# Patient Record
Sex: MALE | Race: BLACK OR AFRICAN AMERICAN | NOT HISPANIC OR LATINO | Employment: FULL TIME | URBAN - METROPOLITAN AREA
[De-identification: names, ages, dates, MRNs, and addresses within clinical notes are randomized per-mention and may not be internally consistent; named-entity substitution may affect disease eponyms.]

---

## 2017-01-26 ENCOUNTER — TELEPHONE (OUTPATIENT)
Dept: ONCOLOGY | Facility: HOSPITAL | Age: 62
End: 2017-01-26

## 2017-01-26 ENCOUNTER — TELEPHONE (OUTPATIENT)
Dept: GENERAL RADIOLOGY | Facility: HOSPITAL | Age: 62
End: 2017-01-26

## 2017-01-26 DIAGNOSIS — I26.99 OTHER PULMONARY EMBOLISM WITHOUT ACUTE COR PULMONALE, UNSPECIFIED CHRONICITY (HCC): Primary | ICD-10-CM

## 2017-01-26 NOTE — TELEPHONE ENCOUNTER
----- Message from Sandra Pelaez RN sent at 1/26/2017  3:49 PM EST -----  Contact: [phone]  Please call pt and set him up for INR and coag RN visit. Thanks

## 2017-01-26 NOTE — TELEPHONE ENCOUNTER
----- Message from Alba Dick sent at 1/26/2017  3:42 PM EST -----  Contact: self   Pt is calling to set up appt his labs      [phone]    Pt calling to set up an appt to come in for an INR check. Message sent to scheduling to make appt. Order for INR placed

## 2017-02-01 ENCOUNTER — APPOINTMENT (OUTPATIENT)
Dept: ONCOLOGY | Facility: HOSPITAL | Age: 62
End: 2017-02-01

## 2017-02-01 ENCOUNTER — APPOINTMENT (OUTPATIENT)
Dept: LAB | Facility: HOSPITAL | Age: 62
End: 2017-02-01

## 2017-02-03 ENCOUNTER — CLINICAL SUPPORT (OUTPATIENT)
Dept: ONCOLOGY | Facility: HOSPITAL | Age: 62
End: 2017-02-03

## 2017-02-03 ENCOUNTER — LAB (OUTPATIENT)
Dept: LAB | Facility: HOSPITAL | Age: 62
End: 2017-02-03

## 2017-02-03 DIAGNOSIS — I26.09 OTHER PULMONARY EMBOLISM WITH ACUTE COR PULMONALE, UNSPECIFIED CHRONICITY (HCC): Primary | ICD-10-CM

## 2017-02-03 LAB
INR PPP: 1.9 (ref 0.9–1.1)
PROTHROMBIN TIME: 23.3 SECONDS (ref 11–13.5)

## 2017-02-03 PROCEDURE — 85610 PROTHROMBIN TIME: CPT | Performed by: INTERNAL MEDICINE

## 2017-02-03 RX ORDER — WARFARIN SODIUM 5 MG/1
TABLET ORAL
Qty: 60 TABLET | Refills: 2 | Status: SHIPPED | OUTPATIENT
Start: 2017-02-03 | End: 2017-05-09 | Stop reason: SDUPTHER

## 2017-02-03 NOTE — PROGRESS NOTES
Patient has been non compliant with taking his coumadin.  He has also missed several months of having this monitored.  Patient was given new coumadin dosing and states that he will be more compliant with taking his meds.

## 2017-03-23 ENCOUNTER — TELEPHONE (OUTPATIENT)
Dept: FAMILY MEDICINE CLINIC | Facility: CLINIC | Age: 62
End: 2017-03-23

## 2017-03-23 RX ORDER — LOSARTAN POTASSIUM 50 MG/1
TABLET ORAL
Qty: 30 TABLET | Refills: 1 | Status: SHIPPED | OUTPATIENT
Start: 2017-03-23 | End: 2017-06-16 | Stop reason: ALTCHOICE

## 2017-03-23 RX ORDER — SILDENAFIL CITRATE 100 MG
TABLET ORAL
Qty: 7 TABLET | Refills: 2 | Status: SHIPPED | OUTPATIENT
Start: 2017-03-23 | End: 2017-06-16 | Stop reason: ALTCHOICE

## 2017-03-23 NOTE — TELEPHONE ENCOUNTER
Patient was called and informed he could not take Viagra with nitroglycerin and could lead to death

## 2017-05-09 RX ORDER — WARFARIN SODIUM 5 MG/1
TABLET ORAL
Qty: 12 TABLET | Refills: 0 | Status: SHIPPED | OUTPATIENT
Start: 2017-05-09 | End: 2017-05-12 | Stop reason: ALTCHOICE

## 2017-05-10 ENCOUNTER — TELEPHONE (OUTPATIENT)
Dept: FAMILY MEDICINE CLINIC | Facility: CLINIC | Age: 62
End: 2017-05-10

## 2017-05-10 ENCOUNTER — OFFICE VISIT (OUTPATIENT)
Dept: FAMILY MEDICINE CLINIC | Facility: CLINIC | Age: 62
End: 2017-05-10

## 2017-05-10 VITALS
OXYGEN SATURATION: 97 % | SYSTOLIC BLOOD PRESSURE: 128 MMHG | TEMPERATURE: 98 F | HEIGHT: 71 IN | BODY MASS INDEX: 33.32 KG/M2 | DIASTOLIC BLOOD PRESSURE: 80 MMHG | WEIGHT: 238 LBS | HEART RATE: 77 BPM

## 2017-05-10 DIAGNOSIS — R06.09 DYSPNEA ON EXERTION: ICD-10-CM

## 2017-05-10 DIAGNOSIS — R53.82 CHRONIC FATIGUE: ICD-10-CM

## 2017-05-10 DIAGNOSIS — Z11.59 NEED FOR HEPATITIS C SCREENING TEST: ICD-10-CM

## 2017-05-10 DIAGNOSIS — I10 ESSENTIAL HYPERTENSION: ICD-10-CM

## 2017-05-10 DIAGNOSIS — D36.7: Primary | ICD-10-CM

## 2017-05-10 LAB
25(OH)D3 SERPL-MCNC: 8.7 NG/ML (ref 30–100)
ALBUMIN SERPL-MCNC: 4 G/DL (ref 3.5–5.2)
ALBUMIN/GLOB SERPL: 1 G/DL
ALP SERPL-CCNC: 61 U/L (ref 39–117)
ALT SERPL W P-5'-P-CCNC: 20 U/L (ref 1–41)
ANION GAP SERPL CALCULATED.3IONS-SCNC: 10.5 MMOL/L
AST SERPL-CCNC: 22 U/L (ref 1–40)
BACTERIA UR QL AUTO: NORMAL /HPF
BILIRUB SERPL-MCNC: 0.7 MG/DL (ref 0.1–1.2)
BILIRUB UR QL STRIP: NEGATIVE
BUN BLD-MCNC: 12 MG/DL (ref 8–23)
BUN/CREAT SERPL: 9.5 (ref 7–25)
CALCIUM SPEC-SCNC: 9.5 MG/DL (ref 8.6–10.5)
CHLORIDE SERPL-SCNC: 101 MMOL/L (ref 98–107)
CHOLEST SERPL-MCNC: 232 MG/DL (ref 0–200)
CLARITY UR: CLEAR
CO2 SERPL-SCNC: 26.5 MMOL/L (ref 22–29)
COLOR UR: YELLOW
CREAT BLD-MCNC: 1.26 MG/DL (ref 0.76–1.27)
ERYTHROCYTE [DISTWIDTH] IN BLOOD BY AUTOMATED COUNT: 14 % (ref 4.5–15)
GFR SERPL CREATININE-BSD FRML MDRD: 70 ML/MIN/1.73
GLOBULIN UR ELPH-MCNC: 3.9 GM/DL
GLUCOSE BLD-MCNC: 111 MG/DL (ref 65–99)
GLUCOSE UR STRIP-MCNC: NEGATIVE MG/DL
HCT VFR BLD AUTO: 38.9 % (ref 35–60)
HCV AB SER DONR QL: NORMAL
HDLC SERPL-MCNC: 61 MG/DL (ref 40–60)
HGB BLD-MCNC: 13 G/DL (ref 13.5–18)
HGB UR QL STRIP.AUTO: NEGATIVE
KETONES UR QL STRIP: NEGATIVE
LDLC SERPL CALC-MCNC: 140 MG/DL (ref 0–100)
LDLC/HDLC SERPL: 2.3 {RATIO}
LEUKOCYTE ESTERASE UR QL STRIP.AUTO: NEGATIVE
LYMPHOCYTES # BLD AUTO: 2.6 10*3/MM3 (ref 1.2–3.4)
LYMPHOCYTES NFR BLD AUTO: 44.4 % (ref 21–51)
MCH RBC QN AUTO: 30.3 PG (ref 26.1–33.1)
MCHC RBC AUTO-ENTMCNC: 33.3 G/DL (ref 33–37)
MCV RBC AUTO: 90.9 FL (ref 80–99)
MONOCYTES # BLD AUTO: 0.5 10*3/MM3 (ref 0.1–0.6)
MONOCYTES NFR BLD AUTO: 9.3 % (ref 2–9)
MUCOUS THREADS URNS QL MICRO: NORMAL /HPF
NEUTROPHILS # BLD AUTO: 2.7 10*3/MM3 (ref 1.4–6.5)
NEUTROPHILS NFR BLD AUTO: 46.3 % (ref 42–75)
NITRITE UR QL STRIP: NEGATIVE
PH UR STRIP.AUTO: 5.5 [PH] (ref 4.6–8)
PLATELET # BLD AUTO: 254 10*3/MM3 (ref 150–450)
PMV BLD AUTO: 7.5 FL (ref 7.1–10.5)
POTASSIUM BLD-SCNC: 4.3 MMOL/L (ref 3.5–5.2)
PROT SERPL-MCNC: 7.9 G/DL (ref 6–8.5)
PROT UR QL STRIP: ABNORMAL
RBC # BLD AUTO: 4.28 10*6/MM3 (ref 4–6)
RBC # UR: NORMAL /HPF
REF LAB TEST METHOD: NORMAL
SODIUM BLD-SCNC: 138 MMOL/L (ref 136–145)
SP GR UR STRIP: 1.02 (ref 1–1.03)
SQUAMOUS #/AREA URNS HPF: NORMAL /HPF
TRIGL SERPL-MCNC: 153 MG/DL (ref 0–150)
TSH SERPL DL<=0.05 MIU/L-ACNC: 1.34 MIU/ML (ref 0.27–4.2)
UROBILINOGEN UR QL STRIP: ABNORMAL
VLDLC SERPL-MCNC: 30.6 MG/DL (ref 5–40)
WBC NRBC COR # BLD: 5.9 10*3/MM3 (ref 4.5–10)
WBC UR QL AUTO: NORMAL /HPF

## 2017-05-10 PROCEDURE — 81001 URINALYSIS AUTO W/SCOPE: CPT | Performed by: NURSE PRACTITIONER

## 2017-05-10 PROCEDURE — 85025 COMPLETE CBC W/AUTO DIFF WBC: CPT | Performed by: NURSE PRACTITIONER

## 2017-05-10 PROCEDURE — 80053 COMPREHEN METABOLIC PANEL: CPT | Performed by: NURSE PRACTITIONER

## 2017-05-10 PROCEDURE — 84443 ASSAY THYROID STIM HORMONE: CPT | Performed by: NURSE PRACTITIONER

## 2017-05-10 PROCEDURE — 82306 VITAMIN D 25 HYDROXY: CPT | Performed by: NURSE PRACTITIONER

## 2017-05-10 PROCEDURE — 80061 LIPID PANEL: CPT | Performed by: NURSE PRACTITIONER

## 2017-05-10 PROCEDURE — 86803 HEPATITIS C AB TEST: CPT | Performed by: NURSE PRACTITIONER

## 2017-05-10 PROCEDURE — 99214 OFFICE O/P EST MOD 30 MIN: CPT | Performed by: NURSE PRACTITIONER

## 2017-05-10 RX ORDER — ERGOCALCIFEROL 1.25 MG/1
50000 CAPSULE ORAL WEEKLY
Qty: 12 CAPSULE | Refills: 0 | Status: SHIPPED | OUTPATIENT
Start: 2017-05-10 | End: 2017-06-16 | Stop reason: ALTCHOICE

## 2017-05-11 ENCOUNTER — APPOINTMENT (OUTPATIENT)
Dept: LAB | Facility: HOSPITAL | Age: 62
End: 2017-05-11

## 2017-05-11 ENCOUNTER — APPOINTMENT (OUTPATIENT)
Dept: ONCOLOGY | Facility: HOSPITAL | Age: 62
End: 2017-05-11

## 2017-05-12 ENCOUNTER — HOSPITAL ENCOUNTER (OUTPATIENT)
Dept: ULTRASOUND IMAGING | Facility: HOSPITAL | Age: 62
Discharge: HOME | End: 2017-05-12
Admitting: NURSE PRACTITIONER

## 2017-05-12 ENCOUNTER — CLINICAL SUPPORT (OUTPATIENT)
Dept: ONCOLOGY | Facility: HOSPITAL | Age: 62
End: 2017-05-12

## 2017-05-12 ENCOUNTER — ANTICOAGULATION VISIT (OUTPATIENT)
Dept: LAB | Facility: HOSPITAL | Age: 62
End: 2017-05-12

## 2017-05-12 DIAGNOSIS — I26.99 OTHER PULMONARY EMBOLISM WITHOUT ACUTE COR PULMONALE, UNSPECIFIED CHRONICITY (HCC): ICD-10-CM

## 2017-05-12 DIAGNOSIS — D36.7: ICD-10-CM

## 2017-05-12 LAB
INR PPP: 1.1 (ref 0.9–1.1)
PROTHROMBIN TIME: 13.5 SECONDS (ref 11–13.5)

## 2017-05-12 PROCEDURE — 85610 PROTHROMBIN TIME: CPT | Performed by: INTERNAL MEDICINE

## 2017-05-12 PROCEDURE — 76882 US LMTD JT/FCL EVL NVASC XTR: CPT

## 2017-05-16 ENCOUNTER — TELEPHONE (OUTPATIENT)
Dept: FAMILY MEDICINE CLINIC | Facility: CLINIC | Age: 62
End: 2017-05-16

## 2017-05-19 ENCOUNTER — OFFICE VISIT (OUTPATIENT)
Dept: FAMILY MEDICINE CLINIC | Facility: CLINIC | Age: 62
End: 2017-05-19

## 2017-05-19 VITALS
HEIGHT: 72 IN | TEMPERATURE: 98 F | SYSTOLIC BLOOD PRESSURE: 122 MMHG | DIASTOLIC BLOOD PRESSURE: 78 MMHG | BODY MASS INDEX: 32.1 KG/M2 | WEIGHT: 237 LBS | OXYGEN SATURATION: 94 % | HEART RATE: 73 BPM

## 2017-05-19 DIAGNOSIS — Z79.01 CHRONIC ANTICOAGULATION: ICD-10-CM

## 2017-05-19 DIAGNOSIS — M79.604 RIGHT LEG PAIN: Primary | ICD-10-CM

## 2017-05-19 DIAGNOSIS — Z86.711 HISTORY OF PULMONARY EMBOLISM: ICD-10-CM

## 2017-05-19 DIAGNOSIS — D68.62 LUPUS ANTICOAGULANT DISORDER (HCC): ICD-10-CM

## 2017-05-19 DIAGNOSIS — E66.09 NON MORBID OBESITY DUE TO EXCESS CALORIES: ICD-10-CM

## 2017-05-19 PROCEDURE — 99213 OFFICE O/P EST LOW 20 MIN: CPT | Performed by: NURSE PRACTITIONER

## 2017-06-16 ENCOUNTER — OFFICE VISIT (OUTPATIENT)
Dept: FAMILY MEDICINE CLINIC | Facility: CLINIC | Age: 62
End: 2017-06-16

## 2017-06-16 VITALS
HEIGHT: 72 IN | BODY MASS INDEX: 32.37 KG/M2 | DIASTOLIC BLOOD PRESSURE: 82 MMHG | HEART RATE: 80 BPM | TEMPERATURE: 97.8 F | WEIGHT: 239 LBS | SYSTOLIC BLOOD PRESSURE: 132 MMHG | OXYGEN SATURATION: 97 %

## 2017-06-16 DIAGNOSIS — L08.9 BLISTER OF TOE OF RIGHT FOOT WITH INFECTION, INITIAL ENCOUNTER: Primary | ICD-10-CM

## 2017-06-16 DIAGNOSIS — S90.426A: ICD-10-CM

## 2017-06-16 DIAGNOSIS — S90.424A BLISTER OF TOE OF RIGHT FOOT WITH INFECTION, INITIAL ENCOUNTER: Primary | ICD-10-CM

## 2017-06-16 DIAGNOSIS — L08.9: ICD-10-CM

## 2017-06-16 DIAGNOSIS — S90.829A: ICD-10-CM

## 2017-06-16 LAB
ANION GAP SERPL CALCULATED.3IONS-SCNC: 12.4 MMOL/L
BUN BLD-MCNC: 15 MG/DL (ref 8–23)
BUN/CREAT SERPL: 12.3 (ref 7–25)
CALCIUM SPEC-SCNC: 9.5 MG/DL (ref 8.6–10.5)
CHLORIDE SERPL-SCNC: 101 MMOL/L (ref 98–107)
CO2 SERPL-SCNC: 25.6 MMOL/L (ref 22–29)
CREAT BLD-MCNC: 1.22 MG/DL (ref 0.76–1.27)
ERYTHROCYTE [DISTWIDTH] IN BLOOD BY AUTOMATED COUNT: 14.4 % (ref 4.5–15)
GFR SERPL CREATININE-BSD FRML MDRD: 73 ML/MIN/1.73
GLUCOSE BLD-MCNC: 93 MG/DL (ref 65–99)
HCT VFR BLD AUTO: 38.2 % (ref 35–60)
HGB BLD-MCNC: 12.5 G/DL (ref 13.5–18)
LYMPHOCYTES # BLD AUTO: 2.1 10*3/MM3 (ref 1.2–3.4)
LYMPHOCYTES NFR BLD AUTO: 48.7 % (ref 21–51)
MCH RBC QN AUTO: 29.7 PG (ref 26.1–33.1)
MCHC RBC AUTO-ENTMCNC: 32.8 G/DL (ref 33–37)
MCV RBC AUTO: 90.4 FL (ref 80–99)
MONOCYTES # BLD AUTO: 0.5 10*3/MM3 (ref 0.1–0.6)
MONOCYTES NFR BLD AUTO: 10.6 % (ref 2–9)
NEUTROPHILS # BLD AUTO: 1.8 10*3/MM3 (ref 1.4–6.5)
NEUTROPHILS NFR BLD AUTO: 40.7 % (ref 42–75)
PLATELET # BLD AUTO: 247 10*3/MM3 (ref 150–450)
PMV BLD AUTO: 7.5 FL (ref 7.1–10.5)
POTASSIUM BLD-SCNC: 4.7 MMOL/L (ref 3.5–5.2)
RBC # BLD AUTO: 4.23 10*6/MM3 (ref 4–6)
SODIUM BLD-SCNC: 139 MMOL/L (ref 136–145)
URATE SERPL-MCNC: 5.9 MG/DL (ref 3.4–7)
WBC NRBC COR # BLD: 4.4 10*3/MM3 (ref 4.5–10)

## 2017-06-16 PROCEDURE — 99213 OFFICE O/P EST LOW 20 MIN: CPT | Performed by: NURSE PRACTITIONER

## 2017-06-16 PROCEDURE — 84550 ASSAY OF BLOOD/URIC ACID: CPT | Performed by: NURSE PRACTITIONER

## 2017-06-16 PROCEDURE — 85025 COMPLETE CBC W/AUTO DIFF WBC: CPT | Performed by: NURSE PRACTITIONER

## 2017-06-16 PROCEDURE — 80048 BASIC METABOLIC PNL TOTAL CA: CPT | Performed by: NURSE PRACTITIONER

## 2017-06-16 RX ORDER — MUPIROCIN CALCIUM 20 MG/G
CREAM TOPICAL 2 TIMES DAILY
Qty: 30 G | Refills: 0 | Status: SHIPPED | OUTPATIENT
Start: 2017-06-16 | End: 2017-10-02

## 2017-06-16 RX ORDER — SULFAMETHOXAZOLE AND TRIMETHOPRIM 800; 160 MG/1; MG/1
1 TABLET ORAL 2 TIMES DAILY
Qty: 14 TABLET | Refills: 0 | Status: SHIPPED | OUTPATIENT
Start: 2017-06-16 | End: 2017-06-16 | Stop reason: SDUPTHER

## 2017-06-16 RX ORDER — SULFAMETHOXAZOLE AND TRIMETHOPRIM 800; 160 MG/1; MG/1
1 TABLET ORAL 2 TIMES DAILY
Qty: 20 TABLET | Refills: 0 | Status: SHIPPED | OUTPATIENT
Start: 2017-06-16 | End: 2017-06-26

## 2017-06-16 RX ORDER — ATORVASTATIN CALCIUM 40 MG/1
40 TABLET, FILM COATED ORAL DAILY
COMMUNITY
End: 2017-10-02 | Stop reason: SDUPTHER

## 2017-06-16 RX ORDER — LOSARTAN POTASSIUM 50 MG/1
50 TABLET ORAL DAILY
COMMUNITY
End: 2017-10-02 | Stop reason: SDUPTHER

## 2017-06-16 NOTE — PROGRESS NOTES
Subjective   Jared Rose is a 61 y.o. male.     History of Present Illness   C/o blister and swelling on right foot, he states this is a new problem today. He states he noticed the blisters for a while, he saw a podiatrist for this before, he denies any DM hx. He states the blisters have been on his toes for about 1-2 months, he states it is hard to put his shoe on, he states he walks on the sides of his foot, he states the blisters are draining. He states he is taking xarelto, for blood clotting disorder, sees Dr. Monroy for this. He stands on his feet a lot, he is security at [employer], he states his shoes are not new.     The following portions of the patient's history were reviewed and updated as appropriate: allergies, current medications, past family history, past medical history, past social history, past surgical history and problem list.    Review of Systems   Constitutional: Negative for chills, diaphoresis and fever.   Respiratory: Negative for shortness of breath.    Cardiovascular: Negative for chest pain and leg swelling.   Musculoskeletal: Positive for gait problem and myalgias. Negative for arthralgias.   Skin: Positive for color change and wound. Negative for pallor.   Neurological: Negative for dizziness, light-headedness and headaches.   All other systems reviewed and are negative.      Objective   Physical Exam   Constitutional: He is oriented to person, place, and time. He appears well-developed and well-nourished.   HENT:   Head: Normocephalic.   Eyes: Pupils are equal, round, and reactive to light.   Neck: Neck supple.   Cardiovascular: Normal rate, regular rhythm, normal heart sounds and intact distal pulses.    Pulses:       Dorsalis pedis pulses are 2+ on the right side, and 2+ on the left side.        Posterior tibial pulses are 2+ on the right side, and 2+ on the left side.   Pulmonary/Chest: Effort normal and breath sounds normal.   Musculoskeletal: Normal range of motion.         Left foot: There is tenderness and swelling. There is normal range of motion, no bony tenderness, normal capillary refill, no crepitus and no deformity.        Neurological: He is alert and oriented to person, place, and time.   Skin: Skin is warm and dry.   Psychiatric: He has a normal mood and affect. His behavior is normal. Judgment and thought content normal.   Nursing note and vitals reviewed.      Assessment/Plan   Jared was seen today for blister and edema.    Diagnoses and all orders for this visit:    Blister of toe of right foot with infection, initial encounter  -     Ambulatory Referral to Wound Clinic  -     Basic metabolic panel  -     Uric Acid  -     Miscellaneous DME  -     Ambulatory Referral to Podiatry  -     CBC & Differential  -     CBC Auto Differential    Foot and toe(s), blister, infected  -     Ambulatory Referral to Wound Clinic  -     Basic metabolic panel  -     Uric Acid  -     Miscellaneous DME  -     Ambulatory Referral to Podiatry  -     CBC & Differential  -     CBC Auto Differential    Other orders  -     Discontinue: sulfamethoxazole-trimethoprim (BACTRIM DS) 800-160 MG per tablet; Take 1 tablet by mouth 2 (Two) Times a Day for 7 days.  -     mupirocin (BACTROBAN) 2 % cream; Apply  topically 2 (Two) Times a Day.  -     sulfamethoxazole-trimethoprim (BACTRIM DS) 800-160 MG per tablet; Take 1 tablet by mouth 2 (Two) Times a Day for 10 days.        Apply bactroban cream to open blisters, in between toes, and to bottom of foot, keep wounds clean and dry, change dressing 2x day.  DME for post op shoe, wear until seen by podiatrist.  Encourage good shoes for work.  CBC,BMP and uric acid today, will call with results.  Start Bactrim DS 2x day for 10 days, call with problems.  F/u with wound clinic as indicated appt on July 3rd.   F/u in 1 week.   Increase fluid intake, get plenty of rest.   Patient agrees with plan of care and understands instructions. Call if worsening symptoms or  any problems or concerns.

## 2017-06-16 NOTE — PATIENT INSTRUCTIONS
Apply bactroban cream to open blisters, in between toes, and to bottom of foot, keep wounds clean and dry, change dressing 2x day.  DME for post op shoe, wear until seen by podiatrist.  Encourage good shoes for work.  CBC,BMP and uric acid today, will call with results.  Start Bactrim DS 2x day for 10 days, call with problems.  F/u with wound clinic as indicated appt on July 3rd.   F/u in 1 week.   May try tylenol as needed for pain.   Elevate leg above heart level, encourage rest.  Work note given.   Increase fluid intake, get plenty of rest.   Patient agrees with plan of care and understands instructions. Call if worsening symptoms or any problems or concerns.

## 2017-06-17 DIAGNOSIS — R79.89 ABNORMAL CBC: ICD-10-CM

## 2017-06-17 DIAGNOSIS — D64.9 LOW HEMOGLOBIN: Primary | ICD-10-CM

## 2017-06-19 ENCOUNTER — TELEPHONE (OUTPATIENT)
Dept: FAMILY MEDICINE CLINIC | Facility: CLINIC | Age: 62
End: 2017-06-19

## 2017-06-19 NOTE — TELEPHONE ENCOUNTER
[facility]    ----- Message from MALIA Thorpe sent at 6/17/2017  9:12 AM EDT -----  Please call patient with results.  BS and kidney func normal. Uric acid normal. Blood counts and hemoglobin low, recommend recheck in about 1 week and occult card. He can do this at f/u next Friday.

## 2017-06-20 ENCOUNTER — TELEPHONE (OUTPATIENT)
Dept: FAMILY MEDICINE CLINIC | Facility: CLINIC | Age: 62
End: 2017-06-20

## 2017-06-20 NOTE — TELEPHONE ENCOUNTER
Pt informed    ----- Message from MALIA Thorpe sent at 6/17/2017  9:12 AM EDT -----  Please call patient with results.  BS and kidney func normal. Uric acid normal. Blood counts and hemoglobin low, recommend recheck in about 1 week and occult card. He can do this at f/u next Friday.

## 2017-06-23 ENCOUNTER — OFFICE VISIT (OUTPATIENT)
Dept: ONCOLOGY | Facility: CLINIC | Age: 62
End: 2017-06-23

## 2017-06-23 ENCOUNTER — LAB (OUTPATIENT)
Dept: LAB | Facility: HOSPITAL | Age: 62
End: 2017-06-23

## 2017-06-23 VITALS
SYSTOLIC BLOOD PRESSURE: 132 MMHG | BODY MASS INDEX: 34.19 KG/M2 | HEART RATE: 95 BPM | RESPIRATION RATE: 16 BRPM | TEMPERATURE: 98.1 F | DIASTOLIC BLOOD PRESSURE: 80 MMHG | HEIGHT: 70 IN | WEIGHT: 238.8 LBS | OXYGEN SATURATION: 96 %

## 2017-06-23 DIAGNOSIS — D68.59 THROMBOPHILIA (HCC): Primary | ICD-10-CM

## 2017-06-23 DIAGNOSIS — Z79.01 CHRONIC ANTICOAGULATION: ICD-10-CM

## 2017-06-23 DIAGNOSIS — I26.99 OTHER PULMONARY EMBOLISM WITHOUT ACUTE COR PULMONALE, UNSPECIFIED CHRONICITY (HCC): ICD-10-CM

## 2017-06-23 DIAGNOSIS — D68.62 LUPUS ANTICOAGULANT DISORDER (HCC): ICD-10-CM

## 2017-06-23 LAB
BASOPHILS # BLD AUTO: 0.04 10*3/MM3 (ref 0–0.1)
BASOPHILS NFR BLD AUTO: 0.8 % (ref 0–1.1)
DEPRECATED RDW RBC AUTO: 45.5 FL (ref 37–49)
EOSINOPHIL # BLD AUTO: 0.18 10*3/MM3 (ref 0–0.36)
EOSINOPHIL NFR BLD AUTO: 3.7 % (ref 1–5)
ERYTHROCYTE [DISTWIDTH] IN BLOOD BY AUTOMATED COUNT: 14 % (ref 11.7–14.5)
HCT VFR BLD AUTO: 37.6 % (ref 40–49)
HGB BLD-MCNC: 13.1 G/DL (ref 13.5–16.5)
IMM GRANULOCYTES # BLD: 0.01 10*3/MM3 (ref 0–0.03)
IMM GRANULOCYTES NFR BLD: 0.2 % (ref 0–0.5)
INR PPP: 1.1 (ref 0.9–1.1)
LYMPHOCYTES # BLD AUTO: 2.09 10*3/MM3 (ref 1–3.5)
LYMPHOCYTES NFR BLD AUTO: 43.1 % (ref 20–49)
MCH RBC QN AUTO: 31.3 PG (ref 27–33)
MCHC RBC AUTO-ENTMCNC: 34.8 G/DL (ref 32–35)
MCV RBC AUTO: 89.7 FL (ref 83–97)
MONOCYTES # BLD AUTO: 0.62 10*3/MM3 (ref 0.25–0.8)
MONOCYTES NFR BLD AUTO: 12.8 % (ref 4–12)
NEUTROPHILS # BLD AUTO: 1.91 10*3/MM3 (ref 1.5–7)
NEUTROPHILS NFR BLD AUTO: 39.4 % (ref 39–75)
NRBC BLD MANUAL-RTO: 0 /100 WBC (ref 0–0)
PLATELET # BLD AUTO: 254 10*3/MM3 (ref 150–375)
PMV BLD AUTO: 9.8 FL (ref 8.9–12.1)
PROTHROMBIN TIME: 12.8 SECONDS (ref 11–13.5)
RBC # BLD AUTO: 4.19 10*6/MM3 (ref 4.3–5.5)
WBC NRBC COR # BLD: 4.85 10*3/MM3 (ref 4–10)

## 2017-06-23 PROCEDURE — 85025 COMPLETE CBC W/AUTO DIFF WBC: CPT

## 2017-06-23 PROCEDURE — 36416 COLLJ CAPILLARY BLOOD SPEC: CPT

## 2017-06-23 PROCEDURE — 85610 PROTHROMBIN TIME: CPT

## 2017-06-23 PROCEDURE — 99213 OFFICE O/P EST LOW 20 MIN: CPT | Performed by: INTERNAL MEDICINE

## 2017-06-23 NOTE — PROGRESS NOTES
Subjective     HISTORY OF PRESENT ILLNESS:     History of Present Illness  The patient is a 60-year-old gentleman on chronic anticoagulation therapy due to previous history of unprovoked pulmonary embolus and positive lupus anticoagulant. He had been doing well on Coumadin for several years but is currently taking Xarelto 20 mg daily.    He seems to be tolerating this Xarelto well.  He is not any need any signs or symptoms of thrombosis and no bleeding issues.    He developed a blister on his foot which became infected and he is currently on an antibiotic and seems to be doing better.    Past Medical History, Past Surgical History, Social History, Family History have been reviewed and are without significant changes except as mentioned.    Review of Systems   Constitutional: Negative for activity change, appetite change, fatigue, fever and unexpected weight change.   HENT: Negative for hearing loss, nosebleeds, trouble swallowing and voice change.    Eyes: Negative for visual disturbance.   Respiratory: Negative for cough, shortness of breath and wheezing.    Cardiovascular: Negative for chest pain and palpitations.   Gastrointestinal: Negative for abdominal pain, diarrhea, nausea and vomiting.   Genitourinary: Negative for difficulty urinating, frequency, hematuria and urgency.   Musculoskeletal: Negative for back pain and neck pain.   Skin: Negative for rash.   Neurological: Negative for dizziness, seizures, syncope and headaches.   Hematological: Negative for adenopathy. Does not bruise/bleed easily.   Psychiatric/Behavioral: Negative for behavioral problems. The patient is not nervous/anxious.       A comprehensive 14 point review of systems was performed and was negative except as mentioned.    Medications:  The current medication list was reviewed in the EMR    ALLERGIES:  No Known Allergies    Objective      Vitals:    06/23/17 1508   BP: 132/80   Pulse: 95   Resp: 16   Temp: 98.1 °F (36.7 °C)   TempSrc:  "Oral   SpO2: 96%   Weight: 238 lb 12.8 oz (108 kg)   Height: 69.61\" (176.8 cm)  Comment: new ht   PainSc: 0-No pain     Current Status 6/23/2017   ECOG score 0       Physical Exam   Constitutional: He is oriented to person, place, and time. He appears well-developed and well-nourished. No distress.   HENT:   Head: Normocephalic.   Eyes: Conjunctivae and EOM are normal. Pupils are equal, round, and reactive to light. No scleral icterus.   Neck: Normal range of motion. Neck supple. No JVD present. No thyromegaly present.   Cardiovascular: Normal rate and regular rhythm.  Exam reveals no gallop and no friction rub.    No murmur heard.  Pulmonary/Chest: Effort normal and breath sounds normal. He has no wheezes. He has no rales.   Abdominal: Soft. He exhibits no distension and no mass. There is no tenderness.   Musculoskeletal: Normal range of motion. He exhibits no edema or deformity.   Lymphadenopathy:     He has no cervical adenopathy.   Neurological: He is alert and oriented to person, place, and time. He has normal reflexes. No cranial nerve deficit.   Skin: Skin is warm and dry. No rash noted. No erythema.   Psychiatric: He has a normal mood and affect. His behavior is normal. Judgment normal.         RECENT LABS:  Hematology    Lab Results   Component Value Date    WBC 4.85 06/23/2017    HGB 13.1 (L) 06/23/2017    HCT 37.6 (L) 06/23/2017    MCV 89.7 06/23/2017     06/23/2017                   Assessment/Plan   1. Long-term anticoagulation following pulmonary embolus with positive lupus anticoagulant detected on his initial thrombophilia evaluation in the hospital.  2. He now is doing well on Xarelto and will not require routine monitoring.   3. Mild anemia and leukopenia. Stable.    Plan    1.   Continue Xarelto 20 mg daily.  2.  Return for follow-up in 12 months.                           6/23/2017      CC:          "

## 2017-07-03 ENCOUNTER — APPOINTMENT (OUTPATIENT)
Dept: WOUND CARE | Facility: HOSPITAL | Age: 62
End: 2017-07-03
Attending: SURGERY

## 2017-08-03 ENCOUNTER — TELEPHONE (OUTPATIENT)
Dept: ONCOLOGY | Facility: HOSPITAL | Age: 62
End: 2017-08-03

## 2017-08-03 NOTE — TELEPHONE ENCOUNTER
Received a voice message from patient regarding changing from Xarelto back to Coumadin d/t cost.  Reviewed with Dr. Monroy and OK for patient to change back to Coumadin.  He should start with 7.5mg daily, recheck 1 week from restarting, then q2 weeks for 3-4 visits and then monthly.  Attempted to call patient back, but there was no answer.  Left voicemail for patient to return call.

## 2017-08-07 ENCOUNTER — TELEPHONE (OUTPATIENT)
Dept: ONCOLOGY | Facility: HOSPITAL | Age: 62
End: 2017-08-07

## 2017-08-07 ENCOUNTER — DOCUMENTATION (OUTPATIENT)
Dept: ONCOLOGY | Facility: CLINIC | Age: 62
End: 2017-08-07

## 2017-08-07 NOTE — TELEPHONE ENCOUNTER
Pt calling because he can't afford his xarelto and might need to switch back to his coumadin. Reviewed with Ashley. She will call patient back and review the option of a copay card.

## 2017-08-07 NOTE — PROGRESS NOTES
Rec call from Fior VELEZ Triage RN-She states pt left a VM saying he could not afford the Xarelto copay. She was asking if there is any assistance. Pt is a [employer] and has commercial insurance. I can get him setup with a copay card and his copay will be $0. I attempted to contact pt at the home number (he provided this in the VM) but I rec no answer. I left a message asking for a return call back.

## 2017-08-14 ENCOUNTER — DOCUMENTATION (OUTPATIENT)
Dept: ONCOLOGY | Facility: CLINIC | Age: 62
End: 2017-08-14

## 2017-08-14 NOTE — PROGRESS NOTES
Rec call from Pt-He was returning a call that was left on his VM. I let him know that I got the message about his Xarelto copay issue and I can set him up with a copay card. He came to the office and picked up the copay card along with the free month voucher. My contact information was given to him as well.

## 2017-10-02 ENCOUNTER — OFFICE VISIT (OUTPATIENT)
Dept: CARDIOLOGY | Facility: CLINIC | Age: 62
End: 2017-10-02

## 2017-10-02 VITALS
SYSTOLIC BLOOD PRESSURE: 140 MMHG | BODY MASS INDEX: 34.02 KG/M2 | HEART RATE: 64 BPM | HEIGHT: 71 IN | DIASTOLIC BLOOD PRESSURE: 84 MMHG | WEIGHT: 243 LBS

## 2017-10-02 DIAGNOSIS — I25.10 ATHEROSCLEROSIS OF NATIVE CORONARY ARTERY OF NATIVE HEART WITHOUT ANGINA PECTORIS: Primary | ICD-10-CM

## 2017-10-02 DIAGNOSIS — I10 ESSENTIAL HYPERTENSION: ICD-10-CM

## 2017-10-02 PROCEDURE — 93000 ELECTROCARDIOGRAM COMPLETE: CPT | Performed by: INTERNAL MEDICINE

## 2017-10-02 PROCEDURE — 99213 OFFICE O/P EST LOW 20 MIN: CPT | Performed by: INTERNAL MEDICINE

## 2017-10-02 RX ORDER — ATORVASTATIN CALCIUM 40 MG/1
40 TABLET, FILM COATED ORAL DAILY
Qty: 90 TABLET | Refills: 3 | Status: SHIPPED | OUTPATIENT
Start: 2017-10-02 | End: 2018-12-26

## 2017-10-02 RX ORDER — LOSARTAN POTASSIUM 50 MG/1
50 TABLET ORAL DAILY
Qty: 90 TABLET | Refills: 3 | Status: SHIPPED | OUTPATIENT
Start: 2017-10-02 | End: 2018-12-26

## 2017-10-02 NOTE — PROGRESS NOTES
Date of Office Visit: 10/2/2017  Encounter Provider: Eligio Panchal MD  Place of Service: [facility] CARDIOLOGY  Patient Name: Jared Rose  :1955    Chief Complaint   Patient presents with   • Coronary Artery Disease   :     HPI: Jared Rose is a 62 y.o. male who presents today to follow-up.  He was seen in our chest pain unit in 2015 with significant dyspnea. Due to his symptoms, cardiac catheterization was recommended. This revealed small-vessel disease of the diagonal and circumflex, and medical therapy only was recommended. He was started on aspirin and atorvastatin, as well as losartan for hypertension. He has a history of VTE due to lupus anticoagulant/antiphospholipid antibody syndrome and was on warfarin for years; he was recently changed to rivaroxaban.       He hasn't taken any medications other than the NOAC for several months.  He states he forgot and never got them filled.  He has very mild exertional dyspnea which is stable, but denies significant dyspnea, chest pain, edema, orthopnea, palpitations, claudication, or syncope.      Past Medical History:   Diagnosis Date   • Bell's palsy    • CAD (coronary artery disease)     Minimal; diagnosed on previous cardiac cath in    • Coronary atherosclerosis     cath 2015: normal LM, diffuse LCx disease, LI LAD, 60-70% ostial diag (<1 mm vessel), LI RCA   • H/O Anemia    • H/O Leukopenia    • H/O umbilical hernia repair 2014    Dr. Delgadillo   • Hyperlipidemia    • Hypertension    • Lupus anticoagulant positive    • Obesity    • Pulmonary embolism     H/O Right lower lobe pulmonary embolus   • Stress fracture of right foot        Past Surgical History:   Procedure Laterality Date   • CARDIAC CATHETERIZATION     • UMBILICAL HERNIA REPAIR  2014    Dr. Delgadillo       Social History     Social History   • Marital status:      Spouse name: N/A   • Number of children: N/A   • Years of  "education: N/A     Occupational History   • [employer]     Social History Main Topics   • Smoking status: Never Smoker   • Smokeless tobacco: Never Used   • Alcohol use Defer   • Drug use: No   • Sexual activity: Yes     Other Topics Concern   • Not on file     Social History Narrative       Family History   Problem Relation Age of Onset   • Coronary artery disease Father    • Heart disease Mother 83   • Hypertension Mother    • Hypertension Sister        Review of Systems   All other systems reviewed and are negative.      Allergies   Allergen Reactions   • Adhesive Tape          Current Outpatient Prescriptions:   •  rivaroxaban (XARELTO) 20 MG tablet, Take 1 tablet by mouth Daily., Disp: 90 tablet, Rfl: 3  •  atorvastatin (LIPITOR) 40 MG tablet, Take 1 tablet by mouth Daily., Disp: 90 tablet, Rfl: 3  •  losartan (COZAAR) 50 MG tablet, Take 1 tablet by mouth Daily., Disp: 90 tablet, Rfl: 3     Objective:     Vitals:    10/02/17 0946   BP: 140/84   BP Location: Left arm   Pulse: 64   Weight: 243 lb (110 kg)   Height: 71\" (180.3 cm)     Body mass index is 33.89 kg/(m^2).    Physical Exam   Constitutional: He is oriented to person, place, and time. He appears well-developed and well-nourished.   HENT:   Head: Normocephalic.   Nose: Nose normal.   Mouth/Throat: Oropharynx is clear and moist.   Eyes: Conjunctivae and EOM are normal. Pupils are equal, round, and reactive to light.   Neck: Normal range of motion. No JVD present.   Cardiovascular: Normal rate, regular rhythm, normal heart sounds and intact distal pulses.    No murmur heard.  Pulmonary/Chest: Effort normal and breath sounds normal.   Abdominal: Soft. He exhibits no mass. There is no tenderness.   Musculoskeletal: Normal range of motion. He exhibits no edema.   Lymphadenopathy:     He has no cervical adenopathy.   Neurological: He is alert and oriented to person, place, and time. No cranial nerve deficit.   Skin: Skin is warm " and dry. No rash noted.   Psychiatric: He has a normal mood and affect. His behavior is normal. Judgment and thought content normal.   Vitals reviewed.        ECG 12 Lead  Date/Time: 10/2/2017 10:31 AM  Performed by: ELIGIO PANCHAL  Authorized by: ELIGIO PANCHAL   Comparison: compared with previous ECG   Similar to previous ECG  Rhythm: sinus rhythm  Conduction: conduction normal  ST Segments: ST segments normal  T flattening: all  QRS axis: normal  Other: no other findings  Clinical impression: non-specific ECG              Assessment:       Diagnosis Plan   1. Atherosclerosis of native coronary artery of native heart without angina pectoris     2. Essential hypertension            Plan:       1.  He has moderate nonobstructive disease involving the circumflex and diagonal, and is asymptomatic.  I encouraged him to resume low-dose aspirin and to be compliant with atorvastatin.    2.  His blood pressure is elevated today but he is not taking his losartan; I refilled this.      Sincerely,       Eligio Panchal MD

## 2018-04-09 RX ORDER — SILDENAFIL 100 MG/1
TABLET, FILM COATED ORAL
Qty: 5 TABLET | Refills: 1 | OUTPATIENT
Start: 2018-04-09

## 2018-04-17 RX ORDER — SILDENAFIL 100 MG/1
TABLET, FILM COATED ORAL
Qty: 10 TABLET | Refills: 1 | Status: SHIPPED | OUTPATIENT
Start: 2018-04-17 | End: 2018-12-26

## 2018-05-18 ENCOUNTER — OFFICE VISIT (OUTPATIENT)
Dept: FAMILY MEDICINE CLINIC | Facility: CLINIC | Age: 63
End: 2018-05-18

## 2018-05-18 ENCOUNTER — TELEPHONE (OUTPATIENT)
Dept: FAMILY MEDICINE CLINIC | Facility: CLINIC | Age: 63
End: 2018-05-18

## 2018-05-18 VITALS
OXYGEN SATURATION: 98 % | DIASTOLIC BLOOD PRESSURE: 80 MMHG | BODY MASS INDEX: 34.75 KG/M2 | HEART RATE: 70 BPM | HEIGHT: 71 IN | RESPIRATION RATE: 16 BRPM | SYSTOLIC BLOOD PRESSURE: 130 MMHG | TEMPERATURE: 98 F | WEIGHT: 248.2 LBS

## 2018-05-18 DIAGNOSIS — D64.9 LOW HEMOGLOBIN: Primary | ICD-10-CM

## 2018-05-18 DIAGNOSIS — S90.821A BLISTER OF RIGHT FOOT, INITIAL ENCOUNTER: Primary | ICD-10-CM

## 2018-05-18 DIAGNOSIS — E78.5 HYPERLIPIDEMIA, UNSPECIFIED HYPERLIPIDEMIA TYPE: ICD-10-CM

## 2018-05-18 DIAGNOSIS — Z00.00 HEALTHCARE MAINTENANCE: ICD-10-CM

## 2018-05-18 LAB
ALBUMIN SERPL-MCNC: 4.2 G/DL (ref 3.5–5.2)
ALBUMIN/GLOB SERPL: 1.2 G/DL
ALP SERPL-CCNC: 78 U/L (ref 39–117)
ALT SERPL W P-5'-P-CCNC: 18 U/L (ref 1–41)
ANION GAP SERPL CALCULATED.3IONS-SCNC: 9.2 MMOL/L
AST SERPL-CCNC: 23 U/L (ref 1–40)
BILIRUB SERPL-MCNC: 0.8 MG/DL (ref 0.1–1.2)
BUN BLD-MCNC: 12 MG/DL (ref 8–23)
BUN/CREAT SERPL: 9.2 (ref 7–25)
CALCIUM SPEC-SCNC: 9.5 MG/DL (ref 8.6–10.5)
CHLORIDE SERPL-SCNC: 100 MMOL/L (ref 98–107)
CO2 SERPL-SCNC: 28.8 MMOL/L (ref 22–29)
CREAT BLD-MCNC: 1.3 MG/DL (ref 0.76–1.27)
ERYTHROCYTE [DISTWIDTH] IN BLOOD BY AUTOMATED COUNT: 14.7 % (ref 4.5–15)
GFR SERPL CREATININE-BSD FRML MDRD: 68 ML/MIN/1.73
GLOBULIN UR ELPH-MCNC: 3.6 GM/DL
GLUCOSE BLD-MCNC: 106 MG/DL (ref 65–99)
HBA1C MFR BLD: 5.93 % (ref 4.8–5.6)
HCT VFR BLD AUTO: 37.6 % (ref 35–60)
HGB BLD-MCNC: 12.3 G/DL (ref 13.5–18)
LYMPHOCYTES # BLD AUTO: 2.6 10*3/MM3 (ref 1.2–3.4)
LYMPHOCYTES NFR BLD AUTO: 49.7 % (ref 21–51)
MCH RBC QN AUTO: 29.7 PG (ref 26.1–33.1)
MCHC RBC AUTO-ENTMCNC: 32.8 G/DL (ref 33–37)
MCV RBC AUTO: 90.4 FL (ref 80–99)
MONOCYTES # BLD AUTO: 0.4 10*3/MM3 (ref 0.1–0.6)
MONOCYTES NFR BLD AUTO: 8.6 % (ref 2–9)
NEUTROPHILS # BLD AUTO: 2.2 10*3/MM3 (ref 1.4–6.5)
NEUTROPHILS NFR BLD AUTO: 41.7 % (ref 42–75)
PLATELET # BLD AUTO: 275 10*3/MM3 (ref 150–450)
PMV BLD AUTO: 7.7 FL (ref 7.1–10.5)
POTASSIUM BLD-SCNC: 4.4 MMOL/L (ref 3.5–5.2)
PROT SERPL-MCNC: 7.8 G/DL (ref 6–8.5)
RBC # BLD AUTO: 4.15 10*6/MM3 (ref 4–6)
SODIUM BLD-SCNC: 138 MMOL/L (ref 136–145)
WBC NRBC COR # BLD: 5.2 10*3/MM3 (ref 4.5–10)

## 2018-05-18 PROCEDURE — 80053 COMPREHEN METABOLIC PANEL: CPT | Performed by: NURSE PRACTITIONER

## 2018-05-18 PROCEDURE — 99213 OFFICE O/P EST LOW 20 MIN: CPT | Performed by: NURSE PRACTITIONER

## 2018-05-18 PROCEDURE — 83036 HEMOGLOBIN GLYCOSYLATED A1C: CPT | Performed by: NURSE PRACTITIONER

## 2018-05-18 PROCEDURE — 36415 COLL VENOUS BLD VENIPUNCTURE: CPT | Performed by: NURSE PRACTITIONER

## 2018-05-18 PROCEDURE — 85025 COMPLETE CBC W/AUTO DIFF WBC: CPT | Performed by: NURSE PRACTITIONER

## 2018-05-18 RX ORDER — SULFAMETHOXAZOLE AND TRIMETHOPRIM 800; 160 MG/1; MG/1
1 TABLET ORAL 2 TIMES DAILY
Qty: 14 TABLET | Refills: 0 | Status: SHIPPED | OUTPATIENT
Start: 2018-05-18 | End: 2018-05-25

## 2018-05-18 NOTE — TELEPHONE ENCOUNTER
----- Message from MALIA Velazco sent at 5/18/2018 11:53 AM EDT -----  Please call patient with results. Needs to work on low sugar and chol diet, regular exercise, good foot wear and frequent foot checks, needs f/u in office in 3 months, has he had colonoscopy?  Will check a1c in 3 months needs to come fasting for chol panel.    [facility]

## 2018-05-18 NOTE — PROGRESS NOTES
Subjective   Jared Rose is a 62 y.o. male.     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 Denies fever, has pain with standing. He states he tried soaking in water, tried cream he was given last year when he had blister, he did see podiatry Dr. Linares, he also went to wound care in 6/17. Sees Dr. Monroy, was placed on xarelto for hx of pulm embolus, last saw in 6/117 due for f/u in 6/18. He wears sketchers shoes to work.     The following portions of the patient's history were reviewed and updated as appropriate: allergies, current medications, past family history, past medical history, past social history, past surgical history and problem list.    Review of Systems   Constitutional: Negative for chills, diaphoresis and fever.   Respiratory: Negative for cough and shortness of breath.    Cardiovascular: Negative for chest pain.   Musculoskeletal: Negative for arthralgias and myalgias.   Skin: Positive for skin lesions.   Neurological: Negative for dizziness, light-headedness and headache.   All other systems reviewed and are negative.      Objective   Physical Exam   Constitutional: He is oriented to person, place, and time. He appears well-developed and well-nourished.   HENT:   Head: Normocephalic.   Eyes: Pupils are equal, round, and reactive to light.   Cardiovascular: Normal rate, regular rhythm, normal heart sounds and intact distal pulses.    Pulmonary/Chest: Effort normal and breath sounds normal.   Musculoskeletal: Normal range of motion.     Vascular Status -  His right foot exhibits normal foot vasculature  and no edema.  Skin Integrity  -  His right foot skin is intact..     Neurological: He is alert and oriented to person, place, and time.   Skin: Skin is warm and dry.   Psychiatric: He has a normal mood and affect.  His behavior is normal.   Nursing note and vitals reviewed.        Assessment/Plan   Jared was seen today for blister.    Diagnoses and all orders for this visit:    Blister of right foot, initial encounter  -     CBC & Differential  -     Comprehensive Metabolic Panel  -     Hemoglobin A1c  -     CBC Auto Differential  -     Ambulatory Referral to Podiatry    Healthcare maintenance  -     CBC & Differential  -     Comprehensive Metabolic Panel  -     Hemoglobin A1c  -     CBC Auto Differential    Hyperlipidemia, unspecified hyperlipidemia type  -     Lipid Panel; Future  -     Hemoglobin A1c    Other orders  -     sulfamethoxazole-trimethoprim (BACTRIM DS) 800-160 MG per tablet; Take 1 tablet by mouth 2 (Two) Times a Day for 7 days.  -     mupirocin (BACTROBAN) 2 % ointment; Apply  topically 3 (Three) Times a Day.    Refer to podiatry, will call with appt.   Cbc,cmp today, he will return for lipid panel.  Apply bactroban oint to affected areas.   Bactrim -160mg 2x day for 7days.   encouraged good foot wear, good fitting shoes, cotton socks.   If symptoms persist call office.   F/u in 1 week for recheck.   Increase fluid intake, get plenty of rest.   Patient agrees with plan of care and understands instructions. Call if worsening symptoms or any problems or concerns.

## 2018-05-18 NOTE — PATIENT INSTRUCTIONS
Refer to podiatry, will call with appt.   Cbc,cmp today, he will return for lipid panel.  Apply bactroban oint to affected areas.   Bactrim -160mg 2x day for 7days.   encouraged good foot wear, good fitting shoes, cotton socks.   If symptoms persist call office.   F/u in 1 week for recheck.   Increase fluid intake, get plenty of rest.   Patient agrees with plan of care and understands instructions. Call if worsening symptoms or any problems or concerns.

## 2018-05-21 ENCOUNTER — TELEPHONE (OUTPATIENT)
Dept: FAMILY MEDICINE CLINIC | Facility: CLINIC | Age: 63
End: 2018-05-21

## 2018-05-21 NOTE — TELEPHONE ENCOUNTER
Patient informed can take OTC tylenol for the foot pain pt states does have appt today with podiatrist.

## 2018-06-29 DIAGNOSIS — I26.99 OTHER PULMONARY EMBOLISM WITHOUT ACUTE COR PULMONALE, UNSPECIFIED CHRONICITY (HCC): ICD-10-CM

## 2018-06-29 DIAGNOSIS — D68.59 THROMBOPHILIA (HCC): ICD-10-CM

## 2018-06-29 DIAGNOSIS — Z79.01 CHRONIC ANTICOAGULATION: ICD-10-CM

## 2018-06-29 DIAGNOSIS — D68.62 LUPUS ANTICOAGULANT DISORDER (HCC): ICD-10-CM

## 2018-06-29 RX ORDER — RIVAROXABAN 20 MG/1
TABLET, FILM COATED ORAL
Qty: 30 TABLET | Refills: 0 | Status: SHIPPED | OUTPATIENT
Start: 2018-06-29 | End: 2018-11-28 | Stop reason: SDUPTHER

## 2018-08-03 DIAGNOSIS — Z79.01 CHRONIC ANTICOAGULATION: ICD-10-CM

## 2018-08-03 DIAGNOSIS — I26.99 OTHER PULMONARY EMBOLISM WITHOUT ACUTE COR PULMONALE, UNSPECIFIED CHRONICITY (HCC): ICD-10-CM

## 2018-08-03 DIAGNOSIS — D68.59 THROMBOPHILIA (HCC): ICD-10-CM

## 2018-08-03 DIAGNOSIS — D68.62 LUPUS ANTICOAGULANT DISORDER (HCC): ICD-10-CM

## 2018-08-03 RX ORDER — RIVAROXABAN 20 MG/1
TABLET, FILM COATED ORAL
Qty: 30 TABLET | Refills: 0 | Status: SHIPPED | OUTPATIENT
Start: 2018-08-03 | End: 2018-11-28 | Stop reason: SDUPTHER

## 2018-08-08 DIAGNOSIS — D68.62 LUPUS ANTICOAGULANT DISORDER (HCC): ICD-10-CM

## 2018-08-08 DIAGNOSIS — I26.99 OTHER PULMONARY EMBOLISM WITHOUT ACUTE COR PULMONALE, UNSPECIFIED CHRONICITY (HCC): ICD-10-CM

## 2018-08-08 DIAGNOSIS — Z79.01 CHRONIC ANTICOAGULATION: ICD-10-CM

## 2018-08-08 DIAGNOSIS — D68.59 THROMBOPHILIA (HCC): ICD-10-CM

## 2018-08-08 RX ORDER — RIVAROXABAN 20 MG/1
TABLET, FILM COATED ORAL
Qty: 30 TABLET | Refills: 2 | Status: SHIPPED | OUTPATIENT
Start: 2018-08-08 | End: 2018-11-28 | Stop reason: SDUPTHER

## 2018-09-07 ENCOUNTER — RESULTS ENCOUNTER (OUTPATIENT)
Dept: FAMILY MEDICINE CLINIC | Facility: CLINIC | Age: 63
End: 2018-09-07

## 2018-09-07 ENCOUNTER — OFFICE VISIT (OUTPATIENT)
Dept: FAMILY MEDICINE CLINIC | Facility: CLINIC | Age: 63
End: 2018-09-07

## 2018-09-07 VITALS
TEMPERATURE: 98.3 F | SYSTOLIC BLOOD PRESSURE: 140 MMHG | DIASTOLIC BLOOD PRESSURE: 86 MMHG | WEIGHT: 250 LBS | HEIGHT: 71 IN | BODY MASS INDEX: 35 KG/M2 | OXYGEN SATURATION: 97 % | HEART RATE: 72 BPM

## 2018-09-07 DIAGNOSIS — R79.89 ELEVATED SERUM CREATININE: ICD-10-CM

## 2018-09-07 DIAGNOSIS — Z12.11 COLON CANCER SCREENING: ICD-10-CM

## 2018-09-07 DIAGNOSIS — R79.89 ELEVATED SERUM CREATININE: Primary | ICD-10-CM

## 2018-09-07 DIAGNOSIS — E78.5 HYPERLIPIDEMIA, UNSPECIFIED HYPERLIPIDEMIA TYPE: ICD-10-CM

## 2018-09-07 DIAGNOSIS — M54.50 LEFT-SIDED LOW BACK PAIN WITHOUT SCIATICA, UNSPECIFIED CHRONICITY: Primary | ICD-10-CM

## 2018-09-07 LAB
ANION GAP SERPL CALCULATED.3IONS-SCNC: 8.3 MMOL/L
BUN BLD-MCNC: 13 MG/DL (ref 8–23)
BUN/CREAT SERPL: 10.1 (ref 7–25)
CALCIUM SPEC-SCNC: 9.7 MG/DL (ref 8.6–10.5)
CHLORIDE SERPL-SCNC: 103 MMOL/L (ref 98–107)
CHOLEST SERPL-MCNC: 166 MG/DL (ref 0–200)
CO2 SERPL-SCNC: 27.7 MMOL/L (ref 22–29)
CREAT BLD-MCNC: 1.29 MG/DL (ref 0.76–1.27)
GFR SERPL CREATININE-BSD FRML MDRD: 68 ML/MIN/1.73
GLUCOSE BLD-MCNC: 101 MG/DL (ref 65–99)
HDLC SERPL-MCNC: 56 MG/DL (ref 40–60)
LDLC SERPL CALC-MCNC: 97 MG/DL (ref 0–100)
LDLC/HDLC SERPL: 1.73 {RATIO}
POTASSIUM BLD-SCNC: 4.8 MMOL/L (ref 3.5–5.2)
SODIUM BLD-SCNC: 139 MMOL/L (ref 136–145)
TRIGL SERPL-MCNC: 65 MG/DL (ref 0–150)
VLDLC SERPL-MCNC: 13 MG/DL (ref 5–40)

## 2018-09-07 PROCEDURE — 80048 BASIC METABOLIC PNL TOTAL CA: CPT | Performed by: NURSE PRACTITIONER

## 2018-09-07 PROCEDURE — 80061 LIPID PANEL: CPT | Performed by: NURSE PRACTITIONER

## 2018-09-07 PROCEDURE — 99213 OFFICE O/P EST LOW 20 MIN: CPT | Performed by: NURSE PRACTITIONER

## 2018-09-07 PROCEDURE — 36415 COLL VENOUS BLD VENIPUNCTURE: CPT | Performed by: NURSE PRACTITIONER

## 2018-09-07 PROCEDURE — 72100 X-RAY EXAM L-S SPINE 2/3 VWS: CPT | Performed by: NURSE PRACTITIONER

## 2018-09-07 RX ORDER — CYCLOBENZAPRINE HCL 10 MG
10 TABLET ORAL NIGHTLY PRN
Qty: 30 TABLET | Refills: 0 | Status: SHIPPED | OUTPATIENT
Start: 2018-09-07 | End: 2018-12-26

## 2018-09-07 NOTE — PROGRESS NOTES
Subjective   Jared Rose is a 63 y.o. male.     History of Present Illness   C/o back pain, started about 1 month ago, back on left side, he denies any injury, he does stand a lot at home, he denies trouble with urination, he denies numbness or tingling, he states pain is severe at times, he denies blood in urine, he states pain worsens with movements, he denies fever. States he tried tylenol which helped some but then comes back. He denies hx of kidney stones. He does take xarelto for hx of DVT, he denies cough. He has not yet taken BP meds this morning. He has never had colonoscopy.       The following portions of the patient's history were reviewed and updated as appropriate: allergies, current medications, past family history, past medical history, past social history, past surgical history and problem list.    Review of Systems   Constitutional: Negative for chills, diaphoresis and fever.   Respiratory: Negative for cough and shortness of breath.    Cardiovascular: Negative for chest pain.   Genitourinary: Negative for frequency and hematuria.   Musculoskeletal: Positive for back pain. Negative for arthralgias, gait problem and myalgias.   Skin: Negative for pallor.   Neurological: Negative for dizziness, light-headedness and headache.   All other systems reviewed and are negative.      Objective   Physical Exam   Constitutional: He is oriented to person, place, and time. He appears well-developed and well-nourished.   HENT:   Head: Normocephalic.   Eyes: Pupils are equal, round, and reactive to light.   Neck: Normal range of motion.   Cardiovascular: Normal rate, regular rhythm, normal heart sounds and intact distal pulses.    Pulmonary/Chest: Effort normal and breath sounds normal.   Musculoskeletal:        Lumbar back: He exhibits decreased range of motion, tenderness and pain. He exhibits no bony tenderness, no swelling, no spasm and normal pulse.        Back:    Positive left straight leg test.     Lymphadenopathy:     He has no cervical adenopathy.   Neurological: He is alert and oriented to person, place, and time.   Skin: Skin is warm and dry.   Psychiatric: He has a normal mood and affect. His behavior is normal.   Nursing note and vitals reviewed.    Lumbar xray today for pain, no comparison, shows NAD, awaiting radiology over read.     Assessment/Plan   Jared was seen today for back pain.    Diagnoses and all orders for this visit:    Left-sided low back pain without sciatica, unspecified chronicity  -     XR Spine Lumbar 2 or 3 View (In Office)  -     Ambulatory Referral to Physical Therapy Evaluate and treat    Colon cancer screening  -     Cologuard - Stool, Per Rectum; Future    Elevated serum creatinine  -     Basic metabolic panel    Other orders  -     Cancel: Urinalysis With Microscopic - Urine, Clean Catch  -     cyclobenzaprine (FLEXERIL) 10 MG tablet; Take 1 tablet by mouth At Night As Needed for Muscle Spasms.    advised to take BP meds today, monitor and call with problems.   Recheck bmp today, occult card given, lipids today,   cologuard ordered, pamphlet given, educated about colon cancer screening.   Flexeril 10mg nightly as needed for pain or spasm.   Avoid NSAIDS OTC, advil, ibuprofen. May try tyelnol OTC as needed for pain.  Refer to PT will call with appt.   Lumbar xray today will call with results.   If symptoms persist call office will consider medrol/ mri if needed.   Increase fluid intake, get plenty of rest.   Patient agrees with plan of care and understands instructions. Call if worsening symptoms or any problems or concerns.

## 2018-09-07 NOTE — PATIENT INSTRUCTIONS
Recheck bmp today, occult card given, lipids today,   cologuard ordered, pamphlet given, educated about colon cancer screening.   Flexeril 10mg nightly as needed for pain or spasm.   Avoid NSAIDS OTC, advil, ibuprofen. May try tyelnol OTC as needed for pain.  Refer to PT will call with appt.   Lumbar xray today will call with results.   If symptoms persist call office will consider medrol/ mri if needed.   Increase fluid intake, get plenty of rest.   Patient agrees with plan of care and understands instructions. Call if worsening symptoms or any problems or concerns.

## 2018-09-10 ENCOUNTER — TELEPHONE (OUTPATIENT)
Dept: FAMILY MEDICINE CLINIC | Facility: CLINIC | Age: 63
End: 2018-09-10

## 2018-09-10 NOTE — TELEPHONE ENCOUNTER
[facility]  ----- Message from MALIA Velazco sent at 9/10/2018  3:10 PM EDT -----  Please call patient with results. Back xray shows degenerative changes consistent with arthritis, cont with muscle relaxer as needed and PT, call office if symptoms persist 1-2 weeks.

## 2018-09-10 NOTE — TELEPHONE ENCOUNTER
----- Message from MALIA Velazco sent at 9/7/2018 12:32 PM EDT -----  Please call patient with results. Kidney func is still elevated, recommend nephrology appt, will call with appt. CHOL is improved ,cont low chol diet, regular exercise.    Pt informed of lab results

## 2018-09-11 ENCOUNTER — TELEPHONE (OUTPATIENT)
Dept: FAMILY MEDICINE CLINIC | Facility: CLINIC | Age: 63
End: 2018-09-11

## 2018-09-11 NOTE — TELEPHONE ENCOUNTER
----- Message from MALIA Velazco sent at 9/10/2018  3:10 PM EDT -----  Please call patient with results. Back xray shows degenerative changes consistent with arthritis, cont with muscle relaxer as needed and PT, call office if symptoms persist 1-2 weeks.    Pt informed of Xray results

## 2018-09-17 ENCOUNTER — HOSPITAL ENCOUNTER (OUTPATIENT)
Dept: PHYSICAL THERAPY | Facility: HOSPITAL | Age: 63
Setting detail: THERAPIES SERIES
Discharge: HOME | End: 2018-09-17

## 2018-09-17 DIAGNOSIS — M62.830 MUSCLE SPASM OF BACK: ICD-10-CM

## 2018-09-17 DIAGNOSIS — M54.50 LEFT-SIDED LOW BACK PAIN WITHOUT SCIATICA, UNSPECIFIED CHRONICITY: Primary | ICD-10-CM

## 2018-09-17 DIAGNOSIS — M25.69 BACK STIFFNESS: ICD-10-CM

## 2018-09-17 PROCEDURE — 97110 THERAPEUTIC EXERCISES: CPT

## 2018-09-17 PROCEDURE — 97035 APP MDLTY 1+ULTRASOUND EA 15: CPT

## 2018-09-17 PROCEDURE — 97161 PT EVAL LOW COMPLEX 20 MIN: CPT

## 2018-09-17 PROCEDURE — G8978 MOBILITY CURRENT STATUS: HCPCS

## 2018-09-17 PROCEDURE — G8979 MOBILITY GOAL STATUS: HCPCS

## 2018-09-17 NOTE — THERAPY EVALUATION
Outpatient Physical Therapy Ortho Initial Evaluation  Psychiatric     Patient Name: Jared Rose  : 1955  MRN: [id number]  Today's Date: 2018      Visit Date: 2018    Patient Active Problem List   Diagnosis   • Thrombophilia (CMS/HCC)   • Pulmonary embolus (CMS/HCC)   • Lupus anticoagulant disorder (CMS/HCC)   • Chronic anticoagulation   • Coronary atherosclerosis   • Hypertension   • Exomphalos   • Hyperlipidemia        Past Medical History:   Diagnosis Date   • Bell's palsy    • CAD (coronary artery disease)     Minimal; diagnosed on previous cardiac cath in    • Coronary atherosclerosis     cath 2015: normal LM, diffuse LCx disease, LI LAD, 60-70% ostial diag (<1 mm vessel), LI RCA   • H/O Anemia    • H/O Leukopenia    • H/O umbilical hernia repair 2014    Dr. Delgadillo   • Hyperlipidemia    • Hypertension    • Lupus anticoagulant positive    • Obesity    • Pulmonary embolism (CMS/HCC)     H/O Right lower lobe pulmonary embolus   • Stress fracture of right foot         Past Surgical History:   Procedure Laterality Date   • CARDIAC CATHETERIZATION     • LUNG SURGERY      Blood clot removed   • TONSILLECTOMY AND ADENOIDECTOMY      as a child   • UMBILICAL HERNIA REPAIR  2014    Dr. Delgadillo       Visit Dx:     ICD-10-CM ICD-9-CM   1. Left-sided low back pain without sciatica, unspecified chronicity M54.5 724.2   2. Back stiffness M25.60 724.8   3. Muscle spasm of back M62.830 724.8             Patient History     Row Name 18 1000             History    Chief Complaint Pain  -LP      Type of Pain Back pain  -LP      Date Current Problem(s) Began 18  -LP      Brief Description of Current Complaint patient with c/o intermittent lumbar parspinal muscle pain for the last 6-9 months.  He denies any specific injury.  He also states there is no shooting pain into either leg.  Pain worsens when he is on his feet a lot or if he has to bend or twist, and if he  has to pick something up.  It does not wake him up from sleep.  Pt is a  here at [facility].  -LP         Pain     Pain Location Back  -LP      Pain at Present 6  -LP      Pain at Best 1  -LP      Pain at Worst 9  -LP      Pain Frequency Intermittent  -LP      Pain Description Sharp;Spasm;Tightness;Burning  -LP         Fall Risk Assessment    Any falls in the past year: No  -LP        User Key  (r) = Recorded By, (t) = Taken By, (c) = Cosigned By    Initials Name Provider Type    LP Kim Begum PT Physical Therapist                PT Ortho     Row Name 09/17/18 1100       Subjective Pain    Able to rate subjective pain? yes  -LP    Pre-Treatment Pain Level 6  -LP    Post-Treatment Pain Level 6  -LP       Posture/Observations    Posture/Observations Comments rounded shouilders and forwrd head posture.  slightly increased lordosis.  -LP       DTR- Lower Quarter Clearing    Patellar tendon (L2-4) Bilateral:;2- Normal response  -LP    Achilles tendon (S1-2) Bilateral:;2- Normal response  -LP       Myotomal Screen- Lower Quarter Clearing    Hip flexion (L2) Bilateral:;5 (Normal)  -LP    Knee extension (L3) Bilateral:;5 (Normal)  -LP    Ankle DF (L4) Bilateral:;5 (Normal)  -LP    Great toe extension (L5) Bilateral:;5 (Normal)  -LP    Ankle PF (S1) Bilateral:;5 (Normal)  -LP    Knee flexion (S2) Bilateral:;5 (Normal)  -LP       Lumbar ROM Screen- Lower Quarter Clearing    Lumbar Flexion Impaired   limited 50% of range  -LP    Lumbar Extension Impaired   limited 25% of range  -LP    Lumbar Lateral Flexion Impaired   limited 25%  -LP    Lumbar Rotation Impaired   limited 25%  -LP       SI/Hip Screen- Lower Quarter Clearing    Mike's/Sd's test Bilateral:;Negative  -LP       Sensation    Sensation WNL? WNL  -LP       Lower Extremity Flexibility    Hamstrings Bilateral:;Mildly limited  -LP    Quadriceps Bilateral:;Mildly limited  -LP    Hip External Rotators WNL;Bilateral:  -LP       Balance Skills  Training    SLS 10 sec B  -LP      User Key  (r) = Recorded By, (t) = Taken By, (c) = Cosigned By    Initials Name Provider Type    LP Kim Begum, PT Physical Therapist                      Therapy Education  Education Details: trying to give pt options for stretches he could do during work day.  Given: HEP, Posture/body mechanics, Symptoms/condition management, Pain management  Program: New  How Provided: Verbal, Demonstration, Written  Provided to: Patient  Level of Understanding: Teach back education performed           PT OP Goals     Row Name 09/17/18 1200          PT Short Term Goals    STG Date to Achieve 10/08/18  -LP     STG 1 Independent with HEP  -LP     STG 1 Progress New  -LP     STG 2 Decreased subj pain rating to 4/10 regularly.  -LP     STG 2 Progress New  -LP     STG 3 Increase forward flexion AROM by 25%.  -LP     STG 3 Progress New  -LP        Long Term Goals    LTG 1 Improved oswestry score to 28%  -LP     LTG 1 Progress New  -LP     LTG 2 Pt with subj rating of pain at < 3/10 during work day.  -LP     LTG 2 Progress New  -LP     LTG 3 Pt able to sit through a movie without increasing back pain.  -LP     LTG 3 Progress New  -LP       User Key  (r) = Recorded By, (t) = Taken By, (c) = Cosigned By    Initials Name Provider Type    Kim Adair, PT Physical Therapist                PT Assessment/Plan     Row Name 09/17/18 1230          PT Assessment    Functional Limitations Impaired locomotion;Impaired gait;Limitations in community activities;Limitations in functional capacity and performance;Performance in leisure activities;Performance in work activities;Limitation in home management;Performance in sport activities  -LP     Impairments Gait;Posture;Pain;Muscle strength;Range of motion;Locomotion;Endurance  -LP     Assessment Comments Mr. Rose presents today with c/o pain in L paraspinal musculature for the last 6-9 months.  Pt. demonstrates impaired trunk ROM, in all planes.  He  has minimally tightned hamstrings bilaterally. Normal Refexes bilaterally.  LE strength is normal, but core strength is decreased.  Pt also has poor standing and sitting posture,which probably worsens later in the day or when pain increases.  Pt will benefit from skilled physical therapy at this time to help decrease pain, increase trunk mobility and flexibility and help pt return to previous level of function  -LP     Please refer to paper survey for additional self-reported information Yes  -LP     Rehab Potential Excellent  -LP     Patient/caregiver participated in establishment of treatment plan and goals Yes  -LP     Patient would benefit from skilled therapy intervention Yes  -LP        PT Plan    PT Frequency 2x/week  -LP     Predicted Duration of Therapy Intervention (Therapy Eval) 3-4 weeks  -LP     Planned CPT's? PT EVAL LOW COMPLEXITY: 63006;PT MANUAL THERAPY EA 15 MIN: 52701;PT THER PROC EA 15 MIN: 75686;PT ULTRASOUND EA 15 MIN: 57198;PT ELECTRICAL STIM UNATTEND: ;PT TRACTION LUMBAR: 06860;PT HOT/COLD PACK WC NONMCARE: 56814  -LP     Physical Therapy Interventions (Optional Details) dry needling;home exercise program;manual therapy techniques;lumbar stabilization;joint mobilization;postural re-education;ROM (Range of Motion);strengthening;stretching  -LP     PT Plan Comments Develop home exercise program for flexibility and core strengthening.  Modalities as needed to decrease pain/muscle spasm  -LP       User Key  (r) = Recorded By, (t) = Taken By, (c) = Cosigned By    Initials Name Provider Type    LP Kim Begum, PT Physical Therapist                Modalities     Row Name 09/17/18 1100             Ultrasound 23208    Location L lower back paraspinals   prone  -LP      Frequency 1.0 MHz  -LP      Intensity - Wts/cm 1.5  -LP      78155 - PT Ultrasound Minutes 8  -LP        User Key  (r) = Recorded By, (t) = Taken By, (c) = Cosigned By    Initials Name Provider Type    LP Kim Begum,  PT Physical Therapist              Exercises     Row Name 09/17/18 1100             Subjective Pain    Able to rate subjective pain? yes  -LP      Pre-Treatment Pain Level 6  -LP      Post-Treatment Pain Level 6  -LP         Exercise 1    Exercise Name 1 SKTC  -LP         Exercise 2    Exercise Name 2 prone press up  -LP         Exercise 3    Exercise Name 3 seated flex  -LP         Exercise 4    Exercise Name 4 seated flex with rotation  -LP         Exercise 5    Exercise Name 5 corner stretch  -LP        User Key  (r) = Recorded By, (t) = Taken By, (c) = Cosigned By    Initials Name Provider Type    LP Kim Begum PT Physical Therapist           Manual Rx (last 36 hours)      Manual Treatments     Row Name 09/17/18 1100             Manual Rx 1    Manual Rx 1 Location L lower back  -LP      Manual Rx 1 Type PA mobs to L3-L5  -LP      Manual Rx 1 Grade gentle  -LP      Manual Rx 1 Duration 2 min   assessed mild stiffness in lumbar segment motion.  -LP         Manual Rx 2    Manual Rx 2 Location lower back  -LP      Manual Rx 2 Type soft tissue palpation  -LP      Manual Rx 2 Duration 2 min   slight increased tone to L paraspinals-poss spasm.  -LP        User Key  (r) = Recorded By, (t) = Taken By, (c) = Cosigned By    Initials Name Provider Type    LP Kim Begum, PT Physical Therapist                      Outcome Measure Options: Modifed Lishay  Modified Oswestry  Modified Oswestry Score/Comments: 34%      Time Calculation:     Therapy Suggested Charges     Code   Minutes Charges    11545 (CPT®)  Pt Neuromusc Re Education Ea 15 Min      40264 (CPT®) Hc Pt Ther Proc Ea 15 Min      56360 (CPT®) Hc Gait Training Ea 15 Min      93685 (CPT®) Hc Pt Therapeutic Act Ea 15 Min      48457 (CPT®) Hc Pt Manual Therapy Ea 15 Min      65997 (CPT®) Hc Pt Ther Massage- Per 15 Min      02298 (CPT®) Hc Pt Iontophoresis Ea 15 Min      23974 (CPT®) Hc Pt Elec Stim Ea-Per 15 Min      48309 (CPT®)  Pt Ultrasound Ea 15  Min 8 1    97447 (CPT®) Hc Pt Self Care/Mgmt/Train Ea 15 Min      17543 (CPT®) Hc Pt Prosthetic (S) Train Initial Encounter, Each 15 Min      04575 (CPT®) Hc Orthotic(S) Mgmt/Train Initial Encounter, Each 15min      36908 (CPT®) Hc Pt Aquatic Therapy Ea 15 Min      42095 (CPT®) Hc Pt Orthotic(S)/Prosthetic(S) Encounter, Each 15 Min      Total  8 1          Start Time: 0915  Stop Time: 1000  Time Calculation (min): 45 min     Therapy Charges for Today     Code Description Service Date Service Provider Modifiers Qty    78629973175 HC PT MOBILITY CURRENT 9/17/2018 Kim Begum, PT GP, CJ 1    93267677068 HC PT MOBILITY PROJECTED 9/17/2018 Kim Begum, PT GP, CI 1    97392181571 HC PT EVAL LOW COMPLEXITY 2 9/17/2018 Kim Begum, PT GP 1    99352359051 HC PT ULTRASOUND EA 15 MIN 9/17/2018 Kim Begum, PT GP 1    31342278429 HC PT THER PROC EA 15 MIN 9/17/2018 Kim Begum, PT GP 1          PT G-Codes  PT Professional Judgement Used?: Yes  Outcome Measure Options: Deanna Su  Modified Oswestry Score/Comments: 34%  Functional Limitation: Mobility: Walking and moving around  Mobility: Walking and Moving Around Current Status (): At least 20 percent but less than 40 percent impaired, limited or restricted  Mobility: Walking and Moving Around Goal Status (): At least 1 percent but less than 20 percent impaired, limited or restricted         Kim Begum PT  9/17/2018

## 2018-09-21 ENCOUNTER — HOSPITAL ENCOUNTER (OUTPATIENT)
Dept: PHYSICAL THERAPY | Facility: HOSPITAL | Age: 63
Setting detail: THERAPIES SERIES
Discharge: HOME | End: 2018-09-21

## 2018-09-21 DIAGNOSIS — M54.50 LEFT-SIDED LOW BACK PAIN WITHOUT SCIATICA, UNSPECIFIED CHRONICITY: Primary | ICD-10-CM

## 2018-09-21 DIAGNOSIS — M25.69 BACK STIFFNESS: ICD-10-CM

## 2018-09-21 DIAGNOSIS — M62.830 MUSCLE SPASM OF BACK: ICD-10-CM

## 2018-09-21 PROCEDURE — 97110 THERAPEUTIC EXERCISES: CPT | Performed by: PHYSICAL THERAPIST

## 2018-09-21 NOTE — THERAPY TREATMENT NOTE
Outpatient Physical Therapy Ortho Treatment Note  [facility]     Patient Name: Jared Rose  : 1955  MRN: [id number]  Today's Date: 2018      Visit Date: 2018    Visit Dx:    ICD-10-CM ICD-9-CM   1. Left-sided low back pain without sciatica, unspecified chronicity M54.5 724.2   2. Back stiffness M25.60 724.8   3. Muscle spasm of back M62.830 724.8       Patient Active Problem List   Diagnosis   • Thrombophilia (CMS/HCC)   • Pulmonary embolus (CMS/HCC)   • Lupus anticoagulant disorder (CMS/HCC)   • Chronic anticoagulation   • Coronary atherosclerosis   • Hypertension   • Exomphalos   • Hyperlipidemia        Past Medical History:   Diagnosis Date   • Bell's palsy    • CAD (coronary artery disease)     Minimal; diagnosed on previous cardiac cath in    • Coronary atherosclerosis     cath 2015: normal LM, diffuse LCx disease, LI LAD, 60-70% ostial diag (<1 mm vessel), LI RCA   • H/O Anemia    • H/O Leukopenia    • H/O umbilical hernia repair 2014    Dr. Delgadillo   • Hyperlipidemia    • Hypertension    • Lupus anticoagulant positive    • Obesity    • Pulmonary embolism (CMS/HCC)     H/O Right lower lobe pulmonary embolus   • Stress fracture of right foot         Past Surgical History:   Procedure Laterality Date   • CARDIAC CATHETERIZATION     • LUNG SURGERY      Blood clot removed   • TONSILLECTOMY AND ADENOIDECTOMY      as a child   • UMBILICAL HERNIA REPAIR  2014    Dr. Delgadillo                             PT Assessment/Plan     Row Name 18 1117          PT Assessment    Assessment Comments Patient returns for 1st follow up.  He denies pain on this date and returns exercise with tactile, verbal and demo cues for accuracy.  Note severe hip flexor restrictions affecting gait pattern, improved with stretching.    -GR        PT Plan    PT Plan Comments Assess response to new exercises and update HEP when appropriate.  -GR       User Key  (r) = Recorded By, (t) =  Taken By, (c) = Cosigned By    Initials Name Provider Type    GR Mario Mejia, PT Physical Therapist                    Exercises     Row Name 09/21/18 1000             Subjective Comments    Subjective Comments Pain is well controlled. Tried the sheet from home. Walking around the hospital more.  -GR         Subjective Pain    Able to rate subjective pain? yes  -GR      Pre-Treatment Pain Level 0  -GR      Post-Treatment Pain Level 0  -GR         Total Minutes    55069 - PT Therapeutic Exercise Minutes 45  -GR         Exercise 1    Exercise Name 1 SKTC  -GR      Cueing 1 Demo  -GR      Sets 1 1  -GR      Reps 1 20  -GR      Time 1 3-5 sec  -GR      Additional Comments alternating  -GR         Exercise 2    Exercise Name 2 prone press up  -GR      Cueing 2 Demo  -GR      Sets 2 1  -GR      Reps 2 10  -GR         Exercise 5    Exercise Name 5 doorway stretch  -GR      Cueing 5 Demo  -GR      Sets 5 1  -GR      Reps 5 3  -GR      Time 5 20 sec  -GR         Exercise 6    Exercise Name 6 Nustep  -GR      Time 6 5 minutes  -GR      Additional Comments L5 UE/LE  -GR         Exercise 7    Exercise Name 7 PPT  -GR      Cueing 7 Demo  -GR      Sets 7 1  -GR      Reps 7 10  -GR      Time 7 5 seconds  -GR         Exercise 8    Exercise Name 8 LTR  -GR      Cueing 8 Demo  -GR      Sets 8 1  -GR      Reps 8 10  -GR         Exercise 9    Exercise Name 9 Piriformis stretch  -GR      Cueing 9 Demo  -GR      Sets 9 1  -GR      Reps 9 3  -GR      Time 9 20 seconds  -GR         Exercise 10    Exercise Name 10 Bridge  -GR      Cueing 10 Demo  -GR      Sets 10 1  -GR      Reps 10 10  -GR      Time 10 5 sec  -GR         Exercise 11    Exercise Name 11 Prone hip flexor stretch with belt  -GR      Cueing 11 Demo  -GR      Sets 11 1  -GR      Reps 11 3  -GR      Time 11 20 sec  -GR         Exercise 12    Exercise Name 12 Doorway lat/QL stretch  -GR      Cueing 12 Demo  -GR      Reps 12 3  -GR      Time 12 20 seconds  -GR       Additional Comments each direction  -GR        User Key  (r) = Recorded By, (t) = Taken By, (c) = Cosigned By    Initials Name Provider Type    Mario Rand PT Physical Therapist                               PT OP Goals     Row Name 09/21/18 1100          PT Short Term Goals    STG Date to Achieve 10/08/18  -GR     STG 1 Independent with HEP  -GR     STG 1 Progress Ongoing  -GR     STG 2 Decreased subj pain rating to 4/10 regularly.  -GR     STG 2 Progress Ongoing  -GR     STG 2 Progress Comments no pain on 9/21  -GR     STG 3 Increase forward flexion AROM by 25%.  -GR     STG 3 Progress Ongoing  -GR        Long Term Goals    LTG 1 Improved oswestry score to 28%  -GR     LTG 1 Progress Ongoing  -GR     LTG 2 Pt with subj rating of pain at < 3/10 during work day.  -GR     LTG 2 Progress Ongoing  -GR     LTG 3 Pt able to sit through a movie without increasing back pain.  -GR     LTG 3 Progress Ongoing  -GR       User Key  (r) = Recorded By, (t) = Taken By, (c) = Cosigned By    Initials Name Provider Type    Mario Rand PT Physical Therapist          Therapy Education  Education Details: review of initial HEP  Given: HEP  Program: Reinforced  How Provided: Verbal  Provided to: Patient  Level of Understanding: Teach back education performed, Verbalized, Demonstrated              Time Calculation:   Start Time: 1015  Stop Time: 1100  Time Calculation (min): 45 min  Total Timed Code Minutes- PT: 45 minute(s)  Therapy Suggested Charges     Code   Minutes Charges    29181 (CPT®) Hc Pt Neuromusc Re Education Ea 15 Min      05060 (CPT®) Hc Pt Ther Proc Ea 15 Min 45 3    00127 (CPT®) Hc Gait Training Ea 15 Min      09859 (CPT®) Hc Pt Therapeutic Act Ea 15 Min      96063 (CPT®) Hc Pt Manual Therapy Ea 15 Min      58082 (CPT®) Hc Pt Ther Massage- Per 15 Min      40440 (CPT®) Hc Pt Iontophoresis Ea 15 Min      19628 (CPT®) Hc Pt Elec Stim Ea-Per 15 Min      09685 (CPT®) Hc Pt Ultrasound Ea 15 Min      35970  (CPT®) Hc Pt Self Care/Mgmt/Train Ea 15 Min      88649 (CPT®) Hc Pt Prosthetic (S) Train Initial Encounter, Each 15 Min      34106 (CPT®) Hc Orthotic(S) Mgmt/Train Initial Encounter, Each 15min      76542 (CPT®) Hc Pt Aquatic Therapy Ea 15 Min      87287 (CPT®) Hc Pt Orthotic(S)/Prosthetic(S) Encounter, Each 15 Min      Total  45 3        Therapy Charges for Today     Code Description Service Date Service Provider Modifiers Qty    76929769492 HC PT THER PROC EA 15 MIN 9/21/2018 Mario Mejia, PT GP 3                    Mario Mejia, PT  9/21/2018

## 2018-09-25 ENCOUNTER — HOSPITAL ENCOUNTER (OUTPATIENT)
Dept: PHYSICAL THERAPY | Facility: HOSPITAL | Age: 63
Setting detail: THERAPIES SERIES
Discharge: HOME | End: 2018-09-25

## 2018-09-25 DIAGNOSIS — M54.50 LEFT-SIDED LOW BACK PAIN WITHOUT SCIATICA, UNSPECIFIED CHRONICITY: Primary | ICD-10-CM

## 2018-09-25 DIAGNOSIS — M25.69 BACK STIFFNESS: ICD-10-CM

## 2018-09-25 DIAGNOSIS — M62.830 MUSCLE SPASM OF BACK: ICD-10-CM

## 2018-09-25 PROCEDURE — 97110 THERAPEUTIC EXERCISES: CPT | Performed by: PHYSICAL THERAPIST

## 2018-09-25 NOTE — THERAPY TREATMENT NOTE
Outpatient Physical Therapy Ortho Treatment Note  [facility]     Patient Name: Jared Rose  : 1955  MRN: [id number]  Today's Date: 2018      Visit Date: 2018    Visit Dx:    ICD-10-CM ICD-9-CM   1. Left-sided low back pain without sciatica, unspecified chronicity M54.5 724.2   2. Back stiffness M25.60 724.8   3. Muscle spasm of back M62.830 724.8       Patient Active Problem List   Diagnosis   • Thrombophilia (CMS/HCC)   • Pulmonary embolus (CMS/HCC)   • Lupus anticoagulant disorder (CMS/HCC)   • Chronic anticoagulation   • Coronary atherosclerosis   • Hypertension   • Exomphalos   • Hyperlipidemia        Past Medical History:   Diagnosis Date   • Bell's palsy    • CAD (coronary artery disease)     Minimal; diagnosed on previous cardiac cath in    • Coronary atherosclerosis     cath 2015: normal LM, diffuse LCx disease, LI LAD, 60-70% ostial diag (<1 mm vessel), LI RCA   • H/O Anemia    • H/O Leukopenia    • H/O umbilical hernia repair 2014    Dr. Delgadillo   • Hyperlipidemia    • Hypertension    • Lupus anticoagulant positive    • Obesity    • Pulmonary embolism (CMS/HCC)     H/O Right lower lobe pulmonary embolus   • Stress fracture of right foot         Past Surgical History:   Procedure Laterality Date   • CARDIAC CATHETERIZATION     • LUNG SURGERY      Blood clot removed   • TONSILLECTOMY AND ADENOIDECTOMY      as a child   • UMBILICAL HERNIA REPAIR  2014    Dr. Delgadillo                             PT Assessment/Plan     Row Name 18 1142          PT Assessment    Assessment Comments Patient continues to present with well controlled pain. Advanced repetitions with stability training met without fatigue. May be appropriate to add resistance band in hooklying next visit.   -GR        PT Plan    PT Plan Comments Reassess forward flexion and compare to IE.  -GR       User Key  (r) = Recorded By, (t) = Taken By, (c) = Cosigned By    Initials Name Provider  Type    GR Mario Mejia, PT Physical Therapist                    Exercises     Row Name 09/25/18 0900             Subjective Comments    Subjective Comments Walking more with less pain and felt no worse after last visit.  -GR         Subjective Pain    Able to rate subjective pain? yes  -GR      Pre-Treatment Pain Level 0  -GR      Post-Treatment Pain Level 0  -GR         Total Minutes    83642 - PT Therapeutic Exercise Minutes 42  -GR         Exercise 1    Exercise Name 1 SKTC  -GR      Cueing 1 Demo  -GR      Sets 1 1  -GR      Reps 1 3  -GR      Time 1 20 sec   -GR      Additional Comments alternating  -GR         Exercise 2    Exercise Name 2 prone press up  -GR      Cueing 2 Demo  -GR      Sets 2 1  -GR      Reps 2 10  -GR         Exercise 5    Exercise Name 5 doorway stretch  -GR      Cueing 5 Demo  -GR      Sets 5 1  -GR      Reps 5 3  -GR      Time 5 20 sec  -GR         Exercise 6    Exercise Name 6 Nustep  -GR      Time 6 5 minutes  -GR      Additional Comments L5 UE/LE  -GR         Exercise 7    Exercise Name 7 PPT  -GR      Cueing 7 Demo  -GR      Sets 7 1  -GR      Reps 7 15  -GR      Time 7 5 seconds  -GR         Exercise 8    Exercise Name 8 LTR  -GR      Cueing 8 Demo  -GR      Sets 8 1  -GR      Reps 8 15  -GR         Exercise 9    Exercise Name 9 Piriformis stretch  -GR      Cueing 9 Demo  -GR      Sets 9 1  -GR      Reps 9 3  -GR      Time 9 20 seconds  -GR         Exercise 10    Exercise Name 10 Bridge  -GR      Cueing 10 Demo  -GR      Sets 10 1  -GR      Reps 10 15  -GR      Time 10 5 sec  -GR         Exercise 11    Exercise Name 11 Prone hip flexor stretch with belt  -GR      Cueing 11 Demo  -GR      Sets 11 1  -GR      Reps 11 3  -GR      Time 11 20 sec  -GR         Exercise 12    Exercise Name 12 Doorway lat/QL stretch  -GR      Cueing 12 Demo  -GR      Reps 12 3  -GR      Time 12 20 seconds  -GR      Additional Comments each direction  -GR        User Key  (r) = Recorded By, (t) =  Taken By, (c) = Cosigned By    Initials Name Provider Type    Mario Rand, PT Physical Therapist                               PT OP Goals     Row Name 09/25/18 1100          PT Short Term Goals    STG Date to Achieve 10/08/18  -GR     STG 1 Independent with HEP  -GR     STG 1 Progress Met  -GR     STG 1 Progress Comments initial HEP met  -GR     STG 2 Decreased subj pain rating to 4/10 regularly.  -GR     STG 2 Progress Ongoing  -GR     STG 2 Progress Comments no pain last 2 visits  -GR     STG 3 Increase forward flexion AROM by 25%.  -GR     STG 3 Progress Ongoing  -GR        Long Term Goals    LTG 1 Improved oswestry score to 28%  -GR     LTG 1 Progress Ongoing  -GR     LTG 2 Pt with subj rating of pain at < 3/10 during work day.  -GR     LTG 2 Progress Ongoing  -GR     LTG 3 Pt able to sit through a movie without increasing back pain.  -GR     LTG 3 Progress Ongoing  -GR       User Key  (r) = Recorded By, (t) = Taken By, (c) = Cosigned By    Initials Name Provider Type    Mario Rand, PT Physical Therapist          Therapy Education  Education Details: continue with current HEP may add TB next visit  Given: HEP  Program: Reinforced  How Provided: Verbal  Provided to: Patient  Level of Understanding: Teach back education performed, Verbalized              Time Calculation:   Start Time: 0918  Stop Time: 1000  Time Calculation (min): 42 min  Total Timed Code Minutes- PT: 42 minute(s)  Therapy Suggested Charges     Code   Minutes Charges    16994 (CPT®) Hc Pt Neuromusc Re Education Ea 15 Min      27607 (CPT®) Hc Pt Ther Proc Ea 15 Min 42 3    91726 (CPT®) Hc Gait Training Ea 15 Min      02458 (CPT®) Hc Pt Therapeutic Act Ea 15 Min      30522 (CPT®) Hc Pt Manual Therapy Ea 15 Min      03332 (CPT®) Hc Pt Ther Massage- Per 15 Min      15563 (CPT®) Hc Pt Iontophoresis Ea 15 Min      96061 (CPT®) Hc Pt Elec Stim Ea-Per 15 Min      48778 (CPT®) Hc Pt Ultrasound Ea 15 Min      66408 (CPT®) Hc Pt Self  Care/Mgmt/Train Ea 15 Min      33733 (CPT®) Hc Pt Prosthetic (S) Train Initial Encounter, Each 15 Min      86327 (CPT®) Hc Orthotic(S) Mgmt/Train Initial Encounter, Each 15min      91957 (CPT®) Hc Pt Aquatic Therapy Ea 15 Min      06241 (CPT®) Hc Pt Orthotic(S)/Prosthetic(S) Encounter, Each 15 Min      Total  42 3        Therapy Charges for Today     Code Description Service Date Service Provider Modifiers Qty    17114211112 HC PT THER PROC EA 15 MIN 9/25/2018 Mario Mejia, PT GP 3                    Mario Mejia, PT  9/25/2018

## 2018-09-26 ENCOUNTER — OFFICE VISIT (OUTPATIENT)
Dept: ONCOLOGY | Facility: CLINIC | Age: 63
End: 2018-09-26

## 2018-09-26 ENCOUNTER — LAB (OUTPATIENT)
Dept: LAB | Facility: HOSPITAL | Age: 63
End: 2018-09-26

## 2018-09-26 VITALS
WEIGHT: 249.8 LBS | OXYGEN SATURATION: 97 % | HEART RATE: 73 BPM | RESPIRATION RATE: 16 BRPM | BODY MASS INDEX: 35.76 KG/M2 | DIASTOLIC BLOOD PRESSURE: 70 MMHG | TEMPERATURE: 98 F | HEIGHT: 70 IN | SYSTOLIC BLOOD PRESSURE: 130 MMHG

## 2018-09-26 DIAGNOSIS — Z79.01 CHRONIC ANTICOAGULATION: ICD-10-CM

## 2018-09-26 DIAGNOSIS — D68.62 LUPUS ANTICOAGULANT DISORDER (HCC): Primary | ICD-10-CM

## 2018-09-26 DIAGNOSIS — E78.5 HYPERLIPIDEMIA, UNSPECIFIED HYPERLIPIDEMIA TYPE: ICD-10-CM

## 2018-09-26 DIAGNOSIS — D68.59 THROMBOPHILIA (HCC): ICD-10-CM

## 2018-09-26 DIAGNOSIS — I26.99 OTHER PULMONARY EMBOLISM WITHOUT ACUTE COR PULMONALE, UNSPECIFIED CHRONICITY (HCC): ICD-10-CM

## 2018-09-26 DIAGNOSIS — Z79.01 CHRONIC ANTICOAGULATION: Primary | ICD-10-CM

## 2018-09-26 LAB
BASOPHILS # BLD AUTO: 0.02 10*3/MM3 (ref 0–0.1)
BASOPHILS NFR BLD AUTO: 0.5 % (ref 0–1.1)
DEPRECATED RDW RBC AUTO: 45.1 FL (ref 37–49)
EOSINOPHIL # BLD AUTO: 0.14 10*3/MM3 (ref 0–0.36)
EOSINOPHIL NFR BLD AUTO: 3.3 % (ref 1–5)
ERYTHROCYTE [DISTWIDTH] IN BLOOD BY AUTOMATED COUNT: 14.1 % (ref 11.7–14.5)
FERRITIN SERPL-MCNC: 15.5 NG/ML (ref 30–400)
HCT VFR BLD AUTO: 34.1 % (ref 40–49)
HGB BLD-MCNC: 11.4 G/DL (ref 13.5–16.5)
HGB RETIC QN: 32.9 PG (ref 29.8–36.1)
IMM GRANULOCYTES # BLD: 0.01 10*3/MM3 (ref 0–0.03)
IMM GRANULOCYTES NFR BLD: 0.2 % (ref 0–0.5)
IMM RETICS NFR: 10.1 % (ref 3–15.8)
INR PPP: 2.1 (ref 0.9–1.1)
IRON 24H UR-MRATE: 54 MCG/DL (ref 59–158)
IRON SATN MFR SERPL: 13 % (ref 14–48)
LYMPHOCYTES # BLD AUTO: 2.01 10*3/MM3 (ref 1–3.5)
LYMPHOCYTES NFR BLD AUTO: 46.7 % (ref 20–49)
MCH RBC QN AUTO: 29.2 PG (ref 27–33)
MCHC RBC AUTO-ENTMCNC: 33.4 G/DL (ref 32–35)
MCV RBC AUTO: 87.4 FL (ref 83–97)
MONOCYTES # BLD AUTO: 0.41 10*3/MM3 (ref 0.25–0.8)
MONOCYTES NFR BLD AUTO: 9.5 % (ref 4–12)
NEUTROPHILS # BLD AUTO: 1.71 10*3/MM3 (ref 1.5–7)
NEUTROPHILS NFR BLD AUTO: 39.8 % (ref 39–75)
NRBC BLD MANUAL-RTO: 0 /100 WBC (ref 0–0)
PLATELET # BLD AUTO: 277 10*3/MM3 (ref 150–375)
PMV BLD AUTO: 9.8 FL (ref 8.9–12.1)
PROTHROMBIN TIME: 24.7 SECONDS (ref 11–13.5)
RBC # BLD AUTO: 3.9 10*6/MM3 (ref 4.3–5.5)
RETICS/RBC NFR AUTO: 1.48 % (ref 0.6–2)
TIBC SERPL-MCNC: 427 MCG/DL (ref 249–505)
TRANSFERRIN SERPL-MCNC: 305 MG/DL (ref 200–360)
VIT B12 BLD-MCNC: 607 PG/ML (ref 211–946)
WBC NRBC COR # BLD: 4.3 10*3/MM3 (ref 4–10)

## 2018-09-26 PROCEDURE — 99213 OFFICE O/P EST LOW 20 MIN: CPT | Performed by: INTERNAL MEDICINE

## 2018-09-26 PROCEDURE — 82607 VITAMIN B-12: CPT | Performed by: INTERNAL MEDICINE

## 2018-09-26 PROCEDURE — 85610 PROTHROMBIN TIME: CPT | Performed by: INTERNAL MEDICINE

## 2018-09-26 PROCEDURE — 83540 ASSAY OF IRON: CPT | Performed by: INTERNAL MEDICINE

## 2018-09-26 PROCEDURE — 36415 COLL VENOUS BLD VENIPUNCTURE: CPT | Performed by: INTERNAL MEDICINE

## 2018-09-26 PROCEDURE — 82728 ASSAY OF FERRITIN: CPT | Performed by: INTERNAL MEDICINE

## 2018-09-26 PROCEDURE — 85025 COMPLETE CBC W/AUTO DIFF WBC: CPT | Performed by: INTERNAL MEDICINE

## 2018-09-26 PROCEDURE — 85046 RETICYTE/HGB CONCENTRATE: CPT | Performed by: INTERNAL MEDICINE

## 2018-09-26 PROCEDURE — 84466 ASSAY OF TRANSFERRIN: CPT | Performed by: INTERNAL MEDICINE

## 2018-09-26 RX ORDER — FERROUS SULFATE 325(65) MG
325 TABLET ORAL
Qty: 60 TABLET | Refills: 5 | Status: SHIPPED | OUTPATIENT
Start: 2018-09-26 | End: 2019-06-21

## 2018-09-26 NOTE — PROGRESS NOTES
Subjective     HISTORY OF PRESENT ILLNESS:     History of Present Illness  The patient is a 63 y.o. gentleman on chronic anticoagulation therapy due to previous history of unprovoked pulmonary embolus and positive lupus anticoagulant. He had been doing well on Coumadin for several years but is currently taking Xarelto 20 mg daily and just checking in with us once a year.    He seems to be tolerating this Xarelto well.  He is not any need any signs or symptoms of thrombosis and no bleeding issues.      Past Medical History, Past Surgical History, Social History, Family History have been reviewed and are without significant changes except as mentioned.    Review of Systems   Constitutional: Negative for activity change, appetite change, fatigue, fever and unexpected weight change.   HENT: Negative for hearing loss, nosebleeds, trouble swallowing and voice change.    Eyes: Negative for visual disturbance.   Respiratory: Negative for cough, shortness of breath and wheezing.    Cardiovascular: Negative for chest pain and palpitations.   Gastrointestinal: Negative for abdominal pain, diarrhea, nausea and vomiting.   Genitourinary: Negative for difficulty urinating, frequency, hematuria and urgency.   Musculoskeletal: Negative for back pain and neck pain.   Skin: Negative for rash.   Neurological: Negative for dizziness, seizures, syncope and headaches.   Hematological: Negative for adenopathy. Does not bruise/bleed easily.   Psychiatric/Behavioral: Negative for behavioral problems. The patient is not nervous/anxious.       A comprehensive 14 point review of systems was performed and was negative except as mentioned.    Medications:  The current medication list was reviewed in the EMR    ALLERGIES:    Allergies   Allergen Reactions   • Adhesive Tape        Objective      Vitals:    09/26/18 1046   BP: 130/70   Pulse: 73   Resp: 16   Temp: 98 °F (36.7 °C)   SpO2: 97%   Weight: 113 kg (249 lb 12.8 oz)   Height: 178.5 cm  "(70.28\")  Comment: new ht w/shoes   PainSc: 0-No pain     Current Status 9/26/2018   ECOG score 0       Physical Exam   Constitutional: He is oriented to person, place, and time. He appears well-developed and well-nourished. No distress.   HENT:   Head: Normocephalic.   Eyes: Pupils are equal, round, and reactive to light. Conjunctivae and EOM are normal. No scleral icterus.   Neck: Normal range of motion. Neck supple. No JVD present. No thyromegaly present.   Cardiovascular: Normal rate and regular rhythm.  Exam reveals no gallop and no friction rub.    No murmur heard.  Pulmonary/Chest: Effort normal and breath sounds normal. He has no wheezes. He has no rales.   Abdominal: Soft. He exhibits no distension and no mass. There is no tenderness.   Musculoskeletal: Normal range of motion. He exhibits no edema or deformity.   Lymphadenopathy:     He has no cervical adenopathy.   Neurological: He is alert and oriented to person, place, and time. He has normal reflexes. No cranial nerve deficit.   Skin: Skin is warm and dry. No rash noted. No erythema.   Psychiatric: He has a normal mood and affect. His behavior is normal. Judgment normal.         RECENT LABS:  Hematology    Lab Results   Component Value Date    WBC 4.30 09/26/2018    HGB 11.4 (L) 09/26/2018    HCT 34.1 (L) 09/26/2018    MCV 87.4 09/26/2018     09/26/2018                   Assessment/Plan   1. Long-term anticoagulation following pulmonary embolus with positive lupus anticoagulant detected on his initial thrombophilia evaluation in the hospital.  2. He now is doing well on Xarelto and will not require routine monitoring.   3. Mild anemia and leukopenia.  He was more anemic today and reports that he is occasionally seen dark stools.  We will send him back to the lab to have some anemia studies performed.    Plan    1.   Continue Xarelto 20 mg daily.  2.  Return for follow-up in 12 months.  3.  He will return to the lab on his way out to have anemia " labs drawn including an iron panel and ferritin, reticulocyte count and B12.  4.  I sent in a prescription for ferrous sulfate to his pharmacy with instructions to take 325 mg tablet twice daily with food.  Once we review the results of the anemia labs if it appears that he does not need any additional iron we can contact him by phone with new instructions.                           9/26/2018      CC:

## 2018-09-28 ENCOUNTER — HOSPITAL ENCOUNTER (OUTPATIENT)
Dept: PHYSICAL THERAPY | Facility: HOSPITAL | Age: 63
Setting detail: THERAPIES SERIES
Discharge: HOME | End: 2018-09-28

## 2018-09-28 DIAGNOSIS — M54.50 LEFT-SIDED LOW BACK PAIN WITHOUT SCIATICA, UNSPECIFIED CHRONICITY: Primary | ICD-10-CM

## 2018-09-28 DIAGNOSIS — M62.830 MUSCLE SPASM OF BACK: ICD-10-CM

## 2018-09-28 DIAGNOSIS — M25.69 BACK STIFFNESS: ICD-10-CM

## 2018-09-28 PROCEDURE — 97110 THERAPEUTIC EXERCISES: CPT | Performed by: PHYSICAL THERAPIST

## 2018-09-28 NOTE — THERAPY TREATMENT NOTE
Outpatient Physical Therapy Ortho Treatment Note  [facility]     Patient Name: Jared Rose  : 1955  MRN: [id number]  Today's Date: 2018      Visit Date: 2018    Visit Dx:    ICD-10-CM ICD-9-CM   1. Left-sided low back pain without sciatica, unspecified chronicity M54.5 724.2   2. Back stiffness M25.60 724.8   3. Muscle spasm of back M62.830 724.8       Patient Active Problem List   Diagnosis   • Thrombophilia (CMS/HCC)   • Pulmonary embolus (CMS/HCC)   • Lupus anticoagulant disorder (CMS/HCC)   • Chronic anticoagulation   • Coronary atherosclerosis   • Hypertension   • Exomphalos   • Hyperlipidemia        Past Medical History:   Diagnosis Date   • Bell's palsy    • CAD (coronary artery disease)     Minimal; diagnosed on previous cardiac cath in    • Coronary atherosclerosis     cath 2015: normal LM, diffuse LCx disease, LI LAD, 60-70% ostial diag (<1 mm vessel), LI RCA   • H/O Anemia    • H/O Leukopenia    • H/O umbilical hernia repair 2014    Dr. Delgadillo   • Hyperlipidemia    • Hypertension    • Lupus anticoagulant positive    • Obesity    • Pulmonary embolism (CMS/HCC)     H/O Right lower lobe pulmonary embolus   • Stress fracture of right foot         Past Surgical History:   Procedure Laterality Date   • CARDIAC CATHETERIZATION     • LUNG SURGERY      Blood clot removed   • TONSILLECTOMY AND ADENOIDECTOMY      as a child   • UMBILICAL HERNIA REPAIR  2014    Dr. Delgadillo                             PT Assessment/Plan     Row Name 18 1042          PT Assessment    Assessment Comments Lumbar flexin improved from 50% impairment on IE to <25% impairment today (distal tibia).  Progressed with swiss ball for core stability training met without adverse resposne. Pain is well controlled and patient is motivated.  -GR       User Key  (r) = Recorded By, (t) = Taken By, (c) = Cosigned By    Initials Name Provider Type    Mario Rand, PT Physical  Therapist                    Exercises     Row Name 09/28/18 1101 09/28/18 1000          Subjective Comments    Subjective Comments  -- Doing well with home program and pain is mild at worst now.  -GR        Subjective Pain    Able to rate subjective pain?  -- yes  -GR     Pre-Treatment Pain Level  -- 0  -GR     Post-Treatment Pain Level  -- 0  -GR        Total Minutes    23685 - PT Therapeutic Exercise Minutes --  -GR 45  -GR        Exercise 1    Exercise Name 1  -- DKTC  -GR     Cueing 1  -- Demo  -GR     Sets 1  -- 1  -GR     Reps 1  -- 3  -GR     Time 1  -- 20 sec  -GR     Additional Comments  -- with swiss ball  -GR        Exercise 2    Exercise Name 2  -- prone press up  -GR     Cueing 2  -- Demo  -GR     Sets 2  -- 1  -GR     Reps 2  -- 10  -GR        Exercise 3    Exercise Name 3  -- Rows standing + TA draw-in  -GR     Cueing 3  -- Demo  -GR     Sets 3  -- 2  -GR     Reps 3  -- 10  -GR     Additional Comments  -- RTB  -GR        Exercise 5    Exercise Name 5  -- doorway stretch  -GR     Cueing 5  -- Demo  -GR     Sets 5  -- 1  -GR     Reps 5  -- 3  -GR     Time 5  -- 20 sec  -GR        Exercise 6    Exercise Name 6  -- Nustep  -GR     Time 6  -- 5 minutes  -GR     Additional Comments  -- L5 UE/LE  -GR        Exercise 7    Exercise Name 7  -- PPT  -GR     Cueing 7  -- Demo  -GR     Sets 7  -- 1  -GR     Reps 7  -- 15  -GR     Time 7  -- 5 seconds  -GR        Exercise 8    Exercise Name 8  -- LTR  -GR     Cueing 8  -- Demo  -GR     Sets 8  -- 1  -GR     Reps 8  -- 15  -GR     Additional Comments  -- swiss ball  -GR        Exercise 9    Exercise Name 9  -- Piriformis stretch  -GR     Cueing 9  -- Demo  -GR     Sets 9  -- 1  -GR     Reps 9  -- 3  -GR     Time 9  -- 20 seconds  -GR        Exercise 10    Exercise Name 10  -- Bridge  -GR     Cueing 10  -- Demo  -GR     Sets 10  -- 1  -GR     Reps 10  -- 15  -GR     Time 10  -- 5 sec  -GR     Additional Comments  -- swiss ball  -GR        Exercise 11    Exercise  Name 11  -- Prone hip flexor stretch with belt  -GR     Cueing 11  -- Demo  -GR     Sets 11  -- 1  -GR     Reps 11  -- 3  -GR     Time 11  -- 20 sec  -GR        Exercise 12    Exercise Name 12  -- Doorway lat/QL stretch  -GR     Cueing 12  -- Demo  -GR     Reps 12  -- 3  -GR     Time 12  -- 20 seconds  -GR     Additional Comments  -- each direction  -GR        Exercise 13    Exercise Name 13  -- H/L clam + TA  -GR     Cueing 13  -- Demo  -GR     Sets 13  -- 1  -GR     Reps 13  -- 15  -GR     Time 13  -- 5 sec  -GR        Exercise 14    Exercise Name 14  -- H/L adductor squeeze  -GR     Cueing 14  -- Demo  -GR     Sets 14  -- 1  -GR     Reps 14  -- 15  -GR     Time 14  -- 5 sec  -GR       User Key  (r) = Recorded By, (t) = Taken By, (c) = Cosigned By    Initials Name Provider Type    Mario Rand, PT Physical Therapist                               PT OP Goals     Row Name 09/28/18 1000          PT Short Term Goals    STG Date to Achieve 10/08/18  -GR     STG 1 Independent with HEP  -GR     STG 1 Progress Met  -GR     STG 2 Decreased subj pain rating to 4/10 regularly.  -GR     STG 2 Progress Met  -GR     STG 3 Increase forward flexion AROM by 25%.  -GR     STG 3 Progress Met  -GR     STG 3 Progress Comments to distal tib  -GR        Long Term Goals    LTG 1 Improved oswestry score to 28%  -GR     LTG 1 Progress Ongoing  -GR     LTG 2 Pt with subj rating of pain at < 3/10 during work day.  -GR     LTG 2 Progress Ongoing  -GR     LTG 3 Pt able to sit through a movie without increasing back pain.  -GR     LTG 3 Progress Ongoing  -GR       User Key  (r) = Recorded By, (t) = Taken By, (c) = Cosigned By    Initials Name Provider Type    Mario Rand, PT Physical Therapist          Therapy Education  Education Details: upper back activation/importance, addition of swiss ball for stability training  Given: HEP, Posture/body mechanics  Program: Progressed  How Provided: Verbal  Provided to: Patient  Level of  Understanding: Teach back education performed, Verbalized              Time Calculation:   Start Time: 1015  Stop Time: 1100  Time Calculation (min): 45 min  Total Timed Code Minutes- PT: 45 minute(s)  Therapy Suggested Charges     Code   Minutes Charges    19378 (CPT®) Hc Pt Neuromusc Re Education Ea 15 Min      76912 (CPT®) Hc Pt Ther Proc Ea 15 Min 45 3    57943 (CPT®) Hc Gait Training Ea 15 Min      15339 (CPT®) Hc Pt Therapeutic Act Ea 15 Min      00698 (CPT®) Hc Pt Manual Therapy Ea 15 Min      82793 (CPT®) Hc Pt Ther Massage- Per 15 Min      64303 (CPT®) Hc Pt Iontophoresis Ea 15 Min      17068 (CPT®) Hc Pt Elec Stim Ea-Per 15 Min      98877 (CPT®) Hc Pt Ultrasound Ea 15 Min      76684 (CPT®) Hc Pt Self Care/Mgmt/Train Ea 15 Min      31952 (CPT®) Hc Pt Prosthetic (S) Train Initial Encounter, Each 15 Min      77482 (CPT®) Hc Orthotic(S) Mgmt/Train Initial Encounter, Each 15min      14574 (CPT®) Hc Pt Aquatic Therapy Ea 15 Min      10460 (CPT®) Hc Pt Orthotic(S)/Prosthetic(S) Encounter, Each 15 Min      Total  45 3        Therapy Charges for Today     Code Description Service Date Service Provider Modifiers Qty    58894811997 HC PT THER PROC EA 15 MIN 9/28/2018 Mario Mejia, PT GP 3                    Mario Mejia, PT  9/28/2018

## 2018-10-02 ENCOUNTER — HOSPITAL ENCOUNTER (OUTPATIENT)
Dept: PHYSICAL THERAPY | Facility: HOSPITAL | Age: 63
Setting detail: THERAPIES SERIES
Discharge: HOME | End: 2018-10-02

## 2018-10-02 DIAGNOSIS — M62.830 MUSCLE SPASM OF BACK: ICD-10-CM

## 2018-10-02 DIAGNOSIS — M54.50 LEFT-SIDED LOW BACK PAIN WITHOUT SCIATICA, UNSPECIFIED CHRONICITY: Primary | ICD-10-CM

## 2018-10-02 DIAGNOSIS — M25.69 BACK STIFFNESS: ICD-10-CM

## 2018-10-02 PROCEDURE — 97110 THERAPEUTIC EXERCISES: CPT | Performed by: PHYSICAL THERAPIST

## 2018-10-02 NOTE — THERAPY TREATMENT NOTE
Outpatient Physical Therapy Ortho Treatment Note  [facility]     Patient Name: Jared Rose  : 1955  MRN: [id number]  Today's Date: 10/2/2018      Visit Date: 10/02/2018    Visit Dx:    ICD-10-CM ICD-9-CM   1. Left-sided low back pain without sciatica, unspecified chronicity M54.5 724.2   2. Back stiffness M25.60 724.8   3. Muscle spasm of back M62.830 724.8       Patient Active Problem List   Diagnosis   • Thrombophilia (CMS/HCC)   • Pulmonary embolus (CMS/HCC)   • Lupus anticoagulant disorder (CMS/HCC)   • Chronic anticoagulation   • Coronary atherosclerosis   • Hypertension   • Exomphalos   • Hyperlipidemia        Past Medical History:   Diagnosis Date   • Bell's palsy    • CAD (coronary artery disease)     Minimal; diagnosed on previous cardiac cath in    • Coronary atherosclerosis     cath 2015: normal LM, diffuse LCx disease, LI LAD, 60-70% ostial diag (<1 mm vessel), LI RCA   • H/O Anemia    • H/O Leukopenia    • H/O umbilical hernia repair 2014    Dr. Delgadillo   • Hyperlipidemia    • Hypertension    • Lupus anticoagulant positive    • Obesity    • Pulmonary embolism (CMS/HCC)     H/O Right lower lobe pulmonary embolus   • Stress fracture of right foot         Past Surgical History:   Procedure Laterality Date   • CARDIAC CATHETERIZATION     • LUNG SURGERY      Blood clot removed   • TONSILLECTOMY AND ADENOIDECTOMY      as a child   • UMBILICAL HERNIA REPAIR  2014    Dr. Delgadillo                             PT Assessment/Plan     Row Name 10/02/18 0959          PT Assessment    Assessment Comments Patient able to walk >1000 ft without pain and then uses a stretching strategy to manage symptoms. Advanced with resistance in clinic met without adverse response.  -GR        PT Plan    PT Plan Comments Continue POC.  -GR       User Key  (r) = Recorded By, (t) = Taken By, (c) = Cosigned By    Initials Name Provider Type    Mario Rand, PT Physical Therapist     "                Exercises     Row Name 10/02/18 0900             Subjective Comments    Subjective Comments \"It's getting better by the minute.\"  -GR         Subjective Pain    Able to rate subjective pain? yes  -GR      Pre-Treatment Pain Level 0  -GR      Post-Treatment Pain Level 0  -GR         Total Minutes    80600 - PT Therapeutic Exercise Minutes 44  -GR         Exercise 3    Exercise Name 3 Rows standing + TA draw-in  -GR      Cueing 3 Demo  -GR      Sets 3 2  -GR      Reps 3 10  -GR      Additional Comments GTB  -GR         Exercise 4    Exercise Name 4 Alternating shoulder extension + TA  -GR      Cueing 4 Demo  -GR      Sets 4 2  -GR      Reps 4 10  -GR      Additional Comments GTB  -GR         Exercise 6    Exercise Name 6 Nustep  -GR      Time 6 5 minutes  -GR      Additional Comments L5 UE/LE  -GR         Exercise 8    Exercise Name 8 LTR  -GR      Cueing 8 Verbal  -GR      Sets 8 1  -GR      Reps 8 20  -GR      Additional Comments swiss ball  -GR         Exercise 9    Exercise Name 9 Piriformis stretch  -GR      Cueing 9 Verbal  -GR      Sets 9 1  -GR      Reps 9 3  -GR      Time 9 20 seconds  -GR         Exercise 10    Exercise Name 10 Bridge  -GR      Cueing 10 Verbal  -GR      Sets 10 1  -GR      Reps 10 20  -GR      Time 10 5 sec  -GR      Additional Comments GTB at knees  -GR         Exercise 11    Exercise Name 11 Hip flexor stretch on step  -GR      Cueing 11 Demo  -GR      Sets 11 1  -GR      Reps 11 3  -GR      Time 11  20 sec  -GR         Exercise 12    Exercise Name 12 Doorway lat/QL stretch  -GR      Cueing 12 Demo  -GR      Reps 12 3  -GR      Time 12 20 seconds  -GR      Additional Comments each direction  -GR         Exercise 13    Exercise Name 13 H/L clam + TA  -GR      Cueing 13 Demo  -GR      Sets 13 1  -GR      Reps 13 20  -GR      Time 13 5 sec  -GR      Additional Comments GTB  -GR         Exercise 14    Exercise Name 14 H/L adductor squeeze  -GR      Cueing 14 Demo  -GR      " Sets 14 1  -GR      Reps 14 20  -GR      Time 14 5 sec  -GR        User Key  (r) = Recorded By, (t) = Taken By, (c) = Cosigned By    Initials Name Provider Type    Mario Rand, PT Physical Therapist                                            Time Calculation:   Start Time: 0915  Stop Time: 0959  Time Calculation (min): 44 min  Total Timed Code Minutes- PT: 44 minute(s)  Therapy Suggested Charges     Code   Minutes Charges    90714 (CPT®) Hc Pt Neuromusc Re Education Ea 15 Min      18414 (CPT®) Hc Pt Ther Proc Ea 15 Min 44 3    00895 (CPT®) Hc Gait Training Ea 15 Min      59607 (CPT®) Hc Pt Therapeutic Act Ea 15 Min      43026 (CPT®) Hc Pt Manual Therapy Ea 15 Min      79682 (CPT®) Hc Pt Ther Massage- Per 15 Min      45415 (CPT®) Hc Pt Iontophoresis Ea 15 Min      74700 (CPT®) Hc Pt Elec Stim Ea-Per 15 Min      44285 (CPT®) Hc Pt Ultrasound Ea 15 Min      33406 (CPT®) Hc Pt Self Care/Mgmt/Train Ea 15 Min      96375 (CPT®) Hc Pt Prosthetic (S) Train Initial Encounter, Each 15 Min      00021 (CPT®) Hc Orthotic(S) Mgmt/Train Initial Encounter, Each 15min      75109 (CPT®) Hc Pt Aquatic Therapy Ea 15 Min      91921 (CPT®) Hc Pt Orthotic(S)/Prosthetic(S) Encounter, Each 15 Min       (CPT®) Hc Pt Electrical Stim Unattended      Total  44 3        Therapy Charges for Today     Code Description Service Date Service Provider Modifiers Qty    40407977952 HC PT THER PROC EA 15 MIN 10/2/2018 Mario Mejia, PT GP 3                    Mario Mejia, PT  10/2/2018

## 2018-10-05 ENCOUNTER — HOSPITAL ENCOUNTER (OUTPATIENT)
Dept: PHYSICAL THERAPY | Facility: HOSPITAL | Age: 63
Setting detail: THERAPIES SERIES
Discharge: HOME | End: 2018-10-05

## 2018-10-05 DIAGNOSIS — M54.50 LEFT-SIDED LOW BACK PAIN WITHOUT SCIATICA, UNSPECIFIED CHRONICITY: Primary | ICD-10-CM

## 2018-10-05 DIAGNOSIS — M25.69 BACK STIFFNESS: ICD-10-CM

## 2018-10-05 DIAGNOSIS — M62.830 MUSCLE SPASM OF BACK: ICD-10-CM

## 2018-10-05 PROCEDURE — 97110 THERAPEUTIC EXERCISES: CPT | Performed by: PHYSICAL THERAPIST

## 2018-10-05 NOTE — THERAPY TREATMENT NOTE
Outpatient Physical Therapy Ortho Treatment Note  [facility]     Patient Name: Jared Rose  : 1955  MRN: [id number]  Today's Date: 10/5/2018      Visit Date: 10/05/2018    Visit Dx:    ICD-10-CM ICD-9-CM   1. Left-sided low back pain without sciatica, unspecified chronicity M54.5 724.2   2. Back stiffness M25.60 724.8   3. Muscle spasm of back M62.830 724.8       Patient Active Problem List   Diagnosis   • Thrombophilia (CMS/HCC)   • Pulmonary embolus (CMS/HCC)   • Lupus anticoagulant disorder (CMS/HCC)   • Chronic anticoagulation   • Coronary atherosclerosis   • Hypertension   • Exomphalos   • Hyperlipidemia        Past Medical History:   Diagnosis Date   • Bell's palsy    • CAD (coronary artery disease)     Minimal; diagnosed on previous cardiac cath in    • Coronary atherosclerosis     cath 2015: normal LM, diffuse LCx disease, LI LAD, 60-70% ostial diag (<1 mm vessel), LI RCA   • H/O Anemia    • H/O Leukopenia    • H/O umbilical hernia repair 2014    Dr. Delgadillo   • Hyperlipidemia    • Hypertension    • Lupus anticoagulant positive    • Obesity    • Pulmonary embolism (CMS/HCC)     H/O Right lower lobe pulmonary embolus   • Stress fracture of right foot         Past Surgical History:   Procedure Laterality Date   • CARDIAC CATHETERIZATION     • LUNG SURGERY      Blood clot removed   • TONSILLECTOMY AND ADENOIDECTOMY      as a child   • UMBILICAL HERNIA REPAIR  2014    Dr. Delgadillo                             PT Assessment/Plan     Row Name 10/05/18 1059          PT Assessment    Assessment Comments Added unilateral stability training. No pain throughout. Progressing towards LTGs.  -GR        PT Plan    PT Plan Comments Continue POC.  -GR       User Key  (r) = Recorded By, (t) = Taken By, (c) = Cosigned By    Initials Name Provider Type    Mario Rand, PT Physical Therapist                    Exercises     Row Name 10/05/18 1000             Subjective  "Comments    Subjective Comments \"I can't complain, just keep working at it.\"  -GR         Subjective Pain    Able to rate subjective pain? yes  -GR      Pre-Treatment Pain Level 0  -GR      Post-Treatment Pain Level 0  -GR         Total Minutes    10181 - PT Therapeutic Exercise Minutes 45  -GR         Exercise 3    Exercise Name 3 Rows standing + TA draw-in  -GR      Cueing 3 Demo  -GR      Sets 3 2  -GR      Reps 3 10  -GR      Additional Comments tuff stuff L2  -GR         Exercise 4    Exercise Name 4 Alternating shoulder extension + TA  -GR      Cueing 4 Demo  -GR      Sets 4 2  -GR      Reps 4 10  -GR      Additional Comments tuff stuff L2  -GR         Exercise 5    Exercise Name 5 Tuff stuff walk out L2  -GR      Cueing 5 Demo;Verbal  -GR      Sets 5 2  -GR      Reps 5 5  -GR      Additional Comments 2 steps fwd/retro  -GR         Exercise 6    Exercise Name 6 Nustep  -GR      Time 6 5 minutes  -GR      Additional Comments L6 UE/LE  -GR         Exercise 8    Exercise Name 8 LTR  -GR      Cueing 8 Verbal  -GR      Sets 8 1  -GR      Reps 8 20  -GR      Additional Comments swiss ball  -GR         Exercise 9    Exercise Name 9 Piriformis stretch  -GR      Cueing 9 Verbal  -GR      Sets 9 1  -GR      Reps 9 3  -GR      Time 9 20 seconds  -GR         Exercise 10    Exercise Name 10 Bridge  -GR      Cueing 10 Verbal  -GR      Sets 10 1  -GR      Reps 10 20  -GR      Time 10 5 sec  -GR      Additional Comments GTB at knees  -GR         Exercise 11    Exercise Name 11 Prone hip flexor stretch  -GR      Cueing 11 Demo  -GR      Sets 11 1  -GR      Reps 11 3  -GR      Time 11  20 sec  -GR      Additional Comments with belt  -GR         Exercise 12    Exercise Name 12 Doorway lat/QL stretch  -GR      Cueing 12 Demo  -GR      Reps 12 3  -GR      Time 12 20 seconds  -GR         Exercise 13    Exercise Name 13 H/L clam + TA  -GR      Cueing 13 Demo  -GR      Sets 13 1  -GR      Reps 13 20  -GR      Time 13 5 sec  -GR      " Additional Comments GTB B and uni  -GR         Exercise 14    Exercise Name 14 H/L adductor squeeze  -GR      Cueing 14 Demo  -GR      Sets 14 1  -GR      Reps 14 20  -GR      Time 14 5 sec  -GR        User Key  (r) = Recorded By, (t) = Taken By, (c) = Cosigned By    Initials Name Provider Type    Mario Rand, PT Physical Therapist                               PT OP Goals     Row Name 10/05/18 1000          PT Short Term Goals    STG Date to Achieve 10/08/18  -GR     STG 1 Independent with HEP  -GR     STG 1 Progress Met  -GR     STG 2 Decreased subj pain rating to 4/10 regularly.  -GR     STG 2 Progress Met  -GR     STG 3 Increase forward flexion AROM by 25%.  -GR     STG 3 Progress Met  -GR        Long Term Goals    LTG 1 Improved oswestry score to 28%  -GR     LTG 1 Progress Ongoing  -GR     LTG 2 Pt with subj rating of pain at < 3/10 during work day.  -GR     LTG 2 Progress Ongoing  -GR     LTG 3 Pt able to sit through a movie without increasing back pain.  -GR     LTG 3 Progress Ongoing  -GR       User Key  (r) = Recorded By, (t) = Taken By, (c) = Cosigned By    Initials Name Provider Type    Mario Rand, PT Physical Therapist                         Time Calculation:   Start Time: 1015  Stop Time: 1100  Time Calculation (min): 45 min  Therapy Suggested Charges     Code   Minutes Charges    88722 (CPT®) Hc Pt Neuromusc Re Education Ea 15 Min      64245 (CPT®) Hc Pt Ther Proc Ea 15 Min 45 3    76197 (CPT®) Hc Gait Training Ea 15 Min      54667 (CPT®) Hc Pt Therapeutic Act Ea 15 Min      16509 (CPT®) Hc Pt Manual Therapy Ea 15 Min      41196 (CPT®) Hc Pt Ther Massage- Per 15 Min      04130 (CPT®) Hc Pt Iontophoresis Ea 15 Min      53573 (CPT®) Hc Pt Elec Stim Ea-Per 15 Min      21864 (CPT®) Hc Pt Ultrasound Ea 15 Min      62697 (CPT®) Hc Pt Self Care/Mgmt/Train Ea 15 Min      36577 (CPT®) Hc Pt Prosthetic (S) Train Initial Encounter, Each 15 Min      67074 (CPT®) Hc Orthotic(S) Mgmt/Train  Initial Encounter, Each 15min      12623 (CPT®) Hc Pt Aquatic Therapy Ea 15 Min      44529 (CPT®) Hc Pt Orthotic(S)/Prosthetic(S) Encounter, Each 15 Min       (CPT®) Hc Pt Electrical Stim Unattended      Total  45 3        Therapy Charges for Today     Code Description Service Date Service Provider Modifiers Qty    34624382336 HC PT THER PROC EA 15 MIN 10/5/2018 Mario Mejia, PT GP 3                    Mario Mejia, PT  10/5/2018

## 2018-10-09 ENCOUNTER — HOSPITAL ENCOUNTER (OUTPATIENT)
Dept: PHYSICAL THERAPY | Facility: HOSPITAL | Age: 63
Setting detail: THERAPIES SERIES
Discharge: HOME | End: 2018-10-09

## 2018-10-09 DIAGNOSIS — M25.69 BACK STIFFNESS: ICD-10-CM

## 2018-10-09 DIAGNOSIS — M54.50 LEFT-SIDED LOW BACK PAIN WITHOUT SCIATICA, UNSPECIFIED CHRONICITY: Primary | ICD-10-CM

## 2018-10-09 DIAGNOSIS — M62.830 MUSCLE SPASM OF BACK: ICD-10-CM

## 2018-10-09 PROCEDURE — 97110 THERAPEUTIC EXERCISES: CPT

## 2018-10-09 NOTE — THERAPY TREATMENT NOTE
Outpatient Physical Therapy Ortho Treatment Note  [facility]     Patient Name: Jared Rose  : 1955  MRN: [id number]  Today's Date: 10/9/2018      Visit Date: 10/09/2018    Visit Dx:    ICD-10-CM ICD-9-CM   1. Left-sided low back pain without sciatica, unspecified chronicity M54.5 724.2   2. Back stiffness M25.60 724.8   3. Muscle spasm of back M62.830 724.8       Patient Active Problem List   Diagnosis   • Thrombophilia (CMS/HCC)   • Pulmonary embolus (CMS/HCC)   • Lupus anticoagulant disorder (CMS/HCC)   • Chronic anticoagulation   • Coronary atherosclerosis   • Hypertension   • Exomphalos   • Hyperlipidemia        Past Medical History:   Diagnosis Date   • Bell's palsy    • CAD (coronary artery disease)     Minimal; diagnosed on previous cardiac cath in    • Coronary atherosclerosis     cath 2015: normal LM, diffuse LCx disease, LI LAD, 60-70% ostial diag (<1 mm vessel), LI RCA   • H/O Anemia    • H/O Leukopenia    • H/O umbilical hernia repair 2014    Dr. Delgadillo   • Hyperlipidemia    • Hypertension    • Lupus anticoagulant positive    • Obesity    • Pulmonary embolism (CMS/HCC)     H/O Right lower lobe pulmonary embolus   • Stress fracture of right foot         Past Surgical History:   Procedure Laterality Date   • CARDIAC CATHETERIZATION     • LUNG SURGERY      Blood clot removed   • TONSILLECTOMY AND ADENOIDECTOMY      as a child   • UMBILICAL HERNIA REPAIR  2014    Dr. Delgadillo                             PT Assessment/Plan     Row Name 10/09/18 1030          PT Assessment    Assessment Comments Pt reports no pain today and overall  improvement in symptoms. Pt continues with intermittnet flare ups at work which requires several stretches to calm down. Pt required minimal cues for current program and discussed TA contraction at length during standing exercises.   -CN        PT Plan    PT Plan Comments Continue to progress strengthening with transition to  functional positions.   -CN       User Key  (r) = Recorded By, (t) = Taken By, (c) = Cosigned By    Initials Name Provider Type    CN Cindy Butcher, PT Physical Therapist                    Exercises     Row Name 10/09/18 0900             Subjective Comments    Subjective Comments I have been feeling pretty good. It still fluctuates some.   -CN         Subjective Pain    Able to rate subjective pain? yes  -CN      Pre-Treatment Pain Level 0  -CN         Total Minutes    59582 - PT Therapeutic Exercise Minutes 45  -CN         Exercise 1    Exercise Name 1 DKTC  -CN      Cueing 1 Demo  -CN      Sets 1 1  -CN      Reps 1 3  -CN      Time 1 20 sec  -CN      Additional Comments with swiss ball  -CN         Exercise 3    Exercise Name 3 Rows standing + TA draw-in  -CN      Cueing 3 Demo  -CN      Sets 3 2  -CN      Reps 3 10  -CN      Additional Comments tuff stuff L3  -CN         Exercise 4    Exercise Name 4 Alternating shoulder extension + TA  -CN      Cueing 4 Demo  -CN      Sets 4 2  -CN      Reps 4 10  -CN      Additional Comments Tuff stuff L2  -CN         Exercise 5    Exercise Name 5 Tuff stuff walk out L2  -CN      Cueing 5 Demo;Verbal  -CN      Sets 5 2  -CN      Reps 5 5  -CN      Additional Comments 2  steps forward/retro  -CN         Exercise 6    Exercise Name 6 Nustep  -CN      Time 6 5 minutes  -CN      Additional Comments L6 UE/LE  -CN         Exercise 8    Exercise Name 8 LTR  -CN      Cueing 8 Verbal  -CN      Sets 8 1  -CN      Reps 8 20  -CN      Additional Comments swiss ball  -CN         Exercise 9    Exercise Name 9 Piriformis stretch  -CN      Cueing 9 Verbal  -CN      Sets 9 1  -CN      Reps 9 3  -CN      Time 9 20 seconds  -CN         Exercise 10    Exercise Name 10 Bridge  -CN      Cueing 10 Verbal  -CN      Sets 10 1  -CN      Reps 10 20  -CN      Time 10 5 sec  -CN      Additional Comments GTB at knees  -CN         Exercise 11    Exercise Name 11 Prone hip flexor stretch  -CN       Cueing 11 Demo  -CN      Sets 11 1  -CN      Reps 11 3  -CN      Time 11  20 sec  -CN      Additional Comments with belt  -CN         Exercise 12    Exercise Name 12 Doorway lat/QL stretch  -CN      Cueing 12 Demo  -CN      Reps 12 3  -CN      Time 12 20 seconds  -CN         Exercise 13    Exercise Name 13 H/L clam + TA  -CN      Cueing 13 Demo  -CN      Sets 13 1  -CN      Reps 13 20  -CN      Time 13 5 sec  -CN      Additional Comments GTB, B and uni  -CN         Exercise 14    Exercise Name 14 H/L adductor squeeze  -CN      Cueing 14 Demo  -CN      Sets 14 1  -CN      Reps 14 20  -CN      Time 14 5 sec  -CN        User Key  (r) = Recorded By, (t) = Taken By, (c) = Cosigned By    Initials Name Provider Type    Cindy Estrada, PT Physical Therapist                               PT OP Goals     Row Name 10/09/18 1000          PT Short Term Goals    STG Date to Achieve 10/08/18  -CN     STG 1 Independent with HEP  -CN     STG 1 Progress Met  -CN     STG 2 Decreased subj pain rating to 4/10 regularly.  -CN     STG 2 Progress Met  -CN     STG 3 Increase forward flexion AROM by 25%.  -CN     STG 3 Progress Met  -CN        Long Term Goals    LTG 1 Improved oswestry score to 28%  -CN     LTG 1 Progress Ongoing  -CN     LTG 2 Pt with subj rating of pain at < 3/10 during work day.  -CN     LTG 2 Progress Ongoing  -CN     LTG 2 Progress Comments Pt continues with intermittent flare ups at work.   -CN     LTG 3 Pt able to sit through a movie without increasing back pain.  -CN     LTG 3 Progress Ongoing  -CN       User Key  (r) = Recorded By, (t) = Taken By, (c) = Cosigned By    Initials Name Provider Type    Cindy Estrada, WILFRID Physical Therapist          Therapy Education  Given: HEP, Posture/body mechanics  Program: Progressed  How Provided: Verbal  Provided to: Patient  Level of Understanding: Teach back education performed, Verbalized              Time Calculation:   Start Time: 0915  Stop Time:  1000  Time Calculation (min): 45 min  Therapy Suggested Charges     Code   Minutes Charges    06338 (CPT®) Hc Pt Neuromusc Re Education Ea 15 Min      18131 (CPT®) Hc Pt Ther Proc Ea 15 Min 45 3    03229 (CPT®) Hc Gait Training Ea 15 Min      30659 (CPT®) Hc Pt Therapeutic Act Ea 15 Min      35396 (CPT®) Hc Pt Manual Therapy Ea 15 Min      14235 (CPT®) Hc Pt Ther Massage- Per 15 Min      01867 (CPT®) Hc Pt Iontophoresis Ea 15 Min      73199 (CPT®) Hc Pt Elec Stim Ea-Per 15 Min      13275 (CPT®) Hc Pt Ultrasound Ea 15 Min      29978 (CPT®) Hc Pt Self Care/Mgmt/Train Ea 15 Min      43625 (CPT®) Hc Pt Prosthetic (S) Train Initial Encounter, Each 15 Min      54065 (CPT®) Hc Orthotic(S) Mgmt/Train Initial Encounter, Each 15min      89951 (CPT®) Hc Pt Aquatic Therapy Ea 15 Min      88607 (CPT®) Hc Pt Orthotic(S)/Prosthetic(S) Encounter, Each 15 Min       (CPT®) Hc Pt Electrical Stim Unattended      Total  45 3        Therapy Charges for Today     Code Description Service Date Service Provider Modifiers Qty    92982669510 HC PT THER PROC EA 15 MIN 10/9/2018 Cindy Butcher, PT GP 3                    Cindy Butcher, PT  10/9/2018

## 2018-10-16 ENCOUNTER — HOSPITAL ENCOUNTER (OUTPATIENT)
Dept: PHYSICAL THERAPY | Facility: HOSPITAL | Age: 63
Setting detail: THERAPIES SERIES
Discharge: HOME | End: 2018-10-16

## 2018-10-16 DIAGNOSIS — M62.830 MUSCLE SPASM OF BACK: ICD-10-CM

## 2018-10-16 DIAGNOSIS — M25.69 BACK STIFFNESS: ICD-10-CM

## 2018-10-16 DIAGNOSIS — M54.50 LEFT-SIDED LOW BACK PAIN WITHOUT SCIATICA, UNSPECIFIED CHRONICITY: Primary | ICD-10-CM

## 2018-10-16 PROCEDURE — G8979 MOBILITY GOAL STATUS: HCPCS | Performed by: PHYSICAL THERAPIST

## 2018-10-16 PROCEDURE — G8980 MOBILITY D/C STATUS: HCPCS | Performed by: PHYSICAL THERAPIST

## 2018-10-16 PROCEDURE — 97110 THERAPEUTIC EXERCISES: CPT | Performed by: PHYSICAL THERAPIST

## 2018-10-16 NOTE — THERAPY DISCHARGE NOTE
Outpatient Physical Therapy Ortho Treatment Note/Discharge Summary  [facility]     Patient Name: Jared Rose  : 1955  MRN: [id number]  Today's Date: 10/16/2018      Visit Date: 10/16/2018    Visit Dx:    ICD-10-CM ICD-9-CM   1. Left-sided low back pain without sciatica, unspecified chronicity M54.5 724.2   2. Back stiffness M25.60 724.8   3. Muscle spasm of back M62.830 724.8       Patient Active Problem List   Diagnosis   • Thrombophilia (CMS/HCC)   • Pulmonary embolus (CMS/HCC)   • Lupus anticoagulant disorder (CMS/HCC)   • Chronic anticoagulation   • Coronary atherosclerosis   • Hypertension   • Exomphalos   • Hyperlipidemia        Past Medical History:   Diagnosis Date   • Bell's palsy    • CAD (coronary artery disease)     Minimal; diagnosed on previous cardiac cath in    • Coronary atherosclerosis     cath 2015: normal LM, diffuse LCx disease, LI LAD, 60-70% ostial diag (<1 mm vessel), LI RCA   • H/O Anemia    • H/O Leukopenia    • H/O umbilical hernia repair 2014    Dr. Delgadillo   • Hyperlipidemia    • Hypertension    • Lupus anticoagulant positive    • Obesity    • Pulmonary embolism (CMS/HCC)     H/O Right lower lobe pulmonary embolus   • Stress fracture of right foot         Past Surgical History:   Procedure Laterality Date   • CARDIAC CATHETERIZATION     • LUNG SURGERY      Blood clot removed   • TONSILLECTOMY AND ADENOIDECTOMY      as a child   • UMBILICAL HERNIA REPAIR  2014    Dr. Delgadillo                             PT Assessment/Plan     Row Name 10/16/18 1305          PT Assessment    Assessment Comments Mr. Townsend has attended 8 sessions of skilled PT for treatment of acute L sided LBP.  He has met all goals at this time and reports only mild intermittent pain with prolonged weight bearing activities which he manages with stretches.  Perceived disability as per the modified Oswestry has decreased from 34% to 18% disability where 100% = complete  "disability.  He is appropriate to d/c to I HEP at this time and verbalizes agreement. Thank you for this referral.  -GR        PT Plan    PT Plan Comments D/C to [facility].  -GR       User Key  (r) = Recorded By, (t) = Taken By, (c) = Cosigned By    Initials Name Provider Type    GR Mario Mejia, PT Physical Therapist                    Exercises     Row Name 10/16/18 0900             Subjective Comments    Subjective Comments \"I don't have pain. I think I can be done today. This has been really helpful.\"  -GR         Subjective Pain    Able to rate subjective pain? yes  -GR      Pre-Treatment Pain Level 0  -GR         Total Minutes    28887 - PT Therapeutic Exercise Minutes 42  -GR         Exercise 1    Exercise Name 1 DKTC  -GR      Cueing 1 Demo  -GR      Sets 1 1  -GR      Reps 1 3  -GR      Time 1 20 sec  -GR      Additional Comments with swiss ball  -GR         Exercise 3    Exercise Name 3 Rows standing + TA draw-in  -GR      Cueing 3 Demo  -GR      Sets 3 2  -GR      Reps 3 10  -GR      Additional Comments tuff stuff L3  -GR         Exercise 4    Exercise Name 4 Alternating shoulder extension + TA  -GR      Cueing 4 Demo  -GR      Sets 4 2  -GR      Reps 4 10  -GR      Additional Comments tuff stuff L3  -GR         Exercise 5    Exercise Name 5 Tuff stuff walk out L3  -GR      Cueing 5 Demo;Verbal  -GR      Sets 5 2  -GR      Reps 5 5  -GR      Additional Comments 2  steps forward/retro  -GR         Exercise 6    Exercise Name 6 Nustep  -GR      Time 6 5 minutes  -GR      Additional Comments L6 UE/LE  -GR         Exercise 8    Exercise Name 8 LTR  -GR      Cueing 8 Verbal  -GR      Sets 8 1  -GR      Reps 8 20  -GR      Additional Comments swiss ball  -GR         Exercise 9    Exercise Name 9 Piriformis stretch  -GR      Cueing 9 Verbal  -GR      Sets 9 1  -GR      Reps 9 3  -GR      Time 9 20 seconds  -GR         Exercise 10    Exercise Name 10 Bridge  -GR      Cueing 10 Verbal  -GR      Sets 10 1  -GR   "    Reps 10 20  -GR      Time 10 5 sec  -GR      Additional Comments BTB at knees  -GR         Exercise 11    Exercise Name 11 --  -GR      Cueing 11 --  -GR      Sets 11 --  -GR      Reps 11 --  -GR      Time 11 --  -GR      Additional Comments --  -GR         Exercise 12    Exercise Name 12 Doorway lat/QL stretch  -GR      Cueing 12 Demo  -GR      Reps 12 3  -GR      Time 12 20 seconds  -GR         Exercise 13    Exercise Name 13 H/L clam + TA  -GR      Cueing 13 Demo  -GR      Sets 13 1  -GR      Reps 13 20  -GR      Time 13 5 sec  -GR      Additional Comments BTB, B and uni  -GR         Exercise 14    Exercise Name 14 H/L adductor squeeze  -GR      Cueing 14 Demo  -GR      Sets 14 1  -GR      Reps 14 20  -GR      Time 14 5 sec  -GR        User Key  (r) = Recorded By, (t) = Taken By, (c) = Cosigned By    Initials Name Provider Type    Mario Rand, PT Physical Therapist                               PT OP Goals     Row Name 10/16/18 0900          PT Short Term Goals    STG Date to Achieve 10/08/18  -GR     STG 1 Independent with HEP  -GR     STG 1 Progress Met  -GR     STG 2 Decreased subj pain rating to 4/10 regularly.  -GR     STG 2 Progress Met  -GR     STG 3 Increase forward flexion AROM by 25%.  -GR     STG 3 Progress Met  -GR        Long Term Goals    LTG 1 Improved oswestry score to 28%  -GR     LTG 1 Progress Met  -GR     LTG 1 Progress Comments 18% disability  -GR     LTG 2 Pt with subj rating of pain at < 3/10 during work day.  -GR     LTG 2 Progress Met  -GR     LTG 3 Pt able to sit through a movie without increasing back pain.  -GR     LTG 3 Progress Met  -GR       User Key  (r) = Recorded By, (t) = Taken By, (c) = Cosigned By    Initials Name Provider Type    Mario Rand, PT Physical Therapist          Therapy Education  Education Details: d/c plan  Given: HEP  Program: Reinforced  How Provided: Verbal  Provided to: Patient  Level of Understanding: Teach back education performed,  Verbalized, Demonstrated    Outcome Measure Options: Modifed Owestry  Modified Oswestry  Modified Oswestry Score/Comments: 18%      Time Calculation:   Start Time: 0918  Stop Time: 1000  Time Calculation (min): 42 min  Total Timed Code Minutes- PT: 42 minute(s)  Therapy Suggested Charges     Code   Minutes Charges    31623 (CPT®) Hc Pt Neuromusc Re Education Ea 15 Min      14642 (CPT®) Hc Pt Ther Proc Ea 15 Min 42 3    94081 (CPT®) Hc Gait Training Ea 15 Min      60414 (CPT®) Hc Pt Therapeutic Act Ea 15 Min      83766 (CPT®) Hc Pt Manual Therapy Ea 15 Min      21586 (CPT®) Hc Pt Ther Massage- Per 15 Min      00934 (CPT®) Hc Pt Iontophoresis Ea 15 Min      47927 (CPT®) Hc Pt Elec Stim Ea-Per 15 Min      36876 (CPT®) Hc Pt Ultrasound Ea 15 Min      26127 (CPT®) Hc Pt Self Care/Mgmt/Train Ea 15 Min      30153 (CPT®) Hc Pt Prosthetic (S) Train Initial Encounter, Each 15 Min      77782 (CPT®) Hc Orthotic(S) Mgmt/Train Initial Encounter, Each 15min      88095 (CPT®) Hc Pt Aquatic Therapy Ea 15 Min      78298 (CPT®) Hc Pt Orthotic(S)/Prosthetic(S) Encounter, Each 15 Min       (CPT®) Hc Pt Electrical Stim Unattended      Total  42 3        Therapy Charges for Today     Code Description Service Date Service Provider Modifiers Qty    43961071540 HC PT THER PROC EA 15 MIN 10/16/2018 Mario Mejia, PT GP 3    83286507972 HC PT MOBILITY PROJECTED 10/16/2018 Mario Mejia, PT GP, CI 1    19402146810 HC PT MOBILITY DISCHARGE 10/16/2018 Mario Mejia, PT GP, CI 1          PT G-Codes  Outcome Measure Options: Modifed Owestry  Modified Oswestry Score/Comments: 18%  Functional Limitation: Mobility: Walking and moving around  Mobility: Walking and Moving Around Goal Status (): At least 1 percent but less than 20 percent impaired, limited or restricted  Mobility: Walking and Moving Around Discharge Status (): At least 1 percent but less than 20 percent impaired, limited or restricted     OP PT Discharge  Summary  Date of Discharge: 10/16/18  Reason for Discharge: All goals achieved  Outcomes Achieved: Able to achieve all goals within established timeline  Discharge Destination: Home with home program  Discharge Instructions/Additional Comments: d/c to NAHOMY Mejia, PT  10/16/2018

## 2018-11-28 DIAGNOSIS — D68.62 LUPUS ANTICOAGULANT DISORDER (HCC): ICD-10-CM

## 2018-11-28 DIAGNOSIS — I26.99 OTHER PULMONARY EMBOLISM WITHOUT ACUTE COR PULMONALE, UNSPECIFIED CHRONICITY (HCC): ICD-10-CM

## 2018-11-28 DIAGNOSIS — D68.59 THROMBOPHILIA (HCC): ICD-10-CM

## 2018-11-28 DIAGNOSIS — Z79.01 CHRONIC ANTICOAGULATION: ICD-10-CM

## 2018-11-28 NOTE — TELEPHONE ENCOUNTER
Conversation yesterday with pt-he states there is an issue with his Xarelto rx. I submitted a PA to [facility] through [employer].    Fax rec stating the Xarelto does not require a PA. I contacted [facility] [phone] and they state pt has no refills left on his rx. I have escribed a new rx to [facility].

## 2018-12-10 ENCOUNTER — TRANSCRIBE ORDERS (OUTPATIENT)
Dept: ADMINISTRATIVE | Facility: HOSPITAL | Age: 63
End: 2018-12-10

## 2018-12-10 DIAGNOSIS — R79.89 ELEVATED SERUM CREATININE: ICD-10-CM

## 2018-12-10 DIAGNOSIS — I10 ESSENTIAL HYPERTENSION: Primary | ICD-10-CM

## 2018-12-17 ENCOUNTER — HOSPITAL ENCOUNTER (OUTPATIENT)
Dept: CARDIOLOGY | Facility: HOSPITAL | Age: 63
Discharge: HOME | End: 2018-12-17
Attending: INTERNAL MEDICINE | Admitting: INTERNAL MEDICINE

## 2018-12-17 ENCOUNTER — HOSPITAL ENCOUNTER (OUTPATIENT)
Dept: ULTRASOUND IMAGING | Facility: HOSPITAL | Age: 63
Discharge: HOME | End: 2018-12-17
Attending: INTERNAL MEDICINE

## 2018-12-17 DIAGNOSIS — I10 ESSENTIAL HYPERTENSION: ICD-10-CM

## 2018-12-17 DIAGNOSIS — R79.89 ELEVATED SERUM CREATININE: ICD-10-CM

## 2018-12-17 LAB
BH CV ECHO MEAS - DIST REN A EDV LEFT: 20 CM/SEC
BH CV ECHO MEAS - DIST REN A PSV LEFT: 80.1 CM/SEC
BH CV ECHO MEAS - DIST REN A RI LEFT: 0.75
BH CV ECHO MEAS - MID REN A EDV LEFT: 27.6 CM/SEC
BH CV ECHO MEAS - MID REN A PSV LEFT: 89.6 CM/SEC
BH CV ECHO MEAS - MID REN A RI LEFT: 0.69
BH CV ECHO MEAS - PROX REN A EDV LEFT: 35.8 CM/SEC
BH CV ECHO MEAS - PROX REN A PSV LEFT: 113 CM/SEC
BH CV ECHO MEAS - PROX REN A RI LEFT: 0.68
BH CV VAS BP LEFT ARM: NORMAL MMHG
BH CV VAS BP RIGHT ARM: NORMAL MMHG
BH CV VAS RENAL AORTIC MID EDV: 0 CM/S
BH CV VAS RENAL AORTIC MID PSV: 111 CM/S
BH CV VAS RENAL HILUM LEFT EDV: 11 CM/S
BH CV VAS RENAL HILUM LEFT PSV: 38 CM/S
BH CV VAS RENAL HILUM RIGHT EDV: 14 CM/S
BH CV VAS RENAL HILUM RIGHT PSV: 44 CM/S
BH CV XLRA MEAS - KID L LEFT: 12.5 CM
BH CV XLRA MEAS - RENAL A ORG RI LEFT: 0.69
BH CV XLRA MEAS DIST REN A EDV RIGHT: 27 CM/SEC
BH CV XLRA MEAS DIST REN A PSV RIGHT: 93.4 CM/SEC
BH CV XLRA MEAS DIST REN A RI RIGHT: 0.71
BH CV XLRA MEAS KID L RIGHT: 11.1 CM
BH CV XLRA MEAS KID W RIGHT: 5.88 CM
BH CV XLRA MEAS MID REN A EDV RIGHT: 42.1 CM/SEC
BH CV XLRA MEAS MID REN A PSV RIGHT: 130 CM/SEC
BH CV XLRA MEAS MID REN A RI RIGHT: 0.68
BH CV XLRA MEAS PROX REN A EDV RIGHT: 47.6 CM/SEC
BH CV XLRA MEAS PROX REN A PSV RIGHT: 133 CM/SEC
BH CV XLRA MEAS PROX REN A RI RIGHT: 0.64
BH CV XLRA MEAS RENAL A ORG EDV LEFT: 34.7 CM/SEC
BH CV XLRA MEAS RENAL A ORG EDV RIGHT: 29 CM/SEC
BH CV XLRA MEAS RENAL A ORG PSV LEFT: 111 CM/SEC
BH CV XLRA MEAS RENAL A ORG PSV RIGHT: 99.6 CM/SEC
BH CV XLRA MEAS RENAL A ORG RI RIGHT: 0.71
LEFT KIDNEY WIDTH: 5.22 CM
LEFT RENAL UPPER PARENCHYMA MAX: 33 CM/S
LEFT RENAL UPPER PARENCHYMA MIN: 11 CM/S
LEFT RENAL UPPER PARENCHYMA RI: 0.67
RIGHT RENAL UPPER PARENCHYMA MAX: 24 CM/S
RIGHT RENAL UPPER PARENCHYMA MIN: 7 CM/S
RIGHT RENAL UPPER PARENCHYMA RI: 0.71

## 2018-12-17 PROCEDURE — 93975 VASCULAR STUDY: CPT

## 2018-12-17 PROCEDURE — 76775 US EXAM ABDO BACK WALL LIM: CPT

## 2018-12-26 DIAGNOSIS — D68.59 THROMBOPHILIA (HCC): ICD-10-CM

## 2018-12-26 DIAGNOSIS — I26.99 OTHER PULMONARY EMBOLISM WITHOUT ACUTE COR PULMONALE, UNSPECIFIED CHRONICITY (HCC): ICD-10-CM

## 2018-12-26 DIAGNOSIS — Z79.01 CHRONIC ANTICOAGULATION: ICD-10-CM

## 2018-12-26 DIAGNOSIS — D68.62 LUPUS ANTICOAGULANT DISORDER (HCC): ICD-10-CM

## 2018-12-27 RX ORDER — CYCLOBENZAPRINE HCL 10 MG
10 TABLET ORAL NIGHTLY PRN
Qty: 30 TABLET | Refills: 0 | Status: SHIPPED | OUTPATIENT
Start: 2018-12-27 | End: 2019-09-29 | Stop reason: HOSPADM

## 2018-12-27 RX ORDER — SILDENAFIL 100 MG/1
TABLET, FILM COATED ORAL
Qty: 10 TABLET | Refills: 1 | Status: SHIPPED | OUTPATIENT
Start: 2018-12-27 | End: 2019-09-29 | Stop reason: HOSPADM

## 2018-12-27 RX ORDER — LOSARTAN POTASSIUM 50 MG/1
50 TABLET ORAL DAILY
Qty: 90 TABLET | Refills: 0 | Status: SHIPPED | OUTPATIENT
Start: 2018-12-27 | End: 2019-03-26 | Stop reason: SDUPTHER

## 2018-12-27 RX ORDER — ATORVASTATIN CALCIUM 40 MG/1
40 TABLET, FILM COATED ORAL DAILY
Qty: 90 TABLET | Refills: 0 | Status: SHIPPED | OUTPATIENT
Start: 2018-12-27 | End: 2019-03-26 | Stop reason: SDUPTHER

## 2019-03-26 RX ORDER — LOSARTAN POTASSIUM 50 MG/1
50 TABLET ORAL DAILY
Qty: 30 TABLET | Refills: 0 | Status: SHIPPED | OUTPATIENT
Start: 2019-03-26 | End: 2019-05-08

## 2019-03-26 RX ORDER — ATORVASTATIN CALCIUM 40 MG/1
40 TABLET, FILM COATED ORAL DAILY
Qty: 30 TABLET | Refills: 0 | Status: SHIPPED | OUTPATIENT
Start: 2019-03-26 | End: 2019-05-08

## 2019-05-08 RX ORDER — ATORVASTATIN CALCIUM 40 MG/1
40 TABLET, FILM COATED ORAL DAILY
Qty: 10 TABLET | Refills: 0 | Status: SHIPPED | OUTPATIENT
Start: 2019-05-08 | End: 2019-09-30 | Stop reason: SDUPTHER

## 2019-05-08 RX ORDER — LOSARTAN POTASSIUM 50 MG/1
50 TABLET ORAL DAILY
Qty: 10 TABLET | Refills: 0 | Status: SHIPPED | OUTPATIENT
Start: 2019-05-08 | End: 2019-05-21

## 2019-05-17 ENCOUNTER — APPOINTMENT (OUTPATIENT)
Dept: WOUND CARE | Facility: HOSPITAL | Age: 64
End: 2019-05-17

## 2019-05-20 ENCOUNTER — LAB REQUISITION (OUTPATIENT)
Dept: LAB | Facility: HOSPITAL | Age: 64
End: 2019-05-20

## 2019-05-20 ENCOUNTER — OFFICE VISIT (OUTPATIENT)
Dept: WOUND CARE | Facility: HOSPITAL | Age: 64
End: 2019-05-20

## 2019-05-20 DIAGNOSIS — Z00.00 ENCOUNTER FOR GENERAL ADULT MEDICAL EXAMINATION WITHOUT ABNORMAL FINDINGS: ICD-10-CM

## 2019-05-20 PROCEDURE — 87075 CULTR BACTERIA EXCEPT BLOOD: CPT | Performed by: SURGERY

## 2019-05-20 PROCEDURE — G0463 HOSPITAL OUTPT CLINIC VISIT: HCPCS

## 2019-05-20 PROCEDURE — 87102 FUNGUS ISOLATION CULTURE: CPT | Performed by: SURGERY

## 2019-05-20 PROCEDURE — 87070 CULTURE OTHR SPECIMN AEROBIC: CPT | Performed by: SURGERY

## 2019-05-20 PROCEDURE — 87205 SMEAR GRAM STAIN: CPT | Performed by: SURGERY

## 2019-05-21 RX ORDER — LOSARTAN POTASSIUM 50 MG/1
50 TABLET ORAL DAILY
Qty: 30 TABLET | Refills: 0 | Status: SHIPPED | OUTPATIENT
Start: 2019-05-21 | End: 2019-09-30 | Stop reason: SDUPTHER

## 2019-05-24 LAB
BACTERIA SPEC AEROBE CULT: NORMAL
GRAM STN SPEC: NORMAL

## 2019-05-25 LAB — BACTERIA SPEC ANAEROBE CULT: NORMAL

## 2019-06-09 LAB — FUNGUS WND CULT: ABNORMAL

## 2019-06-14 ENCOUNTER — APPOINTMENT (OUTPATIENT)
Dept: WOUND CARE | Facility: HOSPITAL | Age: 64
End: 2019-06-14

## 2019-06-21 ENCOUNTER — OFFICE VISIT (OUTPATIENT)
Dept: CARDIOLOGY | Facility: CLINIC | Age: 64
End: 2019-06-21

## 2019-06-21 VITALS
SYSTOLIC BLOOD PRESSURE: 120 MMHG | HEIGHT: 71 IN | WEIGHT: 236.8 LBS | DIASTOLIC BLOOD PRESSURE: 70 MMHG | HEART RATE: 87 BPM | BODY MASS INDEX: 33.15 KG/M2

## 2019-06-21 DIAGNOSIS — I25.10 ATHEROSCLEROSIS OF NATIVE CORONARY ARTERY OF NATIVE HEART WITHOUT ANGINA PECTORIS: Primary | ICD-10-CM

## 2019-06-21 DIAGNOSIS — I10 ESSENTIAL HYPERTENSION: ICD-10-CM

## 2019-06-21 DIAGNOSIS — I26.99 OTHER PULMONARY EMBOLISM WITHOUT ACUTE COR PULMONALE, UNSPECIFIED CHRONICITY (HCC): ICD-10-CM

## 2019-06-21 DIAGNOSIS — E78.5 HYPERLIPIDEMIA, UNSPECIFIED HYPERLIPIDEMIA TYPE: ICD-10-CM

## 2019-06-21 PROCEDURE — 93000 ELECTROCARDIOGRAM COMPLETE: CPT | Performed by: NURSE PRACTITIONER

## 2019-06-21 PROCEDURE — 99214 OFFICE O/P EST MOD 30 MIN: CPT | Performed by: NURSE PRACTITIONER

## 2019-06-21 NOTE — PROGRESS NOTES
Date of Office Visit: 2019  Encounter Provider: MALIA Magdaleno  Place of Service: [facility] CARDIOLOGY  Patient Name: Jared Rose  :1955      Chief Complaint   Patient presents with   • Coronary Artery Disease   :     Dear Dr. Ash,     HPI: Jared Rose is a pleasant 63 y.o. male who presents today for cardiac follow up. He is a new patient to me and his previous records have been reviewed.     He was evaluated in our chest pain unit in 2015 for significant dyspnea.  He underwent cardiac catheterization which revealed small vessel disease of the diagonal and circumflex with medical treatment recommended.  He has a history of VTE due to lupus anticoagulant/antiphospholipid antibody syndrome and was previously treated with warfarin.  He now takes rivaroxaban.  He was last seen in our office in 2017 and Dr. Panchal recommended that he resume low-dose aspirin and be compliant with his medications. On 2018 he had a renal artery duplex completed which was normal.    He presents today for follow-up.  He experiences dyspnea with exertion that resolves with rest and this is not new.  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  He has had some blistering of his soles of his right foot which is been treated by podiatrist with cream.  He denies chest pain, PND, orthopnea, cough, edema, palpitations, dizziness, syncope, or bleeding.  He is due for annual blood work.  His blood pressure and heart rate are both normal today.      Past Medical History:   Diagnosis Date   • Bell's palsy    • CAD (coronary artery disease)     Minimal; diagnosed on previous cardiac cath in    • Coronary atherosclerosis     cath 2015: normal LM, diffuse LCx disease, LI LAD, 60-70% ostial diag (<1 mm vessel), LI RCA   • H/O Anemia    • H/O Leukopenia    • H/O umbilical hernia repair 2014    Dr. Delgadillo   • Hyperlipidemia    • Hypertension    •  Lupus anticoagulant positive    • Obesity    • Pulmonary embolism (CMS/HCC) 2009    H/O Right lower lobe pulmonary embolus   • Stress fracture of right foot 2013       Past Surgical History:   Procedure Laterality Date   • CARDIAC CATHETERIZATION  2006   • LUNG SURGERY  2009    Blood clot removed   • TONSILLECTOMY AND ADENOIDECTOMY      as a child   • UMBILICAL HERNIA REPAIR  05/14/2014    Dr. Delgadillo       Social History     Socioeconomic History   • Marital status:      Spouse name: Not on file   • Number of children: Not on file   • Years of education: Not on file   • Highest education level: Not on file   Occupational History   • Occupation:      Employer: [employer]   Tobacco Use   • Smoking status: Never Smoker   • Smokeless tobacco: Never Used   Substance and Sexual Activity   • Alcohol use: No     Frequency: Never     Comment: Caffeine use   • Drug use: No   • Sexual activity: Yes       Family History   Problem Relation Age of Onset   • Coronary artery disease Father    • Heart disease Mother 83   • Hypertension Mother    • Hypertension Sister        The following portion of the patient's history were reviewed and updated as appropriate: past medical history, past surgical history, past social history, past family history, allergies, current medications, and problem list.    Review of Systems   Constitution: Positive for malaise/fatigue. Negative for chills, diaphoresis, fever, night sweats, weight gain and weight loss.   HENT: Negative for hearing loss, nosebleeds, sore throat and tinnitus.    Eyes: Negative for blurred vision, double vision, pain and visual disturbance.   Cardiovascular: Negative for chest pain, claudication, cyanosis, dyspnea on exertion, irregular heartbeat, leg swelling, near-syncope, orthopnea, palpitations, paroxysmal nocturnal dyspnea and syncope.   Respiratory: Negative for cough, hemoptysis, shortness of breath, snoring and wheezing.    Endocrine:  "Negative for cold intolerance, heat intolerance and polyuria.   Hematologic/Lymphatic: Negative for bleeding problem. Does not bruise/bleed easily.   Skin: Negative for color change, dry skin, flushing and itching.   Musculoskeletal: Negative for falls, joint pain, joint swelling, muscle cramps, muscle weakness and myalgias.   Gastrointestinal: Negative for abdominal pain, constipation, heartburn, melena, nausea and vomiting.   Genitourinary: Negative for dysuria and hematuria.   Neurological: Negative for excessive daytime sleepiness, dizziness, light-headedness, loss of balance, numbness, paresthesias, seizures and vertigo.   Psychiatric/Behavioral: Negative for altered mental status, depression, memory loss and substance abuse. The patient does not have insomnia and is not nervous/anxious.    Allergic/Immunologic: Negative for environmental allergies.       Allergies   Allergen Reactions   • Adhesive Tape          Current Outpatient Medications:   •  atorvastatin (LIPITOR) 40 MG tablet, Take 1 tablet by mouth Daily. *MUST CONTACT OFFICE FOR APPOINTMENT. MUST BE SEEN TO RECEIVE MOR REFILL, Disp: 10 tablet, Rfl: 0  •  cyclobenzaprine (FLEXERIL) 10 MG tablet, Take 1 tablet by mouth At Night As Needed for Muscle Spasms., Disp: 30 tablet, Rfl: 0  •  losartan (COZAAR) 50 MG tablet, Take 1 tablet by mouth Daily. PLEASE KEEP SCHEDULED APPOINTMENT FOR ADDITIONAL REFILLS., Disp: 30 tablet, Rfl: 0  •  rivaroxaban (XARELTO) 20 MG tablet, Take 1 tablet by mouth Daily., Disp: 30 tablet, Rfl: 5  •  sildenafil (VIAGRA) 100 MG tablet, Take 1/2 to 1 tablet daily if needed for ED, Disp: 10 tablet, Rfl: 1        Objective:     Vitals:    06/21/19 1422   BP: 120/70   BP Location: Left arm   Pulse: 87   Weight: 107 kg (236 lb 12.8 oz)   Height: 180.3 cm (71\")     Body mass index is 33.03 kg/m².    PHYSICAL EXAM:    Vitals Reviewed.   General Appearance: No acute distress, well developed and well nourished.  Obese.  Eyes: Conjunctiva " and lids: No erythema, swelling, or discharge. Sclera non-icteric.   HENT: Atraumatic, normocephalic. External eyes, ears, and nose normal. No hearing loss noted. Mucous membranes normal. Lips not cyanotic. Neck supple with no tenderness.  Respiratory: No signs of respiratory distress. Respiration rhythm and depth normal.   Clear to auscultation. No rales, crackles, rhonchi, or wheezing auscultated.   Cardiovascular:  Jugular Venous Pressure: Normal  Heart Rate and Rhythm: Normal, Heart Sounds: Normal S1 and S2. No S3 or S4 noted.  Murmurs: No murmurs noted. No rubs, thrills, or gallops.   Arterial Pulses: Carotid pulses normal. No carotid bruit noted.  Lower Extremities: No edema noted.  Gastrointestinal:  Abdomen soft, non-distended, non-tender. Normal bowel sounds. No hepatomegaly.   Musculoskeletal: Normal movement of extremities  Skin and Nails: General appearance normal. No pallor, cyanosis, diaphoresis. Skin temperature normal. No clubbing of fingernails.   Psychiatric: Patient alert and oriented to person, place, and time. Speech and behavior appropriate. Normal mood and affect.       ECG 12 Lead  Date/Time: 6/21/2019 2:23 PM  Performed by: Rama Simmons APRN  Authorized by: Rama Simmons APRN   Comparison: compared with previous ECG from 10/2/2017  Similar to previous ECG  Rhythm: sinus rhythm  Rate: normal  BPM: 87  Conduction: conduction normal  QRS axis: normal  Other findings: non-specific ST-T wave changes    Clinical impression: non-specific ECG              Assessment:       Diagnosis Plan   1. Atherosclerosis of native coronary artery of native heart without angina pectoris  Comprehensive Metabolic Panel    CBC Auto Differential    Lipid Panel   2. Essential hypertension  Comprehensive Metabolic Panel    CBC Auto Differential    Lipid Panel   3. Hyperlipidemia, unspecified hyperlipidemia type     4. Other pulmonary embolism without acute cor pulmonale, unspecified chronicity (CMS/HCC)             Plan:       1.  Coronary Atherosclerosis: He denies any anginal symptoms.  I recommended that he restart aspirin 81 mg and samples were provided.  He will continue with atorvastatin.    Coronary Artery Disease  Assessment  • The patient has no angina  • Restart aspirin 81 mg daily     Plan  • Lifestyle modifications discussed include adhering to a heart healthy diet, avoidance of tobacco products, maintenance of a healthy weight, medication compliance, regular exercise and regular monitoring of cholesterol and blood pressure      2.  Hypertension: Blood pressure is well controlled.    3.  Hyperlipidemia: He remains on atorvastatin.  He is due for a CBC and liver enzyme lab draw.    4.  Pulmonary Embolism: He has remained on rivaroxaban and denies bleeding.  He is due for a CMP and CBC.    5.  I recommend follow-up with Eligio Panchal in 1 year, unless otherwise needed sooner.    As always, it has been a pleasure to participate in your patient's care. Thank you.       Sincerely,         MALIA Henderson        **Dragon Disclaimer:**  Much of this encounter note is an electronic transcription/translation of spoken language to printed text. The electronic translation of spoken language may permit erroneous, or at times, nonsensical words or phrases to be inadvertently transcribed. Although I have reviewed the note for such errors, some may still exist.

## 2019-06-25 RX ORDER — ATORVASTATIN CALCIUM 40 MG/1
40 TABLET, FILM COATED ORAL DAILY
Qty: 14 TABLET | Refills: 0 | Status: SHIPPED | OUTPATIENT
Start: 2019-06-25 | End: 2019-07-10

## 2019-06-26 NOTE — TELEPHONE ENCOUNTER
I spoke with pt and informed him that we have only refilled his Atoravstatin for a short period due to him needing his labs drawn.    He will try to get these drawn soon.

## 2019-07-11 NOTE — TELEPHONE ENCOUNTER
LM for pt to return call in regards to getting labs drawn  This is his second reminder to get them drawn

## 2019-07-22 RX ORDER — ATORVASTATIN CALCIUM 40 MG/1
40 TABLET, FILM COATED ORAL DAILY
Qty: 30 TABLET | Refills: 1 | Status: SHIPPED | OUTPATIENT
Start: 2019-07-22 | End: 2019-09-29 | Stop reason: HOSPADM

## 2019-09-19 ENCOUNTER — HOSPITAL ENCOUNTER (INPATIENT)
Facility: HOSPITAL | Age: 64
LOS: 10 days | Discharge: HOME | End: 2019-09-29
Attending: EMERGENCY MEDICINE | Admitting: COLON & RECTAL SURGERY

## 2019-09-19 ENCOUNTER — HOSPITAL ENCOUNTER (OUTPATIENT)
Dept: CARDIOLOGY | Facility: HOSPITAL | Age: 64
Discharge: HOME | End: 2019-09-19

## 2019-09-19 ENCOUNTER — HOSPITAL ENCOUNTER (OUTPATIENT)
Dept: CARDIOLOGY | Facility: HOSPITAL | Age: 64
Discharge: HOME | End: 2019-09-19
Admitting: INTERNAL MEDICINE

## 2019-09-19 ENCOUNTER — OFFICE VISIT (OUTPATIENT)
Dept: FAMILY MEDICINE CLINIC | Facility: CLINIC | Age: 64
End: 2019-09-19

## 2019-09-19 VITALS
WEIGHT: 235 LBS | HEIGHT: 71 IN | RESPIRATION RATE: 18 BRPM | HEART RATE: 96 BPM | OXYGEN SATURATION: 98 % | TEMPERATURE: 98 F | BODY MASS INDEX: 32.9 KG/M2 | SYSTOLIC BLOOD PRESSURE: 132 MMHG | DIASTOLIC BLOOD PRESSURE: 60 MMHG

## 2019-09-19 VITALS
HEIGHT: 71 IN | RESPIRATION RATE: 18 BRPM | HEART RATE: 88 BPM | SYSTOLIC BLOOD PRESSURE: 131 MMHG | DIASTOLIC BLOOD PRESSURE: 77 MMHG | BODY MASS INDEX: 33.32 KG/M2 | WEIGHT: 238 LBS | OXYGEN SATURATION: 94 %

## 2019-09-19 DIAGNOSIS — R06.02 SHORTNESS OF BREATH: ICD-10-CM

## 2019-09-19 DIAGNOSIS — Z79.01 ANTICOAGULATED: ICD-10-CM

## 2019-09-19 DIAGNOSIS — C19 RECTOSIGMOID CANCER (HCC): ICD-10-CM

## 2019-09-19 DIAGNOSIS — K62.5 RECTAL BLEEDING: ICD-10-CM

## 2019-09-19 DIAGNOSIS — R06.09 EXERTIONAL DYSPNEA: ICD-10-CM

## 2019-09-19 DIAGNOSIS — K63.89 COLONIC MASS: ICD-10-CM

## 2019-09-19 DIAGNOSIS — R94.31 ABNORMAL EKG: ICD-10-CM

## 2019-09-19 DIAGNOSIS — K63.89 MASS OF COLON: ICD-10-CM

## 2019-09-19 DIAGNOSIS — K92.1 GASTROINTESTINAL HEMORRHAGE WITH MELENA: ICD-10-CM

## 2019-09-19 DIAGNOSIS — D64.9 SYMPTOMATIC ANEMIA: Primary | ICD-10-CM

## 2019-09-19 DIAGNOSIS — R07.9 CHEST PAIN, UNSPECIFIED TYPE: ICD-10-CM

## 2019-09-19 DIAGNOSIS — R31.9 HEMATURIA, UNSPECIFIED TYPE: Primary | ICD-10-CM

## 2019-09-19 PROBLEM — Z86.711 HISTORY OF PULMONARY EMBOLUS (PE): Status: ACTIVE | Noted: 2019-09-19

## 2019-09-19 LAB
ABO GROUP BLD: NORMAL
ALBUMIN SERPL-MCNC: 4.2 G/DL (ref 3.5–5.2)
ALBUMIN/GLOB SERPL: 1.2 G/DL
ALP SERPL-CCNC: 61 U/L (ref 39–117)
ALT SERPL W P-5'-P-CCNC: 16 U/L (ref 1–41)
ANION GAP SERPL CALCULATED.3IONS-SCNC: 11.7 MMOL/L (ref 5–15)
APTT PPP: 32.5 SECONDS (ref 22.7–35.4)
AST SERPL-CCNC: 20 U/L (ref 1–40)
BILIRUB SERPL-MCNC: 0.8 MG/DL (ref 0.2–1.2)
BLD GP AB SCN SERPL QL: NEGATIVE
BUN BLD-MCNC: 14 MG/DL (ref 8–23)
BUN/CREAT SERPL: 12.4 (ref 7–25)
CALCIUM SPEC-SCNC: 9.1 MG/DL (ref 8.6–10.5)
CHLORIDE SERPL-SCNC: 103 MMOL/L (ref 98–107)
CO2 SERPL-SCNC: 24.3 MMOL/L (ref 22–29)
CREAT BLD-MCNC: 1.13 MG/DL (ref 0.76–1.27)
D DIMER PPP FEU-MCNC: 0.46 MCGFEU/ML (ref 0–0.49)
DEPRECATED RDW RBC AUTO: 43.4 FL (ref 37–54)
DEVELOPER EXPIRATION DATE: ABNORMAL
DEVELOPER LOT NUMBER: ABNORMAL
EOSINOPHIL # BLD MANUAL: 0.22 10*3/MM3 (ref 0–0.4)
EOSINOPHIL NFR BLD MANUAL: 4 % (ref 0.3–6.2)
ERYTHROCYTE [DISTWIDTH] IN BLOOD BY AUTOMATED COUNT: 15.9 % (ref 12.3–15.4)
EXPIRATION DATE: ABNORMAL
FECAL OCCULT BLOOD SCREEN, POC: POSITIVE
GFR SERPL CREATININE-BSD FRML MDRD: 79 ML/MIN/1.73
GLOBULIN UR ELPH-MCNC: 3.6 GM/DL
GLUCOSE BLD-MCNC: 88 MG/DL (ref 65–99)
HCT VFR BLD AUTO: 25.3 % (ref 37.5–51)
HGB BLD-MCNC: 7.5 G/DL (ref 13–17.7)
HYPOCHROMIA BLD QL: ABNORMAL
INR PPP: 1.78 (ref 0.9–1.1)
LYMPHOCYTES # BLD MANUAL: 2.12 10*3/MM3 (ref 0.7–3.1)
LYMPHOCYTES NFR BLD MANUAL: 39 % (ref 19.6–45.3)
LYMPHOCYTES NFR BLD MANUAL: 4 % (ref 5–12)
Lab: 2271
MCH RBC QN AUTO: 22.1 PG (ref 26.6–33)
MCHC RBC AUTO-ENTMCNC: 29.6 G/DL (ref 31.5–35.7)
MCV RBC AUTO: 74.4 FL (ref 79–97)
MICROCYTES BLD QL: ABNORMAL
MONOCYTES # BLD AUTO: 0.22 10*3/MM3 (ref 0.1–0.9)
NEGATIVE CONTROL: NEGATIVE
NEUTROPHILS # BLD AUTO: 2.88 10*3/MM3 (ref 1.7–7)
NEUTROPHILS NFR BLD MANUAL: 53 % (ref 42.7–76)
NT-PROBNP SERPL-MCNC: 18.9 PG/ML (ref 5–900)
PLAT MORPH BLD: NORMAL
PLATELET # BLD AUTO: 361 10*3/MM3 (ref 140–450)
PMV BLD AUTO: 8.7 FL (ref 6–12)
POSITIVE CONTROL: POSITIVE
POTASSIUM BLD-SCNC: 4 MMOL/L (ref 3.5–5.2)
PROT SERPL-MCNC: 7.8 G/DL (ref 6–8.5)
PROTHROMBIN TIME: 20.4 SECONDS (ref 11.7–14.2)
RBC # BLD AUTO: 3.4 10*6/MM3 (ref 4.14–5.8)
RH BLD: POSITIVE
SODIUM BLD-SCNC: 139 MMOL/L (ref 136–145)
T&S EXPIRATION DATE: NORMAL
TARGETS BLD QL SMEAR: ABNORMAL
TROPONIN T SERPL-MCNC: <0.01 NG/ML (ref 0–0.03)
WBC MORPH BLD: NORMAL
WBC NRBC COR # BLD: 5.44 10*3/MM3 (ref 3.4–10.8)

## 2019-09-19 PROCEDURE — 85730 THROMBOPLASTIN TIME PARTIAL: CPT | Performed by: PHYSICIAN ASSISTANT

## 2019-09-19 PROCEDURE — 99284 EMERGENCY DEPT VISIT MOD MDM: CPT

## 2019-09-19 PROCEDURE — 93306 TTE W/DOPPLER COMPLETE: CPT | Performed by: INTERNAL MEDICINE

## 2019-09-19 PROCEDURE — 93000 ELECTROCARDIOGRAM COMPLETE: CPT | Performed by: FAMILY MEDICINE

## 2019-09-19 PROCEDURE — 93306 TTE W/DOPPLER COMPLETE: CPT

## 2019-09-19 PROCEDURE — 83880 ASSAY OF NATRIURETIC PEPTIDE: CPT | Performed by: INTERNAL MEDICINE

## 2019-09-19 PROCEDURE — 80053 COMPREHEN METABOLIC PANEL: CPT | Performed by: INTERNAL MEDICINE

## 2019-09-19 PROCEDURE — 85025 COMPLETE CBC W/AUTO DIFF WBC: CPT | Performed by: INTERNAL MEDICINE

## 2019-09-19 PROCEDURE — P9016 RBC LEUKOCYTES REDUCED: HCPCS

## 2019-09-19 PROCEDURE — 25010000002 PERFLUTREN (DEFINITY) 8.476 MG IN SODIUM CHLORIDE 0.9 % 10 ML INJECTION: Performed by: INTERNAL MEDICINE

## 2019-09-19 PROCEDURE — 85007 BL SMEAR W/DIFF WBC COUNT: CPT | Performed by: INTERNAL MEDICINE

## 2019-09-19 PROCEDURE — 99204 OFFICE O/P NEW MOD 45 MIN: CPT | Performed by: INTERNAL MEDICINE

## 2019-09-19 PROCEDURE — 86900 BLOOD TYPING SEROLOGIC ABO: CPT

## 2019-09-19 PROCEDURE — 36430 TRANSFUSION BLD/BLD COMPNT: CPT

## 2019-09-19 PROCEDURE — 86923 COMPATIBILITY TEST ELECTRIC: CPT

## 2019-09-19 PROCEDURE — 86850 RBC ANTIBODY SCREEN: CPT | Performed by: PHYSICIAN ASSISTANT

## 2019-09-19 PROCEDURE — 36415 COLL VENOUS BLD VENIPUNCTURE: CPT

## 2019-09-19 PROCEDURE — 99214 OFFICE O/P EST MOD 30 MIN: CPT | Performed by: FAMILY MEDICINE

## 2019-09-19 PROCEDURE — 86901 BLOOD TYPING SEROLOGIC RH(D): CPT

## 2019-09-19 PROCEDURE — 85610 PROTHROMBIN TIME: CPT | Performed by: PHYSICIAN ASSISTANT

## 2019-09-19 PROCEDURE — 94760 N-INVAS EAR/PLS OXIMETRY 1: CPT

## 2019-09-19 PROCEDURE — 86900 BLOOD TYPING SEROLOGIC ABO: CPT | Performed by: PHYSICIAN ASSISTANT

## 2019-09-19 PROCEDURE — 82270 OCCULT BLOOD FECES: CPT | Performed by: FAMILY MEDICINE

## 2019-09-19 PROCEDURE — 84484 ASSAY OF TROPONIN QUANT: CPT | Performed by: INTERNAL MEDICINE

## 2019-09-19 PROCEDURE — 85379 FIBRIN DEGRADATION QUANT: CPT | Performed by: INTERNAL MEDICINE

## 2019-09-19 PROCEDURE — 86901 BLOOD TYPING SEROLOGIC RH(D): CPT | Performed by: PHYSICIAN ASSISTANT

## 2019-09-19 RX ORDER — FUROSEMIDE 10 MG/ML
20 INJECTION INTRAMUSCULAR; INTRAVENOUS ONCE
Status: COMPLETED | OUTPATIENT
Start: 2019-09-19 | End: 2019-09-20

## 2019-09-19 RX ORDER — SODIUM CHLORIDE 0.9 % (FLUSH) 0.9 %
10 SYRINGE (ML) INJECTION EVERY 12 HOURS SCHEDULED
Status: DISCONTINUED | OUTPATIENT
Start: 2019-09-19 | End: 2019-09-26

## 2019-09-19 RX ORDER — NITROGLYCERIN 0.4 MG/1
0.4 TABLET SUBLINGUAL
Status: DISCONTINUED | OUTPATIENT
Start: 2019-09-19 | End: 2019-09-29 | Stop reason: HOSPADM

## 2019-09-19 RX ORDER — PANTOPRAZOLE SODIUM 40 MG/10ML
80 INJECTION, POWDER, LYOPHILIZED, FOR SOLUTION INTRAVENOUS ONCE
Status: COMPLETED | OUTPATIENT
Start: 2019-09-19 | End: 2019-09-19

## 2019-09-19 RX ORDER — SODIUM CHLORIDE 0.9 % (FLUSH) 0.9 %
10 SYRINGE (ML) INJECTION AS NEEDED
Status: DISCONTINUED | OUTPATIENT
Start: 2019-09-19 | End: 2019-09-29 | Stop reason: HOSPADM

## 2019-09-19 RX ADMIN — SODIUM CHLORIDE 8 MG/HR: 900 INJECTION INTRAVENOUS at 19:07

## 2019-09-19 RX ADMIN — PANTOPRAZOLE SODIUM 80 MG: 40 INJECTION, POWDER, LYOPHILIZED, FOR SOLUTION INTRAVENOUS at 19:02

## 2019-09-19 RX ADMIN — SODIUM CHLORIDE, POTASSIUM CHLORIDE, SODIUM LACTATE AND CALCIUM CHLORIDE 1000 ML: 600; 310; 30; 20 INJECTION, SOLUTION INTRAVENOUS at 18:59

## 2019-09-19 RX ADMIN — PERFLUTREN 1.5 ML: 6.52 INJECTION, SUSPENSION INTRAVENOUS at 16:38

## 2019-09-19 NOTE — PROGRESS NOTES
SUBJECTIVE:  The patient is a 64-year-old -American male.  He presents today with rectal bleeding.  He also complains of shortness of breath when he ambulates any small distance.  He works very hard.  He has 2 jobs.  He is been having some cramping in his legs.  His his brother  of myocardial infarction.  His mother had heart disease.  He has never smoked.  No known colon issues in his family.  He has not had a colonoscopy.  He has a history of pulmonary embolus and is on Xarelto.    PAST MEDICAL HISTORY:  Reviewed.    REVIEW OF SYSTEMS:  Please see above; 14 point ROS negative.      OBJECTIVE:   Vitals signs are reviewed and are stable.    General:  Well-nourished.  Alert and oriented x3 in no acute distress.  HEENT: PERRLA.   Neck:  Supple.   Lungs:  Clear.    Heart:  Regular rate and rhythm.   Abdomen:   Soft, nontender.   Rectal: No masses.  Hemoccult positive  Extremities:  No cyanosis, clubbing or edema.   Neurological:  Grossly intact without motor or sensory deficits.     ECG 12 Lead  Date/Time: 2019 2:54 PM  Performed by: Ezio Mendoza MD  Authorized by: Ezio Mendoza MD   Rhythm: sinus rhythm  Other findings: T wave abnormality        EKG is done here and interpreted by me.  Indication dyspnea with exertion and strong positive family history of heart disease.  EKG compared to 2019.  EKG shows sinus rhythm LVH with some lateral ischemic changes with T wave inversion which were present on previous EKG.  ASSESSMENT:    Exertional dyspnea  Abnormal EKG  Family history of heart disease  Rectal bleeding  PLAN: I have referred him to the chest pain unit.  Refer for colonoscopy for his rectal bleeding.  He is going to come back for more labs because the chest pain clinic will be closed shortly.  He is advised to go to emergency room if any problems.

## 2019-09-19 NOTE — PROGRESS NOTES
Date of Office Visit: 2019  Encounter Provider: Diane Jones MD  Place of Service: [facility] CARDIOLOGY  Patient Name: Jared Rose  :1955      Patient ID:  Jared Rose is a 64 y.o. male is here for new dyspnea on exertion.           History of Present Illness    This is a 64-year-old gentleman who is coming in with short windedness.  He is noticed for 2 weeks that he has intermittent short windedness mostly with exertion.  He has no symptoms of orthopnea or PND.  He has not felt his heart racing or skipping.  He has had no real chest heaviness or tightness.  He has no chest pressure.  He has not been taking his blood pressure or cholesterol medications.  He is taking aspirin and Xarelto because he has a history of lupus anticoagulant/antiphospholipid antibody positive and has had pulmonary embolism.  In , he had a cardiac catheterization showing small vessel disease of the diagonal and circumflex, medically treated.  He has a history of medication noncompliance.  On 2018, he had a renal artery duplex which was normal.    He works about 80 hours a week at [employer].  He does not smoke, use alcohol or drugs.  There is no family history of premature cardiovascular disease.    He is never had myocardial infarction or stroke.  He has no history of cancer or heart failure.    His ECG done in Dr. Mendoza's office today showed left ventricle hypertrophy with lateral T wave inversion consistent with strain pattern.    CBC today shows hemoglobin 7.5, hematocrit 25, white count 5.4, platelets 361.  His stool Hemoccult today is positive.  Other labs are pending at this time.    Past Medical History:   Diagnosis Date   • Bell's palsy    • CAD (coronary artery disease)     Minimal; diagnosed on previous cardiac cath in    • Coronary atherosclerosis     cath 2015: normal LM, diffuse LCx disease, LI LAD, 60-70% ostial diag (<1 mm  vessel), LI RCA   • H/O Anemia    • H/O Leukopenia    • H/O umbilical hernia repair 05/2014    Dr. Delgadillo   • Hyperlipidemia    • Hypertension    • Lupus anticoagulant positive    • Obesity    • Pulmonary embolism (CMS/HCC) 2009    H/O Right lower lobe pulmonary embolus   • Stress fracture of right foot 2013         Past Surgical History:   Procedure Laterality Date   • CARDIAC CATHETERIZATION  2006   • LUNG SURGERY  2009    Blood clot removed   • TONSILLECTOMY AND ADENOIDECTOMY      as a child   • UMBILICAL HERNIA REPAIR  05/14/2014    Dr. Delgadillo       Current Outpatient Medications on File Prior to Encounter   Medication Sig Dispense Refill   • aspirin 81 MG tablet Take 1 tablet by mouth Daily. 30 tablet 0   • rivaroxaban (XARELTO) 20 MG tablet Take 1 tablet by mouth Daily. 30 tablet 5   • atorvastatin (LIPITOR) 40 MG tablet Take 1 tablet by mouth Daily. *MUST CONTACT OFFICE FOR APPOINTMENT. MUST BE SEEN TO RECEIVE MOR REFILL 10 tablet 0   • atorvastatin (LIPITOR) 40 MG tablet Take 1 tablet by mouth Daily. (need lab work) 30 tablet 1   • cyclobenzaprine (FLEXERIL) 10 MG tablet Take 1 tablet by mouth At Night As Needed for Muscle Spasms. 30 tablet 0   • losartan (COZAAR) 50 MG tablet Take 1 tablet by mouth Daily. PLEASE KEEP SCHEDULED APPOINTMENT FOR ADDITIONAL REFILLS. 30 tablet 0   • sildenafil (VIAGRA) 100 MG tablet Take 1/2 to 1 tablet daily if needed for ED 10 tablet 1     No current facility-administered medications on file prior to encounter.        Social History     Socioeconomic History   • Marital status:      Spouse name: Not on file   • Number of children: Not on file   • Years of education: Not on file   • Highest education level: Not on file   Occupational History   • Occupation:      Employer: [employer]   Tobacco Use   • Smoking status: Never Smoker   • Smokeless tobacco: Never Used   Substance and Sexual Activity   • Alcohol use: No     Frequency: Never      "Comment: Caffeine use   • Drug use: No   • Sexual activity: Yes           Review of Systems   Constitution: Positive for malaise/fatigue.   HENT: Negative for congestion.    Eyes: Negative for vision loss in left eye and vision loss in right eye.   Respiratory: Positive for shortness of breath. Negative for cough, hemoptysis, sleep disturbances due to breathing, snoring, sputum production and wheezing.    Endocrine: Negative.    Hematologic/Lymphatic: Negative.    Skin: Negative for poor wound healing and rash.   Musculoskeletal: Negative for falls, gout, muscle cramps and myalgias.   Gastrointestinal: Negative for abdominal pain, diarrhea, dysphagia, hematemesis, melena, nausea and vomiting.   Neurological: Negative for excessive daytime sleepiness, dizziness, headaches, light-headedness, loss of balance, seizures and vertigo.   Psychiatric/Behavioral: Negative for depression and substance abuse. The patient is not nervous/anxious.        Procedures  Procedures        Objective:      Vitals:    09/19/19 1552   BP: 131/77   BP Location: Left arm   Patient Position: Sitting   Pulse: 88   Resp: 18   SpO2: 94%   Weight: 108 kg (238 lb)   Height: 180.3 cm (71\")     Body mass index is 33.19 kg/m².    Physical Exam   Constitutional: He is oriented to person, place, and time. He appears well-developed and well-nourished. No distress.   HENT:   Head: Normocephalic and atraumatic.   Eyes: Conjunctivae are normal. No scleral icterus.   Neck: Neck supple. No JVD present. Carotid bruit is not present. No thyromegaly present.   Cardiovascular: Normal rate, regular rhythm, S1 normal, S2 normal, normal heart sounds and intact distal pulses.  No extrasystoles are present. PMI is not displaced. Exam reveals no gallop.   No murmur heard.  Pulses:       Carotid pulses are 2+ on the right side, and 2+ on the left side.       Radial pulses are 2+ on the right side, and 2+ on the left side.        Dorsalis pedis pulses are 2+ on the " right side, and 2+ on the left side.        Posterior tibial pulses are 2+ on the right side, and 2+ on the left side.   Pulmonary/Chest: Effort normal and breath sounds normal. No respiratory distress. He has no wheezes. He has no rhonchi. He has no rales. He exhibits no tenderness.   Abdominal: Soft. Bowel sounds are normal. He exhibits no distension, no abdominal bruit and no mass. There is no tenderness.   Musculoskeletal: He exhibits no edema or deformity.   Lymphadenopathy:     He has no cervical adenopathy.   Neurological: He is alert and oriented to person, place, and time. No cranial nerve deficit.   Skin: Skin is warm and dry. No rash noted. He is not diaphoretic. No cyanosis. No pallor. Nails show no clubbing.   Psychiatric: He has a normal mood and affect. Judgment normal.   Vitals reviewed.      Lab Review:       Assessment:      Diagnosis Plan   1. Chest pain, unspecified type  Cardiac Monitoring    Vital Signs - Once    Vital Signs - As Needed    Pulse Oximetry    Oxygen Therapy- Nasal Cannula; Titrate for SPO2: 92%, equal to or greater than    Insert Peripheral IV    sodium chloride 0.9 % flush 10 mL    nitroglycerin (NITROSTAT) SL tablet 0.4 mg    NPO Diet    Bathroom Privileges With Assistance    CBC & Differential    Comprehensive Metabolic Panel    Troponin T    D-Dimer    proBNP    Cardiac Monitoring    Vital Signs - Once    Insert Peripheral IV    NPO Diet    Bathroom Privileges With Assistance    Troponin T    Troponin T    D-Dimer    D-Dimer    proBNP    proBNP    CBC Auto Differential    Adult Transthoracic Echo Complete W/ Cont if Necessary Per Protocol    Manual Differential    Manual Differential    Manual Differential   2. Shortness of breath  Cardiac Monitoring    Vital Signs - Once    Vital Signs - As Needed    Pulse Oximetry    Oxygen Therapy- Nasal Cannula; Titrate for SPO2: 92%, equal to or greater than    Insert Peripheral IV    sodium chloride 0.9 % flush 10 mL    nitroglycerin  (NITROSTAT) SL tablet 0.4 mg    NPO Diet    Bathroom Privileges With Assistance    CBC & Differential    Comprehensive Metabolic Panel    Troponin T    D-Dimer    proBNP    Cardiac Monitoring    Vital Signs - Once    Insert Peripheral IV    NPO Diet    Bathroom Privileges With Assistance    Troponin T    Troponin T    D-Dimer    D-Dimer    proBNP    proBNP    CBC Auto Differential    Adult Transthoracic Echo Complete W/ Cont if Necessary Per Protocol    Manual Differential    Manual Differential    Manual Differential     1. Dyspnea on exertion - likely due to anemia.   2. Anemia, symptomatic.   3. Hypertension, untreated.   4. Hemoccult positive stool  5. Chronic anticoagulation with Xarelto and aspirin due to lupus anticoagulant and antiphospholipid antibody positive with history of pulmonary embolism  6. Obesity  7. Hyperlipidemia, should be on atorvastatin but has not been taking  8. Heavy snoring with daytime somnolence, needs to be tested and treated for obstructive sleep apnea  9. Works 80 hours a week       Plan:       Echo today, will send to the emergency department for symptomatic anemia.

## 2019-09-20 LAB
ABO + RH BLD: NORMAL
ANION GAP SERPL CALCULATED.3IONS-SCNC: 11.7 MMOL/L (ref 5–15)
AORTIC ROOT ANNULUS: 2.3 CM
ASCENDING AORTA: 2.9 CM
BH BB BLOOD EXPIRATION DATE: NORMAL
BH BB BLOOD TYPE BARCODE: 6200
BH BB DISPENSE STATUS: NORMAL
BH BB PRODUCT CODE: NORMAL
BH BB UNIT NUMBER: NORMAL
BH CV ECHO MEAS - ACS: 1.9 CM
BH CV ECHO MEAS - AO MAX PG (FULL): 5.8 MMHG
BH CV ECHO MEAS - AO MAX PG: 9.9 MMHG
BH CV ECHO MEAS - AO MEAN PG (FULL): 3.5 MMHG
BH CV ECHO MEAS - AO MEAN PG: 5.6 MMHG
BH CV ECHO MEAS - AO V2 MAX: 157.5 CM/SEC
BH CV ECHO MEAS - AO V2 MEAN: 115 CM/SEC
BH CV ECHO MEAS - AO V2 VTI: 32.8 CM
BH CV ECHO MEAS - AVA(I,A): 1.9 CM^2
BH CV ECHO MEAS - AVA(I,D): 1.9 CM^2
BH CV ECHO MEAS - AVA(V,A): 2.1 CM^2
BH CV ECHO MEAS - AVA(V,D): 2.1 CM^2
BH CV ECHO MEAS - BSA(HAYCOCK): 2.4 M^2
BH CV ECHO MEAS - BSA: 2.3 M^2
BH CV ECHO MEAS - BZI_BMI: 33.2 KILOGRAMS/M^2
BH CV ECHO MEAS - BZI_METRIC_HEIGHT: 180.3 CM
BH CV ECHO MEAS - BZI_METRIC_WEIGHT: 108 KG
BH CV ECHO MEAS - EDV(TEICH): 103.6 ML
BH CV ECHO MEAS - EF(CUBED): 75.5 %
BH CV ECHO MEAS - EF(MOD-BP): 51 %
BH CV ECHO MEAS - EF(TEICH): 67.4 %
BH CV ECHO MEAS - ESV(TEICH): 33.8 ML
BH CV ECHO MEAS - FS: 37.4 %
BH CV ECHO MEAS - IVS/LVPW: 1
BH CV ECHO MEAS - IVSD: 1 CM
BH CV ECHO MEAS - LAT PEAK E' VEL: 9 CM/SEC
BH CV ECHO MEAS - LV MASS(C)D: 161.2 GRAMS
BH CV ECHO MEAS - LV MASS(C)DI: 71 GRAMS/M^2
BH CV ECHO MEAS - LV MAX PG: 4.1 MMHG
BH CV ECHO MEAS - LV MEAN PG: 2.1 MMHG
BH CV ECHO MEAS - LV V1 MAX: 101.4 CM/SEC
BH CV ECHO MEAS - LV V1 MEAN: 68.2 CM/SEC
BH CV ECHO MEAS - LV V1 VTI: 19.2 CM
BH CV ECHO MEAS - LVIDD: 4.7 CM
BH CV ECHO MEAS - LVIDS: 3 CM
BH CV ECHO MEAS - LVOT AREA (M): 3.1 CM^2
BH CV ECHO MEAS - LVOT AREA: 3.2 CM^2
BH CV ECHO MEAS - LVOT DIAM: 2 CM
BH CV ECHO MEAS - LVPWD: 0.96 CM
BH CV ECHO MEAS - MED PEAK E' VEL: 8 CM/SEC
BH CV ECHO MEAS - MV A DUR: 0.13 SEC
BH CV ECHO MEAS - MV A MAX VEL: 77.6 CM/SEC
BH CV ECHO MEAS - MV DEC SLOPE: 670.7 CM/SEC^2
BH CV ECHO MEAS - MV DEC TIME: 0.13 SEC
BH CV ECHO MEAS - MV E MAX VEL: 89.7 CM/SEC
BH CV ECHO MEAS - MV E/A: 1.2
BH CV ECHO MEAS - MV MAX PG: 3.1 MMHG
BH CV ECHO MEAS - MV MEAN PG: 1.8 MMHG
BH CV ECHO MEAS - MV P1/2T MAX VEL: 90.2 CM/SEC
BH CV ECHO MEAS - MV P1/2T: 39.4 MSEC
BH CV ECHO MEAS - MV V2 MAX: 88.2 CM/SEC
BH CV ECHO MEAS - MV V2 MEAN: 63.6 CM/SEC
BH CV ECHO MEAS - MV V2 VTI: 22.4 CM
BH CV ECHO MEAS - MVA P1/2T LCG: 2.4 CM^2
BH CV ECHO MEAS - MVA(P1/2T): 5.6 CM^2
BH CV ECHO MEAS - MVA(VTI): 2.8 CM^2
BH CV ECHO MEAS - PA ACC TIME: 0.08 SEC
BH CV ECHO MEAS - PA MAX PG (FULL): 2.9 MMHG
BH CV ECHO MEAS - PA MAX PG: 5.8 MMHG
BH CV ECHO MEAS - PA PR(ACCEL): 42.6 MMHG
BH CV ECHO MEAS - PA V2 MAX: 120.8 CM/SEC
BH CV ECHO MEAS - PULM A REVS DUR: 0.11 SEC
BH CV ECHO MEAS - PULM A REVS VEL: 28 CM/SEC
BH CV ECHO MEAS - PULM DIAS VEL: 35.5 CM/SEC
BH CV ECHO MEAS - PULM S/D: 2.1
BH CV ECHO MEAS - PULM SYS VEL: 74.8 CM/SEC
BH CV ECHO MEAS - PVA(V,A): 2.2 CM^2
BH CV ECHO MEAS - PVA(V,D): 2.2 CM^2
BH CV ECHO MEAS - QP/QS: 0.89
BH CV ECHO MEAS - RAP SYSTOLE: 3 MMHG
BH CV ECHO MEAS - RV MAX PG: 2.9 MMHG
BH CV ECHO MEAS - RV MEAN PG: 1.6 MMHG
BH CV ECHO MEAS - RV V1 MAX: 85.4 CM/SEC
BH CV ECHO MEAS - RV V1 MEAN: 60.3 CM/SEC
BH CV ECHO MEAS - RV V1 VTI: 17.6 CM
BH CV ECHO MEAS - RVOT AREA: 3.1 CM^2
BH CV ECHO MEAS - RVOT DIAM: 2 CM
BH CV ECHO MEAS - RVSP: 24.6 MMHG
BH CV ECHO MEAS - SI(CUBED): 35 ML/M^2
BH CV ECHO MEAS - SI(LVOT): 27.2 ML/M^2
BH CV ECHO MEAS - SI(TEICH): 30.7 ML/M^2
BH CV ECHO MEAS - SV(CUBED): 79.6 ML
BH CV ECHO MEAS - SV(LVOT): 61.8 ML
BH CV ECHO MEAS - SV(RVOT): 54.9 ML
BH CV ECHO MEAS - SV(TEICH): 69.8 ML
BH CV ECHO MEAS - TAPSE (>1.6): 2.3 CM2
BH CV ECHO MEAS - TR MAX VEL: 232.1 CM/SEC
BH CV ECHO MEASUREMENTS AVERAGE E/E' RATIO: 10.55
BH CV XLRA - RV BASE: 2.2 CM
BH CV XLRA - TDI S': 11 CM/SEC
BUN BLD-MCNC: 11 MG/DL (ref 8–23)
BUN/CREAT SERPL: 10.4 (ref 7–25)
CALCIUM SPEC-SCNC: 9.2 MG/DL (ref 8.6–10.5)
CHLORIDE SERPL-SCNC: 104 MMOL/L (ref 98–107)
CO2 SERPL-SCNC: 25.3 MMOL/L (ref 22–29)
CREAT BLD-MCNC: 1.06 MG/DL (ref 0.76–1.27)
DEPRECATED RDW RBC AUTO: 45.6 FL (ref 37–54)
ERYTHROCYTE [DISTWIDTH] IN BLOOD BY AUTOMATED COUNT: 16.9 % (ref 12.3–15.4)
FERRITIN SERPL-MCNC: 7.09 NG/ML (ref 30–400)
GFR SERPL CREATININE-BSD FRML MDRD: 85 ML/MIN/1.73
GLUCOSE BLD-MCNC: 87 MG/DL (ref 65–99)
GLUCOSE BLDC GLUCOMTR-MCNC: 78 MG/DL (ref 70–130)
HCT VFR BLD AUTO: 28.7 % (ref 37.5–51)
HCT VFR BLD AUTO: 28.7 % (ref 37.5–51)
HCT VFR BLD AUTO: 29.2 % (ref 37.5–51)
HGB BLD-MCNC: 8.4 G/DL (ref 13–17.7)
HGB BLD-MCNC: 8.4 G/DL (ref 13–17.7)
HGB BLD-MCNC: 8.7 G/DL (ref 13–17.7)
HGB RETIC QN AUTO: 20.8 PG (ref 29.8–36.1)
IMM RETICS NFR: 32.8 % (ref 3–15.8)
IRON 24H UR-MRATE: 43 MCG/DL (ref 59–158)
IRON SATN MFR SERPL: 8 % (ref 20–50)
LEFT ATRIUM VOLUME INDEX: 16 ML/M2
MAXIMAL PREDICTED HEART RATE: 155 BPM
MCH RBC QN AUTO: 22 PG (ref 26.6–33)
MCHC RBC AUTO-ENTMCNC: 29.3 G/DL (ref 31.5–35.7)
MCV RBC AUTO: 75.3 FL (ref 79–97)
PLATELET # BLD AUTO: 341 10*3/MM3 (ref 140–450)
PMV BLD AUTO: 9 FL (ref 6–12)
POTASSIUM BLD-SCNC: 4.2 MMOL/L (ref 3.5–5.2)
RBC # BLD AUTO: 3.81 10*6/MM3 (ref 4.14–5.8)
RETICS/RBC NFR AUTO: 1.39 % (ref 0.7–1.9)
SINUS: 2.6 CM
SODIUM BLD-SCNC: 141 MMOL/L (ref 136–145)
STJ: 2.8 CM
STRESS TARGET HR: 132 BPM
TIBC SERPL-MCNC: 507 MCG/DL (ref 298–536)
TRANSFERRIN SERPL-MCNC: 340 MG/DL (ref 200–360)
UNIT  ABO: NORMAL
UNIT  RH: NORMAL
WBC NRBC COR # BLD: 5 10*3/MM3 (ref 3.4–10.8)

## 2019-09-20 PROCEDURE — 82962 GLUCOSE BLOOD TEST: CPT

## 2019-09-20 PROCEDURE — 99222 1ST HOSP IP/OBS MODERATE 55: CPT | Performed by: INTERNAL MEDICINE

## 2019-09-20 PROCEDURE — 85014 HEMATOCRIT: CPT | Performed by: HOSPITALIST

## 2019-09-20 PROCEDURE — 82728 ASSAY OF FERRITIN: CPT | Performed by: INTERNAL MEDICINE

## 2019-09-20 PROCEDURE — 25010000002 FUROSEMIDE PER 20 MG: Performed by: HOSPITALIST

## 2019-09-20 PROCEDURE — 84466 ASSAY OF TRANSFERRIN: CPT | Performed by: INTERNAL MEDICINE

## 2019-09-20 PROCEDURE — 80048 BASIC METABOLIC PNL TOTAL CA: CPT | Performed by: HOSPITALIST

## 2019-09-20 PROCEDURE — 83540 ASSAY OF IRON: CPT | Performed by: INTERNAL MEDICINE

## 2019-09-20 PROCEDURE — 85018 HEMOGLOBIN: CPT | Performed by: HOSPITALIST

## 2019-09-20 PROCEDURE — 85046 RETICYTE/HGB CONCENTRATE: CPT | Performed by: INTERNAL MEDICINE

## 2019-09-20 PROCEDURE — 85027 COMPLETE CBC AUTOMATED: CPT | Performed by: HOSPITALIST

## 2019-09-20 RX ORDER — POLYETHYLENE GLYCOL 3350, SODIUM CHLORIDE, POTASSIUM CHLORIDE, SODIUM BICARBONATE, AND SODIUM SULFATE 240; 5.84; 2.98; 6.72; 22.72 G/4L; G/4L; G/4L; G/4L; G/4L
2000 POWDER, FOR SOLUTION ORAL ONCE
Status: DISCONTINUED | OUTPATIENT
Start: 2019-09-20 | End: 2019-09-25

## 2019-09-20 RX ORDER — POLYETHYLENE GLYCOL 3350, SODIUM CHLORIDE, POTASSIUM CHLORIDE, SODIUM BICARBONATE, AND SODIUM SULFATE 240; 5.84; 2.98; 6.72; 22.72 G/4L; G/4L; G/4L; G/4L; G/4L
2000 POWDER, FOR SOLUTION ORAL ONCE
Status: COMPLETED | OUTPATIENT
Start: 2019-09-21 | End: 2019-09-21

## 2019-09-20 RX ADMIN — SODIUM CHLORIDE 8 MG/HR: 900 INJECTION INTRAVENOUS at 23:33

## 2019-09-20 RX ADMIN — SODIUM CHLORIDE 8 MG/HR: 900 INJECTION INTRAVENOUS at 09:14

## 2019-09-20 RX ADMIN — SODIUM CHLORIDE 8 MG/HR: 900 INJECTION INTRAVENOUS at 18:20

## 2019-09-20 RX ADMIN — SODIUM CHLORIDE, PRESERVATIVE FREE 10 ML: 5 INJECTION INTRAVENOUS at 09:11

## 2019-09-20 RX ADMIN — SODIUM CHLORIDE 8 MG/HR: 900 INJECTION INTRAVENOUS at 03:00

## 2019-09-20 RX ADMIN — SODIUM CHLORIDE, PRESERVATIVE FREE 10 ML: 5 INJECTION INTRAVENOUS at 21:09

## 2019-09-20 RX ADMIN — FUROSEMIDE 20 MG: 10 INJECTION, SOLUTION INTRAMUSCULAR; INTRAVENOUS at 02:59

## 2019-09-20 RX ADMIN — SODIUM CHLORIDE, PRESERVATIVE FREE 10 ML: 5 INJECTION INTRAVENOUS at 03:00

## 2019-09-21 ENCOUNTER — ANESTHESIA EVENT (OUTPATIENT)
Dept: GASTROENTEROLOGY | Facility: HOSPITAL | Age: 64
End: 2019-09-21

## 2019-09-21 ENCOUNTER — ANESTHESIA (OUTPATIENT)
Dept: GASTROENTEROLOGY | Facility: HOSPITAL | Age: 64
End: 2019-09-21

## 2019-09-21 LAB
ALBUMIN SERPL-MCNC: 4.7 G/DL (ref 3.5–5.2)
ALBUMIN/GLOB SERPL: 1.3 G/DL
ALP SERPL-CCNC: 71 U/L (ref 39–117)
ALT SERPL W P-5'-P-CCNC: 14 U/L (ref 1–41)
ANION GAP SERPL CALCULATED.3IONS-SCNC: 11.2 MMOL/L (ref 5–15)
AST SERPL-CCNC: 19 U/L (ref 1–40)
BASOPHILS # BLD AUTO: 0.05 10*3/MM3 (ref 0–0.2)
BASOPHILS NFR BLD AUTO: 0.9 % (ref 0–1.5)
BILIRUB SERPL-MCNC: 2 MG/DL (ref 0.2–1.2)
BUN BLD-MCNC: 10 MG/DL (ref 8–23)
BUN/CREAT SERPL: 8.8 (ref 7–25)
CALCIUM SPEC-SCNC: 9.7 MG/DL (ref 8.6–10.5)
CHLORIDE SERPL-SCNC: 101 MMOL/L (ref 98–107)
CO2 SERPL-SCNC: 26.8 MMOL/L (ref 22–29)
CREAT BLD-MCNC: 1.13 MG/DL (ref 0.76–1.27)
DEPRECATED RDW RBC AUTO: 46.4 FL (ref 37–54)
EOSINOPHIL # BLD AUTO: 0.22 10*3/MM3 (ref 0–0.4)
EOSINOPHIL NFR BLD AUTO: 4 % (ref 0.3–6.2)
ERYTHROCYTE [DISTWIDTH] IN BLOOD BY AUTOMATED COUNT: 16.9 % (ref 12.3–15.4)
GFR SERPL CREATININE-BSD FRML MDRD: 79 ML/MIN/1.73
GLOBULIN UR ELPH-MCNC: 3.6 GM/DL
GLUCOSE BLD-MCNC: 91 MG/DL (ref 65–99)
HCT VFR BLD AUTO: 29.7 % (ref 37.5–51)
HCT VFR BLD AUTO: 32.6 % (ref 37.5–51)
HCT VFR BLD AUTO: 32.6 % (ref 37.5–51)
HGB BLD-MCNC: 8.8 G/DL (ref 13–17.7)
HGB BLD-MCNC: 9.6 G/DL (ref 13–17.7)
HGB BLD-MCNC: 9.6 G/DL (ref 13–17.7)
IMM GRANULOCYTES # BLD AUTO: 0.01 10*3/MM3 (ref 0–0.05)
IMM GRANULOCYTES NFR BLD AUTO: 0.2 % (ref 0–0.5)
INR PPP: 1.26 (ref 0.9–1.1)
LYMPHOCYTES # BLD AUTO: 1.8 10*3/MM3 (ref 0.7–3.1)
LYMPHOCYTES NFR BLD AUTO: 32.9 % (ref 19.6–45.3)
MCH RBC QN AUTO: 22.5 PG (ref 26.6–33)
MCHC RBC AUTO-ENTMCNC: 29.4 G/DL (ref 31.5–35.7)
MCV RBC AUTO: 76.3 FL (ref 79–97)
MONOCYTES # BLD AUTO: 0.58 10*3/MM3 (ref 0.1–0.9)
MONOCYTES NFR BLD AUTO: 10.6 % (ref 5–12)
NEUTROPHILS # BLD AUTO: 2.81 10*3/MM3 (ref 1.7–7)
NEUTROPHILS NFR BLD AUTO: 51.4 % (ref 42.7–76)
NRBC BLD AUTO-RTO: 0 /100 WBC (ref 0–0.2)
PLATELET # BLD AUTO: 383 10*3/MM3 (ref 140–450)
PMV BLD AUTO: 9 FL (ref 6–12)
POTASSIUM BLD-SCNC: 4.3 MMOL/L (ref 3.5–5.2)
PROT SERPL-MCNC: 8.3 G/DL (ref 6–8.5)
PROTHROMBIN TIME: 15.5 SECONDS (ref 11.7–14.2)
RBC # BLD AUTO: 4.27 10*6/MM3 (ref 4.14–5.8)
SODIUM BLD-SCNC: 139 MMOL/L (ref 136–145)
VIT B12 BLD-MCNC: 649 PG/ML (ref 211–946)
WBC NRBC COR # BLD: 5.47 10*3/MM3 (ref 3.4–10.8)

## 2019-09-21 PROCEDURE — 45380 COLONOSCOPY AND BIOPSY: CPT | Performed by: INTERNAL MEDICINE

## 2019-09-21 PROCEDURE — 25010000002 PROPOFOL 10 MG/ML EMULSION: Performed by: ANESTHESIOLOGY

## 2019-09-21 PROCEDURE — 99233 SBSQ HOSP IP/OBS HIGH 50: CPT | Performed by: INTERNAL MEDICINE

## 2019-09-21 PROCEDURE — 0DBN8ZX EXCISION OF SIGMOID COLON, VIA NATURAL OR ARTIFICIAL OPENING ENDOSCOPIC, DIAGNOSTIC: ICD-10-PCS | Performed by: INTERNAL MEDICINE

## 2019-09-21 PROCEDURE — 63710000001 DIPHENHYDRAMINE PER 50 MG: Performed by: INTERNAL MEDICINE

## 2019-09-21 PROCEDURE — 0W3P8ZZ CONTROL BLEEDING IN GASTROINTESTINAL TRACT, VIA NATURAL OR ARTIFICIAL OPENING ENDOSCOPIC: ICD-10-PCS | Performed by: INTERNAL MEDICINE

## 2019-09-21 PROCEDURE — 85025 COMPLETE CBC W/AUTO DIFF WBC: CPT | Performed by: INTERNAL MEDICINE

## 2019-09-21 PROCEDURE — 0DBL8ZX EXCISION OF TRANSVERSE COLON, VIA NATURAL OR ARTIFICIAL OPENING ENDOSCOPIC, DIAGNOSTIC: ICD-10-PCS | Performed by: INTERNAL MEDICINE

## 2019-09-21 PROCEDURE — 85018 HEMOGLOBIN: CPT | Performed by: HOSPITALIST

## 2019-09-21 PROCEDURE — 82607 VITAMIN B-12: CPT | Performed by: INTERNAL MEDICINE

## 2019-09-21 PROCEDURE — 25010000002 IRON SUCROSE PER 1 MG: Performed by: INTERNAL MEDICINE

## 2019-09-21 PROCEDURE — 25010000002 ONDANSETRON PER 1 MG: Performed by: ANESTHESIOLOGY

## 2019-09-21 PROCEDURE — 88305 TISSUE EXAM BY PATHOLOGIST: CPT | Performed by: INTERNAL MEDICINE

## 2019-09-21 PROCEDURE — 85610 PROTHROMBIN TIME: CPT | Performed by: INTERNAL MEDICINE

## 2019-09-21 PROCEDURE — 80053 COMPREHEN METABOLIC PANEL: CPT | Performed by: INTERNAL MEDICINE

## 2019-09-21 PROCEDURE — 45385 COLONOSCOPY W/LESION REMOVAL: CPT | Performed by: INTERNAL MEDICINE

## 2019-09-21 PROCEDURE — 85014 HEMATOCRIT: CPT | Performed by: HOSPITALIST

## 2019-09-21 DEVICE — DEV CLIP ENDO RESOLUTION360 CONTRL ROT 235CM: Type: IMPLANTABLE DEVICE | Site: ASCENDING COLON | Status: FUNCTIONAL

## 2019-09-21 RX ORDER — ONDANSETRON 2 MG/ML
INJECTION INTRAMUSCULAR; INTRAVENOUS AS NEEDED
Status: DISCONTINUED | OUTPATIENT
Start: 2019-09-21 | End: 2019-09-21 | Stop reason: SURG

## 2019-09-21 RX ORDER — SODIUM CHLORIDE 0.9 % (FLUSH) 0.9 %
10 SYRINGE (ML) INJECTION AS NEEDED
Status: DISCONTINUED | OUTPATIENT
Start: 2019-09-21 | End: 2019-09-21 | Stop reason: HOSPADM

## 2019-09-21 RX ORDER — SODIUM CHLORIDE 9 MG/ML
1000 INJECTION, SOLUTION INTRAVENOUS CONTINUOUS
Status: ACTIVE | OUTPATIENT
Start: 2019-09-21 | End: 2019-09-23

## 2019-09-21 RX ORDER — PROPOFOL 10 MG/ML
VIAL (ML) INTRAVENOUS AS NEEDED
Status: DISCONTINUED | OUTPATIENT
Start: 2019-09-21 | End: 2019-09-21 | Stop reason: SURG

## 2019-09-21 RX ORDER — LIDOCAINE HYDROCHLORIDE 20 MG/ML
INJECTION, SOLUTION INFILTRATION; PERINEURAL AS NEEDED
Status: DISCONTINUED | OUTPATIENT
Start: 2019-09-21 | End: 2019-09-21 | Stop reason: SURG

## 2019-09-21 RX ORDER — LIDOCAINE HYDROCHLORIDE 10 MG/ML
0.5 INJECTION, SOLUTION INFILTRATION; PERINEURAL ONCE AS NEEDED
Status: DISCONTINUED | OUTPATIENT
Start: 2019-09-21 | End: 2019-09-21 | Stop reason: HOSPADM

## 2019-09-21 RX ORDER — ACETAMINOPHEN 325 MG/1
650 TABLET ORAL EVERY 24 HOURS
Status: COMPLETED | OUTPATIENT
Start: 2019-09-21 | End: 2019-09-23

## 2019-09-21 RX ORDER — DIPHENHYDRAMINE HCL 25 MG
50 CAPSULE ORAL EVERY 24 HOURS
Status: COMPLETED | OUTPATIENT
Start: 2019-09-21 | End: 2019-09-23

## 2019-09-21 RX ADMIN — ACETAMINOPHEN 650 MG: 325 TABLET, FILM COATED ORAL at 14:36

## 2019-09-21 RX ADMIN — ONDANSETRON 4 MG: 2 INJECTION INTRAMUSCULAR; INTRAVENOUS at 10:56

## 2019-09-21 RX ADMIN — PROPOFOL 150 MG: 10 INJECTION, EMULSION INTRAVENOUS at 11:00

## 2019-09-21 RX ADMIN — SODIUM CHLORIDE 8 MG/HR: 900 INJECTION INTRAVENOUS at 05:25

## 2019-09-21 RX ADMIN — SODIUM CHLORIDE 8 MG/HR: 900 INJECTION INTRAVENOUS at 18:10

## 2019-09-21 RX ADMIN — PROPOFOL 290 MG: 10 INJECTION, EMULSION INTRAVENOUS at 11:25

## 2019-09-21 RX ADMIN — IRON SUCROSE 300 MG: 20 INJECTION, SOLUTION INTRAVENOUS at 15:17

## 2019-09-21 RX ADMIN — LIDOCAINE HYDROCHLORIDE 100 MG: 20 INJECTION, SOLUTION INFILTRATION; PERINEURAL at 10:49

## 2019-09-21 RX ADMIN — SODIUM CHLORIDE, PRESERVATIVE FREE 10 ML: 5 INJECTION INTRAVENOUS at 20:25

## 2019-09-21 RX ADMIN — PROPOFOL 200 MG: 10 INJECTION, EMULSION INTRAVENOUS at 10:49

## 2019-09-21 RX ADMIN — DIPHENHYDRAMINE HYDROCHLORIDE 50 MG: 25 CAPSULE ORAL at 14:36

## 2019-09-21 RX ADMIN — SODIUM CHLORIDE, PRESERVATIVE FREE 10 ML: 5 INJECTION INTRAVENOUS at 09:55

## 2019-09-21 RX ADMIN — POLYETHYLENE GLYCOL-3350 AND ELECTROLYTES WITH FLAVOR PACK 2000 ML: 240; 5.84; 2.98; 6.72; 22.72 POWDER, FOR SOLUTION ORAL at 05:25

## 2019-09-21 RX ADMIN — SODIUM CHLORIDE 1000 ML: 9 INJECTION, SOLUTION INTRAVENOUS at 10:31

## 2019-09-21 NOTE — ANESTHESIA PREPROCEDURE EVALUATION
Anesthesia Evaluation     Patient summary reviewed and Nursing notes reviewed   NPO Solid Status: > 8 hours  NPO Liquid Status: > 8 hours           Airway   Mallampati: III  TM distance: >3 FB  Neck ROM: limited  Large neck circumference and Difficult intubation highly probable  Dental    (+) edentulous and poor dentition    Pulmonary - normal exam   (+) pulmonary embolism,   Cardiovascular - normal exam    ECG reviewed  PT is on anticoagulation therapy    (+) hypertension less than 2 medications, CAD,     ROS comment: EF 51%    Neuro/Psych  GI/Hepatic/Renal/Endo    (+) obesity,       Musculoskeletal     Abdominal    Substance History      OB/GYN          Other                      Anesthesia Plan    ASA 2     MAC     Anesthetic plan, all risks, benefits, and alternatives have been provided, discussed and informed consent has been obtained with: patient.

## 2019-09-21 NOTE — ANESTHESIA POSTPROCEDURE EVALUATION
"Patient: Jared Rose    Procedure Summary     Date:  09/21/19 Room / Location:  [facility] ENDOSCOPY 7 /  [facility]    Anesthesia Start:  1044 Anesthesia Stop:  1133    Procedure:  COLONOSCOPY TO CECUM/TI WITH POLYPECTOMMY (HOT SNARE) AND BIOPSY; RESOLUTION CLIP X3 (N/A ) Diagnosis:       Sigmoid polyp      Colon polyps      Hemorrhoids      (Symptomatic anemia [D64.9])    Surgeon:  Gordo Fernando MD Provider:  Belinda Pena MD    Anesthesia Type:  MAC ASA Status:  2          Anesthesia Type: MAC  Last vitals  BP   147/93 (09/21/19 1158)   Temp   36.5 °C (97.7 °F) (09/21/19 1018)   Pulse   70 (09/21/19 1158)   Resp   16 (09/21/19 1158)     SpO2   100 % (09/21/19 1158)     Post Anesthesia Care and Evaluation    Patient location during evaluation: PACU  Patient participation: complete - patient participated  Level of consciousness: awake  Pain score: 0  Pain management: adequate  Airway patency: patent  Anesthetic complications: No anesthetic complications    Cardiovascular status: acceptable  Respiratory status: acceptable  Hydration status: acceptable    Comments: Blood pressure 147/93, pulse 70, temperature 36.5 °C (97.7 °F), temperature source Oral, resp. rate 16, height 180.3 cm (71\"), weight 107 kg (236 lb), SpO2 100 %.    No anesthesia care post op    "

## 2019-09-22 LAB
BASOPHILS # BLD AUTO: 0.03 10*3/MM3 (ref 0–0.2)
BASOPHILS NFR BLD AUTO: 0.4 % (ref 0–1.5)
DEPRECATED RDW RBC AUTO: 46.2 FL (ref 37–54)
EOSINOPHIL # BLD AUTO: 0.2 10*3/MM3 (ref 0–0.4)
EOSINOPHIL NFR BLD AUTO: 2.8 % (ref 0.3–6.2)
ERYTHROCYTE [DISTWIDTH] IN BLOOD BY AUTOMATED COUNT: 17 % (ref 12.3–15.4)
HCT VFR BLD AUTO: 28.6 % (ref 37.5–51)
HGB BLD-MCNC: 8.4 G/DL (ref 13–17.7)
IMM GRANULOCYTES # BLD AUTO: 0.01 10*3/MM3 (ref 0–0.05)
IMM GRANULOCYTES NFR BLD AUTO: 0.1 % (ref 0–0.5)
LYMPHOCYTES # BLD AUTO: 1.58 10*3/MM3 (ref 0.7–3.1)
LYMPHOCYTES NFR BLD AUTO: 22.3 % (ref 19.6–45.3)
MCH RBC QN AUTO: 22.7 PG (ref 26.6–33)
MCHC RBC AUTO-ENTMCNC: 29.4 G/DL (ref 31.5–35.7)
MCV RBC AUTO: 77.3 FL (ref 79–97)
MONOCYTES # BLD AUTO: 0.73 10*3/MM3 (ref 0.1–0.9)
MONOCYTES NFR BLD AUTO: 10.3 % (ref 5–12)
NEUTROPHILS # BLD AUTO: 4.52 10*3/MM3 (ref 1.7–7)
NEUTROPHILS NFR BLD AUTO: 64.1 % (ref 42.7–76)
NRBC BLD AUTO-RTO: 0.1 /100 WBC (ref 0–0.2)
PLATELET # BLD AUTO: 317 10*3/MM3 (ref 140–450)
PMV BLD AUTO: 9.6 FL (ref 6–12)
RBC # BLD AUTO: 3.7 10*6/MM3 (ref 4.14–5.8)
WBC NRBC COR # BLD: 7.07 10*3/MM3 (ref 3.4–10.8)

## 2019-09-22 PROCEDURE — 25010000002 IRON SUCROSE PER 1 MG: Performed by: INTERNAL MEDICINE

## 2019-09-22 PROCEDURE — 99232 SBSQ HOSP IP/OBS MODERATE 35: CPT | Performed by: INTERNAL MEDICINE

## 2019-09-22 PROCEDURE — 85025 COMPLETE CBC W/AUTO DIFF WBC: CPT | Performed by: INTERNAL MEDICINE

## 2019-09-22 PROCEDURE — 63710000001 DIPHENHYDRAMINE PER 50 MG: Performed by: INTERNAL MEDICINE

## 2019-09-22 RX ADMIN — SODIUM CHLORIDE 8 MG/HR: 900 INJECTION INTRAVENOUS at 06:18

## 2019-09-22 RX ADMIN — SODIUM CHLORIDE 8 MG/HR: 900 INJECTION INTRAVENOUS at 12:51

## 2019-09-22 RX ADMIN — IRON SUCROSE 300 MG: 20 INJECTION, SOLUTION INTRAVENOUS at 18:26

## 2019-09-22 RX ADMIN — SODIUM CHLORIDE, PRESERVATIVE FREE 10 ML: 5 INJECTION INTRAVENOUS at 09:52

## 2019-09-22 RX ADMIN — SODIUM CHLORIDE 8 MG/HR: 900 INJECTION INTRAVENOUS at 17:16

## 2019-09-22 RX ADMIN — ACETAMINOPHEN 650 MG: 325 TABLET, FILM COATED ORAL at 17:38

## 2019-09-22 RX ADMIN — DIPHENHYDRAMINE HYDROCHLORIDE 50 MG: 25 CAPSULE ORAL at 17:38

## 2019-09-23 ENCOUNTER — APPOINTMENT (OUTPATIENT)
Dept: CARDIOLOGY | Facility: HOSPITAL | Age: 64
End: 2019-09-23

## 2019-09-23 PROBLEM — K63.89 MASS OF COLON: Status: ACTIVE | Noted: 2019-09-19

## 2019-09-23 LAB
ANION GAP SERPL CALCULATED.3IONS-SCNC: 8.8 MMOL/L (ref 5–15)
BASOPHILS # BLD AUTO: 0.04 10*3/MM3 (ref 0–0.2)
BASOPHILS NFR BLD AUTO: 0.7 % (ref 0–1.5)
BH CV LOWER VASCULAR LEFT COMMON FEMORAL AUGMENT: NORMAL
BH CV LOWER VASCULAR LEFT COMMON FEMORAL COMPETENT: NORMAL
BH CV LOWER VASCULAR LEFT COMMON FEMORAL COMPRESS: NORMAL
BH CV LOWER VASCULAR LEFT COMMON FEMORAL PHASIC: NORMAL
BH CV LOWER VASCULAR LEFT COMMON FEMORAL SPONT: NORMAL
BH CV LOWER VASCULAR LEFT DISTAL FEMORAL COMPRESS: NORMAL
BH CV LOWER VASCULAR LEFT GASTRONEMIUS COMPRESS: NORMAL
BH CV LOWER VASCULAR LEFT GREATER SAPH AK COMPRESS: NORMAL
BH CV LOWER VASCULAR LEFT GREATER SAPH BK COMPRESS: NORMAL
BH CV LOWER VASCULAR LEFT MID FEMORAL AUGMENT: NORMAL
BH CV LOWER VASCULAR LEFT MID FEMORAL COMPETENT: NORMAL
BH CV LOWER VASCULAR LEFT MID FEMORAL COMPRESS: NORMAL
BH CV LOWER VASCULAR LEFT MID FEMORAL PHASIC: NORMAL
BH CV LOWER VASCULAR LEFT MID FEMORAL SPONT: NORMAL
BH CV LOWER VASCULAR LEFT PERONEAL COMPRESS: NORMAL
BH CV LOWER VASCULAR LEFT POPLITEAL AUGMENT: NORMAL
BH CV LOWER VASCULAR LEFT POPLITEAL COMPETENT: NORMAL
BH CV LOWER VASCULAR LEFT POPLITEAL COMPRESS: NORMAL
BH CV LOWER VASCULAR LEFT POPLITEAL PHASIC: NORMAL
BH CV LOWER VASCULAR LEFT POPLITEAL SPONT: NORMAL
BH CV LOWER VASCULAR LEFT POSTERIOR TIBIAL COMPRESS: NORMAL
BH CV LOWER VASCULAR LEFT PROXIMAL FEMORAL COMPRESS: NORMAL
BH CV LOWER VASCULAR LEFT SAPHENOFEMORAL JUNCTION COMPRESS: NORMAL
BH CV LOWER VASCULAR RIGHT COMMON FEMORAL AUGMENT: NORMAL
BH CV LOWER VASCULAR RIGHT COMMON FEMORAL COMPETENT: NORMAL
BH CV LOWER VASCULAR RIGHT COMMON FEMORAL COMPRESS: NORMAL
BH CV LOWER VASCULAR RIGHT COMMON FEMORAL PHASIC: NORMAL
BH CV LOWER VASCULAR RIGHT COMMON FEMORAL SPONT: NORMAL
BH CV LOWER VASCULAR RIGHT DISTAL FEMORAL COMPRESS: NORMAL
BH CV LOWER VASCULAR RIGHT GASTRONEMIUS COMPRESS: NORMAL
BH CV LOWER VASCULAR RIGHT GREATER SAPH AK COMPRESS: NORMAL
BH CV LOWER VASCULAR RIGHT GREATER SAPH BK COMPRESS: NORMAL
BH CV LOWER VASCULAR RIGHT MID FEMORAL AUGMENT: NORMAL
BH CV LOWER VASCULAR RIGHT MID FEMORAL COMPETENT: NORMAL
BH CV LOWER VASCULAR RIGHT MID FEMORAL COMPRESS: NORMAL
BH CV LOWER VASCULAR RIGHT MID FEMORAL PHASIC: NORMAL
BH CV LOWER VASCULAR RIGHT MID FEMORAL SPONT: NORMAL
BH CV LOWER VASCULAR RIGHT PERONEAL COMPRESS: NORMAL
BH CV LOWER VASCULAR RIGHT POPLITEAL AUGMENT: NORMAL
BH CV LOWER VASCULAR RIGHT POPLITEAL COMPETENT: NORMAL
BH CV LOWER VASCULAR RIGHT POPLITEAL COMPRESS: NORMAL
BH CV LOWER VASCULAR RIGHT POPLITEAL PHASIC: NORMAL
BH CV LOWER VASCULAR RIGHT POPLITEAL SPONT: NORMAL
BH CV LOWER VASCULAR RIGHT POSTERIOR TIBIAL COMPRESS: NORMAL
BH CV LOWER VASCULAR RIGHT PROXIMAL FEMORAL COMPRESS: NORMAL
BH CV LOWER VASCULAR RIGHT SAPHENOFEMORAL JUNCTION COMPRESS: NORMAL
BUN BLD-MCNC: 11 MG/DL (ref 8–23)
BUN/CREAT SERPL: 9.6 (ref 7–25)
CALCIUM SPEC-SCNC: 8.8 MG/DL (ref 8.6–10.5)
CHLORIDE SERPL-SCNC: 99 MMOL/L (ref 98–107)
CO2 SERPL-SCNC: 26.2 MMOL/L (ref 22–29)
CREAT BLD-MCNC: 1.15 MG/DL (ref 0.76–1.27)
DEPRECATED RDW RBC AUTO: 47.9 FL (ref 37–54)
EOSINOPHIL # BLD AUTO: 0.22 10*3/MM3 (ref 0–0.4)
EOSINOPHIL NFR BLD AUTO: 4 % (ref 0.3–6.2)
ERYTHROCYTE [DISTWIDTH] IN BLOOD BY AUTOMATED COUNT: 17.2 % (ref 12.3–15.4)
GFR SERPL CREATININE-BSD FRML MDRD: 77 ML/MIN/1.73
GLUCOSE BLD-MCNC: 83 MG/DL (ref 65–99)
HCT VFR BLD AUTO: 29 % (ref 37.5–51)
HGB BLD-MCNC: 8.5 G/DL (ref 13–17.7)
IMM GRANULOCYTES # BLD AUTO: 0.01 10*3/MM3 (ref 0–0.05)
IMM GRANULOCYTES NFR BLD AUTO: 0.2 % (ref 0–0.5)
LYMPHOCYTES # BLD AUTO: 1.75 10*3/MM3 (ref 0.7–3.1)
LYMPHOCYTES NFR BLD AUTO: 31.5 % (ref 19.6–45.3)
MCH RBC QN AUTO: 22.8 PG (ref 26.6–33)
MCHC RBC AUTO-ENTMCNC: 29.3 G/DL (ref 31.5–35.7)
MCV RBC AUTO: 77.7 FL (ref 79–97)
MONOCYTES # BLD AUTO: 0.57 10*3/MM3 (ref 0.1–0.9)
MONOCYTES NFR BLD AUTO: 10.3 % (ref 5–12)
NEUTROPHILS # BLD AUTO: 2.96 10*3/MM3 (ref 1.7–7)
NEUTROPHILS NFR BLD AUTO: 53.3 % (ref 42.7–76)
NRBC BLD AUTO-RTO: 0 /100 WBC (ref 0–0.2)
PLATELET # BLD AUTO: 317 10*3/MM3 (ref 140–450)
PMV BLD AUTO: 9.5 FL (ref 6–12)
POTASSIUM BLD-SCNC: 4 MMOL/L (ref 3.5–5.2)
RBC # BLD AUTO: 3.73 10*6/MM3 (ref 4.14–5.8)
SODIUM BLD-SCNC: 134 MMOL/L (ref 136–145)
TROPONIN T SERPL-MCNC: <0.01 NG/ML (ref 0–0.03)
WBC NRBC COR # BLD: 5.55 10*3/MM3 (ref 3.4–10.8)

## 2019-09-23 PROCEDURE — 63710000001 DIPHENHYDRAMINE PER 50 MG: Performed by: INTERNAL MEDICINE

## 2019-09-23 PROCEDURE — 93005 ELECTROCARDIOGRAM TRACING: CPT | Performed by: HOSPITALIST

## 2019-09-23 PROCEDURE — 85025 COMPLETE CBC W/AUTO DIFF WBC: CPT | Performed by: INTERNAL MEDICINE

## 2019-09-23 PROCEDURE — 25010000002 IRON SUCROSE PER 1 MG: Performed by: INTERNAL MEDICINE

## 2019-09-23 PROCEDURE — 93010 ELECTROCARDIOGRAM REPORT: CPT | Performed by: INTERNAL MEDICINE

## 2019-09-23 PROCEDURE — 99253 IP/OBS CNSLTJ NEW/EST LOW 45: CPT | Performed by: COLON & RECTAL SURGERY

## 2019-09-23 PROCEDURE — 93970 EXTREMITY STUDY: CPT

## 2019-09-23 PROCEDURE — 80048 BASIC METABOLIC PNL TOTAL CA: CPT | Performed by: HOSPITALIST

## 2019-09-23 PROCEDURE — 99232 SBSQ HOSP IP/OBS MODERATE 35: CPT | Performed by: INTERNAL MEDICINE

## 2019-09-23 PROCEDURE — 84484 ASSAY OF TROPONIN QUANT: CPT | Performed by: HOSPITALIST

## 2019-09-23 RX ADMIN — DIPHENHYDRAMINE HYDROCHLORIDE 50 MG: 25 CAPSULE ORAL at 13:29

## 2019-09-23 RX ADMIN — SODIUM CHLORIDE 8 MG/HR: 900 INJECTION INTRAVENOUS at 11:13

## 2019-09-23 RX ADMIN — IRON SUCROSE 300 MG: 20 INJECTION, SOLUTION INTRAVENOUS at 13:29

## 2019-09-23 RX ADMIN — SODIUM CHLORIDE, PRESERVATIVE FREE 10 ML: 5 INJECTION INTRAVENOUS at 08:25

## 2019-09-23 RX ADMIN — ACETAMINOPHEN 650 MG: 325 TABLET, FILM COATED ORAL at 13:29

## 2019-09-23 RX ADMIN — SODIUM CHLORIDE 8 MG/HR: 900 INJECTION INTRAVENOUS at 02:04

## 2019-09-23 RX ADMIN — SODIUM CHLORIDE 8 MG/HR: 900 INJECTION INTRAVENOUS at 06:37

## 2019-09-24 ENCOUNTER — ANESTHESIA (OUTPATIENT)
Dept: GASTROENTEROLOGY | Facility: HOSPITAL | Age: 64
End: 2019-09-24

## 2019-09-24 ENCOUNTER — ANESTHESIA EVENT (OUTPATIENT)
Dept: GASTROENTEROLOGY | Facility: HOSPITAL | Age: 64
End: 2019-09-24

## 2019-09-24 ENCOUNTER — HOSPITAL ENCOUNTER (OUTPATIENT)
Facility: HOSPITAL | Age: 64
Setting detail: SURGERY ADMIT
End: 2019-09-24
Attending: COLON & RECTAL SURGERY | Admitting: COLON & RECTAL SURGERY

## 2019-09-24 ENCOUNTER — PREP FOR SURGERY (OUTPATIENT)
Dept: OTHER | Facility: HOSPITAL | Age: 64
End: 2019-09-24

## 2019-09-24 DIAGNOSIS — K63.89 COLONIC MASS: Primary | ICD-10-CM

## 2019-09-24 LAB
BASOPHILS # BLD AUTO: 0.05 10*3/MM3 (ref 0–0.2)
BASOPHILS NFR BLD AUTO: 0.7 % (ref 0–1.5)
DEPRECATED RDW RBC AUTO: 46.7 FL (ref 37–54)
EOSINOPHIL # BLD AUTO: 0.22 10*3/MM3 (ref 0–0.4)
EOSINOPHIL NFR BLD AUTO: 3.2 % (ref 0.3–6.2)
ERYTHROCYTE [DISTWIDTH] IN BLOOD BY AUTOMATED COUNT: 17.6 % (ref 12.3–15.4)
HCT VFR BLD AUTO: 28.8 % (ref 37.5–51)
HGB BLD-MCNC: 8.6 G/DL (ref 13–17.7)
IMM GRANULOCYTES # BLD AUTO: 0.02 10*3/MM3 (ref 0–0.05)
IMM GRANULOCYTES NFR BLD AUTO: 0.3 % (ref 0–0.5)
LYMPHOCYTES # BLD AUTO: 2.04 10*3/MM3 (ref 0.7–3.1)
LYMPHOCYTES NFR BLD AUTO: 29.5 % (ref 19.6–45.3)
MCH RBC QN AUTO: 22.9 PG (ref 26.6–33)
MCHC RBC AUTO-ENTMCNC: 29.9 G/DL (ref 31.5–35.7)
MCV RBC AUTO: 76.6 FL (ref 79–97)
MONOCYTES # BLD AUTO: 0.71 10*3/MM3 (ref 0.1–0.9)
MONOCYTES NFR BLD AUTO: 10.3 % (ref 5–12)
NEUTROPHILS # BLD AUTO: 3.87 10*3/MM3 (ref 1.7–7)
NEUTROPHILS NFR BLD AUTO: 56 % (ref 42.7–76)
NRBC BLD AUTO-RTO: 0.1 /100 WBC (ref 0–0.2)
PLATELET # BLD AUTO: 316 10*3/MM3 (ref 140–450)
PMV BLD AUTO: 9.6 FL (ref 6–12)
RBC # BLD AUTO: 3.76 10*6/MM3 (ref 4.14–5.8)
WBC NRBC COR # BLD: 6.91 10*3/MM3 (ref 3.4–10.8)

## 2019-09-24 PROCEDURE — 99253 IP/OBS CNSLTJ NEW/EST LOW 45: CPT | Performed by: INTERNAL MEDICINE

## 2019-09-24 PROCEDURE — 0DJD8ZZ INSPECTION OF LOWER INTESTINAL TRACT, VIA NATURAL OR ARTIFICIAL OPENING ENDOSCOPIC: ICD-10-PCS | Performed by: COLON & RECTAL SURGERY

## 2019-09-24 PROCEDURE — 45335 SIGMOIDOSCOPY W/SUBMUC INJ: CPT | Performed by: COLON & RECTAL SURGERY

## 2019-09-24 PROCEDURE — 25010000002 PROPOFOL 10 MG/ML EMULSION: Performed by: NURSE ANESTHETIST, CERTIFIED REGISTERED

## 2019-09-24 PROCEDURE — 99232 SBSQ HOSP IP/OBS MODERATE 35: CPT | Performed by: INTERNAL MEDICINE

## 2019-09-24 PROCEDURE — 85025 COMPLETE CBC W/AUTO DIFF WBC: CPT | Performed by: INTERNAL MEDICINE

## 2019-09-24 RX ORDER — SODIUM CHLORIDE 9 MG/ML
1000 INJECTION, SOLUTION INTRAVENOUS CONTINUOUS
Status: DISCONTINUED | OUTPATIENT
Start: 2019-09-24 | End: 2019-09-26

## 2019-09-24 RX ORDER — GABAPENTIN 300 MG/1
600 CAPSULE ORAL ONCE
Status: CANCELLED | OUTPATIENT
Start: 2019-09-26 | End: 2019-09-24

## 2019-09-24 RX ORDER — SCOLOPAMINE TRANSDERMAL SYSTEM 1 MG/1
1 PATCH, EXTENDED RELEASE TRANSDERMAL CONTINUOUS
Status: CANCELLED | OUTPATIENT
Start: 2019-09-26 | End: 2019-09-29

## 2019-09-24 RX ORDER — PROPOFOL 10 MG/ML
VIAL (ML) INTRAVENOUS CONTINUOUS PRN
Status: DISCONTINUED | OUTPATIENT
Start: 2019-09-24 | End: 2019-09-24 | Stop reason: SURG

## 2019-09-24 RX ORDER — ALVIMOPAN 12 MG/1
12 CAPSULE ORAL ONCE
Status: CANCELLED | OUTPATIENT
Start: 2019-09-26 | End: 2019-09-24

## 2019-09-24 RX ORDER — CEFAZOLIN SODIUM 2 G/100ML
2 INJECTION, SOLUTION INTRAVENOUS ONCE
Status: CANCELLED | OUTPATIENT
Start: 2019-09-26 | End: 2019-09-24

## 2019-09-24 RX ORDER — ACETAMINOPHEN 500 MG
1000 TABLET ORAL ONCE
Status: CANCELLED | OUTPATIENT
Start: 2019-09-26 | End: 2019-09-24

## 2019-09-24 RX ORDER — SODIUM CHLORIDE, SODIUM LACTATE, POTASSIUM CHLORIDE, CALCIUM CHLORIDE 600; 310; 30; 20 MG/100ML; MG/100ML; MG/100ML; MG/100ML
INJECTION, SOLUTION INTRAVENOUS CONTINUOUS PRN
Status: DISCONTINUED | OUTPATIENT
Start: 2019-09-24 | End: 2019-09-24 | Stop reason: SURG

## 2019-09-24 RX ORDER — CELECOXIB 200 MG/1
200 CAPSULE ORAL ONCE
Status: CANCELLED | OUTPATIENT
Start: 2019-09-26 | End: 2019-09-24

## 2019-09-24 RX ORDER — SODIUM CHLORIDE 0.9 % (FLUSH) 0.9 %
10 SYRINGE (ML) INJECTION AS NEEDED
Status: DISCONTINUED | OUTPATIENT
Start: 2019-09-24 | End: 2019-09-24

## 2019-09-24 RX ORDER — LIDOCAINE HYDROCHLORIDE 10 MG/ML
0.5 INJECTION, SOLUTION INFILTRATION; PERINEURAL ONCE AS NEEDED
Status: DISCONTINUED | OUTPATIENT
Start: 2019-09-24 | End: 2019-09-24

## 2019-09-24 RX ORDER — GABAPENTIN 300 MG/1
300 CAPSULE ORAL ONCE
Status: CANCELLED | OUTPATIENT
Start: 2019-09-24 | End: 2019-09-24

## 2019-09-24 RX ADMIN — PROPOFOL 140 MCG/KG/MIN: 10 INJECTION, EMULSION INTRAVENOUS at 12:19

## 2019-09-24 RX ADMIN — SODIUM CHLORIDE, POTASSIUM CHLORIDE, SODIUM LACTATE AND CALCIUM CHLORIDE: 600; 310; 30; 20 INJECTION, SOLUTION INTRAVENOUS at 11:58

## 2019-09-24 RX ADMIN — SODIUM CHLORIDE, PRESERVATIVE FREE 10 ML: 5 INJECTION INTRAVENOUS at 21:49

## 2019-09-24 RX ADMIN — SODIUM CHLORIDE 1000 ML: 9 INJECTION, SOLUTION INTRAVENOUS at 11:53

## 2019-09-24 NOTE — ANESTHESIA PREPROCEDURE EVALUATION
Anesthesia Evaluation     Patient summary reviewed and Nursing notes reviewed   NPO Solid Status: > 8 hours  NPO Liquid Status: > 8 hours           Airway   Mallampati: II  TM distance: >3 FB  Neck ROM: full  Large neck circumference and Possible difficult intubation  Dental    (+) poor dentition    Pulmonary - normal exam   (+) pulmonary embolism, shortness of breath,   Cardiovascular - normal exam    (+) hypertension (no meds in hospital), CAD,       Neuro/Psych  GI/Hepatic/Renal/Endo    (+) obesity,       ROS Comment: Colon neoplasm    Musculoskeletal     Abdominal    Substance History      OB/GYN          Other          Other Comment: anemia                  Anesthesia Plan    ASA 3     MAC     Anesthetic plan, all risks, benefits, and alternatives have been provided, discussed and informed consent has been obtained with: patient.

## 2019-09-24 NOTE — ANESTHESIA POSTPROCEDURE EVALUATION
"Patient: Jared Rose    Procedure Summary     Date:  09/24/19 Room / Location:   [facility]    Anesthesia Start:  1157 Anesthesia Stop:  1234    Procedure:  SIGMOIDOSCOPY FLEXIBLE WITH TATTOO TO SIGMOID MASS (N/A ) Diagnosis:       Mass of colon      (Mass of colon [K63.9])    Surgeon:  Isabella Whitt MD Provider:  Belinda Pena MD    Anesthesia Type:  MAC ASA Status:  3          Anesthesia Type: MAC  Last vitals  BP   155/98 (09/24/19 1330)   Temp   36.3 °C (97.3 °F) (09/24/19 1330)   Pulse   61 (09/24/19 1330)   Resp   16 (09/24/19 1330)     SpO2   100 % (09/24/19 1330)     Post Anesthesia Care and Evaluation    Patient location during evaluation: PACU  Patient participation: complete - patient participated  Level of consciousness: awake  Pain score: 0  Pain management: adequate  Airway patency: patent  Anesthetic complications: No anesthetic complications    Cardiovascular status: acceptable  Respiratory status: acceptable  Hydration status: acceptable    Comments: Blood pressure 155/98, pulse 61, temperature 36.3 °C (97.3 °F), temperature source Oral, resp. rate 16, height 180.3 cm (71\"), weight 107 kg (236 lb), SpO2 100 %.    No anesthesia care post op    "

## 2019-09-25 ENCOUNTER — APPOINTMENT (OUTPATIENT)
Dept: CT IMAGING | Facility: HOSPITAL | Age: 64
End: 2019-09-25

## 2019-09-25 PROBLEM — C18.7 MALIGNANT NEOPLASM OF SIGMOID COLON (HCC): Status: ACTIVE | Noted: 2019-09-25

## 2019-09-25 PROBLEM — R06.02 SHORTNESS OF BREATH: Status: RESOLVED | Noted: 2019-09-19 | Resolved: 2019-09-25

## 2019-09-25 LAB
ABO GROUP BLD: NORMAL
ANION GAP SERPL CALCULATED.3IONS-SCNC: 7.6 MMOL/L (ref 5–15)
BASOPHILS # BLD AUTO: 0.04 10*3/MM3 (ref 0–0.2)
BASOPHILS NFR BLD AUTO: 0.5 % (ref 0–1.5)
BLD GP AB SCN SERPL QL: NEGATIVE
BUN BLD-MCNC: 15 MG/DL (ref 8–23)
BUN/CREAT SERPL: 14.3 (ref 7–25)
CALCIUM SPEC-SCNC: 9 MG/DL (ref 8.6–10.5)
CEA SERPL-MCNC: 1.28 NG/ML
CHLORIDE SERPL-SCNC: 100 MMOL/L (ref 98–107)
CO2 SERPL-SCNC: 27.4 MMOL/L (ref 22–29)
CREAT BLD-MCNC: 1.05 MG/DL (ref 0.76–1.27)
CYTO UR: NORMAL
DEPRECATED RDW RBC AUTO: 48.7 FL (ref 37–54)
EOSINOPHIL # BLD AUTO: 0.2 10*3/MM3 (ref 0–0.4)
EOSINOPHIL NFR BLD AUTO: 2.7 % (ref 0.3–6.2)
ERYTHROCYTE [DISTWIDTH] IN BLOOD BY AUTOMATED COUNT: 18.4 % (ref 12.3–15.4)
GFR SERPL CREATININE-BSD FRML MDRD: 86 ML/MIN/1.73
GLUCOSE BLD-MCNC: 88 MG/DL (ref 65–99)
GLUCOSE BLDC GLUCOMTR-MCNC: 82 MG/DL (ref 70–130)
HCT VFR BLD AUTO: 29.1 % (ref 37.5–51)
HGB BLD-MCNC: 8.6 G/DL (ref 13–17.7)
IMM GRANULOCYTES # BLD AUTO: 0.02 10*3/MM3 (ref 0–0.05)
IMM GRANULOCYTES NFR BLD AUTO: 0.3 % (ref 0–0.5)
LAB AP CASE REPORT: NORMAL
LYMPHOCYTES # BLD AUTO: 1.78 10*3/MM3 (ref 0.7–3.1)
LYMPHOCYTES NFR BLD AUTO: 23.6 % (ref 19.6–45.3)
MCH RBC QN AUTO: 22.8 PG (ref 26.6–33)
MCHC RBC AUTO-ENTMCNC: 29.6 G/DL (ref 31.5–35.7)
MCV RBC AUTO: 77 FL (ref 79–97)
MONOCYTES # BLD AUTO: 0.84 10*3/MM3 (ref 0.1–0.9)
MONOCYTES NFR BLD AUTO: 11.1 % (ref 5–12)
NEUTROPHILS # BLD AUTO: 4.66 10*3/MM3 (ref 1.7–7)
NEUTROPHILS NFR BLD AUTO: 61.8 % (ref 42.7–76)
NRBC BLD AUTO-RTO: 0.1 /100 WBC (ref 0–0.2)
PATH REPORT.FINAL DX SPEC: NORMAL
PATH REPORT.GROSS SPEC: NORMAL
PLATELET # BLD AUTO: 318 10*3/MM3 (ref 140–450)
PMV BLD AUTO: 10 FL (ref 6–12)
POTASSIUM BLD-SCNC: 3.9 MMOL/L (ref 3.5–5.2)
RBC # BLD AUTO: 3.78 10*6/MM3 (ref 4.14–5.8)
RH BLD: POSITIVE
SODIUM BLD-SCNC: 135 MMOL/L (ref 136–145)
T&S EXPIRATION DATE: NORMAL
WBC NRBC COR # BLD: 7.54 10*3/MM3 (ref 3.4–10.8)

## 2019-09-25 PROCEDURE — 71260 CT THORAX DX C+: CPT

## 2019-09-25 PROCEDURE — 86850 RBC ANTIBODY SCREEN: CPT | Performed by: PHYSICIAN ASSISTANT

## 2019-09-25 PROCEDURE — 86901 BLOOD TYPING SEROLOGIC RH(D): CPT | Performed by: PHYSICIAN ASSISTANT

## 2019-09-25 PROCEDURE — 99232 SBSQ HOSP IP/OBS MODERATE 35: CPT | Performed by: INTERNAL MEDICINE

## 2019-09-25 PROCEDURE — 82962 GLUCOSE BLOOD TEST: CPT

## 2019-09-25 PROCEDURE — 80048 BASIC METABOLIC PNL TOTAL CA: CPT | Performed by: HOSPITALIST

## 2019-09-25 PROCEDURE — 85025 COMPLETE CBC W/AUTO DIFF WBC: CPT | Performed by: INTERNAL MEDICINE

## 2019-09-25 PROCEDURE — 99232 SBSQ HOSP IP/OBS MODERATE 35: CPT | Performed by: COLON & RECTAL SURGERY

## 2019-09-25 PROCEDURE — 25010000002 IOPAMIDOL 61 % SOLUTION: Performed by: HOSPITALIST

## 2019-09-25 PROCEDURE — 86923 COMPATIBILITY TEST ELECTRIC: CPT

## 2019-09-25 PROCEDURE — 86900 BLOOD TYPING SEROLOGIC ABO: CPT | Performed by: PHYSICIAN ASSISTANT

## 2019-09-25 PROCEDURE — 0 DIATRIZOATE MEGLUMINE & SODIUM PER 1 ML: Performed by: COLON & RECTAL SURGERY

## 2019-09-25 PROCEDURE — 82378 CARCINOEMBRYONIC ANTIGEN: CPT | Performed by: PHYSICIAN ASSISTANT

## 2019-09-25 PROCEDURE — C1751 CATH, INF, PER/CENT/MIDLINE: HCPCS

## 2019-09-25 PROCEDURE — 74177 CT ABD & PELVIS W/CONTRAST: CPT

## 2019-09-25 PROCEDURE — 99231 SBSQ HOSP IP/OBS SF/LOW 25: CPT | Performed by: INTERNAL MEDICINE

## 2019-09-25 RX ORDER — NEOMYCIN SULFATE 500 MG/1
1000 TABLET ORAL ONCE
Status: COMPLETED | OUTPATIENT
Start: 2019-09-25 | End: 2019-09-25

## 2019-09-25 RX ORDER — GABAPENTIN 300 MG/1
300 CAPSULE ORAL ONCE
Status: CANCELLED | OUTPATIENT
Start: 2019-09-25 | End: 2019-09-25

## 2019-09-25 RX ORDER — POLYETHYLENE GLYCOL 3350, SODIUM CHLORIDE, POTASSIUM CHLORIDE, SODIUM BICARBONATE, AND SODIUM SULFATE 240; 5.84; 2.98; 6.72; 22.72 G/4L; G/4L; G/4L; G/4L; G/4L
2000 POWDER, FOR SOLUTION ORAL ONCE
Status: COMPLETED | OUTPATIENT
Start: 2019-09-25 | End: 2019-09-25

## 2019-09-25 RX ORDER — LOSARTAN POTASSIUM 25 MG/1
25 TABLET ORAL
Status: DISCONTINUED | OUTPATIENT
Start: 2019-09-25 | End: 2019-09-26

## 2019-09-25 RX ORDER — METRONIDAZOLE 500 MG/1
500 TABLET ORAL ONCE
Status: COMPLETED | OUTPATIENT
Start: 2019-09-25 | End: 2019-09-25

## 2019-09-25 RX ORDER — ACETAMINOPHEN 500 MG
1000 TABLET ORAL ONCE
Status: CANCELLED | OUTPATIENT
Start: 2019-09-25

## 2019-09-25 RX ORDER — CELECOXIB 200 MG/1
200 CAPSULE ORAL ONCE
Status: CANCELLED | OUTPATIENT
Start: 2019-09-25

## 2019-09-25 RX ADMIN — POLYETHYLENE GLYCOL-3350 AND ELECTROLYTES WITH FLAVOR PACK 2000 ML: 240; 5.84; 2.98; 6.72; 22.72 POWDER, FOR SOLUTION ORAL at 17:43

## 2019-09-25 RX ADMIN — METRONIDAZOLE 500 MG: 500 TABLET ORAL at 16:40

## 2019-09-25 RX ADMIN — NEOMYCIN SULFATE 1000 MG: 500 TABLET ORAL at 14:57

## 2019-09-25 RX ADMIN — NEOMYCIN SULFATE 1000 MG: 500 TABLET ORAL at 21:50

## 2019-09-25 RX ADMIN — DIATRIZOATE MEGLUMINE AND DIATRIZOATE SODIUM 30 ML: 600; 100 SOLUTION ORAL; RECTAL at 10:17

## 2019-09-25 RX ADMIN — NEOMYCIN SULFATE 1000 MG: 500 TABLET ORAL at 16:41

## 2019-09-25 RX ADMIN — IOPAMIDOL 85 ML: 612 INJECTION, SOLUTION INTRAVENOUS at 12:59

## 2019-09-25 RX ADMIN — POLYETHYLENE GLYCOL 3350, SODIUM CHLORIDE, POTASSIUM CHLORIDE, SODIUM BICARBONATE, AND SODIUM SULFATE 2000 ML: 240; 5.84; 2.98; 6.72; 22.72 POWDER, FOR SOLUTION ORAL at 23:33

## 2019-09-25 RX ADMIN — SODIUM CHLORIDE, PRESERVATIVE FREE 10 ML: 5 INJECTION INTRAVENOUS at 23:33

## 2019-09-25 RX ADMIN — SODIUM CHLORIDE, PRESERVATIVE FREE 10 ML: 5 INJECTION INTRAVENOUS at 21:54

## 2019-09-25 RX ADMIN — METRONIDAZOLE 500 MG: 500 TABLET ORAL at 21:54

## 2019-09-25 RX ADMIN — LOSARTAN POTASSIUM 25 MG: 25 TABLET, FILM COATED ORAL at 16:40

## 2019-09-25 RX ADMIN — METRONIDAZOLE 500 MG: 500 TABLET ORAL at 14:57

## 2019-09-26 ENCOUNTER — ANESTHESIA (OUTPATIENT)
Dept: PERIOP | Facility: HOSPITAL | Age: 64
End: 2019-09-26

## 2019-09-26 ENCOUNTER — APPOINTMENT (OUTPATIENT)
Dept: GENERAL RADIOLOGY | Facility: HOSPITAL | Age: 64
End: 2019-09-26

## 2019-09-26 ENCOUNTER — ANESTHESIA EVENT (OUTPATIENT)
Dept: PERIOP | Facility: HOSPITAL | Age: 64
End: 2019-09-26

## 2019-09-26 LAB
BASOPHILS # BLD AUTO: 0.05 10*3/MM3 (ref 0–0.2)
BASOPHILS NFR BLD AUTO: 0.8 % (ref 0–1.5)
DEPRECATED RDW RBC AUTO: 47.7 FL (ref 37–54)
EOSINOPHIL # BLD AUTO: 0.19 10*3/MM3 (ref 0–0.4)
EOSINOPHIL NFR BLD AUTO: 3 % (ref 0.3–6.2)
ERYTHROCYTE [DISTWIDTH] IN BLOOD BY AUTOMATED COUNT: 18.8 % (ref 12.3–15.4)
HCT VFR BLD AUTO: 29.7 % (ref 37.5–51)
HGB BLD-MCNC: 9.1 G/DL (ref 13–17.7)
IMM GRANULOCYTES # BLD AUTO: 0.02 10*3/MM3 (ref 0–0.05)
IMM GRANULOCYTES NFR BLD AUTO: 0.3 % (ref 0–0.5)
LYMPHOCYTES # BLD AUTO: 1.4 10*3/MM3 (ref 0.7–3.1)
LYMPHOCYTES NFR BLD AUTO: 21.9 % (ref 19.6–45.3)
MCH RBC QN AUTO: 23.5 PG (ref 26.6–33)
MCHC RBC AUTO-ENTMCNC: 30.6 G/DL (ref 31.5–35.7)
MCV RBC AUTO: 76.5 FL (ref 79–97)
MONOCYTES # BLD AUTO: 0.75 10*3/MM3 (ref 0.1–0.9)
MONOCYTES NFR BLD AUTO: 11.7 % (ref 5–12)
NEUTROPHILS # BLD AUTO: 3.99 10*3/MM3 (ref 1.7–7)
NEUTROPHILS NFR BLD AUTO: 62.3 % (ref 42.7–76)
NRBC BLD AUTO-RTO: 0 /100 WBC (ref 0–0.2)
PLATELET # BLD AUTO: 292 10*3/MM3 (ref 140–450)
PMV BLD AUTO: 9.6 FL (ref 6–12)
RBC # BLD AUTO: 3.88 10*6/MM3 (ref 4.14–5.8)
WBC NRBC COR # BLD: 6.4 10*3/MM3 (ref 3.4–10.8)

## 2019-09-26 PROCEDURE — C1765 ADHESION BARRIER: HCPCS | Performed by: COLON & RECTAL SURGERY

## 2019-09-26 PROCEDURE — 44213 LAP MOBIL SPLENIC FL ADD-ON: CPT | Performed by: PHYSICIAN ASSISTANT

## 2019-09-26 PROCEDURE — 25010000002 LIDOCAINE PER 10 MG: Performed by: ANESTHESIOLOGY

## 2019-09-26 PROCEDURE — 25010000002 MIDAZOLAM PER 1 MG: Performed by: STUDENT IN AN ORGANIZED HEALTH CARE EDUCATION/TRAINING PROGRAM

## 2019-09-26 PROCEDURE — C1751 CATH, INF, PER/CENT/MIDLINE: HCPCS | Performed by: ANESTHESIOLOGY

## 2019-09-26 PROCEDURE — 44207 L COLECTOMY/COLOPROCTOSTOMY: CPT | Performed by: PHYSICIAN ASSISTANT

## 2019-09-26 PROCEDURE — C9290 INJ, BUPIVACAINE LIPOSOME: HCPCS | Performed by: ANESTHESIOLOGY

## 2019-09-26 PROCEDURE — 25010000002 PHENYLEPHRINE PER 1 ML: Performed by: ANESTHESIOLOGY

## 2019-09-26 PROCEDURE — 44207 L COLECTOMY/COLOPROCTOSTOMY: CPT | Performed by: COLON & RECTAL SURGERY

## 2019-09-26 PROCEDURE — 25010000002 MAGNESIUM SULFATE PER 500 MG OF MAGNESIUM: Performed by: ANESTHESIOLOGY

## 2019-09-26 PROCEDURE — 25010000002 FENTANYL CITRATE (PF) 100 MCG/2ML SOLUTION: Performed by: ANESTHESIOLOGY

## 2019-09-26 PROCEDURE — 0DBN4ZZ EXCISION OF SIGMOID COLON, PERCUTANEOUS ENDOSCOPIC APPROACH: ICD-10-PCS | Performed by: COLON & RECTAL SURGERY

## 2019-09-26 PROCEDURE — 25010000002 PROPOFOL 10 MG/ML EMULSION: Performed by: ANESTHESIOLOGY

## 2019-09-26 PROCEDURE — 44213 LAP MOBIL SPLENIC FL ADD-ON: CPT | Performed by: COLON & RECTAL SURGERY

## 2019-09-26 PROCEDURE — 25010000003 CEFAZOLIN IN DEXTROSE 2-4 GM/100ML-% SOLUTION: Performed by: COLON & RECTAL SURGERY

## 2019-09-26 PROCEDURE — 25010000002 DEXAMETHASONE PER 1 MG: Performed by: ANESTHESIOLOGY

## 2019-09-26 PROCEDURE — 0DBP4ZZ EXCISION OF RECTUM, PERCUTANEOUS ENDOSCOPIC APPROACH: ICD-10-PCS | Performed by: COLON & RECTAL SURGERY

## 2019-09-26 PROCEDURE — 85025 COMPLETE CBC W/AUTO DIFF WBC: CPT | Performed by: INTERNAL MEDICINE

## 2019-09-26 PROCEDURE — 25010000002 ONDANSETRON PER 1 MG: Performed by: ANESTHESIOLOGY

## 2019-09-26 PROCEDURE — 88305 TISSUE EXAM BY PATHOLOGIST: CPT | Performed by: COLON & RECTAL SURGERY

## 2019-09-26 PROCEDURE — 0WQF4ZZ REPAIR ABDOMINAL WALL, PERCUTANEOUS ENDOSCOPIC APPROACH: ICD-10-PCS | Performed by: COLON & RECTAL SURGERY

## 2019-09-26 PROCEDURE — 25010000002 ONDANSETRON PER 1 MG: Performed by: NURSE ANESTHETIST, CERTIFIED REGISTERED

## 2019-09-26 PROCEDURE — 88309 TISSUE EXAM BY PATHOLOGIST: CPT | Performed by: COLON & RECTAL SURGERY

## 2019-09-26 PROCEDURE — 25010000003 BUPIVACAINE LIPOSOME 1.3 % SUSPENSION: Performed by: ANESTHESIOLOGY

## 2019-09-26 DEVICE — CELLULAR STAPLER WITH TRI-STAPLE TECHNOLOGY
Type: IMPLANTABLE DEVICE | Site: ABDOMEN | Status: FUNCTIONAL
Brand: EEA

## 2019-09-26 DEVICE — SEALANT WND FIBRIN TISSEEL VAPOR/HEAT/PREFIL/SYR 10ML: Type: IMPLANTABLE DEVICE | Site: ABDOMEN | Status: FUNCTIONAL

## 2019-09-26 DEVICE — PROXIMATE RELOADABLE LINEAR CUTTER WITH SAFETY LOCK-OUT, 75MM
Type: IMPLANTABLE DEVICE | Site: ABDOMEN | Status: FUNCTIONAL
Brand: PROXIMATE

## 2019-09-26 DEVICE — CONTOUR CURVED CUTTER STAPLER RELOAD
Type: IMPLANTABLE DEVICE | Site: ABDOMEN | Status: FUNCTIONAL
Brand: CONTOUR

## 2019-09-26 RX ORDER — HYDROMORPHONE HYDROCHLORIDE 1 MG/ML
0.25 INJECTION, SOLUTION INTRAMUSCULAR; INTRAVENOUS; SUBCUTANEOUS
Status: DISCONTINUED | OUTPATIENT
Start: 2019-09-26 | End: 2019-09-29 | Stop reason: HOSPADM

## 2019-09-26 RX ORDER — GABAPENTIN 300 MG/1
300 CAPSULE ORAL ONCE
Status: DISCONTINUED | OUTPATIENT
Start: 2019-09-26 | End: 2019-09-26 | Stop reason: HOSPADM

## 2019-09-26 RX ORDER — OXYCODONE HYDROCHLORIDE 5 MG/1
5 TABLET ORAL EVERY 4 HOURS PRN
Status: DISCONTINUED | OUTPATIENT
Start: 2019-09-26 | End: 2019-09-26 | Stop reason: HOSPADM

## 2019-09-26 RX ORDER — ONDANSETRON 2 MG/ML
4 INJECTION INTRAMUSCULAR; INTRAVENOUS ONCE AS NEEDED
Status: COMPLETED | OUTPATIENT
Start: 2019-09-26 | End: 2019-09-26

## 2019-09-26 RX ORDER — METOCLOPRAMIDE HYDROCHLORIDE 5 MG/ML
10 INJECTION INTRAMUSCULAR; INTRAVENOUS ONCE AS NEEDED
Status: DISCONTINUED | OUTPATIENT
Start: 2019-09-26 | End: 2019-09-26 | Stop reason: HOSPADM

## 2019-09-26 RX ORDER — FENTANYL CITRATE 50 UG/ML
50 INJECTION, SOLUTION INTRAMUSCULAR; INTRAVENOUS
Status: DISCONTINUED | OUTPATIENT
Start: 2019-09-26 | End: 2019-09-26 | Stop reason: HOSPADM

## 2019-09-26 RX ORDER — MAGNESIUM HYDROXIDE 1200 MG/15ML
LIQUID ORAL AS NEEDED
Status: DISCONTINUED | OUTPATIENT
Start: 2019-09-26 | End: 2019-09-26 | Stop reason: HOSPADM

## 2019-09-26 RX ORDER — NALOXONE HCL 0.4 MG/ML
0.4 VIAL (ML) INJECTION
Status: DISCONTINUED | OUTPATIENT
Start: 2019-09-26 | End: 2019-09-29 | Stop reason: HOSPADM

## 2019-09-26 RX ORDER — NALOXONE HCL 0.4 MG/ML
0.4 VIAL (ML) INJECTION
Status: DISCONTINUED | OUTPATIENT
Start: 2019-09-26 | End: 2019-09-26 | Stop reason: HOSPADM

## 2019-09-26 RX ORDER — NITROGLYCERIN 0.4 MG/1
0.4 TABLET SUBLINGUAL
Status: DISCONTINUED | OUTPATIENT
Start: 2019-09-26 | End: 2019-09-29 | Stop reason: HOSPADM

## 2019-09-26 RX ORDER — LIDOCAINE HYDROCHLORIDE 10 MG/ML
0.5 INJECTION, SOLUTION EPIDURAL; INFILTRATION; INTRACAUDAL; PERINEURAL ONCE AS NEEDED
Status: DISCONTINUED | OUTPATIENT
Start: 2019-09-26 | End: 2019-09-26 | Stop reason: HOSPADM

## 2019-09-26 RX ORDER — LIDOCAINE HYDROCHLORIDE 20 MG/ML
INJECTION, SOLUTION INFILTRATION; PERINEURAL AS NEEDED
Status: DISCONTINUED | OUTPATIENT
Start: 2019-09-26 | End: 2019-09-26 | Stop reason: SURG

## 2019-09-26 RX ORDER — DIPHENHYDRAMINE HYDROCHLORIDE 50 MG/ML
25 INJECTION INTRAMUSCULAR; INTRAVENOUS EVERY 4 HOURS PRN
Status: DISCONTINUED | OUTPATIENT
Start: 2019-09-26 | End: 2019-09-26 | Stop reason: HOSPADM

## 2019-09-26 RX ORDER — ALVIMOPAN 12 MG/1
12 CAPSULE ORAL ONCE
Status: COMPLETED | OUTPATIENT
Start: 2019-09-26 | End: 2019-09-26

## 2019-09-26 RX ORDER — DIPHENHYDRAMINE HCL 25 MG
25 CAPSULE ORAL EVERY 4 HOURS PRN
Status: DISCONTINUED | OUTPATIENT
Start: 2019-09-26 | End: 2019-09-26 | Stop reason: HOSPADM

## 2019-09-26 RX ORDER — ROCURONIUM BROMIDE 10 MG/ML
INJECTION, SOLUTION INTRAVENOUS AS NEEDED
Status: DISCONTINUED | OUTPATIENT
Start: 2019-09-26 | End: 2019-09-26 | Stop reason: SURG

## 2019-09-26 RX ORDER — FAMOTIDINE 10 MG/ML
20 INJECTION, SOLUTION INTRAVENOUS ONCE
Status: COMPLETED | OUTPATIENT
Start: 2019-09-26 | End: 2019-09-26

## 2019-09-26 RX ORDER — ALVIMOPAN 12 MG/1
12 CAPSULE ORAL 2 TIMES DAILY
Status: DISCONTINUED | OUTPATIENT
Start: 2019-09-27 | End: 2019-09-28

## 2019-09-26 RX ORDER — EPHEDRINE SULFATE 50 MG/ML
INJECTION, SOLUTION INTRAVENOUS AS NEEDED
Status: DISCONTINUED | OUTPATIENT
Start: 2019-09-26 | End: 2019-09-26 | Stop reason: SURG

## 2019-09-26 RX ORDER — ACETAMINOPHEN 500 MG
1000 TABLET ORAL EVERY 6 HOURS
Status: DISCONTINUED | OUTPATIENT
Start: 2019-09-26 | End: 2019-09-29 | Stop reason: HOSPADM

## 2019-09-26 RX ORDER — KETOROLAC TROMETHAMINE 30 MG/ML
30 INJECTION, SOLUTION INTRAMUSCULAR; INTRAVENOUS EVERY 8 HOURS
Status: DISCONTINUED | OUTPATIENT
Start: 2019-09-27 | End: 2019-09-29 | Stop reason: HOSPADM

## 2019-09-26 RX ORDER — MIDAZOLAM HYDROCHLORIDE 1 MG/ML
2 INJECTION INTRAMUSCULAR; INTRAVENOUS
Status: DISCONTINUED | OUTPATIENT
Start: 2019-09-26 | End: 2019-09-26 | Stop reason: HOSPADM

## 2019-09-26 RX ORDER — ONDANSETRON 2 MG/ML
INJECTION INTRAMUSCULAR; INTRAVENOUS AS NEEDED
Status: DISCONTINUED | OUTPATIENT
Start: 2019-09-26 | End: 2019-09-26 | Stop reason: SURG

## 2019-09-26 RX ORDER — MIDAZOLAM HYDROCHLORIDE 1 MG/ML
1 INJECTION INTRAMUSCULAR; INTRAVENOUS
Status: DISCONTINUED | OUTPATIENT
Start: 2019-09-26 | End: 2019-09-26 | Stop reason: HOSPADM

## 2019-09-26 RX ORDER — METHOCARBAMOL 100 MG/ML
1000 INJECTION, SOLUTION INTRAMUSCULAR; INTRAVENOUS EVERY 8 HOURS PRN
Status: DISCONTINUED | OUTPATIENT
Start: 2019-09-26 | End: 2019-09-29 | Stop reason: HOSPADM

## 2019-09-26 RX ORDER — GABAPENTIN 300 MG/1
600 CAPSULE ORAL 2 TIMES DAILY
Status: DISCONTINUED | OUTPATIENT
Start: 2019-09-26 | End: 2019-09-27

## 2019-09-26 RX ORDER — SODIUM CHLORIDE, SODIUM LACTATE, POTASSIUM CHLORIDE, CALCIUM CHLORIDE 600; 310; 30; 20 MG/100ML; MG/100ML; MG/100ML; MG/100ML
9 INJECTION, SOLUTION INTRAVENOUS CONTINUOUS
Status: DISCONTINUED | OUTPATIENT
Start: 2019-09-26 | End: 2019-09-26

## 2019-09-26 RX ORDER — PROPOFOL 10 MG/ML
VIAL (ML) INTRAVENOUS AS NEEDED
Status: DISCONTINUED | OUTPATIENT
Start: 2019-09-26 | End: 2019-09-26 | Stop reason: SURG

## 2019-09-26 RX ORDER — GABAPENTIN 300 MG/1
600 CAPSULE ORAL ONCE
Status: COMPLETED | OUTPATIENT
Start: 2019-09-26 | End: 2019-09-26

## 2019-09-26 RX ORDER — SCOLOPAMINE TRANSDERMAL SYSTEM 1 MG/1
1 PATCH, EXTENDED RELEASE TRANSDERMAL CONTINUOUS
Status: DISCONTINUED | OUTPATIENT
Start: 2019-09-26 | End: 2019-09-29 | Stop reason: HOSPADM

## 2019-09-26 RX ORDER — KETAMINE HYDROCHLORIDE 10 MG/ML
INJECTION INTRAMUSCULAR; INTRAVENOUS AS NEEDED
Status: DISCONTINUED | OUTPATIENT
Start: 2019-09-26 | End: 2019-09-26 | Stop reason: SURG

## 2019-09-26 RX ORDER — ACETAMINOPHEN 500 MG
1000 TABLET ORAL ONCE
Status: COMPLETED | OUTPATIENT
Start: 2019-09-26 | End: 2019-09-26

## 2019-09-26 RX ORDER — SODIUM CHLORIDE, SODIUM LACTATE, POTASSIUM CHLORIDE, CALCIUM CHLORIDE 600; 310; 30; 20 MG/100ML; MG/100ML; MG/100ML; MG/100ML
50 INJECTION, SOLUTION INTRAVENOUS CONTINUOUS
Status: DISCONTINUED | OUTPATIENT
Start: 2019-09-26 | End: 2019-09-27

## 2019-09-26 RX ORDER — MAGNESIUM SULFATE HEPTAHYDRATE 500 MG/ML
INJECTION, SOLUTION INTRAMUSCULAR; INTRAVENOUS AS NEEDED
Status: DISCONTINUED | OUTPATIENT
Start: 2019-09-26 | End: 2019-09-26 | Stop reason: SURG

## 2019-09-26 RX ORDER — SODIUM CHLORIDE 0.9 % (FLUSH) 0.9 %
3-10 SYRINGE (ML) INJECTION AS NEEDED
Status: DISCONTINUED | OUTPATIENT
Start: 2019-09-26 | End: 2019-09-26 | Stop reason: HOSPADM

## 2019-09-26 RX ORDER — SODIUM CHLORIDE 0.9 % (FLUSH) 0.9 %
3 SYRINGE (ML) INJECTION EVERY 12 HOURS SCHEDULED
Status: DISCONTINUED | OUTPATIENT
Start: 2019-09-26 | End: 2019-09-26 | Stop reason: HOSPADM

## 2019-09-26 RX ORDER — CEFAZOLIN SODIUM 2 G/100ML
2 INJECTION, SOLUTION INTRAVENOUS ONCE
Status: COMPLETED | OUTPATIENT
Start: 2019-09-26 | End: 2019-09-26

## 2019-09-26 RX ORDER — LIDOCAINE HYDROCHLORIDE ANHYDROUS AND DEXTROSE MONOHYDRATE 5; 400 G/100ML; MG/100ML
INJECTION, SOLUTION INTRAVENOUS CONTINUOUS PRN
Status: DISCONTINUED | OUTPATIENT
Start: 2019-09-26 | End: 2019-09-26 | Stop reason: SURG

## 2019-09-26 RX ORDER — ENALAPRILAT 2.5 MG/2ML
0.62 INJECTION INTRAVENOUS EVERY 6 HOURS PRN
Status: DISCONTINUED | OUTPATIENT
Start: 2019-09-26 | End: 2019-09-29 | Stop reason: HOSPADM

## 2019-09-26 RX ORDER — BUPIVACAINE HYDROCHLORIDE AND EPINEPHRINE 2.5; 5 MG/ML; UG/ML
INJECTION, SOLUTION INFILTRATION; PERINEURAL AS NEEDED
Status: DISCONTINUED | OUTPATIENT
Start: 2019-09-26 | End: 2019-09-26 | Stop reason: HOSPADM

## 2019-09-26 RX ORDER — DEXAMETHASONE SODIUM PHOSPHATE 10 MG/ML
INJECTION INTRAMUSCULAR; INTRAVENOUS AS NEEDED
Status: DISCONTINUED | OUTPATIENT
Start: 2019-09-26 | End: 2019-09-26 | Stop reason: SURG

## 2019-09-26 RX ORDER — FAMOTIDINE 10 MG/ML
20 INJECTION, SOLUTION INTRAVENOUS EVERY 12 HOURS SCHEDULED
Status: DISCONTINUED | OUTPATIENT
Start: 2019-09-26 | End: 2019-09-29 | Stop reason: HOSPADM

## 2019-09-26 RX ORDER — CELECOXIB 200 MG/1
200 CAPSULE ORAL ONCE
Status: COMPLETED | OUTPATIENT
Start: 2019-09-26 | End: 2019-09-26

## 2019-09-26 RX ORDER — ONDANSETRON 2 MG/ML
4 INJECTION INTRAMUSCULAR; INTRAVENOUS EVERY 6 HOURS PRN
Status: DISCONTINUED | OUTPATIENT
Start: 2019-09-26 | End: 2019-09-29 | Stop reason: HOSPADM

## 2019-09-26 RX ORDER — BUPIVACAINE HYDROCHLORIDE 2.5 MG/ML
INJECTION, SOLUTION EPIDURAL; INFILTRATION; INTRACAUDAL
Status: COMPLETED | OUTPATIENT
Start: 2019-09-26 | End: 2019-09-26

## 2019-09-26 RX ADMIN — GABAPENTIN 600 MG: 300 CAPSULE ORAL at 23:19

## 2019-09-26 RX ADMIN — PHENYLEPHRINE HYDROCHLORIDE 50 MCG: 10 INJECTION INTRAVENOUS at 16:29

## 2019-09-26 RX ADMIN — LIDOCAINE HYDROCHLORIDE 100 MG: 20 INJECTION, SOLUTION INFILTRATION; PERINEURAL at 15:39

## 2019-09-26 RX ADMIN — EPHEDRINE SULFATE 5 MG: 50 INJECTION INTRAMUSCULAR; INTRAVENOUS; SUBCUTANEOUS at 16:32

## 2019-09-26 RX ADMIN — ROCURONIUM BROMIDE 20 MG: 10 INJECTION INTRAVENOUS at 17:08

## 2019-09-26 RX ADMIN — FAMOTIDINE 20 MG: 10 INJECTION, SOLUTION INTRAVENOUS at 15:20

## 2019-09-26 RX ADMIN — SUGAMMADEX 200 MG: 100 INJECTION, SOLUTION INTRAVENOUS at 18:52

## 2019-09-26 RX ADMIN — DEXAMETHASONE SODIUM PHOSPHATE 12 MG: 10 INJECTION INTRAMUSCULAR; INTRAVENOUS at 15:55

## 2019-09-26 RX ADMIN — KETAMINE HYDROCHLORIDE 20 MG: 10 INJECTION INTRAMUSCULAR; INTRAVENOUS at 17:15

## 2019-09-26 RX ADMIN — CEFAZOLIN SODIUM 2 G: 2 INJECTION, SOLUTION INTRAVENOUS at 15:53

## 2019-09-26 RX ADMIN — PROPOFOL 200 MG: 10 INJECTION, EMULSION INTRAVENOUS at 15:39

## 2019-09-26 RX ADMIN — PHENYLEPHRINE HYDROCHLORIDE 100 MCG: 10 INJECTION INTRAVENOUS at 17:01

## 2019-09-26 RX ADMIN — ACETAMINOPHEN 1000 MG: 500 TABLET, FILM COATED ORAL at 14:10

## 2019-09-26 RX ADMIN — GABAPENTIN 600 MG: 300 CAPSULE ORAL at 14:10

## 2019-09-26 RX ADMIN — SODIUM CHLORIDE, POTASSIUM CHLORIDE, SODIUM LACTATE AND CALCIUM CHLORIDE 50 ML/HR: 600; 310; 30; 20 INJECTION, SOLUTION INTRAVENOUS at 20:17

## 2019-09-26 RX ADMIN — ROCURONIUM BROMIDE 50 MG: 10 INJECTION INTRAVENOUS at 15:39

## 2019-09-26 RX ADMIN — BUPIVACAINE HYDROCHLORIDE 40 ML: 2.5 INJECTION, SOLUTION EPIDURAL; INFILTRATION; INTRACAUDAL; PERINEURAL at 15:55

## 2019-09-26 RX ADMIN — FENTANYL CITRATE 50 MCG: 50 INJECTION INTRAMUSCULAR; INTRAVENOUS at 19:29

## 2019-09-26 RX ADMIN — KETAMINE HYDROCHLORIDE 40 MG: 10 INJECTION INTRAMUSCULAR; INTRAVENOUS at 16:12

## 2019-09-26 RX ADMIN — SODIUM CHLORIDE, POTASSIUM CHLORIDE, SODIUM LACTATE AND CALCIUM CHLORIDE 9 ML/HR: 600; 310; 30; 20 INJECTION, SOLUTION INTRAVENOUS at 15:22

## 2019-09-26 RX ADMIN — ACETAMINOPHEN 1000 MG: 500 TABLET, FILM COATED ORAL at 23:19

## 2019-09-26 RX ADMIN — ONDANSETRON 4 MG: 2 INJECTION INTRAMUSCULAR; INTRAVENOUS at 19:29

## 2019-09-26 RX ADMIN — BUPIVACAINE 20 ML: 13.3 INJECTION, SUSPENSION, LIPOSOMAL INFILTRATION at 15:55

## 2019-09-26 RX ADMIN — MIDAZOLAM 2 MG: 1 INJECTION INTRAMUSCULAR; INTRAVENOUS at 15:10

## 2019-09-26 RX ADMIN — METRONIDAZOLE 500 MG: 500 INJECTION, SOLUTION INTRAVENOUS at 15:48

## 2019-09-26 RX ADMIN — SODIUM CHLORIDE, PRESERVATIVE FREE 10 ML: 5 INJECTION INTRAVENOUS at 10:36

## 2019-09-26 RX ADMIN — ROCURONIUM BROMIDE 20 MG: 10 INJECTION INTRAVENOUS at 17:19

## 2019-09-26 RX ADMIN — CELECOXIB 200 MG: 200 CAPSULE ORAL at 14:10

## 2019-09-26 RX ADMIN — FENTANYL CITRATE 50 MCG: 50 INJECTION INTRAMUSCULAR; INTRAVENOUS at 21:02

## 2019-09-26 RX ADMIN — KETAMINE HYDROCHLORIDE 20 MG: 10 INJECTION INTRAMUSCULAR; INTRAVENOUS at 18:00

## 2019-09-26 RX ADMIN — PHENYLEPHRINE HYDROCHLORIDE 50 MCG: 10 INJECTION INTRAVENOUS at 16:35

## 2019-09-26 RX ADMIN — LOSARTAN POTASSIUM 25 MG: 25 TABLET, FILM COATED ORAL at 10:36

## 2019-09-26 RX ADMIN — SCOPALAMINE 1 PATCH: 1 PATCH, EXTENDED RELEASE TRANSDERMAL at 14:10

## 2019-09-26 RX ADMIN — LIDOCAINE HYDROCHLORIDE ANHYDROUS AND DEXTROSE MONOHYDRATE 1.5 MG/MIN: .4; 5 INJECTION, SOLUTION INTRAVENOUS at 15:53

## 2019-09-26 RX ADMIN — KETAMINE HYDROCHLORIDE 10 MG: 10 INJECTION INTRAMUSCULAR; INTRAVENOUS at 19:00

## 2019-09-26 RX ADMIN — PHENYLEPHRINE HYDROCHLORIDE 100 MCG: 10 INJECTION INTRAVENOUS at 17:38

## 2019-09-26 RX ADMIN — EPHEDRINE SULFATE 5 MG: 50 INJECTION INTRAMUSCULAR; INTRAVENOUS; SUBCUTANEOUS at 16:27

## 2019-09-26 RX ADMIN — KETAMINE HYDROCHLORIDE 10 MG: 10 INJECTION INTRAMUSCULAR; INTRAVENOUS at 16:25

## 2019-09-26 RX ADMIN — EPHEDRINE SULFATE 5 MG: 50 INJECTION INTRAMUSCULAR; INTRAVENOUS; SUBCUTANEOUS at 17:08

## 2019-09-26 RX ADMIN — FAMOTIDINE 20 MG: 10 INJECTION, SOLUTION INTRAVENOUS at 23:18

## 2019-09-26 RX ADMIN — PHENYLEPHRINE HYDROCHLORIDE 100 MCG: 10 INJECTION INTRAVENOUS at 17:08

## 2019-09-26 RX ADMIN — ONDANSETRON 4 MG: 2 INJECTION INTRAMUSCULAR; INTRAVENOUS at 18:50

## 2019-09-26 RX ADMIN — MAGNESIUM SULFATE HEPTAHYDRATE 2 G: 500 INJECTION, SOLUTION INTRAMUSCULAR; INTRAVENOUS at 16:15

## 2019-09-26 RX ADMIN — ALVIMOPAN 12 MG: 12 CAPSULE ORAL at 14:10

## 2019-09-26 NOTE — ANESTHESIA PROCEDURE NOTES
Central Line      Patient reassessed immediately prior to procedure    Patient location during procedure: holding area  Start time: 9/26/2019 2:55 PM  Stop Time:9/26/2019 3:20 PM  Indications: vascular access and MD/Surgeon request  Staff  Anesthesiologist: Isidoro Salinas MD  Preanesthetic Checklist  Completed: patient identified, site marked, surgical consent, pre-op evaluation, timeout performed, IV checked, risks and benefits discussed and monitors and equipment checked  Central Line Prep  Sterile Tech:cap, gloves, gown, mask and sterile barriers  Prep: chloraprep  Patient monitoring: blood pressure monitoring, continuous pulse oximetry and EKG  Central Line Procedure  Laterality:right  Location:internal jugular  Catheter Type:triple lumen  Catheter Size:7 Fr  Guidance:ultrasound guided  PROCEDURE NOTE/ULTRASOUND INTERPRETATION.  Using ultrasound guidance the potential vascular sites for insertion of the catheter were visualized to determine the patency of the vessel to be used for vascular access.  After selecting the appropriate site for insertion, the needle was visualized under ultrasound being inserted into the internal jugular vein, followed by ultrasound confirmation of wire and catheter placement. There were no abnormalities seen on ultrasound; an image was taken; and the patient tolerated the procedure with no complications. Images: still images not obtained  Assessment  Post procedure:biopatch applied, line sutured and occlusive dressing applied  Assessement:blood return through all ports, free fluid flow and chest x-ray ordered  Complications:no  Patient Tolerance:patient tolerated the procedure well with no apparent complications  Additional Notes  Will check CXR in OR prior to using central line

## 2019-09-26 NOTE — ANESTHESIA PROCEDURE NOTES
Bilateral TAP block      Patient reassessed immediately prior to procedure    Patient location during procedure: OR  Start time: 9/26/2019 3:50 PM  Stop time: 9/26/2019 3:55 PM  Reason for block: at surgeon's request and post-op pain management  Performed by  Anesthesiologist: Minnie Kitchen MD  Preanesthetic Checklist  Completed: patient identified, site marked, surgical consent, pre-op evaluation, timeout performed, IV checked, risks and benefits discussed and monitors and equipment checked  Prep:  Pt Position: supine  Sterile barriers:cap, gloves and mask  Prep: ChloraPrep  Patient monitoring: blood pressure monitoring, continuous pulse oximetry and EKG  Procedure  Sedation:no  Performed under: general  Guidance:ultrasound guided  ULTRASOUND INTERPRETATION. Using ultrasound guidance a 21 G gauge needle was placed in close proximity to the nerve, at which point, under ultrasound guidance anesthetic was injected in the area of the nerve and spread of the anesthesia was seen on ultrasound in close proximity thereto.  There were no abnormalities seen on ultrasound; a digital image was taken; and the patient tolerated the procedure with no complications. Images:still images obtained    Laterality:Bilateral  Block Type:TAP  Injection Technique:single-shot  Needle Type:short-bevel and echogenic  Needle Gauge:21 G  Resistance on Injection: none    Medications Used: bupivacaine liposome (EXPAREL) 1.3 % injection, 20 mL  bupivacaine PF (MARCAINE) 0.25 % injection, 40 mL  Med admintered at 9/26/2019 3:55 PM      Medications  Comment:Ultrasound Interpretation:  Using ultrasound guidance the needle was placed in close proximity to the transversalis plane and anesthetic was injected in the area of the TAP plane and spread of the anesthetic was seen on ultrasound in close proximity thereto.  There were no abnormalities seen on ultrasound; a digital image was taken; and the patient tolerated the procedure with no  complications.    Block placed for postoperative pain control per surgeon request.    Post Assessment  Injection Assessment: negative aspiration for heme, no paresthesia on injection and incremental injection  Patient Tolerance:comfortable throughout block  Complications:no

## 2019-09-26 NOTE — ANESTHESIA PREPROCEDURE EVALUATION
Anesthesia Evaluation     Patient summary reviewed and Nursing notes reviewed   no history of anesthetic complications:  NPO Solid Status: > 8 hours  NPO Liquid Status: > 2 hours           Airway   Mallampati: II  TM distance: >3 FB  Neck ROM: full  No difficulty expected  Dental - normal exam     Pulmonary     breath sounds clear to auscultation  (+) pulmonary embolism,   Cardiovascular   Exercise tolerance: good (4-7 METS)    ECG reviewed  Rhythm: regular  Rate: normal    (+) hypertension, hyperlipidemia,     ROS comment: Echo reviewed    Neuro/Psych  GI/Hepatic/Renal/Endo    (+) obesity,  GI bleeding,     ROS Comment: Colon cancer    Musculoskeletal     Abdominal     Abdomen: soft.   Substance History      OB/GYN          Other      history of cancer      Other Comment: Anemia                  Anesthesia Plan    ASA 3     general with block     intravenous induction   Anesthetic plan, all risks, benefits, and alternatives have been provided, discussed and informed consent has been obtained with: patient.

## 2019-09-26 NOTE — ANESTHESIA PROCEDURE NOTES
Airway  Urgency: elective    Date/Time: 9/26/2019 3:46 PM  Difficult airway    General Information and Staff    Patient location during procedure: OR  Anesthesiologist: Minnie Kitchen MD    Indications and Patient Condition  Indications for airway management: airway protection    Preoxygenated: yes  Mask difficulty assessment: 1 - vent by mask    Final Airway Details  Final airway type: endotracheal airway      Successful airway: ETT  Cuffed: yes   Successful intubation technique: direct laryngoscopy  Facilitating devices/methods: intubating stylet and cricoid pressure  Endotracheal tube insertion site: oral  Blade: Danilo  Blade size: 4  ETT size (mm): 8.0  Cormack-Lehane Classification: grade III - view of epiglottis only  Placement verified by: chest auscultation and capnometry   Inital cuff pressure (cm H2O): 23  Cuff volume (mL): 6  Measured from: teeth  ETT/EBT  to teeth (cm): 22  Number of attempts at approach: 1  Assessment: lips, teeth, and gum same as pre-op and atraumatic intubation

## 2019-09-27 ENCOUNTER — APPOINTMENT (OUTPATIENT)
Dept: CARDIOLOGY | Facility: HOSPITAL | Age: 64
End: 2019-09-27

## 2019-09-27 LAB
ANION GAP SERPL CALCULATED.3IONS-SCNC: 9.2 MMOL/L (ref 5–15)
APTT PPP: 31.3 SECONDS (ref 22.7–35.4)
APTT PPP: 33.3 SECONDS (ref 22.7–35.4)
BASOPHILS # BLD AUTO: 0.01 10*3/MM3 (ref 0–0.2)
BASOPHILS NFR BLD AUTO: 0.1 % (ref 0–1.5)
BH CV UPPER VENOUS LEFT AXILLARY AUGMENT: NORMAL
BH CV UPPER VENOUS LEFT AXILLARY COMPETENT: NORMAL
BH CV UPPER VENOUS LEFT AXILLARY COMPRESS: NORMAL
BH CV UPPER VENOUS LEFT AXILLARY PHASIC: NORMAL
BH CV UPPER VENOUS LEFT AXILLARY SPONT: NORMAL
BH CV UPPER VENOUS LEFT BASILIC FOREARM COMPRESS: NORMAL
BH CV UPPER VENOUS LEFT BASILIC UPPER COLOR: 1
BH CV UPPER VENOUS LEFT BASILIC UPPER COMPRESS: NORMAL
BH CV UPPER VENOUS LEFT BASILIC UPPER THROMBUS: NORMAL
BH CV UPPER VENOUS LEFT BRACHIAL COLOR: 1
BH CV UPPER VENOUS LEFT BRACHIAL COMPRESS: NORMAL
BH CV UPPER VENOUS LEFT BRACHIAL THROMBUS: NORMAL
BH CV UPPER VENOUS LEFT CEPHALIC FOREARM COMPRESS: NORMAL
BH CV UPPER VENOUS LEFT CEPHALIC UPPER COMPRESS: NORMAL
BH CV UPPER VENOUS LEFT INTERNAL JUGULAR COMPETENT: NORMAL
BH CV UPPER VENOUS LEFT INTERNAL JUGULAR COMPRESS: NORMAL
BH CV UPPER VENOUS LEFT INTERNAL JUGULAR PHASIC: NORMAL
BH CV UPPER VENOUS LEFT INTERNAL JUGULAR SPONT: NORMAL
BH CV UPPER VENOUS LEFT RADIAL COMPRESS: NORMAL
BH CV UPPER VENOUS LEFT SUBCLAVIAN COMPETENT: NORMAL
BH CV UPPER VENOUS LEFT SUBCLAVIAN COMPRESS: NORMAL
BH CV UPPER VENOUS LEFT SUBCLAVIAN PHASIC: NORMAL
BH CV UPPER VENOUS LEFT SUBCLAVIAN SPONT: NORMAL
BH CV UPPER VENOUS LEFT ULNAR COMPRESS: NORMAL
BH CV UPPER VENOUS RIGHT AXILLARY AUGMENT: NORMAL
BH CV UPPER VENOUS RIGHT AXILLARY COMPETENT: NORMAL
BH CV UPPER VENOUS RIGHT AXILLARY COMPRESS: NORMAL
BH CV UPPER VENOUS RIGHT AXILLARY PHASIC: NORMAL
BH CV UPPER VENOUS RIGHT AXILLARY SPONT: NORMAL
BH CV UPPER VENOUS RIGHT BASILIC FOREARM COMPRESS: NORMAL
BH CV UPPER VENOUS RIGHT BASILIC UPPER COMPRESS: NORMAL
BH CV UPPER VENOUS RIGHT BRACHIAL COMPRESS: NORMAL
BH CV UPPER VENOUS RIGHT CEPHALIC FOREARM COLOR: 1
BH CV UPPER VENOUS RIGHT CEPHALIC FOREARM COMPRESS: NORMAL
BH CV UPPER VENOUS RIGHT CEPHALIC FOREARM THROMBUS: NORMAL
BH CV UPPER VENOUS RIGHT CEPHALIC UPPER COLOR: 1
BH CV UPPER VENOUS RIGHT CEPHALIC UPPER COMPRESS: NORMAL
BH CV UPPER VENOUS RIGHT CEPHALIC UPPER THROMBUS: NORMAL
BH CV UPPER VENOUS RIGHT INTERNAL JUGULAR COMPETENT: NORMAL
BH CV UPPER VENOUS RIGHT INTERNAL JUGULAR COMPRESS: NORMAL
BH CV UPPER VENOUS RIGHT INTERNAL JUGULAR PHASIC: NORMAL
BH CV UPPER VENOUS RIGHT INTERNAL JUGULAR SPONT: NORMAL
BH CV UPPER VENOUS RIGHT RADIAL COMPRESS: NORMAL
BH CV UPPER VENOUS RIGHT SUBCLAVIAN COMPETENT: NORMAL
BH CV UPPER VENOUS RIGHT SUBCLAVIAN COMPRESS: NORMAL
BH CV UPPER VENOUS RIGHT SUBCLAVIAN PHASIC: NORMAL
BH CV UPPER VENOUS RIGHT SUBCLAVIAN SPONT: NORMAL
BH CV UPPER VENOUS RIGHT ULNAR COMPRESS: NORMAL
BUN BLD-MCNC: 10 MG/DL (ref 8–23)
BUN/CREAT SERPL: 10.3 (ref 7–25)
CALCIUM SPEC-SCNC: 8.7 MG/DL (ref 8.6–10.5)
CHLORIDE SERPL-SCNC: 95 MMOL/L (ref 98–107)
CO2 SERPL-SCNC: 25.8 MMOL/L (ref 22–29)
CREAT BLD-MCNC: 0.97 MG/DL (ref 0.76–1.27)
DEPRECATED RDW RBC AUTO: 48.9 FL (ref 37–54)
EOSINOPHIL # BLD AUTO: 0 10*3/MM3 (ref 0–0.4)
EOSINOPHIL NFR BLD AUTO: 0 % (ref 0.3–6.2)
ERYTHROCYTE [DISTWIDTH] IN BLOOD BY AUTOMATED COUNT: 19.5 % (ref 12.3–15.4)
GFR SERPL CREATININE-BSD FRML MDRD: 94 ML/MIN/1.73
GLUCOSE BLD-MCNC: 118 MG/DL (ref 65–99)
HCT VFR BLD AUTO: 28.7 % (ref 37.5–51)
HGB BLD-MCNC: 8.9 G/DL (ref 13–17.7)
IMM GRANULOCYTES # BLD AUTO: 0.02 10*3/MM3 (ref 0–0.05)
IMM GRANULOCYTES NFR BLD AUTO: 0.2 % (ref 0–0.5)
INR PPP: 1.22 (ref 0.9–1.1)
LYMPHOCYTES # BLD AUTO: 0.46 10*3/MM3 (ref 0.7–3.1)
LYMPHOCYTES NFR BLD AUTO: 5.3 % (ref 19.6–45.3)
MAGNESIUM SERPL-MCNC: 2.3 MG/DL (ref 1.6–2.4)
MCH RBC QN AUTO: 23.3 PG (ref 26.6–33)
MCHC RBC AUTO-ENTMCNC: 31 G/DL (ref 31.5–35.7)
MCV RBC AUTO: 75.1 FL (ref 79–97)
MONOCYTES # BLD AUTO: 0.55 10*3/MM3 (ref 0.1–0.9)
MONOCYTES NFR BLD AUTO: 6.4 % (ref 5–12)
NEUTROPHILS # BLD AUTO: 7.57 10*3/MM3 (ref 1.7–7)
NEUTROPHILS NFR BLD AUTO: 88 % (ref 42.7–76)
NRBC BLD AUTO-RTO: 0 /100 WBC (ref 0–0.2)
PLATELET # BLD AUTO: 284 10*3/MM3 (ref 140–450)
PMV BLD AUTO: 9.7 FL (ref 6–12)
POTASSIUM BLD-SCNC: 4.2 MMOL/L (ref 3.5–5.2)
PROTHROMBIN TIME: 15.1 SECONDS (ref 11.7–14.2)
RBC # BLD AUTO: 3.82 10*6/MM3 (ref 4.14–5.8)
SODIUM BLD-SCNC: 130 MMOL/L (ref 136–145)
WBC NRBC COR # BLD: 8.61 10*3/MM3 (ref 3.4–10.8)

## 2019-09-27 PROCEDURE — 85025 COMPLETE CBC W/AUTO DIFF WBC: CPT | Performed by: COLON & RECTAL SURGERY

## 2019-09-27 PROCEDURE — 25010000002 HEPARIN (PORCINE) PER 1000 UNITS: Performed by: COLON & RECTAL SURGERY

## 2019-09-27 PROCEDURE — 85730 THROMBOPLASTIN TIME PARTIAL: CPT | Performed by: COLON & RECTAL SURGERY

## 2019-09-27 PROCEDURE — 83735 ASSAY OF MAGNESIUM: CPT | Performed by: COLON & RECTAL SURGERY

## 2019-09-27 PROCEDURE — 80048 BASIC METABOLIC PNL TOTAL CA: CPT | Performed by: COLON & RECTAL SURGERY

## 2019-09-27 PROCEDURE — 25010000002 ENOXAPARIN PER 10 MG: Performed by: COLON & RECTAL SURGERY

## 2019-09-27 PROCEDURE — 93970 EXTREMITY STUDY: CPT

## 2019-09-27 PROCEDURE — 85610 PROTHROMBIN TIME: CPT | Performed by: COLON & RECTAL SURGERY

## 2019-09-27 PROCEDURE — 25010000002 KETOROLAC TROMETHAMINE PER 15 MG: Performed by: COLON & RECTAL SURGERY

## 2019-09-27 RX ORDER — HEPARIN SODIUM 10000 [USP'U]/100ML
14 INJECTION, SOLUTION INTRAVENOUS
Status: DISPENSED | OUTPATIENT
Start: 2019-09-27 | End: 2019-09-28

## 2019-09-27 RX ORDER — GABAPENTIN 300 MG/1
300 CAPSULE ORAL 2 TIMES DAILY
Status: COMPLETED | OUTPATIENT
Start: 2019-09-27 | End: 2019-09-29

## 2019-09-27 RX ADMIN — ACETAMINOPHEN 1000 MG: 500 TABLET, FILM COATED ORAL at 16:21

## 2019-09-27 RX ADMIN — ACETAMINOPHEN 1000 MG: 500 TABLET, FILM COATED ORAL at 04:35

## 2019-09-27 RX ADMIN — GABAPENTIN 600 MG: 300 CAPSULE ORAL at 10:16

## 2019-09-27 RX ADMIN — ALVIMOPAN 12 MG: 12 CAPSULE ORAL at 10:16

## 2019-09-27 RX ADMIN — KETOROLAC TROMETHAMINE 30 MG: 30 INJECTION, SOLUTION INTRAMUSCULAR at 18:03

## 2019-09-27 RX ADMIN — GABAPENTIN 300 MG: 300 CAPSULE ORAL at 21:04

## 2019-09-27 RX ADMIN — SODIUM CHLORIDE, POTASSIUM CHLORIDE, SODIUM LACTATE AND CALCIUM CHLORIDE 50 ML/HR: 600; 310; 30; 20 INJECTION, SOLUTION INTRAVENOUS at 00:22

## 2019-09-27 RX ADMIN — KETOROLAC TROMETHAMINE 30 MG: 30 INJECTION, SOLUTION INTRAMUSCULAR at 00:42

## 2019-09-27 RX ADMIN — KETOROLAC TROMETHAMINE 30 MG: 30 INJECTION, SOLUTION INTRAMUSCULAR at 10:16

## 2019-09-27 RX ADMIN — FAMOTIDINE 20 MG: 10 INJECTION, SOLUTION INTRAVENOUS at 10:16

## 2019-09-27 RX ADMIN — ENOXAPARIN SODIUM 40 MG: 40 INJECTION SUBCUTANEOUS at 10:17

## 2019-09-27 RX ADMIN — ACETAMINOPHEN 1000 MG: 500 TABLET, FILM COATED ORAL at 10:19

## 2019-09-27 RX ADMIN — FAMOTIDINE 20 MG: 10 INJECTION, SOLUTION INTRAVENOUS at 21:04

## 2019-09-27 RX ADMIN — ALVIMOPAN 12 MG: 12 CAPSULE ORAL at 21:04

## 2019-09-27 RX ADMIN — ACETAMINOPHEN 1000 MG: 500 TABLET, FILM COATED ORAL at 22:28

## 2019-09-27 RX ADMIN — HEPARIN SODIUM 18 UNITS/KG/HR: 10000 INJECTION, SOLUTION INTRAVENOUS at 16:16

## 2019-09-27 NOTE — ANESTHESIA POSTPROCEDURE EVALUATION
"Patient: Jared Rose    Procedure Summary     Date:  09/26/19 Room / Location:  [facility] OR 09 / [facility] MAIN OR    Anesthesia Start:  1532 Anesthesia Stop:  1916    Procedure:  LOW ANTERIOR COLON RESECTION IMMOBILIZATION OF SPLENIC FLEXURE (N/A Abdomen) Diagnosis:       Colonic mass      (Colonic mass [K63.9])    Surgeon:  Isabella Whitt MD Provider:  Minnie Kitchen MD    Anesthesia Type:  general with block ASA Status:  3          Anesthesia Type: general with block  Last vitals  BP   150/77 (09/26/19 2025)   Temp   36.4 °C (97.6 °F) (09/26/19 1911)   Pulse   77 (09/26/19 2025)   Resp   18 (09/26/19 2025)     SpO2   99 % (09/26/19 2025)     Post Anesthesia Care and Evaluation    Patient location during evaluation: bedside  Patient participation: complete - patient participated  Level of consciousness: awake and alert  Pain management: adequate  Airway patency: patent  Anesthetic complications: No anesthetic complications  PONV Status: none  Cardiovascular status: acceptable  Respiratory status: acceptable  Hydration status: acceptable    Comments: /77   Pulse 77   Temp 36.4 °C (97.6 °F) (Oral)   Resp 18   Ht 180.3 cm (71\")   Wt 107 kg (236 lb)   SpO2 99%   BMI 32.92 kg/m²         "

## 2019-09-28 LAB
APTT PPP: 122.6 SECONDS (ref 22.7–35.4)
APTT PPP: 185.8 SECONDS (ref 22.7–35.4)
APTT PPP: >200 SECONDS (ref 22.7–35.4)
BASOPHILS # BLD AUTO: 0.03 10*3/MM3 (ref 0–0.2)
BASOPHILS NFR BLD AUTO: 0.3 % (ref 0–1.5)
DEPRECATED RDW RBC AUTO: 52.9 FL (ref 37–54)
EOSINOPHIL # BLD AUTO: 0.05 10*3/MM3 (ref 0–0.4)
EOSINOPHIL NFR BLD AUTO: 0.6 % (ref 0.3–6.2)
ERYTHROCYTE [DISTWIDTH] IN BLOOD BY AUTOMATED COUNT: 19.8 % (ref 12.3–15.4)
HCT VFR BLD AUTO: 25.5 % (ref 37.5–51)
HGB BLD-MCNC: 7.9 G/DL (ref 13–17.7)
IMM GRANULOCYTES # BLD AUTO: 0.05 10*3/MM3 (ref 0–0.05)
IMM GRANULOCYTES NFR BLD AUTO: 0.6 % (ref 0–0.5)
LYMPHOCYTES # BLD AUTO: 2.15 10*3/MM3 (ref 0.7–3.1)
LYMPHOCYTES NFR BLD AUTO: 24.1 % (ref 19.6–45.3)
MCH RBC QN AUTO: 23.7 PG (ref 26.6–33)
MCHC RBC AUTO-ENTMCNC: 31 G/DL (ref 31.5–35.7)
MCV RBC AUTO: 76.3 FL (ref 79–97)
MONOCYTES # BLD AUTO: 0.77 10*3/MM3 (ref 0.1–0.9)
MONOCYTES NFR BLD AUTO: 8.6 % (ref 5–12)
NEUTROPHILS # BLD AUTO: 5.86 10*3/MM3 (ref 1.7–7)
NEUTROPHILS NFR BLD AUTO: 65.8 % (ref 42.7–76)
NRBC BLD AUTO-RTO: 0 /100 WBC (ref 0–0.2)
PLATELET # BLD AUTO: 254 10*3/MM3 (ref 140–450)
PMV BLD AUTO: 10.1 FL (ref 6–12)
RBC # BLD AUTO: 3.34 10*6/MM3 (ref 4.14–5.8)
WBC NRBC COR # BLD: 8.91 10*3/MM3 (ref 3.4–10.8)

## 2019-09-28 PROCEDURE — 25010000002 HEPARIN (PORCINE) PER 1000 UNITS: Performed by: COLON & RECTAL SURGERY

## 2019-09-28 PROCEDURE — 85025 COMPLETE CBC W/AUTO DIFF WBC: CPT | Performed by: COLON & RECTAL SURGERY

## 2019-09-28 PROCEDURE — 25010000002 KETOROLAC TROMETHAMINE PER 15 MG: Performed by: COLON & RECTAL SURGERY

## 2019-09-28 PROCEDURE — 85730 THROMBOPLASTIN TIME PARTIAL: CPT | Performed by: COLON & RECTAL SURGERY

## 2019-09-28 RX ORDER — LOSARTAN POTASSIUM 50 MG/1
50 TABLET ORAL DAILY
Status: DISCONTINUED | OUTPATIENT
Start: 2019-09-28 | End: 2019-09-29 | Stop reason: HOSPADM

## 2019-09-28 RX ADMIN — GABAPENTIN 300 MG: 300 CAPSULE ORAL at 08:45

## 2019-09-28 RX ADMIN — FAMOTIDINE 20 MG: 10 INJECTION, SOLUTION INTRAVENOUS at 08:45

## 2019-09-28 RX ADMIN — ACETAMINOPHEN 1000 MG: 500 TABLET, FILM COATED ORAL at 17:41

## 2019-09-28 RX ADMIN — KETOROLAC TROMETHAMINE 30 MG: 30 INJECTION, SOLUTION INTRAMUSCULAR at 01:22

## 2019-09-28 RX ADMIN — ALVIMOPAN 12 MG: 12 CAPSULE ORAL at 08:45

## 2019-09-28 RX ADMIN — KETOROLAC TROMETHAMINE 30 MG: 30 INJECTION, SOLUTION INTRAMUSCULAR at 17:41

## 2019-09-28 RX ADMIN — ACETAMINOPHEN 1000 MG: 500 TABLET, FILM COATED ORAL at 05:04

## 2019-09-28 RX ADMIN — GABAPENTIN 300 MG: 300 CAPSULE ORAL at 20:35

## 2019-09-28 RX ADMIN — ACETAMINOPHEN 1000 MG: 500 TABLET, FILM COATED ORAL at 11:29

## 2019-09-28 RX ADMIN — ACETAMINOPHEN 1000 MG: 500 TABLET, FILM COATED ORAL at 23:03

## 2019-09-28 RX ADMIN — LOSARTAN POTASSIUM 50 MG: 50 TABLET, FILM COATED ORAL at 11:28

## 2019-09-28 RX ADMIN — HEPARIN SODIUM 15 UNITS/KG/HR: 10000 INJECTION, SOLUTION INTRAVENOUS at 05:50

## 2019-09-28 RX ADMIN — FAMOTIDINE 20 MG: 10 INJECTION, SOLUTION INTRAVENOUS at 20:35

## 2019-09-28 RX ADMIN — KETOROLAC TROMETHAMINE 30 MG: 30 INJECTION, SOLUTION INTRAMUSCULAR at 08:45

## 2019-09-28 RX ADMIN — SODIUM CHLORIDE, PRESERVATIVE FREE 10 ML: 5 INJECTION INTRAVENOUS at 20:35

## 2019-09-29 VITALS
HEIGHT: 71 IN | BODY MASS INDEX: 33.04 KG/M2 | RESPIRATION RATE: 18 BRPM | WEIGHT: 236 LBS | OXYGEN SATURATION: 100 % | TEMPERATURE: 98.9 F | HEART RATE: 76 BPM | DIASTOLIC BLOOD PRESSURE: 71 MMHG | SYSTOLIC BLOOD PRESSURE: 128 MMHG

## 2019-09-29 PROBLEM — I82.629 ACUTE DEEP VEIN THROMBOSIS (DVT) OF UPPER EXTREMITY (HCC): Status: ACTIVE | Noted: 2019-09-29

## 2019-09-29 LAB
ABO + RH BLD: NORMAL
ANION GAP SERPL CALCULATED.3IONS-SCNC: 10.8 MMOL/L (ref 5–15)
APTT PPP: 75 SECONDS (ref 22.7–35.4)
BASOPHILS # BLD AUTO: 0.04 10*3/MM3 (ref 0–0.2)
BASOPHILS NFR BLD AUTO: 0.5 % (ref 0–1.5)
BH BB BLOOD EXPIRATION DATE: NORMAL
BH BB BLOOD TYPE BARCODE: 6200
BH BB DISPENSE STATUS: NORMAL
BH BB PRODUCT CODE: NORMAL
BH BB UNIT NUMBER: NORMAL
BUN BLD-MCNC: 14 MG/DL (ref 8–23)
BUN/CREAT SERPL: 12.3 (ref 7–25)
CALCIUM SPEC-SCNC: 8.4 MG/DL (ref 8.6–10.5)
CHLORIDE SERPL-SCNC: 104 MMOL/L (ref 98–107)
CO2 SERPL-SCNC: 25.2 MMOL/L (ref 22–29)
CREAT BLD-MCNC: 1.14 MG/DL (ref 0.76–1.27)
DEPRECATED RDW RBC AUTO: 55.5 FL (ref 37–54)
EOSINOPHIL # BLD AUTO: 0.12 10*3/MM3 (ref 0–0.4)
EOSINOPHIL NFR BLD AUTO: 1.6 % (ref 0.3–6.2)
ERYTHROCYTE [DISTWIDTH] IN BLOOD BY AUTOMATED COUNT: 20.1 % (ref 12.3–15.4)
GFR SERPL CREATININE-BSD FRML MDRD: 78 ML/MIN/1.73
GLUCOSE BLD-MCNC: 88 MG/DL (ref 65–99)
HCT VFR BLD AUTO: 23.3 % (ref 37.5–51)
HGB BLD-MCNC: 7 G/DL (ref 13–17.7)
IMM GRANULOCYTES # BLD AUTO: 0.03 10*3/MM3 (ref 0–0.05)
IMM GRANULOCYTES NFR BLD AUTO: 0.4 % (ref 0–0.5)
LYMPHOCYTES # BLD AUTO: 1.56 10*3/MM3 (ref 0.7–3.1)
LYMPHOCYTES NFR BLD AUTO: 20.4 % (ref 19.6–45.3)
MCH RBC QN AUTO: 23.6 PG (ref 26.6–33)
MCHC RBC AUTO-ENTMCNC: 30 G/DL (ref 31.5–35.7)
MCV RBC AUTO: 78.7 FL (ref 79–97)
MONOCYTES # BLD AUTO: 0.76 10*3/MM3 (ref 0.1–0.9)
MONOCYTES NFR BLD AUTO: 9.9 % (ref 5–12)
NEUTROPHILS # BLD AUTO: 5.13 10*3/MM3 (ref 1.7–7)
NEUTROPHILS NFR BLD AUTO: 67.2 % (ref 42.7–76)
NRBC BLD AUTO-RTO: 0 /100 WBC (ref 0–0.2)
PLATELET # BLD AUTO: 247 10*3/MM3 (ref 140–450)
PMV BLD AUTO: 10.1 FL (ref 6–12)
POTASSIUM BLD-SCNC: 4 MMOL/L (ref 3.5–5.2)
RBC # BLD AUTO: 2.96 10*6/MM3 (ref 4.14–5.8)
SODIUM BLD-SCNC: 140 MMOL/L (ref 136–145)
UNIT  ABO: NORMAL
UNIT  RH: NORMAL
WBC NRBC COR # BLD: 7.64 10*3/MM3 (ref 3.4–10.8)

## 2019-09-29 PROCEDURE — 85025 COMPLETE CBC W/AUTO DIFF WBC: CPT | Performed by: COLON & RECTAL SURGERY

## 2019-09-29 PROCEDURE — 85730 THROMBOPLASTIN TIME PARTIAL: CPT | Performed by: COLON & RECTAL SURGERY

## 2019-09-29 PROCEDURE — 80048 BASIC METABOLIC PNL TOTAL CA: CPT | Performed by: HOSPITALIST

## 2019-09-29 PROCEDURE — 25010000002 KETOROLAC TROMETHAMINE PER 15 MG: Performed by: COLON & RECTAL SURGERY

## 2019-09-29 RX ADMIN — LOSARTAN POTASSIUM 50 MG: 50 TABLET, FILM COATED ORAL at 09:10

## 2019-09-29 RX ADMIN — ACETAMINOPHEN 1000 MG: 500 TABLET, FILM COATED ORAL at 10:28

## 2019-09-29 RX ADMIN — FAMOTIDINE 20 MG: 10 INJECTION, SOLUTION INTRAVENOUS at 09:10

## 2019-09-29 RX ADMIN — KETOROLAC TROMETHAMINE 30 MG: 30 INJECTION, SOLUTION INTRAMUSCULAR at 09:09

## 2019-09-29 RX ADMIN — KETOROLAC TROMETHAMINE 30 MG: 30 INJECTION, SOLUTION INTRAMUSCULAR at 02:19

## 2019-09-29 RX ADMIN — GABAPENTIN 300 MG: 300 CAPSULE ORAL at 09:10

## 2019-09-30 ENCOUNTER — READMISSION MANAGEMENT (OUTPATIENT)
Dept: CALL CENTER | Facility: HOSPITAL | Age: 64
End: 2019-09-30

## 2019-09-30 NOTE — OUTREACH NOTE
Prep Survey      Responses   Facility patient discharged from?  [facility]   Is patient eligible?  Yes   Discharge diagnosis  Malignant neoplasm of sigmoid colon, acute DVT of upper extremity, colonic mass, symptomatic anemia, hx. of PE. HLD, HTN, Thombophilia, chronic anticoagulation, s/p Colon resection   Does the patient have one of the following disease processes/diagnoses(primary or secondary)?  General Surgery   Does the patient have Home health ordered?  No   Is there a DME ordered?  No   Comments regarding appointments  Pt to schedule follow up appointments   Prep survey completed?  Yes          Sabrina Hall RN

## 2019-10-01 RX ORDER — ATORVASTATIN CALCIUM 40 MG/1
40 TABLET, FILM COATED ORAL DAILY
Qty: 90 TABLET | Refills: 3 | Status: SHIPPED | OUTPATIENT
Start: 2019-10-01 | End: 2020-10-06 | Stop reason: SDUPTHER

## 2019-10-01 RX ORDER — LOSARTAN POTASSIUM 50 MG/1
50 TABLET ORAL DAILY
Qty: 90 TABLET | Refills: 3 | Status: SHIPPED | OUTPATIENT
Start: 2019-10-01 | End: 2020-01-02 | Stop reason: SDUPTHER

## 2019-10-02 ENCOUNTER — APPOINTMENT (OUTPATIENT)
Dept: ONCOLOGY | Facility: CLINIC | Age: 64
End: 2019-10-02

## 2019-10-03 ENCOUNTER — TELEPHONE (OUTPATIENT)
Dept: GASTROENTEROLOGY | Facility: CLINIC | Age: 64
End: 2019-10-03

## 2019-10-03 ENCOUNTER — READMISSION MANAGEMENT (OUTPATIENT)
Dept: CALL CENTER | Facility: HOSPITAL | Age: 64
End: 2019-10-03

## 2019-10-03 LAB
CYTO UR: NORMAL
LAB AP CASE REPORT: NORMAL
LAB AP SYNOPTIC CHECKLIST: NORMAL
Lab: NORMAL
PATH REPORT.ADDENDUM SPEC: NORMAL
PATH REPORT.FINAL DX SPEC: NORMAL
PATH REPORT.GROSS SPEC: NORMAL

## 2019-10-03 NOTE — OUTREACH NOTE
General Surgery Week 1 Survey      Responses   Facility patient discharged from?  [facility]   Does the patient have one of the following disease processes/diagnoses(primary or secondary)?  General Surgery   Is there a successful TCM telephone encounter documented?  No   Week 1 attempt successful?  Yes   Call start time  0913   Call end time  0916   Discharge diagnosis  Malignant neoplasm of sigmoid colon, acute DVT of upper extremity, colonic mass, symptomatic anemia, hx. of PE. HLD, HTN, Thombophilia, chronic anticoagulation, s/p Colon resection   Meds reviewed with patient/caregiver?  Yes   Is the patient having any side effects they believe may be caused by any medication additions or changes?  No   Does the patient have all medications related to this admission filled (includes all antibiotics, pain medications, etc.)  N/A   Is the patient taking all medications as directed (includes completed medication regime)?  Yes   Does the patient have a follow up appointment scheduled with their surgeon?  Yes   Has the patient kept scheduled appointments due by today?  N/A   Has home health visited the patient within 72 hours of discharge?  N/A   Psychosocial issues?  No   Did the patient receive a copy of their discharge instructions?  Yes   Nursing interventions  Reviewed instructions with patient   What is the patient's perception of their health status since discharge?  Improving   Nursing interventions  Nurse provided patient education   Is the patient /caregiver able to teach back basic post-op care?  Continue use of incentive spirometry at least 1 week post discharge, Practice 'cough and deep breath', Drive as instructed by MD in discharge instructions, Take showers only when approved by MD-sponge bathe until then, No tub bath, swimming, or hot tub until instructed by MD, Keep incision areas clean,dry and protected, Do not remove steri-strips, Lifting as instructed by MD in discharge instructions   Is the  patient/caregiver able to teach back signs and symptoms of incisional infection?  Increased redness, swelling or pain at the incisonal site, Increased drainage or bleeding, Incisional warmth, Pus or odor from incision, Fever   Is the patient/caregiver able to teach back steps to recovery at home?  Rest and rebuild strength, gradually increase activity, Set small, achievable goals for return to baseline health, Eat a well-balance diet   Is the patient/caregiver able to teach back the hierarchy of who to call/visit for symptoms/problems? PCP, Specialist, Home health nurse, Urgent Care, ED, 911  Yes   Additional teach back comments  Pt says he is doing well, his abdomen is still sore, he is following all discharge instructions, no questions or concerns at this time.   Week 1 call completed?  Yes          Renu Street RN

## 2019-10-03 NOTE — TELEPHONE ENCOUNTER
Received [facility] papers through fax today.  Spoke with patient and informed him we could complete for his hospital stay but that he would need to have the surgeon complete for the additional 4-6 weeks of being off since she is under his care.  He states that he understand.  Forms completed and placed on [employer] desk for signature.  Once complete will collect payment and fax to [employer].

## 2019-10-09 ENCOUNTER — DOCUMENTATION (OUTPATIENT)
Dept: ONCOLOGY | Facility: CLINIC | Age: 64
End: 2019-10-09

## 2019-10-09 ENCOUNTER — APPOINTMENT (OUTPATIENT)
Dept: LAB | Facility: HOSPITAL | Age: 64
End: 2019-10-09

## 2019-10-09 ENCOUNTER — OFFICE VISIT (OUTPATIENT)
Dept: ONCOLOGY | Facility: CLINIC | Age: 64
End: 2019-10-09

## 2019-10-09 ENCOUNTER — LAB (OUTPATIENT)
Dept: LAB | Facility: HOSPITAL | Age: 64
End: 2019-10-09

## 2019-10-09 VITALS
RESPIRATION RATE: 16 BRPM | DIASTOLIC BLOOD PRESSURE: 81 MMHG | SYSTOLIC BLOOD PRESSURE: 162 MMHG | WEIGHT: 233.4 LBS | TEMPERATURE: 98.5 F | HEIGHT: 69 IN | OXYGEN SATURATION: 98 % | HEART RATE: 83 BPM | BODY MASS INDEX: 34.57 KG/M2

## 2019-10-09 DIAGNOSIS — D68.62 LUPUS ANTICOAGULANT DISORDER (HCC): ICD-10-CM

## 2019-10-09 DIAGNOSIS — C18.7 MALIGNANT NEOPLASM OF SIGMOID COLON (HCC): Primary | ICD-10-CM

## 2019-10-09 DIAGNOSIS — D64.9 SYMPTOMATIC ANEMIA: ICD-10-CM

## 2019-10-09 DIAGNOSIS — I82.622 ACUTE DEEP VEIN THROMBOSIS (DVT) OF BRACHIAL VEIN OF LEFT UPPER EXTREMITY (HCC): ICD-10-CM

## 2019-10-09 DIAGNOSIS — D50.0 IRON DEFICIENCY ANEMIA DUE TO CHRONIC BLOOD LOSS: ICD-10-CM

## 2019-10-09 LAB
BASOPHILS # BLD AUTO: 0.05 10*3/MM3 (ref 0–0.2)
BASOPHILS NFR BLD AUTO: 0.6 % (ref 0–1.5)
DEPRECATED RDW RBC AUTO: 75.3 FL (ref 37–54)
EOSINOPHIL # BLD AUTO: 0.46 10*3/MM3 (ref 0–0.4)
EOSINOPHIL NFR BLD AUTO: 5.6 % (ref 0.3–6.2)
ERYTHROCYTE [DISTWIDTH] IN BLOOD BY AUTOMATED COUNT: 26.1 % (ref 12.3–15.4)
FERRITIN SERPL-MCNC: 380.9 NG/ML (ref 30–400)
HCT VFR BLD AUTO: 28.3 % (ref 37.5–51)
HGB BLD-MCNC: 8.5 G/DL (ref 13–17.7)
HGB RETIC QN AUTO: 30.7 PG (ref 29.8–36.1)
IMM GRANULOCYTES # BLD AUTO: 0.03 10*3/MM3 (ref 0–0.05)
IMM GRANULOCYTES NFR BLD AUTO: 0.4 % (ref 0–0.5)
IMM RETICS NFR: 38.5 % (ref 3–15.8)
IRON 24H UR-MRATE: 50 MCG/DL (ref 59–158)
IRON SATN MFR SERPL: 15 % (ref 14–48)
LYMPHOCYTES # BLD AUTO: 1.82 10*3/MM3 (ref 0.7–3.1)
LYMPHOCYTES NFR BLD AUTO: 22.2 % (ref 19.6–45.3)
MCH RBC QN AUTO: 25.1 PG (ref 26.6–33)
MCHC RBC AUTO-ENTMCNC: 30 G/DL (ref 31.5–35.7)
MCV RBC AUTO: 83.7 FL (ref 79–97)
MONOCYTES # BLD AUTO: 0.86 10*3/MM3 (ref 0.1–0.9)
MONOCYTES NFR BLD AUTO: 10.5 % (ref 5–12)
NEUTROPHILS # BLD AUTO: 4.99 10*3/MM3 (ref 1.7–7)
NEUTROPHILS NFR BLD AUTO: 60.7 % (ref 42.7–76)
NRBC BLD AUTO-RTO: 0 /100 WBC (ref 0–0.2)
PLATELET # BLD AUTO: 792 10*3/MM3 (ref 140–450)
PMV BLD AUTO: 8 FL (ref 6–12)
RBC # BLD AUTO: 3.38 10*6/MM3 (ref 4.14–5.8)
RETICS/RBC NFR AUTO: 3.39 % (ref 0.7–1.9)
TIBC SERPL-MCNC: 340 MCG/DL (ref 249–505)
TRANSFERRIN SERPL-MCNC: 243 MG/DL (ref 200–360)
WBC NRBC COR # BLD: 8.21 10*3/MM3 (ref 3.4–10.8)

## 2019-10-09 PROCEDURE — 83540 ASSAY OF IRON: CPT

## 2019-10-09 PROCEDURE — G0463 HOSPITAL OUTPT CLINIC VISIT: HCPCS | Performed by: INTERNAL MEDICINE

## 2019-10-09 PROCEDURE — 99215 OFFICE O/P EST HI 40 MIN: CPT | Performed by: INTERNAL MEDICINE

## 2019-10-09 PROCEDURE — 84466 ASSAY OF TRANSFERRIN: CPT

## 2019-10-09 PROCEDURE — 85025 COMPLETE CBC W/AUTO DIFF WBC: CPT

## 2019-10-09 PROCEDURE — 36415 COLL VENOUS BLD VENIPUNCTURE: CPT

## 2019-10-09 PROCEDURE — 82728 ASSAY OF FERRITIN: CPT

## 2019-10-09 PROCEDURE — 85046 RETICYTE/HGB CONCENTRATE: CPT

## 2019-10-09 NOTE — PROGRESS NOTES
[facility] OUTPATIENT FOLLOW UP CLINIC VISIT    REASON FOR FOLLOW-UP:    1.  History of unprovoked pulmonary embolism with a positive lupus anticoagulant test in June 2010.  Chronically anticoagulated with Xarelto 20 mg daily.  Last annual follow-up with Dr. Monroy on 9/26/2018.  2.  Iron deficiency anemia diagnosed at the time of hospitalization on 9/19/2019.  He received a total of 900 mg of intravenous Venofer.  3.  Stage IIIa (pT1, pN1b) adenocarcinoma of the sigmoid colon status post laparoscopic low anterior resection on 9/26/2019.  1.6 x 1.4 cm tumor, grade 2, moderately differentiated with widely negative margins with 2 out of 15 lymph nodes positive for metastatic disease without evidence for extracapsular extension, greatest measuring 3.5 mm.  Microsatellite stable.  4.  Acute right upper extremity superficial thrombophlebitis in the cephalic vein diagnosed on 9/27/2019.  5.  Acute left upper extremity DVT in the brachial vein and acute left upper extremity superficial thrombophlebitis in the basilic vein on 9/27/2019.  Associated with an intravenous catheter.    HISTORY OF PRESENT ILLNESS:  Jared Rose is a 64 y.o. male who returns today for follow up of the above issues.  He was previously following with Dr. Monroy annually for a history of unprovoked venous thromboembolism with a positive lupus anticoagulant test in June 2010.     He presented to [facility] on 9/18/2019 with fatigue, dyspnea on exertion, as well as dark red blood and bright red blood in his stool.  He was found to be anemic with a hemoglobin of 7.5.  He received 1 unit of packed red blood cells.  Fecal occult blood testing was positive.  Xarelto was held.  Ultimately he received a total of 900 mg of intravenous Venofer during that hospitalization.    A colonoscopy on 9/21/2019 showed invasive moderately differentiated grade 2 adenocarcinoma of the sigmoid colon at 25 cm.  Some additional benign adenomatous  "polyps were discovered as well.  The polyp was large and not able to be excised endoscopically.  Therefore, he had a low anterior resection by Dr. Isabella Whitt on 9/26/2019.      He is recovering nicely from this.  He is on Xarelto starter pack for his newly discovered upper extremity DVT.  He denies any bleeding.  He continues to have some abdominal discomfort postoperatively.  No bleeding.  He is eating and drinking adequately and he is having bowel movements.  He does have some limited mobility of the left shoulder apparently due to his DVT but this seems to be improving.    ALLERGIES:  Allergies   Allergen Reactions   • Adhesive Tape Other (See Comments)     blisters       MEDICATIONS:  The medication list has been reviewed with the patient by the medical assistant, and the list has been updated in the electronic medical record, which I reviewed.  Medication dosages and frequencies were confirmed to be accurate.    REVIEW OF SYSTEMS:  PAIN:  See Vital Signs below.  GENERAL:  No fevers, chills, night sweats, or unintended weight loss.  SKIN:  No rashes or non-healing lesions.  HEME/LYMPH:  No abnormal bleeding.  No palpable lymphadenopathy.  EYES:  No vision changes or diplopia.  ENT:  No sore throat or difficulty swallowing.  RESPIRATORY:  No cough, shortness of breath, hemoptysis, or wheezing.  CARDIOVASCULAR:  No chest pain, palpitations, orthopnea, or dyspnea on exertion.  GASTROINTESTINAL: Postoperative abdominal discomfort.  GENITOURINARY:  No dysuria or hematuria.  MUSCULOSKELETAL: Limited range of motion of the left shoulder.  NEUROLOGIC:  No dizziness, loss of consciousness, or seizures.  PSYCHIATRIC:  No depression, anxiety, or mood changes.    Vitals:    10/09/19 0804   BP: 162/81   Pulse: 83   Resp: 16   Temp: 98.5 °F (36.9 °C)   TempSrc: Oral   SpO2: 98%   Weight: 106 kg (233 lb 6.4 oz)   Height: 176 cm (69.29\")   PainSc: 0-No pain  Comment: lupus       PHYSICAL EXAMINATION:  GENERAL:  Well-developed " well-nourished male; awake, alert and oriented, in no acute distress.  SKIN:  Warm and dry, without rashes, purpura, or petechiae.  HEAD:  Normocephalic, atraumatic.  EARS:  Hearing intact.  NOSE:  Septum midline.  No excoriations or nasal discharge.  MOUTH:  No stomatitis or ulcers.  Lips are normal.  THROAT:  Oropharynx without lesions or exudates.  NECK:  Supple with good range of motion; no thyromegaly or masses; no JVD or bruits.  LYMPHATICS:  No cervical, supraclavicular, axillary lymphadenopathy.  CHEST:  Lungs are clear to auscultation bilaterally.  No wheezes, rales, or rhonchi.  HEART:  Regular rate; normal rhythm.  No murmurs, gallops or rubs.  ABDOMEN: Soft.  Appropriate postoperative tenderness to palpation.  Healing surgical incisions.  Normal active bowel sounds.  EXTREMITIES:  No clubbing, cyanosis, or edema.  NEUROLOGICAL:  No focal neurologic deficits.    DIAGNOSTIC DATA:  Results for orders placed or performed in visit on 10/09/19   Retic With IRF & RET-He   Result Value Ref Range    Immature Reticulocyte Fraction 38.5 (H) 3.0 - 15.8 %    Reticulocyte % 3.39 (H) 0.70 - 1.90 %    Reticulocyte Hgb 30.7 29.8 - 36.1 pg   CBC Auto Differential   Result Value Ref Range    WBC 8.21 3.40 - 10.80 10*3/mm3    RBC 3.38 (L) 4.14 - 5.80 10*6/mm3    Hemoglobin 8.5 (L) 13.0 - 17.7 g/dL    Hematocrit 28.3 (L) 37.5 - 51.0 %    MCV 83.7 79.0 - 97.0 fL    MCH 25.1 (L) 26.6 - 33.0 pg    MCHC 30.0 (L) 31.5 - 35.7 g/dL    RDW 26.1 (H) 12.3 - 15.4 %    RDW-SD 75.3 (H) 37.0 - 54.0 fl    MPV 8.0 6.0 - 12.0 fL    Platelets 792 (H) 140 - 450 10*3/mm3    Neutrophil % 60.7 42.7 - 76.0 %    Lymphocyte % 22.2 19.6 - 45.3 %    Monocyte % 10.5 5.0 - 12.0 %    Eosinophil % 5.6 0.3 - 6.2 %    Basophil % 0.6 0.0 - 1.5 %    Immature Grans % 0.4 0.0 - 0.5 %    Neutrophils, Absolute 4.99 1.70 - 7.00 10*3/mm3    Lymphocytes, Absolute 1.82 0.70 - 3.10 10*3/mm3    Monocytes, Absolute 0.86 0.10 - 0.90 10*3/mm3    Eosinophils, Absolute 0.46  (H) 0.00 - 0.40 10*3/mm3    Basophils, Absolute 0.05 0.00 - 0.20 10*3/mm3    Immature Grans, Absolute 0.03 0.00 - 0.05 10*3/mm3    nRBC 0.0 0.0 - 0.2 /100 WBC       IMAGING: Images personally reviewed.  CT images of the chest on 9/25/2019 without definite evidence for metastatic disease.  There is one nodule stable since 2010.  A second nodule is felt to be benign with repeat imaging in 6 months suggested.    IMPRESSION:  The primary sigmoid neoplasm is not well delineated on CT. A  few foci of extraluminal air are seen adjacent to the mid sigmoid colon  likely a result of recent biopsy of the neoplasm. No convincing evidence  of metastatic disease is seen within the chest, abdomen and pelvis.  There are 2 noncalcified lung nodules one of which is definitely  unchanged from 2010 suggestive of a benign nodule. The second nodule is  not definitively identified, however is favored to be benign as well.  This can be reevaluated by chest CT in 6 months.    ASSESSMENT:  This is a 64 y.o. male with:  1.  History of unprovoked pulmonary embolism with a positive lupus anticoagulant test in June 2010.  Chronically anticoagulated with Xarelto 20 mg daily.  Last annual follow-up with Dr. Monroy on 9/26/2018.    2.  Iron deficiency anemia diagnosed at the time of hospitalization on 9/19/2019.  He received a total of 900 mg of intravenous Venofer.  Repeat iron studies and a ferritin level today with further intravenous iron if necessary.  He remains anemic but this is slowly improving.    3.  Stage IIIa (pT1, pN1b) adenocarcinoma of the sigmoid colon status post laparoscopic low anterior resection on 9/26/2019.  1.6 x 1.4 cm tumor, grade 2, moderately differentiated with widely negative margins with 2 out of 15 lymph nodes positive for metastatic disease without evidence for extracapsular extension, greatest measuring 3.5 mm.  Microsatellite stable.    I discussed the diagnosis with him and his wife at length today.  I explained  that because the lymph nodes are positive he does have stage III disease in spite of the fact that his primary tumor was quite small.  I explained that for stage III colon cancer we typically recommend adjuvant therapy.  However, I do believe that he has low risk disease based on pathology findings.  Therefore, per NCCN guidelines, it is reasonable to consider 3 months of therapy with CAPEOX rather than 6 months of therapy with FOLFOX.  The patient has wife are in agreement with this approach today.  He is less than 2 weeks after his surgery.  He needs to improve over the next few weeks before we consider starting therapy.  His hemoglobin also needs to improve and I think it will do so over the next few weeks.  I explained potential adverse effects of the chemotherapy regimen to him and his wife today.  I explained the possibility of cytopenias, skin changes particularly to the hands and feet, and GI toxicity.    4.  Acute right upper extremity superficial thrombophlebitis in the cephalic vein diagnosed on 9/27/2019.  Acute left upper extremity DVT in the brachial vein and acute left upper extremity superficial thrombophlebitis in the basilic vein on 9/27/2019.  Associated with an intravenous catheter.  He continues Xarelto 15 mg twice daily for 3 weeks total and then he will resume 20 mg daily.    PLAN:  1.  We will see him back in about 1 month for follow-up with labs and plans to begin adjuvant therapy with CAPEOX at that time.  We will plan for four 21-day cycles of therapy for a total of 3 months of therapy.  Dosing will be oxaliplatin 130 mg per metered squared every 21 days and Xeloda 850 mg per metered squared twice daily for 14 out of 21 days, rounding up slightly to 2000 mg twice daily.  He will have a teaching session with one of our nurse practitioners in a couple of weeks with a CBC that day to make sure that his hemoglobin is improving appropriately.    2.  Continue Xarelto.  Plan to repeat a left  upper extremity venous duplex about 3 months after his DVT which will be at the end of December.

## 2019-10-09 NOTE — PROGRESS NOTES
Staff message rec from Dr Lares-pt will need to start Xeloda. See below.    Carter Lares MD sent to [facility]             Going to start him on CAPEOX on Oct 31.  Dosing for Xeloda will be 2000 mg bid 14 days on and 7 days off.  Plan four cycles.       Thanks!  AJ      I have submitted a PA to [employer] through covermymeds.    Waiting for a decision.

## 2019-10-10 ENCOUNTER — DOCUMENTATION (OUTPATIENT)
Dept: ONCOLOGY | Facility: CLINIC | Age: 64
End: 2019-10-10

## 2019-10-10 RX ORDER — CAPECITABINE 500 MG/1
TABLET, FILM COATED ORAL
Qty: 112 TABLET | Refills: 3 | Status: SHIPPED | OUTPATIENT
Start: 2019-10-10 | End: 2020-07-15

## 2019-10-10 NOTE — PROGRESS NOTES
Capecitabine approved from 10/10/19-10/9/20-[employer]    I have escribed the rx to [facility] for processing.    I left pt a VM on the mobile number asking for a return call back. I wanted to let him know the status.

## 2019-10-11 ENCOUNTER — READMISSION MANAGEMENT (OUTPATIENT)
Dept: CALL CENTER | Facility: HOSPITAL | Age: 64
End: 2019-10-11

## 2019-10-11 NOTE — OUTREACH NOTE
General Surgery Week 2 Survey      Responses   Facility patient discharged from?  [facility]   Does the patient have one of the following disease processes/diagnoses(primary or secondary)?  General Surgery   Week 2 attempt successful?  Yes   Call start time  1106   Call end time  1108   Discharge diagnosis  Malignant neoplasm of sigmoid colon, acute DVT of upper extremity, colonic mass, symptomatic anemia, hx. of PE. HLD, HTN, Thombophilia, chronic anticoagulation, s/p Colon resection   Meds reviewed with patient/caregiver?  Yes   Is the patient taking all medications as directed (includes completed medication regime)?  Yes   Has the patient kept scheduled appointments due by today?  N/A   Comments  Tuesday 10/15/2019 with surgeon   What is the patient's perception of their health status since discharge?  Improving   Week 2 call completed?  Yes          Mallory Tyler RN

## 2019-10-15 ENCOUNTER — OFFICE VISIT (OUTPATIENT)
Dept: SURGERY | Facility: CLINIC | Age: 64
End: 2019-10-15

## 2019-10-15 VITALS
OXYGEN SATURATION: 98 % | DIASTOLIC BLOOD PRESSURE: 72 MMHG | HEIGHT: 69 IN | TEMPERATURE: 97.9 F | SYSTOLIC BLOOD PRESSURE: 130 MMHG | BODY MASS INDEX: 34.7 KG/M2 | HEART RATE: 70 BPM | WEIGHT: 234.3 LBS

## 2019-10-15 DIAGNOSIS — Z79.01 CHRONIC ANTICOAGULATION: ICD-10-CM

## 2019-10-15 DIAGNOSIS — C18.7 MALIGNANT NEOPLASM OF SIGMOID COLON (HCC): Primary | ICD-10-CM

## 2019-10-15 PROCEDURE — 99024 POSTOP FOLLOW-UP VISIT: CPT | Performed by: COLON & RECTAL SURGERY

## 2019-10-15 NOTE — PROGRESS NOTES
"Jared Rose is a 64 y.o. male in for follow up of Malignant neoplasm of sigmoid colon (CMS/HCC)    Chronic anticoagulation   s/p sigmoid colon resection 9/26/2019 for T1N1 sigmoid colon cancer    Pt states his energy is good, appetite is good    He does have some oozing from abdominal wound    His BMs are irregular: he has 5-6 BMs per day, sometimes formed, sometimes loose  He is not taking a fiber supplement or imodium    No f/c    No n/v    He is on xarelto    He saw onc Dr. Lares    /72 (BP Location: Right arm, Patient Position: Sitting, Cuff Size: Adult)   Pulse 70   Temp 97.9 °F (36.6 °C) (Oral)   Ht 176 cm (69.29\")   Wt 106 kg (234 lb 4.8 oz)   SpO2 98%   BMI 34.31 kg/m²   Body mass index is 34.31 kg/m².      PE:  Physical Exam   Constitutional: He appears well-developed. No distress.   HENT:   Head: Normocephalic and atraumatic.   Abdominal:   -s/nt/nd  -midline vertical incision c/d/i, healing well  -umbilical lap site with hematoma, no purulence: opened and packed   Musculoskeletal: Normal range of motion.   Neurological: He is alert.   Psychiatric: Thought content normal.         Assessment:   1. Malignant neoplasm of sigmoid colon (CMS/HCC)    2. Chronic anticoagulation    s/p sigmoid colon resection 9/26/2019 for T1N1 sigmoid colon cancer     Plan:    He is healing well.  Opened hematoma at umbilical lap site; he will need to pack with gauze daily.  Will assess RTW at next appt.    Call or come in if any questions or concerning symptoms      RTC 3 weeks    Scribed for Isabella Whitt MD by Neida Oscar PA-C  10/15/2019   This patient was evaluated by me, recommendations made, documentation reviewed, edited, and revised by me, Isabella Whitt MD        "

## 2019-10-19 ENCOUNTER — READMISSION MANAGEMENT (OUTPATIENT)
Dept: CALL CENTER | Facility: HOSPITAL | Age: 64
End: 2019-10-19

## 2019-10-19 NOTE — OUTREACH NOTE
General Surgery Week 3 Survey      Responses   Facility patient discharged from?  [facility]   Does the patient have one of the following disease processes/diagnoses(primary or secondary)?  General Surgery   Week 3 attempt successful?  No   Unsuccessful attempts  Attempt 1          Sabrina Moscoso RN

## 2019-10-22 ENCOUNTER — READMISSION MANAGEMENT (OUTPATIENT)
Dept: CALL CENTER | Facility: HOSPITAL | Age: 64
End: 2019-10-22

## 2019-10-22 NOTE — OUTREACH NOTE
General Surgery Week 3 Survey      Responses   Facility patient discharged from?  [facility]   Does the patient have one of the following disease processes/diagnoses(primary or secondary)?  General Surgery   Week 3 attempt successful?  Yes   Call start time  1258   Call end time  1258   Discharge diagnosis  Malignant neoplasm of sigmoid colon, acute DVT of upper extremity, colonic mass, symptomatic anemia, hx. of PE. HLD, HTN, Thombophilia, chronic anticoagulation, s/p Colon resection   What is the patient's perception of their health status since discharge?  Improving   Additional teach back comments  Pt says he is doing really well, no questions or concerns at this time.   Week 3 call completed?  Yes   Wrap up additional comments  quick call with patient.          Renu Street RN

## 2019-10-24 ENCOUNTER — DOCUMENTATION (OUTPATIENT)
Dept: ONCOLOGY | Facility: CLINIC | Age: 64
End: 2019-10-24

## 2019-10-25 ENCOUNTER — OFFICE VISIT (OUTPATIENT)
Dept: ONCOLOGY | Facility: CLINIC | Age: 64
End: 2019-10-25

## 2019-10-25 ENCOUNTER — LAB (OUTPATIENT)
Dept: LAB | Facility: HOSPITAL | Age: 64
End: 2019-10-25

## 2019-10-25 VITALS — WEIGHT: 230.8 LBS | BODY MASS INDEX: 33.8 KG/M2

## 2019-10-25 DIAGNOSIS — D68.62 LUPUS ANTICOAGULANT DISORDER (HCC): ICD-10-CM

## 2019-10-25 DIAGNOSIS — C18.7 MALIGNANT NEOPLASM OF SIGMOID COLON (HCC): ICD-10-CM

## 2019-10-25 DIAGNOSIS — I82.622 ACUTE DEEP VEIN THROMBOSIS (DVT) OF BRACHIAL VEIN OF LEFT UPPER EXTREMITY (HCC): ICD-10-CM

## 2019-10-25 DIAGNOSIS — C18.7 MALIGNANT NEOPLASM OF SIGMOID COLON (HCC): Primary | ICD-10-CM

## 2019-10-25 DIAGNOSIS — D50.0 IRON DEFICIENCY ANEMIA DUE TO CHRONIC BLOOD LOSS: ICD-10-CM

## 2019-10-25 LAB
BASOPHILS # BLD AUTO: 0.05 10*3/MM3 (ref 0–0.2)
BASOPHILS NFR BLD AUTO: 1.1 % (ref 0–1.5)
EOSINOPHIL # BLD AUTO: 0.26 10*3/MM3 (ref 0–0.4)
EOSINOPHIL NFR BLD AUTO: 5.9 % (ref 0.3–6.2)
HCT VFR BLD AUTO: 34.4 % (ref 37.5–51)
HGB BLD-MCNC: 11 G/DL (ref 13–17.7)
IMM GRANULOCYTES # BLD AUTO: 0.02 10*3/MM3 (ref 0–0.05)
IMM GRANULOCYTES NFR BLD AUTO: 0.5 % (ref 0–0.5)
LYMPHOCYTES # BLD AUTO: 1.63 10*3/MM3 (ref 0.7–3.1)
LYMPHOCYTES NFR BLD AUTO: 36.7 % (ref 19.6–45.3)
MCH RBC QN AUTO: 26.4 PG (ref 26.6–33)
MCHC RBC AUTO-ENTMCNC: 32 G/DL (ref 31.5–35.7)
MCV RBC AUTO: 82.5 FL (ref 79–97)
MONOCYTES # BLD AUTO: 0.45 10*3/MM3 (ref 0.1–0.9)
MONOCYTES NFR BLD AUTO: 10.1 % (ref 5–12)
NEUTROPHILS # BLD AUTO: 2.03 10*3/MM3 (ref 1.7–7)
NEUTROPHILS NFR BLD AUTO: 45.7 % (ref 42.7–76)
NRBC BLD AUTO-RTO: 0 /100 WBC (ref 0–0.2)
PLATELET # BLD AUTO: 278 10*3/MM3 (ref 140–450)
PMV BLD AUTO: 9.6 FL (ref 6–12)
RBC # BLD AUTO: 4.17 10*6/MM3 (ref 4.14–5.8)
WBC NRBC COR # BLD: 4.44 10*3/MM3 (ref 3.4–10.8)

## 2019-10-25 PROCEDURE — G0463 HOSPITAL OUTPT CLINIC VISIT: HCPCS | Performed by: NURSE PRACTITIONER

## 2019-10-25 PROCEDURE — 85025 COMPLETE CBC W/AUTO DIFF WBC: CPT

## 2019-10-25 PROCEDURE — 36415 COLL VENOUS BLD VENIPUNCTURE: CPT

## 2019-10-25 PROCEDURE — 99215 OFFICE O/P EST HI 40 MIN: CPT | Performed by: NURSE PRACTITIONER

## 2019-10-25 RX ORDER — ONDANSETRON HYDROCHLORIDE 8 MG/1
8 TABLET, FILM COATED ORAL 3 TIMES DAILY PRN
Qty: 30 TABLET | Refills: 5 | Status: SHIPPED | OUTPATIENT
Start: 2019-10-25 | End: 2020-07-23

## 2019-10-25 NOTE — PROGRESS NOTES
Subjective     PATIENT NAME:  Jared Rose  YOB: 1955  PATIENTS AGE:  64 y.o.  PATIENTS SEX:  male  DATE OF SERVICE:  10/25/2019  PROVIDER:  MALIA Nath      ____________________PATIENT EDUCATION____________________    PATIENT EDUCATION:  Today I met with the patient to discuss the chemotherapy regimen recommended for treatment of his disease.  The patient was given explanation of treatment premed side effects including office policy that prohibits patients to drive if sedating medications are administered, MD explanation given regarding benefits, side effects, toxicities and goals of treatment.  The patient received a Chemotherapy/Biotherapy Plan Summary including diagnosis and specific treatment plan.    SIDE EFFECTS:  Common side effects were discussed with the patient and wife.  Discussion included hair loss/discoloration, anemia/fatigue, infection/chills/fever, appetite, bleeding risk/precautions, constipation, diarrhea, mouth sores, taste alteration, loss of appetite,nausea/vomiting, peripheral neuropathy, skin/nail changes, rash, muscle aches/weakness, photosensitivity, liver damage, lung damage, kidney damage, DVT/PE risk, fluid retention, pleural/pericardial effusion, somnolence, electrolyte/LFT imbalance, vein exercises and/or the possible need for vascular access/port placement.  The patient was advice that although uncommon, leakage of an infused medication from the vein or venous access device (port) may lead to skin breakdown and/or other tissue damage.  The patient was advised that he/she may have pain, bleeding, and/or bruising from the insertion of a needle in their vein or venous access device (port).  The patient was further advised that, in spite of proper technique, infection with redness and irritation may rarely occur at the site where the needle was inserted.  The patient was advised that if complications occur, additional medical treatment is  available.    Discussion also included side effects specific to drugs in the treatment plan, specifically Xeloda, Oxaliplatin.    Reproductive risks were discussed, including appropriate use of birth control and protection during sexual relations.    Additionally, we have reviewed CBC results with continued improvement in his hemoglobin, hemoglobin is improved to 11.0.    A total of 45 minutes were spent with the patient, with 100% of time spent in education and counseling.    Laxmi Spencer, APRN  10/25/2019

## 2019-10-31 ENCOUNTER — APPOINTMENT (OUTPATIENT)
Dept: ONCOLOGY | Facility: HOSPITAL | Age: 64
End: 2019-10-31

## 2019-10-31 ENCOUNTER — OFFICE VISIT (OUTPATIENT)
Dept: ONCOLOGY | Facility: CLINIC | Age: 64
End: 2019-10-31

## 2019-10-31 ENCOUNTER — INFUSION (OUTPATIENT)
Dept: ONCOLOGY | Facility: HOSPITAL | Age: 64
End: 2019-10-31

## 2019-10-31 VITALS
TEMPERATURE: 98.3 F | SYSTOLIC BLOOD PRESSURE: 146 MMHG | HEIGHT: 69 IN | RESPIRATION RATE: 18 BRPM | DIASTOLIC BLOOD PRESSURE: 80 MMHG | WEIGHT: 235.3 LBS | HEART RATE: 81 BPM | BODY MASS INDEX: 34.85 KG/M2 | OXYGEN SATURATION: 97 %

## 2019-10-31 DIAGNOSIS — C18.7 MALIGNANT NEOPLASM OF SIGMOID COLON (HCC): ICD-10-CM

## 2019-10-31 DIAGNOSIS — C18.7 MALIGNANT NEOPLASM OF SIGMOID COLON (HCC): Primary | ICD-10-CM

## 2019-10-31 DIAGNOSIS — D68.62 LUPUS ANTICOAGULANT DISORDER (HCC): ICD-10-CM

## 2019-10-31 LAB
ALBUMIN SERPL-MCNC: 4.1 G/DL (ref 3.5–5.2)
ALBUMIN/GLOB SERPL: 1.2 G/DL (ref 1.1–2.4)
ALP SERPL-CCNC: 88 U/L (ref 38–116)
ALT SERPL W P-5'-P-CCNC: 20 U/L (ref 0–41)
ANION GAP SERPL CALCULATED.3IONS-SCNC: 11 MMOL/L (ref 5–15)
AST SERPL-CCNC: 17 U/L (ref 0–40)
BASOPHILS # BLD AUTO: 0.03 10*3/MM3 (ref 0–0.2)
BASOPHILS NFR BLD AUTO: 0.6 % (ref 0–1.5)
BILIRUB SERPL-MCNC: 0.6 MG/DL (ref 0.2–1.2)
BUN BLD-MCNC: 13 MG/DL (ref 6–20)
BUN/CREAT SERPL: 12.4 (ref 7.3–30)
CALCIUM SPEC-SCNC: 9.4 MG/DL (ref 8.5–10.2)
CHLORIDE SERPL-SCNC: 102 MMOL/L (ref 98–107)
CO2 SERPL-SCNC: 25 MMOL/L (ref 22–29)
CREAT BLD-MCNC: 1.05 MG/DL (ref 0.7–1.3)
EOSINOPHIL # BLD AUTO: 0.29 10*3/MM3 (ref 0–0.4)
EOSINOPHIL NFR BLD AUTO: 6 % (ref 0.3–6.2)
ERYTHROCYTE [DISTWIDTH] IN BLOOD BY AUTOMATED COUNT: ABNORMAL %
GFR SERPL CREATININE-BSD FRML MDRD: 86 ML/MIN/1.73
GLOBULIN UR ELPH-MCNC: 3.5 GM/DL (ref 1.8–3.5)
GLUCOSE BLD-MCNC: 109 MG/DL (ref 74–124)
HCT VFR BLD AUTO: 33.6 % (ref 37.5–51)
HGB BLD-MCNC: 10.6 G/DL (ref 13–17.7)
IMM GRANULOCYTES # BLD AUTO: 0.01 10*3/MM3 (ref 0–0.05)
IMM GRANULOCYTES NFR BLD AUTO: 0.2 % (ref 0–0.5)
LYMPHOCYTES # BLD AUTO: 1.73 10*3/MM3 (ref 0.7–3.1)
LYMPHOCYTES NFR BLD AUTO: 35.7 % (ref 19.6–45.3)
MCH RBC QN AUTO: 26.8 PG (ref 26.6–33)
MCHC RBC AUTO-ENTMCNC: 31.5 G/DL (ref 31.5–35.7)
MCV RBC AUTO: 84.8 FL (ref 79–97)
MONOCYTES # BLD AUTO: 0.68 10*3/MM3 (ref 0.1–0.9)
MONOCYTES NFR BLD AUTO: 14 % (ref 5–12)
NEUTROPHILS # BLD AUTO: 2.11 10*3/MM3 (ref 1.7–7)
NEUTROPHILS NFR BLD AUTO: 43.5 % (ref 42.7–76)
NRBC BLD AUTO-RTO: 0 /100 WBC (ref 0–0.2)
PLATELET # BLD AUTO: 179 10*3/MM3 (ref 140–450)
PMV BLD AUTO: 8.9 FL (ref 6–12)
POTASSIUM BLD-SCNC: 4 MMOL/L (ref 3.5–4.7)
PROT SERPL-MCNC: 7.6 G/DL (ref 6.3–8)
RBC # BLD AUTO: 3.96 10*6/MM3 (ref 4.14–5.8)
SODIUM BLD-SCNC: 138 MMOL/L (ref 134–145)
WBC NRBC COR # BLD: 4.85 10*3/MM3 (ref 3.4–10.8)

## 2019-10-31 PROCEDURE — 36415 COLL VENOUS BLD VENIPUNCTURE: CPT

## 2019-10-31 PROCEDURE — 80053 COMPREHEN METABOLIC PANEL: CPT

## 2019-10-31 PROCEDURE — 99214 OFFICE O/P EST MOD 30 MIN: CPT | Performed by: INTERNAL MEDICINE

## 2019-10-31 PROCEDURE — 85025 COMPLETE CBC W/AUTO DIFF WBC: CPT

## 2019-10-31 NOTE — PROGRESS NOTES
[facility] OUTPATIENT FOLLOW UP CLINIC VISIT    REASON FOR FOLLOW-UP:    1.  History of unprovoked pulmonary embolism with a positive lupus anticoagulant test in June 2010.  Chronically anticoagulated with Xarelto 20 mg daily.  Last annual follow-up with Dr. Monroy on 9/26/2018.  2.  Iron deficiency anemia diagnosed at the time of hospitalization on 9/19/2019.  He received a total of 900 mg of intravenous Venofer.  3.  Stage IIIa (pT1, pN1b) adenocarcinoma of the sigmoid colon status post laparoscopic low anterior resection on 9/26/2019.  1.6 x 1.4 cm tumor, grade 2, moderately differentiated with widely negative margins with 2 out of 15 lymph nodes positive for metastatic disease without evidence for extracapsular extension, greatest measuring 3.5 mm.  Microsatellite stable.  4.  Acute right upper extremity superficial thrombophlebitis in the cephalic vein diagnosed on 9/27/2019.  5.  Acute left upper extremity DVT in the brachial vein and acute left upper extremity superficial thrombophlebitis in the basilic vein on 9/27/2019.  Associated with an intravenous catheter.  On Xarelto.    HISTORY OF PRESENT ILLNESS:  Jared Rose is a 64 y.o. male who returns today for follow up of the above issues.  He was previously following with Dr. Monroy annually for a history of unprovoked venous thromboembolism with a positive lupus anticoagulant test in June 2010.     He returns today to initiate adjuvant therapy with CAPEOX.      Overall he is doing very well and for the most part has healed nicely after his procedure.  He does have one open surgical incision that has required packing.  This is improving.  There is some bloody drainage from this today which apparently is new.  He did take his first dose of Xeloda this morning.        ALLERGIES:  Allergies   Allergen Reactions   • Adhesive Tape Other (See Comments)     blisters       MEDICATIONS:  The medication list has been reviewed with the patient by the  "medical assistant, and the list has been updated in the electronic medical record, which I reviewed.  Medication dosages and frequencies were confirmed to be accurate.    REVIEW OF SYSTEMS:  PAIN:  See Vital Signs below.  GENERAL:  No fevers, chills, night sweats, or unintended weight loss.  SKIN: Abdominal wound closing by secondary intention and requiring packing at this point  HEME/LYMPH:  No abnormal bleeding.  No palpable lymphadenopathy.  EYES:  No vision changes or diplopia.  ENT:  No sore throat or difficulty swallowing.  RESPIRATORY:  No cough, shortness of breath, hemoptysis, or wheezing.  CARDIOVASCULAR:  No chest pain, palpitations, orthopnea, or dyspnea on exertion.  GASTROINTESTINAL: Postoperative abdominal discomfort.  GENITOURINARY:  No dysuria or hematuria.  MUSCULOSKELETAL: Limited range of motion of the left shoulder.  NEUROLOGIC:  No dizziness, loss of consciousness, or seizures.  PSYCHIATRIC:  No depression, anxiety, or mood changes.    Vitals:    10/31/19 0756   BP: 146/80   Pulse: 81   Resp: 18   Temp: 98.3 °F (36.8 °C)   TempSrc: Oral   SpO2: 97%   Weight: 107 kg (235 lb 4.8 oz)   Height: 176 cm (69.29\")   PainSc: 0-No pain       PHYSICAL EXAMINATION:  GENERAL:  Well-developed well-nourished male; awake, alert and oriented, in no acute distress.  SKIN:  Warm and dry, without rashes, purpura, or petechiae.   HEAD:  Normocephalic, atraumatic.  EARS:  Hearing intact.  NOSE:  Septum midline.  No excoriations or nasal discharge.  MOUTH:  No stomatitis or ulcers.  Lips are normal.  THROAT:  Oropharynx without lesions or exudates.  NECK:  Supple with good range of motion; no thyromegaly or masses; no JVD or bruits.  LYMPHATICS:  No cervical, supraclavicular, axillary lymphadenopathy.  CHEST:  Lungs are clear to auscultation bilaterally.  No wheezes, rales, or rhonchi.  HEART:  Regular rate; normal rhythm.  No murmurs, gallops or rubs.  ABDOMEN: Soft.  There is a bandage over the midline surgical " incision just cranial to the umbilicus.  The bandage has some bloody drainage today.  The wound is packed.  Wound measures about 1 cm.  EXTREMITIES:  No clubbing, cyanosis, or edema.  NEUROLOGICAL:  No focal neurologic deficits.    DIAGNOSTIC DATA:  Results for orders placed or performed in visit on 10/31/19   Comprehensive metabolic panel   Result Value Ref Range    Glucose 109 74 - 124 mg/dL    BUN 13 6 - 20 mg/dL    Creatinine 1.05 0.70 - 1.30 mg/dL    Sodium 138 134 - 145 mmol/L    Potassium 4.0 3.5 - 4.7 mmol/L    Chloride 102 98 - 107 mmol/L    CO2 25.0 22.0 - 29.0 mmol/L    Calcium 9.4 8.5 - 10.2 mg/dL    Total Protein 7.6 6.3 - 8.0 g/dL    Albumin 4.10 3.50 - 5.20 g/dL    ALT (SGPT) 20 0 - 41 U/L    AST (SGOT) 17 0 - 40 U/L    Alkaline Phosphatase 88 38 - 116 U/L    Total Bilirubin 0.6 0.2 - 1.2 mg/dL    eGFR  African Amer 86 >60 mL/min/1.73    Globulin 3.5 1.8 - 3.5 gm/dL    A/G Ratio 1.2 1.1 - 2.4 g/dL    BUN/Creatinine Ratio 12.4 7.3 - 30.0    Anion Gap 11.0 5.0 - 15.0 mmol/L   CBC Auto Differential   Result Value Ref Range    WBC 4.85 3.40 - 10.80 10*3/mm3    RBC 3.96 (L) 4.14 - 5.80 10*6/mm3    Hemoglobin 10.6 (L) 13.0 - 17.7 g/dL    Hematocrit 33.6 (L) 37.5 - 51.0 %    MCV 84.8 79.0 - 97.0 fL    MCH 26.8 26.6 - 33.0 pg    MCHC 31.5 31.5 - 35.7 g/dL    RDW      MPV 8.9 6.0 - 12.0 fL    Platelets 179 140 - 450 10*3/mm3    Neutrophil % 43.5 42.7 - 76.0 %    Lymphocyte % 35.7 19.6 - 45.3 %    Monocyte % 14.0 (H) 5.0 - 12.0 %    Eosinophil % 6.0 0.3 - 6.2 %    Basophil % 0.6 0.0 - 1.5 %    Immature Grans % 0.2 0.0 - 0.5 %    Neutrophils, Absolute 2.11 1.70 - 7.00 10*3/mm3    Lymphocytes, Absolute 1.73 0.70 - 3.10 10*3/mm3    Monocytes, Absolute 0.68 0.10 - 0.90 10*3/mm3    Eosinophils, Absolute 0.29 0.00 - 0.40 10*3/mm3    Basophils, Absolute 0.03 0.00 - 0.20 10*3/mm3    Immature Grans, Absolute 0.01 0.00 - 0.05 10*3/mm3    nRBC 0.0 0.0 - 0.2 /100 WBC       IMAGING: Images personally reviewed.  CT images of  the chest on 9/25/2019 without definite evidence for metastatic disease.  There is one nodule stable since 2010.  A second nodule is felt to be benign with repeat imaging in 6 months suggested.    IMPRESSION:  The primary sigmoid neoplasm is not well delineated on CT. A  few foci of extraluminal air are seen adjacent to the mid sigmoid colon  likely a result of recent biopsy of the neoplasm. No convincing evidence  of metastatic disease is seen within the chest, abdomen and pelvis.  There are 2 noncalcified lung nodules one of which is definitely  unchanged from 2010 suggestive of a benign nodule. The second nodule is  not definitively identified, however is favored to be benign as well.  This can be reevaluated by chest CT in 6 months.    ASSESSMENT:  This is a 64 y.o. male with:  1.  History of unprovoked pulmonary embolism with a positive lupus anticoagulant test in June 2010.  Chronically anticoagulated with Xarelto 20 mg daily.  Last annual follow-up with Dr. Monroy on 9/26/2018.    2.  Iron deficiency anemia diagnosed at the time of hospitalization on 9/19/2019.  He received a total of 900 mg of intravenous Venofer.  Repeat iron studies and a ferritin level today with further intravenous iron if necessary.  He remains anemic but this is slowly improving.    3.  Stage IIIa (pT1, pN1b) adenocarcinoma of the sigmoid colon status post laparoscopic low anterior resection on 9/26/2019.  1.6 x 1.4 cm tumor, grade 2, moderately differentiated with widely negative margins with 2 out of 15 lymph nodes positive for metastatic disease without evidence for extracapsular extension, greatest measuring 3.5 mm.  Microsatellite stable.    We discussed pursuing adjuvant therapy with CAPEOX for at least 3 months.  He was in agreement.  For now, we will plan for 4 cycles.  This is in accordance with NCCN guidelines.    Today, he does have a surgical site incision that is still healing by secondary intention and requiring packing.   There is some bloody drainage today.  I would like for this to heal up a little better before we get started with therapy.  Therefore, we will delay therapy by few weeks.    4.  Acute right upper extremity superficial thrombophlebitis in the cephalic vein diagnosed on 9/27/2019.  Acute left upper extremity DVT in the brachial vein and acute left upper extremity superficial thrombophlebitis in the basilic vein on 9/27/2019.  Associated with an intravenous catheter.  He continues Xarelto 15 mg twice daily for 3 weeks total and then he will resume 20 mg daily.    PLAN:  1.  Hold therapy today.  We will see him back in 2 to 3 weeks and at that time we will plan to begin adjuvant therapy with CAPEOX.  We will plan for four 21-day cycles of therapy for a total of 3 months of therapy.  Dosing will be oxaliplatin 130 mg per metered squared every 21 days and Xeloda 850 mg per metered squared twice daily for 14 out of 21 days, rounding up slightly to 2000 mg twice daily.    2.  Continue Xarelto.  Plan to repeat a left upper extremity venous duplex at the end of December.

## 2019-11-05 ENCOUNTER — OFFICE VISIT (OUTPATIENT)
Dept: SURGERY | Facility: CLINIC | Age: 64
End: 2019-11-05

## 2019-11-05 VITALS
WEIGHT: 237 LBS | SYSTOLIC BLOOD PRESSURE: 150 MMHG | TEMPERATURE: 97 F | HEIGHT: 69 IN | OXYGEN SATURATION: 98 % | DIASTOLIC BLOOD PRESSURE: 84 MMHG | HEART RATE: 74 BPM | BODY MASS INDEX: 35.1 KG/M2

## 2019-11-05 DIAGNOSIS — C18.7 MALIGNANT NEOPLASM OF SIGMOID COLON (HCC): Primary | ICD-10-CM

## 2019-11-05 DIAGNOSIS — Z79.01 CHRONIC ANTICOAGULATION: ICD-10-CM

## 2019-11-05 PROCEDURE — 99024 POSTOP FOLLOW-UP VISIT: CPT | Performed by: COLON & RECTAL SURGERY

## 2019-11-05 NOTE — PROGRESS NOTES
"Jared Rose is a 64 y.o. male in for follow up of Malignant neoplasm of sigmoid colon (CMS/HCC)    Chronic anticoagulation  s/p sigmoid colon resection 9/26/2019 for T1N1 sigmoid colon cancer  Energy good  Appetite good  Ready to go back to work    /84   Pulse 74   Temp 97 °F (36.1 °C)   Ht 175.3 cm (69\")   Wt 108 kg (237 lb)   SpO2 98%   BMI 35.00 kg/m²   Body mass index is 35 kg/m².      PE:  Physical Exam   Constitutional: He appears well-developed. No distress.   HENT:   Head: Normocephalic and atraumatic.   Abdominal: Soft. He exhibits no distension.   Incision well healed   Musculoskeletal: Normal range of motion.   Neurological: He is alert.   Psychiatric: Thought content normal.         Assessment:   1. Malignant neoplasm of sigmoid colon (CMS/HCC)    2. Chronic anticoagulation    s/p sigmoid colon resection 9/26/2019 for T1N1 sigmoid colon cancer     Plan:    Ok to start chemo  Ok to return to work  Cy in 1 year        "

## 2019-11-14 ENCOUNTER — SPECIALTY PHARMACY (OUTPATIENT)
Dept: PHARMACY | Facility: HOSPITAL | Age: 64
End: 2019-11-14

## 2019-11-20 DIAGNOSIS — C18.7 MALIGNANT NEOPLASM OF SIGMOID COLON (HCC): Primary | ICD-10-CM

## 2019-11-21 ENCOUNTER — OFFICE VISIT (OUTPATIENT)
Dept: ONCOLOGY | Facility: CLINIC | Age: 64
End: 2019-11-21

## 2019-11-21 ENCOUNTER — INFUSION (OUTPATIENT)
Dept: ONCOLOGY | Facility: HOSPITAL | Age: 64
End: 2019-11-21

## 2019-11-21 VITALS
HEART RATE: 82 BPM | TEMPERATURE: 97.8 F | HEIGHT: 69 IN | RESPIRATION RATE: 16 BRPM | SYSTOLIC BLOOD PRESSURE: 137 MMHG | DIASTOLIC BLOOD PRESSURE: 77 MMHG | OXYGEN SATURATION: 99 % | WEIGHT: 239.6 LBS | BODY MASS INDEX: 35.49 KG/M2

## 2019-11-21 DIAGNOSIS — C18.7 MALIGNANT NEOPLASM OF SIGMOID COLON (HCC): ICD-10-CM

## 2019-11-21 DIAGNOSIS — C18.7 MALIGNANT NEOPLASM OF SIGMOID COLON (HCC): Primary | ICD-10-CM

## 2019-11-21 LAB
ALBUMIN SERPL-MCNC: 4 G/DL (ref 3.5–5.2)
ALBUMIN/GLOB SERPL: 1.1 G/DL (ref 1.1–2.4)
ALP SERPL-CCNC: 88 U/L (ref 38–116)
ALT SERPL W P-5'-P-CCNC: 35 U/L (ref 0–41)
ANION GAP SERPL CALCULATED.3IONS-SCNC: 11.4 MMOL/L (ref 5–15)
AST SERPL-CCNC: 30 U/L (ref 0–40)
BASOPHILS # BLD AUTO: 0.02 10*3/MM3 (ref 0–0.2)
BASOPHILS NFR BLD AUTO: 0.5 % (ref 0–1.5)
BILIRUB SERPL-MCNC: 0.5 MG/DL (ref 0.2–1.2)
BUN BLD-MCNC: 16 MG/DL (ref 6–20)
BUN/CREAT SERPL: 15.2 (ref 7.3–30)
CALCIUM SPEC-SCNC: 9.4 MG/DL (ref 8.5–10.2)
CHLORIDE SERPL-SCNC: 103 MMOL/L (ref 98–107)
CO2 SERPL-SCNC: 23.6 MMOL/L (ref 22–29)
CREAT BLD-MCNC: 1.05 MG/DL (ref 0.7–1.3)
DEPRECATED RDW RBC AUTO: 70.6 FL (ref 37–54)
EOSINOPHIL # BLD AUTO: 0.2 10*3/MM3 (ref 0–0.4)
EOSINOPHIL NFR BLD AUTO: 4.7 % (ref 0.3–6.2)
ERYTHROCYTE [DISTWIDTH] IN BLOOD BY AUTOMATED COUNT: 23.7 % (ref 12.3–15.4)
GFR SERPL CREATININE-BSD FRML MDRD: 86 ML/MIN/1.73
GLOBULIN UR ELPH-MCNC: 3.7 GM/DL (ref 1.8–3.5)
GLUCOSE BLD-MCNC: 159 MG/DL (ref 74–124)
HCT VFR BLD AUTO: 35.9 % (ref 37.5–51)
HGB BLD-MCNC: 11.2 G/DL (ref 13–17.7)
IMM GRANULOCYTES # BLD AUTO: 0.01 10*3/MM3 (ref 0–0.05)
IMM GRANULOCYTES NFR BLD AUTO: 0.2 % (ref 0–0.5)
LYMPHOCYTES # BLD AUTO: 1.57 10*3/MM3 (ref 0.7–3.1)
LYMPHOCYTES NFR BLD AUTO: 36.5 % (ref 19.6–45.3)
MCH RBC QN AUTO: 26.7 PG (ref 26.6–33)
MCHC RBC AUTO-ENTMCNC: 31.2 G/DL (ref 31.5–35.7)
MCV RBC AUTO: 85.7 FL (ref 79–97)
MONOCYTES # BLD AUTO: 0.58 10*3/MM3 (ref 0.1–0.9)
MONOCYTES NFR BLD AUTO: 13.5 % (ref 5–12)
NEUTROPHILS # BLD AUTO: 1.92 10*3/MM3 (ref 1.7–7)
NEUTROPHILS NFR BLD AUTO: 44.6 % (ref 42.7–76)
NRBC BLD AUTO-RTO: 0 /100 WBC (ref 0–0.2)
PLATELET # BLD AUTO: 327 10*3/MM3 (ref 140–450)
PMV BLD AUTO: 8.7 FL (ref 6–12)
POTASSIUM BLD-SCNC: 4 MMOL/L (ref 3.5–4.7)
PROT SERPL-MCNC: 7.7 G/DL (ref 6.3–8)
RBC # BLD AUTO: 4.19 10*6/MM3 (ref 4.14–5.8)
SODIUM BLD-SCNC: 138 MMOL/L (ref 134–145)
WBC NRBC COR # BLD: 4.3 10*3/MM3 (ref 3.4–10.8)

## 2019-11-21 PROCEDURE — 80053 COMPREHEN METABOLIC PANEL: CPT

## 2019-11-21 PROCEDURE — 96415 CHEMO IV INFUSION ADDL HR: CPT | Performed by: INTERNAL MEDICINE

## 2019-11-21 PROCEDURE — 25010000002 OXALIPLATIN PER 0.5 MG: Performed by: INTERNAL MEDICINE

## 2019-11-21 PROCEDURE — 85025 COMPLETE CBC W/AUTO DIFF WBC: CPT

## 2019-11-21 PROCEDURE — 96375 TX/PRO/DX INJ NEW DRUG ADDON: CPT | Performed by: INTERNAL MEDICINE

## 2019-11-21 PROCEDURE — 25010000002 PALONOSETRON PER 25 MCG: Performed by: INTERNAL MEDICINE

## 2019-11-21 PROCEDURE — 99215 OFFICE O/P EST HI 40 MIN: CPT | Performed by: INTERNAL MEDICINE

## 2019-11-21 PROCEDURE — 25010000002 DEXAMETHASONE SODIUM PHOSPHATE 100 MG/10ML SOLUTION: Performed by: INTERNAL MEDICINE

## 2019-11-21 PROCEDURE — 96413 CHEMO IV INFUSION 1 HR: CPT | Performed by: INTERNAL MEDICINE

## 2019-11-21 RX ORDER — DEXTROSE MONOHYDRATE 50 MG/ML
250 INJECTION, SOLUTION INTRAVENOUS ONCE
Status: CANCELLED | OUTPATIENT
Start: 2019-11-21

## 2019-11-21 RX ORDER — DEXTROSE MONOHYDRATE 50 MG/ML
250 INJECTION, SOLUTION INTRAVENOUS ONCE
Status: COMPLETED | OUTPATIENT
Start: 2019-11-21 | End: 2019-11-21

## 2019-11-21 RX ORDER — FAMOTIDINE 10 MG/ML
20 INJECTION, SOLUTION INTRAVENOUS AS NEEDED
Status: CANCELLED | OUTPATIENT
Start: 2019-11-21

## 2019-11-21 RX ORDER — PALONOSETRON 0.05 MG/ML
0.25 INJECTION, SOLUTION INTRAVENOUS ONCE
Status: CANCELLED | OUTPATIENT
Start: 2019-11-21

## 2019-11-21 RX ORDER — PALONOSETRON 0.05 MG/ML
0.25 INJECTION, SOLUTION INTRAVENOUS ONCE
Status: COMPLETED | OUTPATIENT
Start: 2019-11-21 | End: 2019-11-21

## 2019-11-21 RX ORDER — DIPHENHYDRAMINE HYDROCHLORIDE 50 MG/ML
50 INJECTION INTRAMUSCULAR; INTRAVENOUS AS NEEDED
Status: CANCELLED | OUTPATIENT
Start: 2019-11-21

## 2019-11-21 RX ADMIN — DEXAMETHASONE SODIUM PHOSPHATE 12 MG: 10 INJECTION, SOLUTION INTRAMUSCULAR; INTRAVENOUS at 08:47

## 2019-11-21 RX ADMIN — OXALIPLATIN 285 MG: 5 INJECTION, SOLUTION, CONCENTRATE INTRAVENOUS at 09:20

## 2019-11-21 RX ADMIN — PALONOSETRON 0.25 MG: 0.05 INJECTION, SOLUTION INTRAVENOUS at 08:47

## 2019-11-21 RX ADMIN — DEXTROSE MONOHYDRATE 250 ML: 5 INJECTION, SOLUTION INTRAVENOUS at 08:47

## 2019-11-21 NOTE — PROGRESS NOTES
[facility] OUTPATIENT FOLLOW UP CLINIC VISIT    REASON FOR FOLLOW-UP:    1.  History of unprovoked pulmonary embolism with a positive lupus anticoagulant test in June 2010.  Chronically anticoagulated with Xarelto 20 mg daily.  Last annual follow-up with Dr. Monroy on 9/26/2018.  2.  Iron deficiency anemia diagnosed at the time of hospitalization on 9/19/2019.  He received a total of 900 mg of intravenous Venofer.  3.  Stage IIIa (pT1, pN1b) adenocarcinoma of the sigmoid colon status post laparoscopic low anterior resection on 9/26/2019.  1.6 x 1.4 cm tumor, grade 2, moderately differentiated with widely negative margins with 2 out of 15 lymph nodes positive for metastatic disease without evidence for extracapsular extension, greatest measuring 3.5 mm.  Microsatellite stable.  4.  Acute right upper extremity superficial thrombophlebitis in the cephalic vein diagnosed on 9/27/2019.  5.  Acute left upper extremity DVT in the brachial vein and acute left upper extremity superficial thrombophlebitis in the basilic vein on 9/27/2019.  Associated with an intravenous catheter.  On Xarelto.    HISTORY OF PRESENT ILLNESS:  Jared Rose is a 64 y.o. male who returns today for follow up of the above issues.  He has been following with Dr. Monroy annually for a history of unprovoked venous thromboembolism with a positive lupus anticoagulant test in June 2010.     He returns today to initiate adjuvant therapy with CAPEOX, delayed due to slow wound healing from one of his surgical incision sites.  His surgical incision site has now healed and he is ready to begin therapy today.  He denies any new problems.      ALLERGIES:  Allergies   Allergen Reactions   • Adhesive Tape Other (See Comments)     blisters       MEDICATIONS:  The medication list has been reviewed with the patient by the medical assistant, and the list has been updated in the electronic medical record, which I reviewed.  Medication dosages and  "frequencies were confirmed to be accurate.    REVIEW OF SYSTEMS:  PAIN:  See Vital Signs below.  GENERAL:  No fevers, chills, night sweats, or unintended weight loss.  SKIN: Abdominal wound completely healed at this point  HEME/LYMPH:  No abnormal bleeding.  No palpable lymphadenopathy.  EYES:  No vision changes or diplopia.  ENT:  No sore throat or difficulty swallowing.  RESPIRATORY:  No cough, shortness of breath, hemoptysis, or wheezing.  CARDIOVASCULAR:  No chest pain, palpitations, orthopnea, or dyspnea on exertion.  GASTROINTESTINAL: Postoperative abdominal discomfort.  GENITOURINARY:  No dysuria or hematuria.  MUSCULOSKELETAL: Limited range of motion of the left shoulder.  NEUROLOGIC:  No dizziness, loss of consciousness, or seizures.  PSYCHIATRIC:  No depression, anxiety, or mood changes.    Vitals:    11/21/19 0807   BP: 137/77   Pulse: 82   Resp: 16   Temp: 97.8 °F (36.6 °C)   TempSrc: Oral   SpO2: 99%   Weight: 109 kg (239 lb 9.6 oz)   Height: 176 cm (69.29\")   PainSc: 0-No pain       PHYSICAL EXAMINATION:  GENERAL:  Well-developed well-nourished male; awake, alert and oriented, in no acute distress.  SKIN:  Warm and dry, without rashes, purpura, or petechiae.   HEAD:  Normocephalic, atraumatic.  EARS:  Hearing intact.  NOSE:  Septum midline.  No excoriations or nasal discharge.  MOUTH:  No stomatitis or ulcers.  Lips are normal.  THROAT:  Oropharynx without lesions or exudates.  NECK:  Supple with good range of motion; no thyromegaly or masses; no JVD or bruits.  LYMPHATICS:  No cervical, supraclavicular, axillary lymphadenopathy.  CHEST:  Lungs are clear to auscultation bilaterally.  No wheezes, rales, or rhonchi.  HEART:  Regular rate; normal rhythm.  No murmurs, gallops or rubs.  ABDOMEN: Soft.  Nontender.  Surgical incisions well-healed at this point.  EXTREMITIES:  No clubbing, cyanosis, or edema.  NEUROLOGICAL:  No focal neurologic deficits.    DIAGNOSTIC DATA:  Results for orders placed or " performed in visit on 11/21/19   Comprehensive Metabolic Panel   Result Value Ref Range    Glucose 159 (H) 74 - 124 mg/dL    BUN 16 6 - 20 mg/dL    Creatinine 1.05 0.70 - 1.30 mg/dL    Sodium 138 134 - 145 mmol/L    Potassium 4.0 3.5 - 4.7 mmol/L    Chloride 103 98 - 107 mmol/L    CO2 23.6 22.0 - 29.0 mmol/L    Calcium 9.4 8.5 - 10.2 mg/dL    Total Protein 7.7 6.3 - 8.0 g/dL    Albumin 4.00 3.50 - 5.20 g/dL    ALT (SGPT) 35 0 - 41 U/L    AST (SGOT) 30 0 - 40 U/L    Alkaline Phosphatase 88 38 - 116 U/L    Total Bilirubin 0.5 0.2 - 1.2 mg/dL    eGFR  African Amer 86 >60 mL/min/1.73    Globulin 3.7 (H) 1.8 - 3.5 gm/dL    A/G Ratio 1.1 1.1 - 2.4 g/dL    BUN/Creatinine Ratio 15.2 7.3 - 30.0    Anion Gap 11.4 5.0 - 15.0 mmol/L   CBC Auto Differential   Result Value Ref Range    WBC 4.30 3.40 - 10.80 10*3/mm3    RBC 4.19 4.14 - 5.80 10*6/mm3    Hemoglobin 11.2 (L) 13.0 - 17.7 g/dL    Hematocrit 35.9 (L) 37.5 - 51.0 %    MCV 85.7 79.0 - 97.0 fL    MCH 26.7 26.6 - 33.0 pg    MCHC 31.2 (L) 31.5 - 35.7 g/dL    RDW 23.7 (H) 12.3 - 15.4 %    RDW-SD 70.6 (H) 37.0 - 54.0 fl    MPV 8.7 6.0 - 12.0 fL    Platelets 327 140 - 450 10*3/mm3    Neutrophil % 44.6 42.7 - 76.0 %    Lymphocyte % 36.5 19.6 - 45.3 %    Monocyte % 13.5 (H) 5.0 - 12.0 %    Eosinophil % 4.7 0.3 - 6.2 %    Basophil % 0.5 0.0 - 1.5 %    Immature Grans % 0.2 0.0 - 0.5 %    Neutrophils, Absolute 1.92 1.70 - 7.00 10*3/mm3    Lymphocytes, Absolute 1.57 0.70 - 3.10 10*3/mm3    Monocytes, Absolute 0.58 0.10 - 0.90 10*3/mm3    Eosinophils, Absolute 0.20 0.00 - 0.40 10*3/mm3    Basophils, Absolute 0.02 0.00 - 0.20 10*3/mm3    Immature Grans, Absolute 0.01 0.00 - 0.05 10*3/mm3    nRBC 0.0 0.0 - 0.2 /100 WBC       IMAGING: Images personally reviewed.  CT images of the chest on 9/25/2019 without definite evidence for metastatic disease.  There is one nodule stable since 2010.  A second nodule is felt to be benign with repeat imaging in 6 months suggested.    IMPRESSION:  The  primary sigmoid neoplasm is not well delineated on CT. A  few foci of extraluminal air are seen adjacent to the mid sigmoid colon  likely a result of recent biopsy of the neoplasm. No convincing evidence  of metastatic disease is seen within the chest, abdomen and pelvis.  There are 2 noncalcified lung nodules one of which is definitely  unchanged from 2010 suggestive of a benign nodule. The second nodule is  not definitively identified, however is favored to be benign as well.  This can be reevaluated by chest CT in 6 months.    ASSESSMENT:  This is a 64 y.o. male with:  1.  History of unprovoked pulmonary embolism with a positive lupus anticoagulant test in June 2010.  Chronically anticoagulated with Xarelto 20 mg daily.  Last annual follow-up with Dr. Monroy on 9/26/2018.    2.  Iron deficiency anemia diagnosed at the time of hospitalization on 9/19/2019.  He received a total of 900 mg of intravenous Venofer.  Iron studies and ferritin were normal.  Anemia improving    3.  Stage IIIa (pT1, pN1b) adenocarcinoma of the sigmoid colon status post laparoscopic low anterior resection on 9/26/2019.  1.6 x 1.4 cm tumor, grade 2, moderately differentiated with widely negative margins with 2 out of 15 lymph nodes positive for metastatic disease without evidence for extracapsular extension, greatest measuring 3.5 mm.  Microsatellite stable.    We discussed pursuing adjuvant therapy with CAPEOX for at least 3 months.  He was in agreement.  For now, we will plan for 4 cycles.  This is in accordance with NCCN guidelines.    Proceed with cycle #1 today.    4.  Acute right upper extremity superficial thrombophlebitis in the cephalic vein diagnosed on 9/27/2019.  Acute left upper extremity DVT in the brachial vein and acute left upper extremity superficial thrombophlebitis in the basilic vein on 9/27/2019.  Associated with an intravenous catheter.  He continues Xarelto 20 mg daily.    PLAN:  1.  Proceed with CAPEOX cycle number 1  day 1 today.  We will plan for four 21-day cycles of therapy for a total of 3 months of therapy.  Dosing will be oxaliplatin 130 mg per metered squared every 21 days and Xeloda 850 mg per metered squared twice daily for 14 out of 21 days, rounding up slightly to 2000 mg twice daily.  I again advised him today as potential adverse effects and encouraged him to call us with any symptoms that he is not able to adequately manage at home.    2.  Continue Xarelto.  Plan to repeat a left upper extremity venous duplex at the end of December.    I will see him back in 3 weeks for cycle #2.

## 2019-11-21 NOTE — PROGRESS NOTES
1125-Patient c/o numbness above left peripheral IV site that ran up arm. Oxaliplatin stopped and unhooked from IV. Patient stated his arm looked swollen and puffy. Positive blood return from IV. Called pharmacy-said to aspirate as much oxaliplatin as possible, apply heat compress and elevate. Aspirated 2 cc's Oxalipaltin from IV site and IV discontinued. Charge nurse looked at site and flare reaction from peripheral oxaliplatin suspected. Warm heat compress applied, and pillow given to elevate left arm.  1144: New IV restarted on right arm and D5 ran concurrently to dilute oxaliplatin for the remainder of drug. Patient tolerated well.     Will notify pharmacy to dilute oxaliplatin in 500 ml for remaining 3 treatments. Instructed patient to use heat compresses and elevate arm to help with pain. Pt. V/u

## 2019-11-25 ENCOUNTER — TELEPHONE (OUTPATIENT)
Dept: ONCOLOGY | Facility: HOSPITAL | Age: 64
End: 2019-11-25

## 2019-12-04 ENCOUNTER — TELEPHONE (OUTPATIENT)
Dept: ONCOLOGY | Facility: HOSPITAL | Age: 64
End: 2019-12-04

## 2019-12-04 NOTE — TELEPHONE ENCOUNTER
Pt spoke with Ashley. Ashley states pt has bruise on his arm that's getting darker and fingertips are grey. I called pt. He states on his left arm where he had his chemo the other day is now getting darker. After chemo infiltrated he states it was red and now dark. Pt denies any swelling or pain to that arm. He is no longer using warm compresses as the area is not painful. I informed him that it sounds as if the area around the IV site is healing and that it may take a couple weeks for that bruise to go away. He also reports all of his fingers underneath his nail beds are grey. Denies any numbness or tingling in his finger tips (states when he touches something cold his fingers tingle for a minute and then return to normal) and they are not painful.     Spoke with Junie ORDOÑEZ. She states she has seen in past with 5FU changing the pigmentation of the skin, she feels it may be a side effect of the chemo. Suggest to show it to Dr. Lares on next appt. Informed pt. Pt v/u

## 2019-12-05 ENCOUNTER — SPECIALTY PHARMACY (OUTPATIENT)
Dept: PHARMACY | Facility: HOSPITAL | Age: 64
End: 2019-12-05

## 2019-12-12 ENCOUNTER — OFFICE VISIT (OUTPATIENT)
Dept: ONCOLOGY | Facility: CLINIC | Age: 64
End: 2019-12-12

## 2019-12-12 ENCOUNTER — INFUSION (OUTPATIENT)
Dept: ONCOLOGY | Facility: HOSPITAL | Age: 64
End: 2019-12-12

## 2019-12-12 VITALS
BODY MASS INDEX: 35.5 KG/M2 | OXYGEN SATURATION: 97 % | TEMPERATURE: 97.9 F | DIASTOLIC BLOOD PRESSURE: 84 MMHG | RESPIRATION RATE: 16 BRPM | HEART RATE: 80 BPM | HEIGHT: 69 IN | SYSTOLIC BLOOD PRESSURE: 145 MMHG | WEIGHT: 239.7 LBS

## 2019-12-12 DIAGNOSIS — I82.622 ACUTE DEEP VEIN THROMBOSIS (DVT) OF BRACHIAL VEIN OF LEFT UPPER EXTREMITY (HCC): Primary | ICD-10-CM

## 2019-12-12 DIAGNOSIS — C18.7 MALIGNANT NEOPLASM OF SIGMOID COLON (HCC): ICD-10-CM

## 2019-12-12 DIAGNOSIS — C18.7 MALIGNANT NEOPLASM OF SIGMOID COLON (HCC): Primary | ICD-10-CM

## 2019-12-12 LAB
ALBUMIN SERPL-MCNC: 3.9 G/DL (ref 3.5–5.2)
ALBUMIN/GLOB SERPL: 1.2 G/DL (ref 1.1–2.4)
ALP SERPL-CCNC: 76 U/L (ref 38–116)
ALT SERPL W P-5'-P-CCNC: 22 U/L (ref 0–41)
ANION GAP SERPL CALCULATED.3IONS-SCNC: 9.3 MMOL/L (ref 5–15)
AST SERPL-CCNC: 25 U/L (ref 0–40)
BASOPHILS # BLD AUTO: 0.03 10*3/MM3 (ref 0–0.2)
BASOPHILS NFR BLD AUTO: 0.8 % (ref 0–1.5)
BILIRUB SERPL-MCNC: 0.7 MG/DL (ref 0.2–1.2)
BUN BLD-MCNC: 12 MG/DL (ref 6–20)
BUN/CREAT SERPL: 10.2 (ref 7.3–30)
CALCIUM SPEC-SCNC: 9.1 MG/DL (ref 8.5–10.2)
CHLORIDE SERPL-SCNC: 103 MMOL/L (ref 98–107)
CO2 SERPL-SCNC: 26.7 MMOL/L (ref 22–29)
CREAT BLD-MCNC: 1.18 MG/DL (ref 0.7–1.3)
DEPRECATED RDW RBC AUTO: 73 FL (ref 37–54)
EOSINOPHIL # BLD AUTO: 0.14 10*3/MM3 (ref 0–0.4)
EOSINOPHIL NFR BLD AUTO: 3.6 % (ref 0.3–6.2)
ERYTHROCYTE [DISTWIDTH] IN BLOOD BY AUTOMATED COUNT: 25 % (ref 12.3–15.4)
GFR SERPL CREATININE-BSD FRML MDRD: 75 ML/MIN/1.73
GLOBULIN UR ELPH-MCNC: 3.3 GM/DL (ref 1.8–3.5)
GLUCOSE BLD-MCNC: 97 MG/DL (ref 74–124)
HCT VFR BLD AUTO: 34.8 % (ref 37.5–51)
HGB BLD-MCNC: 11.2 G/DL (ref 13–17.7)
IMM GRANULOCYTES # BLD AUTO: 0 10*3/MM3 (ref 0–0.05)
IMM GRANULOCYTES NFR BLD AUTO: 0 % (ref 0–0.5)
LYMPHOCYTES # BLD AUTO: 1.72 10*3/MM3 (ref 0.7–3.1)
LYMPHOCYTES NFR BLD AUTO: 44 % (ref 19.6–45.3)
MCH RBC QN AUTO: 27.7 PG (ref 26.6–33)
MCHC RBC AUTO-ENTMCNC: 32.2 G/DL (ref 31.5–35.7)
MCV RBC AUTO: 86.1 FL (ref 79–97)
MONOCYTES # BLD AUTO: 0.68 10*3/MM3 (ref 0.1–0.9)
MONOCYTES NFR BLD AUTO: 17.4 % (ref 5–12)
NEUTROPHILS # BLD AUTO: 1.34 10*3/MM3 (ref 1.7–7)
NEUTROPHILS NFR BLD AUTO: 34.2 % (ref 42.7–76)
NRBC BLD AUTO-RTO: 0 /100 WBC (ref 0–0.2)
PLATELET # BLD AUTO: 337 10*3/MM3 (ref 140–450)
PMV BLD AUTO: 9.3 FL (ref 6–12)
POTASSIUM BLD-SCNC: 4.1 MMOL/L (ref 3.5–4.7)
PROT SERPL-MCNC: 7.2 G/DL (ref 6.3–8)
RBC # BLD AUTO: 4.04 10*6/MM3 (ref 4.14–5.8)
SODIUM BLD-SCNC: 139 MMOL/L (ref 134–145)
WBC NRBC COR # BLD: 3.91 10*3/MM3 (ref 3.4–10.8)

## 2019-12-12 PROCEDURE — 96375 TX/PRO/DX INJ NEW DRUG ADDON: CPT | Performed by: INTERNAL MEDICINE

## 2019-12-12 PROCEDURE — 96415 CHEMO IV INFUSION ADDL HR: CPT | Performed by: INTERNAL MEDICINE

## 2019-12-12 PROCEDURE — 25010000002 OXALIPLATIN PER 0.5 MG: Performed by: INTERNAL MEDICINE

## 2019-12-12 PROCEDURE — 85025 COMPLETE CBC W/AUTO DIFF WBC: CPT

## 2019-12-12 PROCEDURE — 80053 COMPREHEN METABOLIC PANEL: CPT

## 2019-12-12 PROCEDURE — 96413 CHEMO IV INFUSION 1 HR: CPT | Performed by: INTERNAL MEDICINE

## 2019-12-12 PROCEDURE — 36415 COLL VENOUS BLD VENIPUNCTURE: CPT

## 2019-12-12 PROCEDURE — 99215 OFFICE O/P EST HI 40 MIN: CPT | Performed by: INTERNAL MEDICINE

## 2019-12-12 PROCEDURE — 25010000002 PALONOSETRON PER 25 MCG: Performed by: INTERNAL MEDICINE

## 2019-12-12 PROCEDURE — 25010000002 DEXAMETHASONE SODIUM PHOSPHATE 100 MG/10ML SOLUTION: Performed by: INTERNAL MEDICINE

## 2019-12-12 RX ORDER — PALONOSETRON 0.05 MG/ML
0.25 INJECTION, SOLUTION INTRAVENOUS ONCE
Status: COMPLETED | OUTPATIENT
Start: 2019-12-12 | End: 2019-12-12

## 2019-12-12 RX ORDER — DEXTROSE MONOHYDRATE 50 MG/ML
250 INJECTION, SOLUTION INTRAVENOUS ONCE
Status: CANCELLED | OUTPATIENT
Start: 2020-01-02

## 2019-12-12 RX ORDER — FAMOTIDINE 10 MG/ML
20 INJECTION, SOLUTION INTRAVENOUS AS NEEDED
Status: CANCELLED | OUTPATIENT
Start: 2020-01-02

## 2019-12-12 RX ORDER — PALONOSETRON 0.05 MG/ML
0.25 INJECTION, SOLUTION INTRAVENOUS ONCE
Status: CANCELLED | OUTPATIENT
Start: 2020-01-02

## 2019-12-12 RX ORDER — DIPHENHYDRAMINE HYDROCHLORIDE 50 MG/ML
50 INJECTION INTRAMUSCULAR; INTRAVENOUS AS NEEDED
Status: CANCELLED | OUTPATIENT
Start: 2019-12-12

## 2019-12-12 RX ORDER — DEXTROSE MONOHYDRATE 50 MG/ML
250 INJECTION, SOLUTION INTRAVENOUS ONCE
Status: COMPLETED | OUTPATIENT
Start: 2019-12-12 | End: 2019-12-12

## 2019-12-12 RX ORDER — DEXTROSE MONOHYDRATE 50 MG/ML
250 INJECTION, SOLUTION INTRAVENOUS ONCE
Status: CANCELLED | OUTPATIENT
Start: 2019-12-12

## 2019-12-12 RX ORDER — FAMOTIDINE 10 MG/ML
20 INJECTION, SOLUTION INTRAVENOUS AS NEEDED
Status: CANCELLED | OUTPATIENT
Start: 2019-12-12

## 2019-12-12 RX ORDER — PALONOSETRON 0.05 MG/ML
0.25 INJECTION, SOLUTION INTRAVENOUS ONCE
Status: CANCELLED | OUTPATIENT
Start: 2019-12-12

## 2019-12-12 RX ORDER — DIPHENHYDRAMINE HYDROCHLORIDE 50 MG/ML
50 INJECTION INTRAMUSCULAR; INTRAVENOUS AS NEEDED
Status: CANCELLED | OUTPATIENT
Start: 2020-01-02

## 2019-12-12 RX ADMIN — OXALIPLATIN 285 MG: 5 INJECTION, SOLUTION, CONCENTRATE INTRAVENOUS at 08:58

## 2019-12-12 RX ADMIN — DEXAMETHASONE SODIUM PHOSPHATE 12 MG: 10 INJECTION, SOLUTION INTRAMUSCULAR; INTRAVENOUS at 08:26

## 2019-12-12 RX ADMIN — PALONOSETRON 0.25 MG: 0.05 INJECTION, SOLUTION INTRAVENOUS at 08:26

## 2019-12-12 RX ADMIN — DEXTROSE MONOHYDRATE 250 ML: 5 INJECTION, SOLUTION INTRAVENOUS at 08:25

## 2019-12-12 NOTE — PROGRESS NOTES
[facility] OUTPATIENT FOLLOW UP CLINIC VISIT    REASON FOR FOLLOW-UP:    1.  History of unprovoked pulmonary embolism with a positive lupus anticoagulant test in June 2010.  Chronically anticoagulated with Xarelto 20 mg daily.  Last annual follow-up with Dr. Monroy on 9/26/2018.  2.  Iron deficiency anemia diagnosed at the time of hospitalization on 9/19/2019.  He received a total of 900 mg of intravenous Venofer.  3.  Stage IIIa (pT1, pN1b) adenocarcinoma of the sigmoid colon status post laparoscopic low anterior resection on 9/26/2019.  1.6 x 1.4 cm tumor, grade 2, moderately differentiated with widely negative margins with 2 out of 15 lymph nodes positive for metastatic disease without evidence for extracapsular extension, greatest measuring 3.5 mm.  Microsatellite stable.  4.  Acute right upper extremity superficial thrombophlebitis in the cephalic vein diagnosed on 9/27/2019.  5.  Acute left upper extremity DVT in the brachial vein and acute left upper extremity superficial thrombophlebitis in the basilic vein on 9/27/2019.  Associated with an intravenous catheter.  On Xarelto.    HISTORY OF PRESENT ILLNESS:  Jared Rose is a 64 y.o. male who returns today for follow up and cycle 2 of CAPEOX.    He tolerated cycle #1 very well.  He did have some IV infiltration with oxaliplatin with cycle #1.  Issues with this quickly resolved.  He noticed some skin changes with discoloration of his hands and nailbeds following cycle #1 of chemotherapy.  He did have the anticipated cold sensitivity.  No mucositis.  No nausea vomiting or diarrhea.  Minimal fatigue.  He did quite well otherwise.      ALLERGIES:  Allergies   Allergen Reactions   • Adhesive Tape Other (See Comments)     blisters       MEDICATIONS:  The medication list has been reviewed with the patient by the medical assistant, and the list has been updated in the electronic medical record, which I reviewed.  Medication dosages and frequencies were  "confirmed to be accurate.    REVIEW OF SYSTEMS:  PAIN:  See Vital Signs below.  GENERAL:  No fevers, chills, night sweats, or unintended weight loss.  SKIN: Abdominal wound completely healed at this point.  Some skin discoloration of his hands due to chemotherapy.  HEME/LYMPH:  No abnormal bleeding.  No palpable lymphadenopathy.  EYES:  No vision changes or diplopia.  ENT:  No sore throat or difficulty swallowing.  RESPIRATORY:  No cough, shortness of breath, hemoptysis, or wheezing.  CARDIOVASCULAR:  No chest pain, palpitations, orthopnea, or dyspnea on exertion.  GASTROINTESTINAL: Postoperative abdominal discomfort resolved.  GENITOURINARY:  No dysuria or hematuria.  MUSCULOSKELETAL: Limited range of motion of the left shoulder.  NEUROLOGIC:  No dizziness, loss of consciousness, or seizures.  PSYCHIATRIC:  No depression, anxiety, or mood changes.    Vitals:    12/12/19 0742   BP: 145/84   Pulse: 80   Resp: 16   Temp: 97.9 °F (36.6 °C)   TempSrc: Oral   SpO2: 97%   Weight: 109 kg (239 lb 11.2 oz)   Height: 176 cm (69.29\")   PainSc: 0-No pain  Comment: colon cancer       PHYSICAL EXAMINATION:  GENERAL:  Well-developed well-nourished male; awake, alert and oriented, in no acute distress.  SKIN:  Warm and dry, without rashes, purpura, or petechiae.   HEAD:  Normocephalic, atraumatic.  EARS:  Hearing intact.  NOSE:  Septum midline.  No excoriations or nasal discharge.  MOUTH:  No stomatitis or ulcers.  Lips are normal.  THROAT:  Oropharynx without lesions or exudates.  NECK:  Supple with good range of motion; no thyromegaly or masses; no JVD or bruits.  LYMPHATICS:  No cervical, supraclavicular, axillary lymphadenopathy.  CHEST:  Lungs are clear to auscultation bilaterally.  No wheezes, rales, or rhonchi.  HEART:  Regular rate; normal rhythm.  No murmurs, gallops or rubs.  ABDOMEN: Not examined today  EXTREMITIES:  No clubbing, cyanosis, or edema.  NEUROLOGICAL:  No focal neurologic deficits.    DIAGNOSTIC " DATA:  Results for orders placed or performed in visit on 12/12/19   CBC Auto Differential   Result Value Ref Range    WBC 3.91 3.40 - 10.80 10*3/mm3    RBC 4.04 (L) 4.14 - 5.80 10*6/mm3    Hemoglobin 11.2 (L) 13.0 - 17.7 g/dL    Hematocrit 34.8 (L) 37.5 - 51.0 %    MCV 86.1 79.0 - 97.0 fL    MCH 27.7 26.6 - 33.0 pg    MCHC 32.2 31.5 - 35.7 g/dL    RDW 25.0 (H) 12.3 - 15.4 %    RDW-SD 73.0 (H) 37.0 - 54.0 fl    MPV 9.3 6.0 - 12.0 fL    Platelets 337 140 - 450 10*3/mm3    Neutrophil % 34.2 (L) 42.7 - 76.0 %    Lymphocyte % 44.0 19.6 - 45.3 %    Monocyte % 17.4 (H) 5.0 - 12.0 %    Eosinophil % 3.6 0.3 - 6.2 %    Basophil % 0.8 0.0 - 1.5 %    Immature Grans % 0.0 0.0 - 0.5 %    Neutrophils, Absolute 1.34 (L) 1.70 - 7.00 10*3/mm3    Lymphocytes, Absolute 1.72 0.70 - 3.10 10*3/mm3    Monocytes, Absolute 0.68 0.10 - 0.90 10*3/mm3    Eosinophils, Absolute 0.14 0.00 - 0.40 10*3/mm3    Basophils, Absolute 0.03 0.00 - 0.20 10*3/mm3    Immature Grans, Absolute 0.00 0.00 - 0.05 10*3/mm3    nRBC 0.0 0.0 - 0.2 /100 WBC       IMAGING:  CT images of the chest on 9/25/2019 without definite evidence for metastatic disease.  There is one nodule stable since 2010.  A second nodule is felt to be benign with repeat imaging in 6 months suggested.    IMPRESSION:  The primary sigmoid neoplasm is not well delineated on CT. A  few foci of extraluminal air are seen adjacent to the mid sigmoid colon  likely a result of recent biopsy of the neoplasm. No convincing evidence  of metastatic disease is seen within the chest, abdomen and pelvis.  There are 2 noncalcified lung nodules one of which is definitely  unchanged from 2010 suggestive of a benign nodule. The second nodule is  not definitively identified, however is favored to be benign as well.  This can be reevaluated by chest CT in 6 months.    ASSESSMENT:  This is a 64 y.o. male with:  1.  History of unprovoked pulmonary embolism with a positive lupus anticoagulant test in June 2010.   Chronically anticoagulated with Xarelto 20 mg daily.  Last annual follow-up with Dr. Monroy on 9/26/2018.    2.  Iron deficiency anemia diagnosed at the time of hospitalization on 9/19/2019.  He received a total of 900 mg of intravenous Venofer.  Iron studies and ferritin were normal.  Anemia stable today with a hemoglobin of 11.2    3.  Stage IIIa (pT1, pN1b) adenocarcinoma of the sigmoid colon status post laparoscopic low anterior resection on 9/26/2019.  1.6 x 1.4 cm tumor, grade 2, moderately differentiated with widely negative margins with 2 out of 15 lymph nodes positive for metastatic disease without evidence for extracapsular extension, greatest measuring 3.5 mm.  Microsatellite stable.    Discussed pursuing adjuvant therapy with CAPEOX for at least 3 months.  He was in agreement.  For now, we will plan for 4 cycles.  This is in accordance with NCCN guidelines.    Proceed with cycle #2 today.    4.  Acute right upper extremity superficial thrombophlebitis in the cephalic vein diagnosed on 9/27/2019.  Acute left upper extremity DVT in the brachial vein and acute left upper extremity superficial thrombophlebitis in the basilic vein on 9/27/2019.  Associated with an intravenous catheter.  He continues Xarelto 20 mg daily.    5.  Mild skin changes related to Xeloda    6.  Mild neutropenia related to chemotherapy: Continue to monitor.  Proceed with treatment today.  Advised him to call us with fevers 100.5 or greater.    PLAN:  1.  Proceed with CAPEOX cycle number 2 day 1 today.  We will plan for four 21-day cycles of therapy for a total of 3 months of therapy.  Dosing will be oxaliplatin 130 mg per metered squared every 21 days and Xeloda 850 mg per metered squared twice daily for 14 out of 21 days, rounding up slightly to 2000 mg twice daily.  Cycle #1 was well-tolerated.    2.  Continue Xarelto.  Plan to repeat a left upper extremity venous duplex in January.  Ordered today.      NP visit in 3 weeks for cycle #3  and I will see him back for cycle #4 in 6 weeks.  I advised him to call us with any concerning problems in between his scheduled appointments.

## 2019-12-12 NOTE — PROGRESS NOTES
Per Dr. Lopez to treat with ANC of 1.34 today. Will have pharmacy dilute oxaliplatin in 500 ml due to giving Oxaliplatin in a peripheral IV site. Applied warm compress to arm above IV site when starting oxaliplatin to prevent any soreness.    About an hour and a half after oxaliplatin started, patient c/o stinging, burning on arm above IV site. Positive blood return. Started D5 fluids concurrently. Patient still c/o burning, stinging at IV site with D5 running. Per pharmacy, ok to decrease rate of oxaliplatin. Decreased rate of oxaliplatin to half the rate and patient stated his arm is feeling better. Warm compress reapplied.

## 2019-12-30 ENCOUNTER — SPECIALTY PHARMACY (OUTPATIENT)
Dept: PHARMACY | Facility: HOSPITAL | Age: 64
End: 2019-12-30

## 2020-01-02 ENCOUNTER — INFUSION (OUTPATIENT)
Dept: ONCOLOGY | Facility: HOSPITAL | Age: 65
End: 2020-01-02

## 2020-01-02 ENCOUNTER — OFFICE VISIT (OUTPATIENT)
Dept: ONCOLOGY | Facility: CLINIC | Age: 65
End: 2020-01-02

## 2020-01-02 VITALS
SYSTOLIC BLOOD PRESSURE: 155 MMHG | HEART RATE: 79 BPM | DIASTOLIC BLOOD PRESSURE: 80 MMHG | HEIGHT: 69 IN | BODY MASS INDEX: 34.79 KG/M2 | RESPIRATION RATE: 12 BRPM | OXYGEN SATURATION: 99 % | WEIGHT: 234.9 LBS | TEMPERATURE: 98 F

## 2020-01-02 DIAGNOSIS — C18.7 MALIGNANT NEOPLASM OF SIGMOID COLON (HCC): Primary | ICD-10-CM

## 2020-01-02 DIAGNOSIS — C18.7 MALIGNANT NEOPLASM OF SIGMOID COLON (HCC): ICD-10-CM

## 2020-01-02 LAB
ALBUMIN SERPL-MCNC: 3.9 G/DL (ref 3.5–5.2)
ALBUMIN/GLOB SERPL: 1.3 G/DL (ref 1.1–2.4)
ALP SERPL-CCNC: 72 U/L (ref 38–116)
ALT SERPL W P-5'-P-CCNC: 40 U/L (ref 0–41)
ANION GAP SERPL CALCULATED.3IONS-SCNC: 10.3 MMOL/L (ref 5–15)
AST SERPL-CCNC: 38 U/L (ref 0–40)
BASOPHILS # BLD AUTO: 0.01 10*3/MM3 (ref 0–0.2)
BASOPHILS NFR BLD AUTO: 0.3 % (ref 0–1.5)
BILIRUB SERPL-MCNC: 0.8 MG/DL (ref 0.2–1.2)
BUN BLD-MCNC: 15 MG/DL (ref 6–20)
BUN/CREAT SERPL: 14 (ref 7.3–30)
CALCIUM SPEC-SCNC: 9.3 MG/DL (ref 8.5–10.2)
CHLORIDE SERPL-SCNC: 108 MMOL/L (ref 98–107)
CO2 SERPL-SCNC: 24.7 MMOL/L (ref 22–29)
CREAT BLD-MCNC: 1.07 MG/DL (ref 0.7–1.3)
DEPRECATED RDW RBC AUTO: 71.7 FL (ref 37–54)
EOSINOPHIL # BLD AUTO: 0.08 10*3/MM3 (ref 0–0.4)
EOSINOPHIL NFR BLD AUTO: 2.6 % (ref 0.3–6.2)
ERYTHROCYTE [DISTWIDTH] IN BLOOD BY AUTOMATED COUNT: 22.4 % (ref 12.3–15.4)
GFR SERPL CREATININE-BSD FRML MDRD: 84 ML/MIN/1.73
GLOBULIN UR ELPH-MCNC: 3.1 GM/DL (ref 1.8–3.5)
GLUCOSE BLD-MCNC: 103 MG/DL (ref 74–124)
HCT VFR BLD AUTO: 33.6 % (ref 37.5–51)
HGB BLD-MCNC: 10.9 G/DL (ref 13–17.7)
IMM GRANULOCYTES # BLD AUTO: 0 10*3/MM3 (ref 0–0.05)
IMM GRANULOCYTES NFR BLD AUTO: 0 % (ref 0–0.5)
LYMPHOCYTES # BLD AUTO: 1.33 10*3/MM3 (ref 0.7–3.1)
LYMPHOCYTES NFR BLD AUTO: 42.8 % (ref 19.6–45.3)
MCH RBC QN AUTO: 28.5 PG (ref 26.6–33)
MCHC RBC AUTO-ENTMCNC: 32.4 G/DL (ref 31.5–35.7)
MCV RBC AUTO: 87.7 FL (ref 79–97)
MONOCYTES # BLD AUTO: 0.58 10*3/MM3 (ref 0.1–0.9)
MONOCYTES NFR BLD AUTO: 18.6 % (ref 5–12)
NEUTROPHILS # BLD AUTO: 1.11 10*3/MM3 (ref 1.7–7)
NEUTROPHILS NFR BLD AUTO: 35.7 % (ref 42.7–76)
NRBC BLD AUTO-RTO: 0 /100 WBC (ref 0–0.2)
PLATELET # BLD AUTO: 217 10*3/MM3 (ref 140–450)
PMV BLD AUTO: 8.4 FL (ref 6–12)
POTASSIUM BLD-SCNC: 4.4 MMOL/L (ref 3.5–4.7)
PROT SERPL-MCNC: 7 G/DL (ref 6.3–8)
RBC # BLD AUTO: 3.83 10*6/MM3 (ref 4.14–5.8)
SODIUM BLD-SCNC: 143 MMOL/L (ref 134–145)
WBC NRBC COR # BLD: 3.11 10*3/MM3 (ref 3.4–10.8)

## 2020-01-02 PROCEDURE — 99214 OFFICE O/P EST MOD 30 MIN: CPT | Performed by: NURSE PRACTITIONER

## 2020-01-02 PROCEDURE — 96415 CHEMO IV INFUSION ADDL HR: CPT | Performed by: NURSE PRACTITIONER

## 2020-01-02 PROCEDURE — 25010000002 PALONOSETRON PER 25 MCG: Performed by: INTERNAL MEDICINE

## 2020-01-02 PROCEDURE — 85025 COMPLETE CBC W/AUTO DIFF WBC: CPT

## 2020-01-02 PROCEDURE — 80053 COMPREHEN METABOLIC PANEL: CPT

## 2020-01-02 PROCEDURE — 96413 CHEMO IV INFUSION 1 HR: CPT | Performed by: NURSE PRACTITIONER

## 2020-01-02 PROCEDURE — 25010000002 OXALIPLATIN PER 0.5 MG: Performed by: INTERNAL MEDICINE

## 2020-01-02 PROCEDURE — 96375 TX/PRO/DX INJ NEW DRUG ADDON: CPT | Performed by: NURSE PRACTITIONER

## 2020-01-02 PROCEDURE — 25010000002 DEXAMETHASONE SODIUM PHOSPHATE 100 MG/10ML SOLUTION: Performed by: INTERNAL MEDICINE

## 2020-01-02 RX ORDER — DEXTROSE MONOHYDRATE 50 MG/ML
250 INJECTION, SOLUTION INTRAVENOUS ONCE
Status: COMPLETED | OUTPATIENT
Start: 2020-01-02 | End: 2020-01-02

## 2020-01-02 RX ORDER — LOSARTAN POTASSIUM 50 MG/1
50 TABLET ORAL DAILY
Qty: 90 TABLET | Refills: 3 | Status: SHIPPED | OUTPATIENT
Start: 2020-01-02 | End: 2021-03-30 | Stop reason: SDUPTHER

## 2020-01-02 RX ORDER — PALONOSETRON 0.05 MG/ML
0.25 INJECTION, SOLUTION INTRAVENOUS ONCE
Status: COMPLETED | OUTPATIENT
Start: 2020-01-02 | End: 2020-01-02

## 2020-01-02 RX ADMIN — DEXTROSE 250 ML: 5 SOLUTION INTRAVENOUS at 11:25

## 2020-01-02 RX ADMIN — DEXAMETHASONE SODIUM PHOSPHATE 12 MG: 10 INJECTION, SOLUTION INTRAMUSCULAR; INTRAVENOUS at 11:28

## 2020-01-02 RX ADMIN — OXALIPLATIN 285 MG: 5 INJECTION, SOLUTION, CONCENTRATE INTRAVENOUS at 11:49

## 2020-01-02 RX ADMIN — PALONOSETRON 0.25 MG: 0.05 INJECTION, SOLUTION INTRAVENOUS at 11:25

## 2020-01-02 NOTE — PROGRESS NOTES
[facility] GROUP OUTPATIENT FOLLOW UP CLINIC VISIT    REASON FOR FOLLOW-UP:    1.  History of unprovoked pulmonary embolism with a positive lupus anticoagulant test in June 2010.  Chronically anticoagulated with Xarelto 20 mg daily.  Last annual follow-up with Dr. Monroy on 9/26/2018.  2.  Iron deficiency anemia diagnosed at the time of hospitalization on 9/19/2019.  He received a total of 900 mg of intravenous Venofer.  3.  Stage IIIa (pT1, pN1b) adenocarcinoma of the sigmoid colon status post laparoscopic low anterior resection on 9/26/2019.  1.6 x 1.4 cm tumor, grade 2, moderately differentiated with widely negative margins with 2 out of 15 lymph nodes positive for metastatic disease without evidence for extracapsular extension, greatest measuring 3.5 mm.  Microsatellite stable.  4.  Acute right upper extremity superficial thrombophlebitis in the cephalic vein diagnosed on 9/27/2019.  5.  Acute left upper extremity DVT in the brachial vein and acute left upper extremity superficial thrombophlebitis in the basilic vein on 9/27/2019.  Associated with an intravenous catheter.  On Xarelto.  6.  1/2/2020 continues to tolerate treatment quite well.    HISTORY OF PRESENT ILLNESS:  Jared Rose is a 64 y.o. male who returns today for follow up and cycle 3 of CAPEOX.  Continues to take Xeloda 850 mg per metered squared twice daily for 14 out of 21 days.    He reports feeling well with no new questions or concerns.  He does report dryness of the palms but denies pain, swelling, cracking or peeling.  His appetite is good.  His vital signs are stable.  He denies diarrhea, nausea or vomiting.  He continues to take Xarelto as prescribed.          ALLERGIES:  Allergies   Allergen Reactions   • Adhesive Tape Other (See Comments)     blisters       MEDICATIONS:  The medication list has been reviewed with the patient by the medical assistant, and the list has been updated in the electronic medical record, which I  reviewed.  Medication dosages and frequencies were confirmed to be accurate.    REVIEW OF SYSTEMS:  PAIN:  See Vital Signs below.  GENERAL:  No fevers, chills, night sweats, or unintended weight loss.  SKIN: See HPI.  HEME/LYMPH:  No abnormal bleeding.  No palpable lymphadenopathy.  EYES:  No vision changes or diplopia.  ENT:  No sore throat or difficulty swallowing.  RESPIRATORY:  No cough, shortness of breath, hemoptysis, or wheezing.  CARDIOVASCULAR:  No chest pain, palpitations, orthopnea, or dyspnea on exertion.  GASTROINTESTINAL: Postoperative abdominal discomfort resolved.  GENITOURINARY:  No dysuria or hematuria.  MUSCULOSKELETAL: Limited range of motion of the left shoulder.  NEUROLOGIC:  No dizziness, loss of consciousness, or seizures.  PSYCHIATRIC:  No depression, anxiety, or mood changes.    There were no vitals filed for this visit.    PHYSICAL EXAMINATION:  GENERAL:  Well-developed well-nourished male; awake, alert and oriented, in no acute distress.  SKIN:  Warm and dry, without rashes, purpura, or petechiae.  Mild dryness noted of the palms of the hands bilaterally.  No warmth or erythema noted  HEAD:  Normocephalic, atraumatic.  EARS:  Hearing intact.  NOSE:  Septum midline.  No excoriations or nasal discharge.  MOUTH:  No stomatitis or ulcers.  Lips are normal.  THROAT:  Oropharynx without lesions or exudates.  NECK:  Supple with good range of motion; no thyromegaly or masses; no JVD or bruits.  LYMPHATICS:  No cervical, supraclavicular, axillary lymphadenopathy.  CHEST:  Lungs are clear to auscultation bilaterally.  No wheezes, rales, or rhonchi.  HEART:  Regular rate; normal rhythm.  No murmurs, gallops or rubs.  ABDOMEN: Not examined today  EXTREMITIES:  No clubbing, cyanosis, or edema.  NEUROLOGICAL:  No focal neurologic deficits.    DIAGNOSTIC DATA:  Results for orders placed or performed in visit on 12/12/19   Comprehensive metabolic panel   Result Value Ref Range    Glucose 97 74 - 124  mg/dL    BUN 12 6 - 20 mg/dL    Creatinine 1.18 0.70 - 1.30 mg/dL    Sodium 139 134 - 145 mmol/L    Potassium 4.1 3.5 - 4.7 mmol/L    Chloride 103 98 - 107 mmol/L    CO2 26.7 22.0 - 29.0 mmol/L    Calcium 9.1 8.5 - 10.2 mg/dL    Total Protein 7.2 6.3 - 8.0 g/dL    Albumin 3.90 3.50 - 5.20 g/dL    ALT (SGPT) 22 0 - 41 U/L    AST (SGOT) 25 0 - 40 U/L    Alkaline Phosphatase 76 38 - 116 U/L    Total Bilirubin 0.7 0.2 - 1.2 mg/dL    eGFR  African Amer 75 >60 mL/min/1.73    Globulin 3.3 1.8 - 3.5 gm/dL    A/G Ratio 1.2 1.1 - 2.4 g/dL    BUN/Creatinine Ratio 10.2 7.3 - 30.0    Anion Gap 9.3 5.0 - 15.0 mmol/L   CBC Auto Differential   Result Value Ref Range    WBC 3.91 3.40 - 10.80 10*3/mm3    RBC 4.04 (L) 4.14 - 5.80 10*6/mm3    Hemoglobin 11.2 (L) 13.0 - 17.7 g/dL    Hematocrit 34.8 (L) 37.5 - 51.0 %    MCV 86.1 79.0 - 97.0 fL    MCH 27.7 26.6 - 33.0 pg    MCHC 32.2 31.5 - 35.7 g/dL    RDW 25.0 (H) 12.3 - 15.4 %    RDW-SD 73.0 (H) 37.0 - 54.0 fl    MPV 9.3 6.0 - 12.0 fL    Platelets 337 140 - 450 10*3/mm3    Neutrophil % 34.2 (L) 42.7 - 76.0 %    Lymphocyte % 44.0 19.6 - 45.3 %    Monocyte % 17.4 (H) 5.0 - 12.0 %    Eosinophil % 3.6 0.3 - 6.2 %    Basophil % 0.8 0.0 - 1.5 %    Immature Grans % 0.0 0.0 - 0.5 %    Neutrophils, Absolute 1.34 (L) 1.70 - 7.00 10*3/mm3    Lymphocytes, Absolute 1.72 0.70 - 3.10 10*3/mm3    Monocytes, Absolute 0.68 0.10 - 0.90 10*3/mm3    Eosinophils, Absolute 0.14 0.00 - 0.40 10*3/mm3    Basophils, Absolute 0.03 0.00 - 0.20 10*3/mm3    Immature Grans, Absolute 0.00 0.00 - 0.05 10*3/mm3    nRBC 0.0 0.0 - 0.2 /100 WBC       IMAGING:  CT images of the chest on 9/25/2019 without definite evidence for metastatic disease.  There is one nodule stable since 2010.  A second nodule is felt to be benign with repeat imaging in 6 months suggested.    IMPRESSION:  The primary sigmoid neoplasm is not well delineated on CT. A  few foci of extraluminal air are seen adjacent to the mid sigmoid colon  likely a  result of recent biopsy of the neoplasm. No convincing evidence  of metastatic disease is seen within the chest, abdomen and pelvis.  There are 2 noncalcified lung nodules one of which is definitely  unchanged from 2010 suggestive of a benign nodule. The second nodule is  not definitively identified, however is favored to be benign as well.  This can be reevaluated by chest CT in 6 months.    ASSESSMENT:  This is a 64 y.o. male with:  1.  History of unprovoked pulmonary embolism with a positive lupus anticoagulant test in June 2010.  Chronically anticoagulated with Xarelto 20 mg daily.  Last annual meeting with Dr. Monroy on 9/26/2019    2.  Iron deficiency anemia diagnosed at the time of hospitalization on 9/19/2019.  He received a total of 900 mg of intravenous Venofer.  Iron studies and ferritin were normal.  Anemia stable today with a hemoglobin of 10.9.    3.  Stage IIIa (pT1, pN1b) adenocarcinoma of the sigmoid colon status post laparoscopic low anterior resection on 9/26/2019.  1.6 x 1.4 cm tumor, grade 2, moderately differentiated with widely negative margins with 2 out of 15 lymph nodes positive for metastatic disease without evidence for extracapsular extension, greatest measuring 3.5 mm.  Microsatellite stable.    Discussed pursuing adjuvant therapy with CAPEOX for at least 3 months.  He was in agreement.  For now, we will plan for 4 cycles.  This is in accordance with NCCN guidelines.    Proceed with cycle #3 today.  He is tolerating treatment quite well.    4.  Acute right upper extremity superficial thrombophlebitis in the cephalic vein diagnosed on 9/27/2019.  Acute left upper extremity DVT in the brachial vein and acute left upper extremity superficial thrombophlebitis in the basilic vein on 9/27/2019.  Associated with an intravenous catheter.  He continues Xarelto 20 mg daily.    5.  Mild skin changes related to Xeloda, dryness noted.    6.  Mild neutropenia related to chemotherapy: Continue to  monitor.  Proceed with treatment today.  Advised him to call us with fevers 100.5 or greater.    PLAN:  1.  Proceed with CAPEOX cycle number 3 day 1 today.  We will plan for four 21-day cycles of therapy for a total of 3 months of therapy.  Dosing will be oxaliplatin 130 mg per metered squared every 21 days and Xeloda 850 mg per metered squared twice daily for 14 out of 21 days, rounding up slightly to 2000 mg twice daily.  He has tolerated treatment extremely well so far.    2.  Continue Xarelto.  Plan to repeat a left upper extremity venous duplex in January.  Scheduled for 1/16/2020.    3.  I have advised the patient to apply dye free fragrance free lotion to his hands and feet as frequently as possible.    4.  Will return as already scheduled on January 23, 2019 to see Dr. Lares in anticipation of his next treatment.    I have asked the patient to call the office with any new or worsening symptoms before his scheduled visits.

## 2020-01-02 NOTE — NURSING NOTE
Lab Results   Component Value Date    WBC 3.11 (L) 01/02/2020    HGB 10.9 (L) 01/02/2020    HCT 33.6 (L) 01/02/2020    MCV 87.7 01/02/2020     01/02/2020   ANC 1.11.  Reviewed results with Marylou Hilliard to proceed with Oxaliplatin.  Reviewed infection precautions with pt and spouse.  Both v/u.

## 2020-01-03 NOTE — PROGRESS NOTES
Specialty Pharmacy Note      Name:  Jared Rose  :  1955  Date:  2019         Past Medical History:   Diagnosis Date   • Abnormal EKG 2019   • Bell's palsy 2011    SEEN AT [facility] ER   • Blister of foot 2017    RIGHT FOOT   • CAD (coronary artery disease)     Minimal; diagnosed on previous cardiac cath in    • Chest pain    • Chronic anticoagulation    • Chronic fatigue    • Chronic left-sided low back pain without sciatica    • Coronary atherosclerosis     cath 2015: normal LM, diffuse LCx disease, LI LAD, 60-70% ostial diag (<1 mm vessel), LI RCA   • Dermoid cyst of leg, right 2017   • ED (erectile dysfunction)    • Elevated serum creatinine 2018    SEEN BY DR. NOHELIA COTTO   • Exomphalos 2013   • GI hemorrhage 2019    ADMITTED TO [facility]   • H/O Anemia    • H/O Leukopenia    • History of transfusion    • Hyperlipidemia    • Hypertension    • Lupus (CMS/HCC)    • Lupus anticoagulant positive    • Muscle spasm of back 10/2018   • Obesity    • Paresthesias 2008    SEEN AT [facility] ER   • Pulmonary embolism (CMS/HCC) 2010     Right lower lobe pulmonary embolus, ADMITTED TO [facility]   • Rectal bleeding 2019   • Rectal cancer (CMS/HCC) 2019    MODERATELY DIFFERENTIATED ADENOCARCINOMA GRADE 2, FOLLOWED BY DR. RADHA DONOVAN   • Stress fracture of right foot 2013    4TH METATARSAL, SEEN AT [facility] ER   • Thrombophilia (CMS/[facility])     FOLLOWED BY DR. BALAJI MCGEE   • Tinea pedis 2007    SEEN AT [facility] ER       Past Surgical History:   Procedure Laterality Date   • CARDIAC CATHETERIZATION Left 2006    NORMAL LV SYSTOLIC FUNCTION(EF 50%), MOD DZ OF 2 SMALL CORONARY BRANCHES (D2 AND OM2), DR. BOOM GALLEGO AT [facility]   • CARDIAC CATHETERIZATION Left 2015    NORMAL LM, DIFFUSE LCx DZ, LI LAD, 60-70% OSTIAL DIAG(<1MM VESSEL), 10%STENOSIS IN LAD, DR. CHERI VARGAS AT [facility]   • CLOSED REDUCTION WRIST FRACTURE Right    • COLON RESECTION N/A 2019    Procedure:  LOW ANTERIOR COLON RESECTION IMMOBILIZATION OF SPLENIC FLEXURE;  Surgeon: Isabella Donovan MD;  Location: [facility] OR;  Service: General   • COLONOSCOPY N/A 9/21/2019    INT/EXT HEMORRHOIDS, 18 MM POLYPOID LESION IN MID SIGMOID, PATH: INVASIVE MODERATELY DIFFERENTIATED GRADE 2 ADENOCARCINOMA, 2 TUBULOVILLOUS ADENOMA POLYPS IN CECUM, ADENOMATOUS POLYP IN TRANSVERSE, ADENOMATOUS POLYP IN ASCENDING, MULTIPLE SMALL AND LARGE DIVERTICULA, DR. TRUONG MONTEZ AT [facility]   • LUNG SURGERY Right 2009    RIGHT LOWER LOBE, BLOT CLOT REMOVED   • SIGMOIDOSCOPY N/A 9/24/2019    AN INFILTRATIVE NON OBSTRUCTING MASS IN SIGMOID, AREA TATTOOED, DR. ISABELLA DONOVAN AT [facility]   • TONSILLECTOMY AND ADENOIDECTOMY Bilateral     DURING CHILDHOOD   • UMBILICAL HERNIA REPAIR N/A 05/14/2014    DR. ROMERO SCHUSTER AT [facility]       Social History     Socioeconomic History   • Marital status:      Spouse name: Not on file   • Number of children: Not on file   • Years of education: Not on file   • Highest education level: Not on file   Occupational History   • Occupation:      Employer: [employer]   Tobacco Use   • Smoking status: Never Smoker   • Smokeless tobacco: Never Used   Substance and Sexual Activity   • Alcohol use: No     Frequency: Never     Comment: Caffeine use   • Drug use: No   • Sexual activity: Yes       Family History   Problem Relation Age of Onset   • Coronary artery disease Father    • Heart disease Mother 83   • Hypertension Mother    • Asthma Mother    • Diabetes Mother    • Kidney disease Mother    • Heart attack Mother    • Hypertension Sister    • Diabetes Sister    • Kidney disease Sister    • Heart attack Brother    • Alcohol abuse Brother    • Diabetes Sister    • Heart disease Sister        Allergies   Allergen Reactions   • Adhesive Tape Other (See Comments)     blisters       Current Outpatient Medications   Medication Sig Dispense Refill   • atorvastatin (LIPITOR) 40 MG tablet Take 1 tablet  by mouth Daily. 90 tablet 3   • capecitabine (XELODA) 500 MG chemo tablet Take 4 tablets (2000 mg) by mouth twice a day for 14 days on then 7 days off. 112 tablet 3   • losartan (COZAAR) 50 MG tablet Take 1 tablet by mouth Daily. 90 tablet 3   • ondansetron (ZOFRAN) 8 MG tablet Take 1 tablet by mouth 3 (Three) Times a Day As Needed for Nausea or Vomiting. 30 tablet 5   • rivaroxaban (XARELTO) 20 MG tablet Take 1 tablet by mouth Daily With Dinner. 138 tablet 0     No current facility-administered medications for this visit.          LABORATORY:    Lab Results   Component Value Date    IRON 50 (L) 10/09/2019    TSH 1.340 05/10/2017    PROTIME 15.1 (H) 09/27/2019    INR 1.22 (H) 09/27/2019    TIBC 340 10/09/2019     Lab Results   Component Value Date    PROTIME 15.1 (H) 09/27/2019    PROTIME 15.5 (H) 09/21/2019    PROTIME 20.4 (H) 09/19/2019    PROTIME 24.7 (H) 09/26/2018    INR 1.22 (H) 09/27/2019    INR 1.26 (H) 09/21/2019    INR 1.78 (H) 09/19/2019    INR 2.10 (H) 09/26/2018    PTT 75.0 (H) 09/29/2019    .6 (H) 09/28/2019    .8 (C) 09/28/2019    PTT >200.0 (C) 09/27/2019     No results found for: HAV, HCVQUANT, RSDFSN51, HCVINFO, HCVGENOTYPE  No results found for: AMPHETSCREEN, BARBITSCNUR, LABBENZSCN, COCAINEUR, LABMETHSCN, PXWPE7T, [id number], HEPBSAB, OXYCODONESCN, 6ACETYLMORP, BUPRENORSCNU, LABOPIASCN, PCPUR, THCURSCR  Last Urine Toxicity     There is no flowsheet data to display.          ASSESSMENT/PLAN:    Patient Update Assessment (new medications, allergies, medical history): Assessed    Medication(s): Xeloda 500 mg, Take 4 tablets (2000 mg) by mouth twice a day for 14 days on then 7 days off.    Currently Taking Medication(s): Yes    Effectiveness of Medication: Yes    Experiencing Side Effects: None reported    Prior Authorization Status: Approved    Financial Assistance Status: None needed at this time    Any Issues Identified: None at this time    Appropriate to Process Prescription(s):  Yes, prescription will be processed at [facility] and delivered to patient via [employer] overnight delivery.    Counseling Offered: Declined    Next Specialty Pharmacy Visit: 01/16/2020

## 2020-01-06 ENCOUNTER — TELEPHONE (OUTPATIENT)
Dept: ONCOLOGY | Facility: CLINIC | Age: 65
End: 2020-01-06

## 2020-01-06 NOTE — TELEPHONE ENCOUNTER
Pt's wife called stating pt's numbness and tingling in his hands and feet have worsened. She states he has been trying to keep warm, wearing gloves and socks around the house, avoiding the cold. She states pt is not a complainer but has mentioned this tingling is much worse than before. I reviewed with Junie ORDOÑEZ, suggested Vitamin B complex daily and theraworx cream to rub on hands and feet. Informed micaela. She v/u

## 2020-01-16 ENCOUNTER — LAB (OUTPATIENT)
Dept: LAB | Facility: HOSPITAL | Age: 65
End: 2020-01-16

## 2020-01-16 ENCOUNTER — SPECIALTY PHARMACY (OUTPATIENT)
Dept: PHARMACY | Facility: HOSPITAL | Age: 65
End: 2020-01-16

## 2020-01-16 ENCOUNTER — HOSPITAL ENCOUNTER (OUTPATIENT)
Dept: CARDIOLOGY | Facility: HOSPITAL | Age: 65
Discharge: HOME | End: 2020-01-16
Admitting: INTERNAL MEDICINE

## 2020-01-16 ENCOUNTER — TELEPHONE (OUTPATIENT)
Dept: CARDIOLOGY | Facility: CLINIC | Age: 65
End: 2020-01-16

## 2020-01-16 DIAGNOSIS — C18.7 MALIGNANT NEOPLASM OF SIGMOID COLON (HCC): ICD-10-CM

## 2020-01-16 DIAGNOSIS — I82.622 ACUTE DEEP VEIN THROMBOSIS (DVT) OF BRACHIAL VEIN OF LEFT UPPER EXTREMITY (HCC): ICD-10-CM

## 2020-01-16 LAB
ALBUMIN SERPL-MCNC: 4 G/DL (ref 3.5–5.2)
ALBUMIN/GLOB SERPL: 1.1 G/DL
ALP SERPL-CCNC: 61 U/L (ref 39–117)
ALT SERPL W P-5'-P-CCNC: 30 U/L (ref 1–41)
ANION GAP SERPL CALCULATED.3IONS-SCNC: 10.2 MMOL/L (ref 5–15)
AST SERPL-CCNC: 32 U/L (ref 1–40)
BASOPHILS # BLD AUTO: 0.01 10*3/MM3 (ref 0–0.2)
BASOPHILS NFR BLD AUTO: 0.3 % (ref 0–1.5)
BH CV UPPER VENOUS LEFT AXILLARY AUGMENT: NORMAL
BH CV UPPER VENOUS LEFT AXILLARY COMPETENT: NORMAL
BH CV UPPER VENOUS LEFT AXILLARY COMPRESS: NORMAL
BH CV UPPER VENOUS LEFT AXILLARY PHASIC: NORMAL
BH CV UPPER VENOUS LEFT AXILLARY SPONT: NORMAL
BH CV UPPER VENOUS LEFT BASILIC FOREARM COMPRESS: NORMAL
BH CV UPPER VENOUS LEFT BASILIC UPPER COLOR: 1
BH CV UPPER VENOUS LEFT BASILIC UPPER COMPRESS: NORMAL
BH CV UPPER VENOUS LEFT BASILIC UPPER THROMBUS: NORMAL
BH CV UPPER VENOUS LEFT BRACHIAL COMPRESS: NORMAL
BH CV UPPER VENOUS LEFT CEPHALIC FOREARM COMPRESS: NORMAL
BH CV UPPER VENOUS LEFT CEPHALIC UPPER COMPRESS: NORMAL
BH CV UPPER VENOUS LEFT INTERNAL JUGULAR AUGMENT: NORMAL
BH CV UPPER VENOUS LEFT INTERNAL JUGULAR COMPETENT: NORMAL
BH CV UPPER VENOUS LEFT INTERNAL JUGULAR COMPRESS: NORMAL
BH CV UPPER VENOUS LEFT INTERNAL JUGULAR PHASIC: NORMAL
BH CV UPPER VENOUS LEFT INTERNAL JUGULAR SPONT: NORMAL
BH CV UPPER VENOUS LEFT RADIAL COMPRESS: NORMAL
BH CV UPPER VENOUS LEFT SUBCLAVIAN AUGMENT: NORMAL
BH CV UPPER VENOUS LEFT SUBCLAVIAN COMPETENT: NORMAL
BH CV UPPER VENOUS LEFT SUBCLAVIAN COMPRESS: NORMAL
BH CV UPPER VENOUS LEFT SUBCLAVIAN PHASIC: NORMAL
BH CV UPPER VENOUS LEFT SUBCLAVIAN SPONT: NORMAL
BH CV UPPER VENOUS LEFT ULNAR COMPRESS: NORMAL
BH CV UPPER VENOUS RIGHT INTERNAL JUGULAR AUGMENT: NORMAL
BH CV UPPER VENOUS RIGHT INTERNAL JUGULAR COMPETENT: NORMAL
BH CV UPPER VENOUS RIGHT INTERNAL JUGULAR COMPRESS: NORMAL
BH CV UPPER VENOUS RIGHT INTERNAL JUGULAR PHASIC: NORMAL
BH CV UPPER VENOUS RIGHT INTERNAL JUGULAR SPONT: NORMAL
BH CV UPPER VENOUS RIGHT SUBCLAVIAN AUGMENT: NORMAL
BH CV UPPER VENOUS RIGHT SUBCLAVIAN COMPETENT: NORMAL
BH CV UPPER VENOUS RIGHT SUBCLAVIAN COMPRESS: NORMAL
BH CV UPPER VENOUS RIGHT SUBCLAVIAN PHASIC: NORMAL
BH CV UPPER VENOUS RIGHT SUBCLAVIAN SPONT: NORMAL
BILIRUB SERPL-MCNC: 1.1 MG/DL (ref 0.2–1.2)
BUN BLD-MCNC: 15 MG/DL (ref 8–23)
BUN/CREAT SERPL: 16 (ref 7–25)
CALCIUM SPEC-SCNC: 9.7 MG/DL (ref 8.6–10.5)
CHLORIDE SERPL-SCNC: 101 MMOL/L (ref 98–107)
CO2 SERPL-SCNC: 25.8 MMOL/L (ref 22–29)
CREAT BLD-MCNC: 0.94 MG/DL (ref 0.76–1.27)
DEPRECATED RDW RBC AUTO: 58.6 FL (ref 37–54)
EOSINOPHIL # BLD AUTO: 0.07 10*3/MM3 (ref 0–0.4)
EOSINOPHIL NFR BLD AUTO: 2.3 % (ref 0.3–6.2)
ERYTHROCYTE [DISTWIDTH] IN BLOOD BY AUTOMATED COUNT: 19.8 % (ref 12.3–15.4)
GFR SERPL CREATININE-BSD FRML MDRD: 98 ML/MIN/1.73
GLOBULIN UR ELPH-MCNC: 3.5 GM/DL
GLUCOSE BLD-MCNC: 90 MG/DL (ref 65–99)
HCT VFR BLD AUTO: 34.3 % (ref 37.5–51)
HGB BLD-MCNC: 11.2 G/DL (ref 13–17.7)
IMM GRANULOCYTES # BLD AUTO: 0 10*3/MM3 (ref 0–0.05)
IMM GRANULOCYTES NFR BLD AUTO: 0 % (ref 0–0.5)
LYMPHOCYTES # BLD AUTO: 1.53 10*3/MM3 (ref 0.7–3.1)
LYMPHOCYTES NFR BLD AUTO: 50.2 % (ref 19.6–45.3)
MCH RBC QN AUTO: 28.7 PG (ref 26.6–33)
MCHC RBC AUTO-ENTMCNC: 32.7 G/DL (ref 31.5–35.7)
MCV RBC AUTO: 87.9 FL (ref 79–97)
MONOCYTES # BLD AUTO: 0.58 10*3/MM3 (ref 0.1–0.9)
MONOCYTES NFR BLD AUTO: 19 % (ref 5–12)
NEUTROPHILS # BLD AUTO: 0.86 10*3/MM3 (ref 1.7–7)
NEUTROPHILS NFR BLD AUTO: 28.2 % (ref 42.7–76)
NRBC BLD AUTO-RTO: 0.3 /100 WBC (ref 0–0.2)
PLATELET # BLD AUTO: 157 10*3/MM3 (ref 140–450)
PMV BLD AUTO: 8.6 FL (ref 6–12)
POTASSIUM BLD-SCNC: 4.2 MMOL/L (ref 3.5–5.2)
PROT SERPL-MCNC: 7.5 G/DL (ref 6–8.5)
RBC # BLD AUTO: 3.9 10*6/MM3 (ref 4.14–5.8)
SODIUM BLD-SCNC: 137 MMOL/L (ref 136–145)
WBC NRBC COR # BLD: 3.05 10*3/MM3 (ref 3.4–10.8)

## 2020-01-16 PROCEDURE — 80053 COMPREHEN METABOLIC PANEL: CPT

## 2020-01-16 PROCEDURE — 93971 EXTREMITY STUDY: CPT

## 2020-01-16 PROCEDURE — 85025 COMPLETE CBC W/AUTO DIFF WBC: CPT

## 2020-01-16 PROCEDURE — 36415 COLL VENOUS BLD VENIPUNCTURE: CPT

## 2020-01-16 NOTE — TELEPHONE ENCOUNTER
Pt is having testing done at the hospital today   I spoke with pt and reminded him to get his lipid panel checked.    He will have this done today

## 2020-01-23 ENCOUNTER — INFUSION (OUTPATIENT)
Dept: ONCOLOGY | Facility: HOSPITAL | Age: 65
End: 2020-01-23

## 2020-01-23 ENCOUNTER — OFFICE VISIT (OUTPATIENT)
Dept: ONCOLOGY | Facility: CLINIC | Age: 65
End: 2020-01-23

## 2020-01-23 VITALS
OXYGEN SATURATION: 99 % | TEMPERATURE: 97.8 F | HEART RATE: 66 BPM | RESPIRATION RATE: 16 BRPM | BODY MASS INDEX: 33.87 KG/M2 | HEIGHT: 69 IN | DIASTOLIC BLOOD PRESSURE: 83 MMHG | WEIGHT: 228.7 LBS | SYSTOLIC BLOOD PRESSURE: 136 MMHG

## 2020-01-23 DIAGNOSIS — C18.7 MALIGNANT NEOPLASM OF SIGMOID COLON (HCC): Primary | ICD-10-CM

## 2020-01-23 DIAGNOSIS — C18.7 MALIGNANT NEOPLASM OF SIGMOID COLON (HCC): ICD-10-CM

## 2020-01-23 LAB
ALBUMIN SERPL-MCNC: 3.9 G/DL (ref 3.5–5.2)
ALBUMIN/GLOB SERPL: 1.1 G/DL (ref 1.1–2.4)
ALP SERPL-CCNC: 73 U/L (ref 38–116)
ALT SERPL W P-5'-P-CCNC: 31 U/L (ref 0–41)
ANION GAP SERPL CALCULATED.3IONS-SCNC: 9.4 MMOL/L (ref 5–15)
AST SERPL-CCNC: 37 U/L (ref 0–40)
BASOPHILS # BLD AUTO: 0.02 10*3/MM3 (ref 0–0.2)
BASOPHILS NFR BLD AUTO: 0.5 % (ref 0–1.5)
BILIRUB SERPL-MCNC: 0.9 MG/DL (ref 0.2–1.2)
BUN BLD-MCNC: 13 MG/DL (ref 6–20)
BUN/CREAT SERPL: 12.9 (ref 7.3–30)
CALCIUM SPEC-SCNC: 9.3 MG/DL (ref 8.5–10.2)
CHLORIDE SERPL-SCNC: 103 MMOL/L (ref 98–107)
CO2 SERPL-SCNC: 25.6 MMOL/L (ref 22–29)
CREAT BLD-MCNC: 1.01 MG/DL (ref 0.7–1.3)
DEPRECATED RDW RBC AUTO: 69.2 FL (ref 37–54)
EOSINOPHIL # BLD AUTO: 0.08 10*3/MM3 (ref 0–0.4)
EOSINOPHIL NFR BLD AUTO: 2.2 % (ref 0.3–6.2)
ERYTHROCYTE [DISTWIDTH] IN BLOOD BY AUTOMATED COUNT: 21.5 % (ref 12.3–15.4)
GFR SERPL CREATININE-BSD FRML MDRD: 90 ML/MIN/1.73
GLOBULIN UR ELPH-MCNC: 3.7 GM/DL (ref 1.8–3.5)
GLUCOSE BLD-MCNC: 90 MG/DL (ref 74–124)
HCT VFR BLD AUTO: 35.1 % (ref 37.5–51)
HGB BLD-MCNC: 11.7 G/DL (ref 13–17.7)
IMM GRANULOCYTES # BLD AUTO: 0.01 10*3/MM3 (ref 0–0.05)
IMM GRANULOCYTES NFR BLD AUTO: 0.3 % (ref 0–0.5)
LYMPHOCYTES # BLD AUTO: 1.8 10*3/MM3 (ref 0.7–3.1)
LYMPHOCYTES NFR BLD AUTO: 49.5 % (ref 19.6–45.3)
MCH RBC QN AUTO: 29.9 PG (ref 26.6–33)
MCHC RBC AUTO-ENTMCNC: 33.3 G/DL (ref 31.5–35.7)
MCV RBC AUTO: 89.8 FL (ref 79–97)
MONOCYTES # BLD AUTO: 0.76 10*3/MM3 (ref 0.1–0.9)
MONOCYTES NFR BLD AUTO: 20.9 % (ref 5–12)
NEUTROPHILS # BLD AUTO: 0.97 10*3/MM3 (ref 1.7–7)
NEUTROPHILS NFR BLD AUTO: 26.6 % (ref 42.7–76)
NRBC BLD AUTO-RTO: 0 /100 WBC (ref 0–0.2)
PLATELET # BLD AUTO: 217 10*3/MM3 (ref 140–450)
PMV BLD AUTO: 9.3 FL (ref 6–12)
POTASSIUM BLD-SCNC: 4.5 MMOL/L (ref 3.5–4.7)
PROT SERPL-MCNC: 7.6 G/DL (ref 6.3–8)
RBC # BLD AUTO: 3.91 10*6/MM3 (ref 4.14–5.8)
SODIUM BLD-SCNC: 138 MMOL/L (ref 134–145)
WBC NRBC COR # BLD: 3.64 10*3/MM3 (ref 3.4–10.8)

## 2020-01-23 PROCEDURE — 80053 COMPREHEN METABOLIC PANEL: CPT

## 2020-01-23 PROCEDURE — 25010000002 DEXAMETHASONE: Performed by: INTERNAL MEDICINE

## 2020-01-23 PROCEDURE — 96413 CHEMO IV INFUSION 1 HR: CPT | Performed by: INTERNAL MEDICINE

## 2020-01-23 PROCEDURE — 99215 OFFICE O/P EST HI 40 MIN: CPT | Performed by: INTERNAL MEDICINE

## 2020-01-23 PROCEDURE — 85025 COMPLETE CBC W/AUTO DIFF WBC: CPT

## 2020-01-23 PROCEDURE — 96415 CHEMO IV INFUSION ADDL HR: CPT | Performed by: INTERNAL MEDICINE

## 2020-01-23 PROCEDURE — 25010000002 PALONOSETRON PER 25 MCG: Performed by: INTERNAL MEDICINE

## 2020-01-23 PROCEDURE — 25010000002 OXALIPLATIN PER 0.5 MG: Performed by: INTERNAL MEDICINE

## 2020-01-23 PROCEDURE — 96375 TX/PRO/DX INJ NEW DRUG ADDON: CPT | Performed by: INTERNAL MEDICINE

## 2020-01-23 RX ORDER — FAMOTIDINE 10 MG/ML
20 INJECTION, SOLUTION INTRAVENOUS AS NEEDED
Status: CANCELLED | OUTPATIENT
Start: 2020-01-23

## 2020-01-23 RX ORDER — DIPHENHYDRAMINE HYDROCHLORIDE 50 MG/ML
50 INJECTION INTRAMUSCULAR; INTRAVENOUS AS NEEDED
Status: CANCELLED | OUTPATIENT
Start: 2020-01-23

## 2020-01-23 RX ORDER — DEXTROSE MONOHYDRATE 50 MG/ML
250 INJECTION, SOLUTION INTRAVENOUS ONCE
Status: CANCELLED | OUTPATIENT
Start: 2020-01-23

## 2020-01-23 RX ORDER — PALONOSETRON 0.05 MG/ML
0.25 INJECTION, SOLUTION INTRAVENOUS ONCE
Status: COMPLETED | OUTPATIENT
Start: 2020-01-23 | End: 2020-01-23

## 2020-01-23 RX ORDER — PALONOSETRON 0.05 MG/ML
0.25 INJECTION, SOLUTION INTRAVENOUS ONCE
Status: CANCELLED | OUTPATIENT
Start: 2020-01-23

## 2020-01-23 RX ORDER — DEXTROSE MONOHYDRATE 50 MG/ML
250 INJECTION, SOLUTION INTRAVENOUS ONCE
Status: COMPLETED | OUTPATIENT
Start: 2020-01-23 | End: 2020-01-23

## 2020-01-23 RX ADMIN — OXALIPLATIN 285 MG: 5 INJECTION, SOLUTION, CONCENTRATE INTRAVENOUS at 13:00

## 2020-01-23 RX ADMIN — DEXAMETHASONE SODIUM PHOSPHATE 12 MG: 4 INJECTION, SOLUTION INTRAMUSCULAR; INTRAVENOUS at 12:44

## 2020-01-23 RX ADMIN — PALONOSETRON HYDROCHLORIDE 0.25 MG: 0.25 INJECTION INTRAVENOUS at 12:44

## 2020-01-23 RX ADMIN — DEXTROSE MONOHYDRATE 250 ML: 5 INJECTION, SOLUTION INTRAVENOUS at 12:44

## 2020-01-23 NOTE — PROGRESS NOTES
[facility] GROUP OUTPATIENT FOLLOW UP CLINIC VISIT    REASON FOR FOLLOW-UP:    1.  History of unprovoked pulmonary embolism with a positive lupus anticoagulant test in June 2010.  Chronically anticoagulated with Xarelto 20 mg daily.  Last annual follow-up with Dr. Monroy on 9/26/2018.  2.  Iron deficiency anemia diagnosed at the time of hospitalization on 9/19/2019.  He received a total of 900 mg of intravenous Venofer.  3.  Stage IIIa (pT1, pN1b) adenocarcinoma of the sigmoid colon status post laparoscopic low anterior resection on 9/26/2019.  1.6 x 1.4 cm tumor, grade 2, moderately differentiated with widely negative margins with 2 out of 15 lymph nodes positive for metastatic disease without evidence for extracapsular extension, greatest measuring 3.5 mm.  Microsatellite stable.  4.  Acute right upper extremity superficial thrombophlebitis in the cephalic vein diagnosed on 9/27/2019.  5.  Acute left upper extremity DVT in the brachial vein and acute left upper extremity superficial thrombophlebitis in the basilic vein on 9/27/2019.  Associated with an intravenous catheter.  On Xarelto.    HISTORY OF PRESENT ILLNESS:  Jared Rose is a 64 y.o. male who returns today for follow up and cycle 4 of CAPEOX.  Continues to take Xeloda 850 mg per metered squared twice daily for 14 out of 21 days.    He has generally tolerated therapy quite well.  He does report a decreased appetite and some weight loss.  No nausea or vomiting.  No mucositis.  He tolerates the Xarelto well without any bleeding.  He has noticed very dry skin on his hands and he has been using some lotion with minimal improvement.  He also notes some cold sensitivity.  He called into the office and we recommended a vitamin B complex vitamin which he is taking.  This has not persisted and he notes that occasionally at this point.  There is no persistent neuropathy.          ALLERGIES:  Allergies   Allergen Reactions   • Adhesive Tape Other (See  "Comments)     blisters       MEDICATIONS:  The medication list has been reviewed with the patient by the medical assistant, and the list has been updated in the electronic medical record, which I reviewed.  Medication dosages and frequencies were confirmed to be accurate.    REVIEW OF SYSTEMS:  PAIN:  See Vital Signs below.  GENERAL:  No fevers, chills, night sweats, or unintended weight loss.  SKIN: Skin on his hands  HEME/LYMPH:  No abnormal bleeding.  No palpable lymphadenopathy.  EYES:  No vision changes or diplopia.  ENT:  No sore throat or difficulty swallowing.  RESPIRATORY:  No cough, shortness of breath, hemoptysis, or wheezing.  CARDIOVASCULAR:  No chest pain, palpitations, orthopnea, or dyspnea on exertion.  GASTROINTESTINAL: No nausea vomiting diarrhea or constipation.  GENITOURINARY:  No dysuria or hematuria.  MUSCULOSKELETAL: Limited range of motion of the left shoulder.  NEUROLOGIC: Cold sensitivity related to oxaliplatin  PSYCHIATRIC:  No depression, anxiety, or mood changes.    Vitals:    01/23/20 1150   BP: 136/83   Pulse: 66   Resp: 16   Temp: 97.8 °F (36.6 °C)   TempSrc: Oral   SpO2: 99%   Weight: 104 kg (228 lb 11.2 oz)   Height: 176 cm (69.29\")   PainSc: 0-No pain       PHYSICAL EXAMINATION:  GENERAL:  Well-developed well-nourished male; awake, alert and oriented, in no acute distress.  SKIN:  Warm and dry, without rashes, purpura, or petechiae.  Very dry skin noted on his hands with some mild fissuring.  HEAD:  Normocephalic, atraumatic.  EARS:  Hearing intact.  NOSE:  Septum midline.  No excoriations or nasal discharge.  MOUTH:  No stomatitis or ulcers.  Lips are normal.  THROAT:  Oropharynx without lesions or exudates  LYMPHATICS:  No cervical, supraclavicular, axillary lymphadenopathy.  CHEST:  Lungs are clear to auscultation bilaterally.  No wheezes, rales, or rhonchi.  HEART:  Regular rate; normal rhythm.  No murmurs, gallops or rubs.  ABDOMEN: Not examined today  EXTREMITIES:  No " clubbing, cyanosis, or edema.  NEUROLOGICAL:  No focal neurologic deficits.    DIAGNOSTIC DATA:  Results for orders placed or performed in visit on 01/23/20   CBC Auto Differential   Result Value Ref Range    WBC 3.64 3.40 - 10.80 10*3/mm3    RBC 3.91 (L) 4.14 - 5.80 10*6/mm3    Hemoglobin 11.7 (L) 13.0 - 17.7 g/dL    Hematocrit 35.1 (L) 37.5 - 51.0 %    MCV 89.8 79.0 - 97.0 fL    MCH 29.9 26.6 - 33.0 pg    MCHC 33.3 31.5 - 35.7 g/dL    RDW 21.5 (H) 12.3 - 15.4 %    RDW-SD 69.2 (H) 37.0 - 54.0 fl    MPV 9.3 6.0 - 12.0 fL    Platelets 217 140 - 450 10*3/mm3    Neutrophil % 26.6 (L) 42.7 - 76.0 %    Lymphocyte % 49.5 (H) 19.6 - 45.3 %    Monocyte % 20.9 (H) 5.0 - 12.0 %    Eosinophil % 2.2 0.3 - 6.2 %    Basophil % 0.5 0.0 - 1.5 %    Immature Grans % 0.3 0.0 - 0.5 %    Neutrophils, Absolute 0.97 (L) 1.70 - 7.00 10*3/mm3    Lymphocytes, Absolute 1.80 0.70 - 3.10 10*3/mm3    Monocytes, Absolute 0.76 0.10 - 0.90 10*3/mm3    Eosinophils, Absolute 0.08 0.00 - 0.40 10*3/mm3    Basophils, Absolute 0.02 0.00 - 0.20 10*3/mm3    Immature Grans, Absolute 0.01 0.00 - 0.05 10*3/mm3    nRBC 0.0 0.0 - 0.2 /100 WBC       IMAGING:  CT images of the chest on 9/25/2019 without definite evidence for metastatic disease.  There is one nodule stable since 2010.  A second nodule is felt to be benign with repeat imaging in 6 months suggested.    IMPRESSION:  The primary sigmoid neoplasm is not well delineated on CT. A  few foci of extraluminal air are seen adjacent to the mid sigmoid colon  likely a result of recent biopsy of the neoplasm. No convincing evidence  of metastatic disease is seen within the chest, abdomen and pelvis.  There are 2 noncalcified lung nodules one of which is definitely  unchanged from 2010 suggestive of a benign nodule. The second nodule is  not definitively identified, however is favored to be benign as well.  This can be reevaluated by chest CT in 6 months.    ASSESSMENT:  This is a 64 y.o. male with:  1.  History  of unprovoked pulmonary embolism with a positive lupus anticoagulant test in June 2010.  Chronically anticoagulated with Xarelto 20 mg daily.  Last annual meeting with Dr. Monroy on 9/26/2019    2.  Iron deficiency anemia diagnosed at the time of hospitalization on 9/19/2019.  He received a total of 900 mg of intravenous Venofer.  Iron studies and ferritin were normal.  Anemia stable today with a hemoglobin of 11.7.    3.  Stage IIIa (pT1, pN1b) adenocarcinoma of the sigmoid colon status post laparoscopic low anterior resection on 9/26/2019.  1.6 x 1.4 cm tumor, grade 2, moderately differentiated with widely negative margins with 2 out of 15 lymph nodes positive for metastatic disease without evidence for extracapsular extension, greatest measuring 3.5 mm.  Microsatellite stable.    Discussed pursuing adjuvant therapy with CAPEOX for at least 3 months.  He was in agreement.  For now, we will plan for 4 cycles.  This is in accordance with NCCN guidelines.    Proceed with cycle #4 today.  This is his final cycle of therapy.    4.  Acute right upper extremity superficial thrombophlebitis in the cephalic vein diagnosed on 9/27/2019.  Acute left upper extremity DVT in the brachial vein and acute left upper extremity superficial thrombophlebitis in the basilic vein on 9/27/2019.  Associated with an intravenous catheter.  He continues Xarelto 20 mg daily.    Repeat left upper extremity venous duplex on 1/16/2020 with chronic left upper extremity superficial thrombophlebitis in the basilic vein.  No DVT.    5.  Skin changes related to Xeloda with very dry skin on his palms: Advised Aquaphor.      6.  Mild neutropenia related to chemotherapy: Continue to monitor.  Proceed with treatment today.      7.  Cold sensitivity related to oxaliplatin: Anticipated.  He has no persistent neuropathic symptoms.    PLAN:  1.  Proceed with CAPEOX cycle number 4 day 1 today.  This is his final cycle.   Oxaliplatin 130 mg per metered squared  every 21 days and Xeloda 850 mg per metered squared twice daily for 14 out of 21 days, rounding up slightly to 2000 mg twice daily.      2.  Continue Xarelto.  He is chronically anticoagulated.      3.  Aquaphor to his hands    4.  I will see him back in 3 months for follow-up with a CBC CMP and reticulocyte count.  We will need to see about repeating CT imaging of his chest abdomen pelvis after that.

## 2020-04-20 ENCOUNTER — TELEMEDICINE (OUTPATIENT)
Dept: ONCOLOGY | Facility: CLINIC | Age: 65
End: 2020-04-20

## 2020-04-20 ENCOUNTER — LAB (OUTPATIENT)
Dept: LAB | Facility: HOSPITAL | Age: 65
End: 2020-04-20

## 2020-04-20 DIAGNOSIS — C18.7 MALIGNANT NEOPLASM OF SIGMOID COLON (HCC): Primary | ICD-10-CM

## 2020-04-20 DIAGNOSIS — C18.7 MALIGNANT NEOPLASM OF SIGMOID COLON (HCC): ICD-10-CM

## 2020-04-20 LAB
ALBUMIN SERPL-MCNC: 4.1 G/DL (ref 3.5–5.2)
ALBUMIN/GLOB SERPL: 1.1 G/DL (ref 1.1–2.4)
ALP SERPL-CCNC: 76 U/L (ref 38–116)
ALT SERPL W P-5'-P-CCNC: 24 U/L (ref 0–41)
ANION GAP SERPL CALCULATED.3IONS-SCNC: 11.8 MMOL/L (ref 5–15)
AST SERPL-CCNC: 26 U/L (ref 0–40)
BASOPHILS # BLD AUTO: 0.02 10*3/MM3 (ref 0–0.2)
BASOPHILS NFR BLD AUTO: 0.4 % (ref 0–1.5)
BILIRUB SERPL-MCNC: 0.9 MG/DL (ref 0.2–1.2)
BUN BLD-MCNC: 20 MG/DL (ref 6–20)
BUN/CREAT SERPL: 14.7 (ref 7.3–30)
CALCIUM SPEC-SCNC: 9.6 MG/DL (ref 8.5–10.2)
CEA SERPL-MCNC: 1.67 NG/ML
CHLORIDE SERPL-SCNC: 103 MMOL/L (ref 98–107)
CO2 SERPL-SCNC: 26.2 MMOL/L (ref 22–29)
CREAT BLD-MCNC: 1.36 MG/DL (ref 0.7–1.3)
DEPRECATED RDW RBC AUTO: 51.9 FL (ref 37–54)
EOSINOPHIL # BLD AUTO: 0.19 10*3/MM3 (ref 0–0.4)
EOSINOPHIL NFR BLD AUTO: 3.8 % (ref 0.3–6.2)
ERYTHROCYTE [DISTWIDTH] IN BLOOD BY AUTOMATED COUNT: 13.9 % (ref 12.3–15.4)
GFR SERPL CREATININE-BSD FRML MDRD: 64 ML/MIN/1.73
GLOBULIN UR ELPH-MCNC: 3.6 GM/DL (ref 1.8–3.5)
GLUCOSE BLD-MCNC: 106 MG/DL (ref 74–124)
HCT VFR BLD AUTO: 38.3 % (ref 37.5–51)
HGB BLD-MCNC: 12.5 G/DL (ref 13–17.7)
IMM GRANULOCYTES # BLD AUTO: 0.01 10*3/MM3 (ref 0–0.05)
IMM GRANULOCYTES NFR BLD AUTO: 0.2 % (ref 0–0.5)
LYMPHOCYTES # BLD AUTO: 2.07 10*3/MM3 (ref 0.7–3.1)
LYMPHOCYTES NFR BLD AUTO: 41.6 % (ref 19.6–45.3)
MCH RBC QN AUTO: 32.8 PG (ref 26.6–33)
MCHC RBC AUTO-ENTMCNC: 32.6 G/DL (ref 31.5–35.7)
MCV RBC AUTO: 100.5 FL (ref 79–97)
MONOCYTES # BLD AUTO: 0.69 10*3/MM3 (ref 0.1–0.9)
MONOCYTES NFR BLD AUTO: 13.9 % (ref 5–12)
NEUTROPHILS # BLD AUTO: 2 10*3/MM3 (ref 1.7–7)
NEUTROPHILS NFR BLD AUTO: 40.1 % (ref 42.7–76)
NRBC BLD AUTO-RTO: 0 /100 WBC (ref 0–0.2)
PLATELET # BLD AUTO: 254 10*3/MM3 (ref 140–450)
PMV BLD AUTO: 9 FL (ref 6–12)
POTASSIUM BLD-SCNC: 4.7 MMOL/L (ref 3.5–4.7)
PROT SERPL-MCNC: 7.7 G/DL (ref 6.3–8)
RBC # BLD AUTO: 3.81 10*6/MM3 (ref 4.14–5.8)
SODIUM BLD-SCNC: 141 MMOL/L (ref 134–145)
WBC NRBC COR # BLD: 4.98 10*3/MM3 (ref 3.4–10.8)

## 2020-04-20 PROCEDURE — 85025 COMPLETE CBC W/AUTO DIFF WBC: CPT

## 2020-04-20 PROCEDURE — 36415 COLL VENOUS BLD VENIPUNCTURE: CPT

## 2020-04-20 PROCEDURE — 99214 OFFICE O/P EST MOD 30 MIN: CPT | Performed by: INTERNAL MEDICINE

## 2020-04-20 PROCEDURE — 82378 CARCINOEMBRYONIC ANTIGEN: CPT | Performed by: INTERNAL MEDICINE

## 2020-04-20 PROCEDURE — 80053 COMPREHEN METABOLIC PANEL: CPT

## 2020-06-02 RX ORDER — SILDENAFIL 100 MG/1
TABLET, FILM COATED ORAL
Qty: 10 TABLET | Refills: 1 | Status: SHIPPED | OUTPATIENT
Start: 2020-06-02 | End: 2020-08-03 | Stop reason: SDUPTHER

## 2020-07-13 ENCOUNTER — APPOINTMENT (OUTPATIENT)
Dept: PET IMAGING | Facility: HOSPITAL | Age: 65
End: 2020-07-13

## 2020-07-17 ENCOUNTER — HOSPITAL ENCOUNTER (OUTPATIENT)
Dept: PET IMAGING | Facility: HOSPITAL | Age: 65
Discharge: HOME | End: 2020-07-17
Admitting: INTERNAL MEDICINE

## 2020-07-17 DIAGNOSIS — C18.7 MALIGNANT NEOPLASM OF SIGMOID COLON (HCC): ICD-10-CM

## 2020-07-17 LAB — CREAT BLDA-MCNC: 1.2 MG/DL (ref 0.6–1.3)

## 2020-07-17 PROCEDURE — 82565 ASSAY OF CREATININE: CPT

## 2020-07-17 PROCEDURE — 71260 CT THORAX DX C+: CPT

## 2020-07-17 PROCEDURE — 0 DIATRIZOATE MEGLUMINE & SODIUM PER 1 ML: Performed by: INTERNAL MEDICINE

## 2020-07-17 PROCEDURE — 82378 CARCINOEMBRYONIC ANTIGEN: CPT | Performed by: INTERNAL MEDICINE

## 2020-07-17 PROCEDURE — 74177 CT ABD & PELVIS W/CONTRAST: CPT

## 2020-07-17 PROCEDURE — 25010000002 IOPAMIDOL 61 % SOLUTION: Performed by: INTERNAL MEDICINE

## 2020-07-17 RX ADMIN — DIATRIZOATE MEGLUMINE AND DIATRIZOATE SODIUM 30 ML: 660; 100 LIQUID ORAL; RECTAL at 13:48

## 2020-07-17 RX ADMIN — IOPAMIDOL 85 ML: 612 INJECTION, SOLUTION INTRAVENOUS at 14:55

## 2020-07-21 NOTE — PROGRESS NOTES
RM:________     PCP: Ras Ash MD    : 1955  AGE: 65 y.o.  EST PATIENT   REASON FOR VISIT/  CC:  CAD       WT: ____________ BP: __________L __________R HR______    CHEST PAIN: _____________    SOA: _____________PALPS: _______________     LIGHTHEADED: ___________FATIGUE: ________________ EDEMA __________    ALLERGIES:Adhesive tape SMOKING HISTORY:  Social History     Tobacco Use   • Smoking status: Never Smoker   • Smokeless tobacco: Never Used   Substance Use Topics   • Alcohol use: No     Frequency: Never     Comment: Caffeine use   • Drug use: No     CAFFEINE USE_________________  ALCOHOL ______________________    Below is the patient's most recent value for Albumin, ALT, AST, BUN, Calcium, Chloride, Cholesterol, CO2, Creatinine, GFR, Glucose, HDL, Hematocrit, Hemoglobin, Hemoglobin A1C, LDL, Magnesium, Phosphorus, Platelets, Potassium, PSA, Sodium, Triglycerides, TSH and WBC.   Lab Results   Component Value Date    ALBUMIN 4.30 2020    ALT 32 2020    AST 26 2020    BUN 14 2020    CALCIUM 9.9 2020    CL 99 2020    CHOL 166 2018    CO2 27.1 2020    CREATININE 1.25 2020    HDL 56 2018    HCT 40.1 2020    HGB 13.4 2020    HGBA1C 5.93 (H) 2018    LDL 97 2018    MG 2.3 2019     2020    K 4.4 2020     2020    TRIG 65 2018    TSH 1.340 05/10/2017    WBC 4.99 2020          NEW DIAGNOSIS/ SURGERY/ HOSP OR ED VISITS: ______________________    __________________________________________________________________      RECENT LABS OR DIAGNOSTIC TESTING:  _____________________________    __________________________________________________________________      ASSESSMENT/ PLAN: _______________________________________________    __________________________________________________________________

## 2020-07-23 ENCOUNTER — OFFICE VISIT (OUTPATIENT)
Dept: CARDIOLOGY | Facility: CLINIC | Age: 65
End: 2020-07-23

## 2020-07-23 VITALS
DIASTOLIC BLOOD PRESSURE: 74 MMHG | HEIGHT: 69 IN | SYSTOLIC BLOOD PRESSURE: 146 MMHG | BODY MASS INDEX: 37.53 KG/M2 | HEART RATE: 75 BPM | WEIGHT: 253.4 LBS

## 2020-07-23 DIAGNOSIS — I10 ESSENTIAL HYPERTENSION: ICD-10-CM

## 2020-07-23 DIAGNOSIS — I25.10 ATHEROSCLEROSIS OF NATIVE CORONARY ARTERY OF NATIVE HEART WITHOUT ANGINA PECTORIS: Primary | ICD-10-CM

## 2020-07-23 DIAGNOSIS — D68.62 LUPUS ANTICOAGULANT DISORDER (HCC): ICD-10-CM

## 2020-07-23 PROBLEM — R07.9 CHEST PAIN: Status: RESOLVED | Noted: 2019-09-19 | Resolved: 2020-07-23

## 2020-07-23 PROCEDURE — 99213 OFFICE O/P EST LOW 20 MIN: CPT | Performed by: INTERNAL MEDICINE

## 2020-07-23 PROCEDURE — 93000 ELECTROCARDIOGRAM COMPLETE: CPT | Performed by: INTERNAL MEDICINE

## 2020-07-23 NOTE — PROGRESS NOTES
Date of Office Visit: 2020  Encounter Provider: Eligio Panchal MD  Place of Service: [facility] CARDIOLOGY  Patient Name: Jared Rose  :1955    Chief Complaint   Patient presents with   • Coronary Artery Disease   :     HPI: Jared Rose is a 65 y.o. male who presents today to follow-up.  He was seen in our chest pain unit in 2015 with significant dyspnea. Due to his symptoms, cardiac catheterization was recommended. This revealed small-vessel disease of the diagonal and circumflex, and medical therapy only was recommended. He was started on aspirin and atorvastatin, as well as losartan for hypertension. He has a history of VTE due to lupus anticoagulant/antiphospholipid antibody syndrome and was on warfarin for years, which was then switched to rivaroxaban.       Since we last saw him, he was diagnosed with stage 3 colon cancer and underwent LAR and chemotherapy.  He had a recurrent DVT in the perioperative period while his DOAC was being held.     He's doing well from a cardiac standpoint.  He has chronic fatigue but no dyspnea, chest pain, palpitations, lightheadedness, or syncope.      Past Medical History:   Diagnosis Date   • Bell's palsy 2011    SEEN AT [facility] ER   • Blister of foot 2017    RIGHT FOOT   • Chronic anticoagulation    • Chronic fatigue    • Chronic left-sided low back pain without sciatica    • Coronary atherosclerosis     cath 2015: normal LM, diffuse LCx disease, LI LAD, 60-70% ostial diag (<1 mm vessel), LI RCA   • Dermoid cyst of leg, right 2017   • ED (erectile dysfunction)    • Exomphalos 2013   • GI hemorrhage 2019    ADMITTED TO [facility]   • H/O Anemia    • H/O Leukopenia    • History of transfusion    • Hyperlipidemia    • Hypertension    • Lupus anticoagulant positive    • Muscle spasm of back 10/2018   • Obesity    • Pulmonary embolism (CMS/HCC) 2010     Right lower lobe pulmonary embolus, ADMITTED TO [facility]    • Rectal bleeding 09/2019   • Rectal cancer (CMS/HCC) 09/24/2019    MODERATELY DIFFERENTIATED ADENOCARCINOMA GRADE 2, FOLLOWED BY DR. RADHA WHITT   • Stress fracture of right foot 05/11/2013    4TH METATARSAL, SEEN AT [facility] ER   • Thrombophilia (CMS/HCC)     FOLLOWED BY DR. BALAJI MCGEE   • Tinea pedis 11/25/2007    SEEN AT [facility] ER       Past Surgical History:   Procedure Laterality Date   • CARDIAC CATHETERIZATION Left 05/11/2006    NORMAL LV SYSTOLIC FUNCTION(EF 50%), MOD DZ OF 2 SMALL CORONARY BRANCHES (D2 AND OM2), DR. BOOM GALLEGO AT [facility]   • CARDIAC CATHETERIZATION Left 07/20/2015    NORMAL LM, DIFFUSE LCx DZ, LI LAD, 60-70% OSTIAL DIAG(<1MM VESSEL), 10%STENOSIS IN LAD, DR. CHERI VARGAS AT [facility]   • CLOSED REDUCTION WRIST FRACTURE Right    • COLON RESECTION N/A 9/26/2019    Procedure: LOW ANTERIOR COLON RESECTION IMMOBILIZATION OF SPLENIC FLEXURE;  Surgeon: Radha Whitt MD;  Location: [facility];  Service: General   • COLONOSCOPY N/A 9/21/2019    INT/EXT HEMORRHOIDS, 18 MM POLYPOID LESION IN MID SIGMOID, PATH: INVASIVE MODERATELY DIFFERENTIATED GRADE 2 ADENOCARCINOMA, 2 TUBULOVILLOUS ADENOMA POLYPS IN CECUM, ADENOMATOUS POLYP IN TRANSVERSE, ADENOMATOUS POLYP IN ASCENDING, MULTIPLE SMALL AND LARGE DIVERTICULA, DR. TRUONG MONTEZ AT [facility]   • LUNG SURGERY Right 2009    RIGHT LOWER LOBE, BLOT CLOT REMOVED   • SIGMOIDOSCOPY N/A 9/24/2019    AN INFILTRATIVE NON OBSTRUCTING MASS IN SIGMOID, AREA TATTOOED, DR. RADHA WHITT AT [facility]   • TONSILLECTOMY AND ADENOIDECTOMY Bilateral     DURING CHILDHOOD   • UMBILICAL HERNIA REPAIR N/A 05/14/2014    DR. ROMERO SCHUSTER AT [facility]       Social History     Socioeconomic History   • Marital status:      Spouse name: Not on file   • Number of children: Not on file   • Years of education: Not on file   • Highest education level: Not on file   Occupational History   • Occupation:      Employer: [employer]   Tobacco Use   • Smoking status: Never  "Smoker   • Smokeless tobacco: Never Used   Substance and Sexual Activity   • Alcohol use: No     Frequency: Never     Comment: Caffeine use   • Drug use: No   • Sexual activity: Yes       Family History   Problem Relation Age of Onset   • Coronary artery disease Father    • Heart disease Mother 83   • Hypertension Mother    • Asthma Mother    • Diabetes Mother    • Kidney disease Mother    • Heart attack Mother    • Hypertension Sister    • Diabetes Sister    • Kidney disease Sister    • Heart attack Brother    • Alcohol abuse Brother    • Diabetes Sister    • Heart disease Sister        Review of Systems   Constitution: Positive for malaise/fatigue.   Cardiovascular: Negative for chest pain.   Neurological: Positive for paresthesias. Negative for dizziness and light-headedness.   All other systems reviewed and are negative.      Allergies   Allergen Reactions   • Adhesive Tape Other (See Comments)     blisters         Current Outpatient Medications:   •  atorvastatin (LIPITOR) 40 MG tablet, Take 1 tablet by mouth Daily., Disp: 90 tablet, Rfl: 3  •  losartan (COZAAR) 50 MG tablet, Take 1 tablet by mouth Daily., Disp: 90 tablet, Rfl: 3  •  rivaroxaban (XARELTO) 20 MG tablet, Take 1 tablet by mouth Daily With Dinner. Must be seen, Disp: 30 tablet, Rfl: 0  •  sildenafil (Viagra) 100 MG tablet, Take 1/2 to 1 tablet daily if needed for ED, Disp: 10 tablet, Rfl: 1     Objective:     Vitals:    07/23/20 1024   BP: 146/74   BP Location: Left arm   Pulse: 75   Weight: 115 kg (253 lb 6.4 oz)   Height: 175.8 cm (69.2\")     Body mass index is 37.2 kg/m².    Physical Exam   Constitutional: He is oriented to person, place, and time. He appears well-developed and well-nourished.   HENT:   Head: Normocephalic.   Nose: Nose normal.   Mouth/Throat: Oropharynx is clear and moist.   Eyes: Pupils are equal, round, and reactive to light. Conjunctivae and EOM are normal.   Neck: Normal range of motion. No JVD present.   Cardiovascular: " Normal rate, regular rhythm, normal heart sounds and intact distal pulses.   No murmur heard.  Pulmonary/Chest: Effort normal and breath sounds normal.   Abdominal: Soft. There is no tenderness.   Musculoskeletal: Normal range of motion. He exhibits no edema.   Neurological: He is alert and oriented to person, place, and time. No cranial nerve deficit.   Skin: Skin is warm and dry. No rash noted.   Psychiatric: He has a normal mood and affect. His behavior is normal. Judgment and thought content normal.   Vitals reviewed.        ECG 12 Lead  Date/Time: 7/23/2020 10:43 AM  Performed by: Eligio Panchal MD  Authorized by: Eligio Panchal MD   Comparison: compared with previous ECG   Similar to previous ECG  Rhythm: sinus rhythm  Conduction: conduction normal  ST Segments: ST segments normal  T inversion: I and aVL  QRS axis: normal  Other findings: non-specific ST-T wave changes    Clinical impression: non-specific ECG              Assessment:       Diagnosis Plan   1. Atherosclerosis of native coronary artery of native heart without angina pectoris     2. Essential hypertension     3. Lupus anticoagulant disorder (CMS/HCC)            Plan:       1.  He has moderate nonobstructive disease involving the circumflex and diagonal, and is asymptomatic. Since he is fully anticoagulated, I don't feel strongly about aspirin therapy.    2.  His BP was a little high today but he has gained a lot of weight.  I asked him to work on this and I suspect his BP will improve.  It was better when I rechecked it.    3.  He's anticoagulated and follows with Dr Lares.    Sincerely,       Eligio Panchal MD

## 2020-07-27 ENCOUNTER — APPOINTMENT (OUTPATIENT)
Dept: LAB | Facility: HOSPITAL | Age: 65
End: 2020-07-27

## 2020-07-27 ENCOUNTER — OFFICE VISIT (OUTPATIENT)
Dept: ONCOLOGY | Facility: CLINIC | Age: 65
End: 2020-07-27

## 2020-07-27 VITALS
TEMPERATURE: 97.5 F | SYSTOLIC BLOOD PRESSURE: 163 MMHG | OXYGEN SATURATION: 99 % | DIASTOLIC BLOOD PRESSURE: 98 MMHG | HEART RATE: 88 BPM | HEIGHT: 69 IN | BODY MASS INDEX: 38.3 KG/M2 | WEIGHT: 258.6 LBS | RESPIRATION RATE: 16 BRPM

## 2020-07-27 DIAGNOSIS — C18.7 MALIGNANT NEOPLASM OF SIGMOID COLON (HCC): Primary | ICD-10-CM

## 2020-07-27 PROCEDURE — 99214 OFFICE O/P EST MOD 30 MIN: CPT | Performed by: INTERNAL MEDICINE

## 2020-07-27 NOTE — PROGRESS NOTES
[facility] GROUP OUTPATIENT FOLLOW UP CLINIC VISIT    REASON FOR FOLLOW-UP:    1.  History of unprovoked pulmonary embolism with a positive lupus anticoagulant test in June 2010.  Chronically anticoagulated with Xarelto 20 mg daily.  Last annual follow-up with Dr. Mornoy on 9/26/2018.  2.  Iron deficiency anemia diagnosed at the time of hospitalization on 9/19/2019.  He received a total of 900 mg of intravenous Venofer.  3.  Stage IIIa (pT1, pN1b) adenocarcinoma of the sigmoid colon status post laparoscopic low anterior resection on 9/26/2019.  1.6 x 1.4 cm tumor, grade 2, moderately differentiated with widely negative margins with 2 out of 15 lymph nodes positive for metastatic disease without evidence for extracapsular extension, greatest measuring 3.5 mm.  Microsatellite stable.  4.  Acute right upper extremity superficial thrombophlebitis in the cephalic vein diagnosed on 9/27/2019.  5.  Acute left upper extremity DVT in the brachial vein and acute left upper extremity superficial thrombophlebitis in the basilic vein on 9/27/2019.  Associated with an intravenous catheter.  On Xarelto.  6.  Four cycles of adjuvant CAPEOX complete 1/23/2020    HISTORY OF PRESENT ILLNESS:  Jared Rose is a 65 y.o. male who returns today for follow up and lab review.  He completed his fourth cycle being on 1/23/2020.    Overall he is doing well.  He continues to gain weight but does intend to try to lose some weight.  He notes some dyspnea on exertion which may be secondary to the weight gain.  He denies any abdominal pain nausea vomiting or diarrhea.  No fevers or chills.  No bleeding issues.        ALLERGIES:  Allergies   Allergen Reactions   • Adhesive Tape Other (See Comments)     blisters       MEDICATIONS:  The medication list has been reviewed with the patient by the medical assistant, and the list has been updated in the electronic medical record, which I reviewed.  Medication dosages and frequencies were  "confirmed to be accurate.    REVIEW OF SYSTEMS:  PAIN:  See Vital Signs below.  GENERAL:  No fevers, chills, night sweats, or unintended weight loss.  SKIN: No rash  HEME/LYMPH:  No abnormal bleeding.  No palpable lymphadenopathy.  EYES:  No vision changes or diplopia.  ENT:  No sore throat or difficulty swallowing.  RESPIRATORY:  No cough, shortness of breath, hemoptysis, or wheezing.  CARDIOVASCULAR: Mild dyspnea on exertion.  GASTROINTESTINAL: No abdominal pain, nausea vomiting diarrhea or constipation.  GENITOURINARY:  No dysuria or hematuria.  MUSCULOSKELETAL: No new joint pain or swelling  NEUROLOGIC: No paresthesias  PSYCHIATRIC:  No depression, anxiety, or mood changes.    Vitals:    07/27/20 1352   BP: 163/98   Pulse: 88   Resp: 16   Temp: 97.5 °F (36.4 °C)   TempSrc: Temporal   SpO2: 99%   Weight: 117 kg (258 lb 9.6 oz)   Height: 175.8 cm (69.21\")   PainSc: 0-No pain       PHYSICAL EXAMINATION:  GENERAL:  Well-developed well-nourished male; awake, alert and oriented, in no acute distress.  Wearing a mask.  SKIN:  Warm and dry, without rashes, purpura, or petechiae.  HEAD:  Normocephalic, atraumatic.  EARS:  Hearing intact.  LYMPHATICS:  No cervical, supraclavicular, or axillary lymphadenopathy.  CHEST:  Lungs are clear to auscultation bilaterally.  No wheezes, rales, or rhonchi.  HEART:  Regular rate; normal rhythm.  No murmurs, gallops or rubs.  ABDOMEN:  Soft, non-tender, non-distended.  Normal active bowel sounds.  No organomegaly.  EXTREMITIES: No clubbing cyanosis or edema.  NEUROLOGICAL:  No focal neurologic deficits.      DIAGNOSTIC DATA:  Results for orders placed or performed during the hospital encounter of 07/17/20   POC Creatinine   Result Value Ref Range    Creatinine 1.20 0.60 - 1.30 mg/dL       IMAGING:  CT chest abd pelvis from 7/17/2020 images personally reviewed  Two cystic lesions along the colonic anastomosis.  Follow up in 3 mos suggested.      ASSESSMENT:  This is a 65 y.o. male " with:  1.  History of unprovoked pulmonary embolism with a positive lupus anticoagulant test in June 2010.  Chronically anticoagulated with Xarelto 20 mg daily.  Last annual meeting with Dr. Monroy on 9/26/2019    2.  Iron deficiency anemia diagnosed at the time of hospitalization on 9/19/2019.  He received a total of 900 mg of intravenous Venofer.  Iron studies and ferritin were normal.  Hemoglobin normalized at 13.4.    3.  Stage IIIa (pT1, pN1b) adenocarcinoma of the sigmoid colon status post laparoscopic low anterior resection on 9/26/2019.  1.6 x 1.4 cm tumor, grade 2, moderately differentiated with widely negative margins with 2 out of 15 lymph nodes positive for metastatic disease without evidence for extracapsular extension, greatest measuring 3.5 mm.  Microsatellite stable.    Discussed pursuing adjuvant therapy with CAPEOX for at least 3 months. This is in accordance with NCCN guidelines.    4 cycles of therapy complete as of 1/23/2020.    CT imaging now on 7/17/2020 with some concerning findings.  I will alert his surgeon Dr. Whitt.  I gave him the option today of proceeding with a PET scan for further evaluation or proceeding with CT imaging of the abdomen and pelvis in 3 months.  He has elected to proceed with CT imaging of the abdomen and pelvis in 3 months.  I encouraged him to talk to his wife and if he changes his mind to let us know.    4.  Acute right upper extremity superficial thrombophlebitis in the cephalic vein diagnosed on 9/27/2019.  Acute left upper extremity DVT in the brachial vein and acute left upper extremity superficial thrombophlebitis in the basilic vein on 9/27/2019.  Associated with an intravenous catheter.      Repeat left upper extremity venous duplex on 1/16/2020 with chronic left upper extremity superficial thrombophlebitis in the basilic vein.  No DVT.      He continues Xarelto 20 mg daily.    PLAN:  1.  Continue Xarelto.  He is chronically anticoagulated.      2.  He has  elected not to proceed with a PET scan at this time and he desires to proceed with repeat CT imaging of the abdomen and pelvis in 3 months to reassess the abnormalities discovered on recent imaging.  I will see him back after that with a CBC, CMP, CEA.

## 2020-08-03 RX ORDER — SILDENAFIL 100 MG/1
TABLET, FILM COATED ORAL
Qty: 10 TABLET | Refills: 1 | Status: SHIPPED | OUTPATIENT
Start: 2020-08-03 | End: 2021-02-28 | Stop reason: SDUPTHER

## 2020-09-09 ENCOUNTER — OFFICE VISIT (OUTPATIENT)
Dept: FAMILY MEDICINE CLINIC | Facility: CLINIC | Age: 65
End: 2020-09-09

## 2020-09-09 VITALS
HEIGHT: 69 IN | BODY MASS INDEX: 38.16 KG/M2 | OXYGEN SATURATION: 98 % | WEIGHT: 257.6 LBS | SYSTOLIC BLOOD PRESSURE: 120 MMHG | HEART RATE: 74 BPM | TEMPERATURE: 97.7 F | DIASTOLIC BLOOD PRESSURE: 66 MMHG

## 2020-09-09 DIAGNOSIS — M25.562 ACUTE PAIN OF LEFT KNEE: Primary | ICD-10-CM

## 2020-09-09 PROCEDURE — 99213 OFFICE O/P EST LOW 20 MIN: CPT | Performed by: NURSE PRACTITIONER

## 2020-09-09 PROCEDURE — 73560 X-RAY EXAM OF KNEE 1 OR 2: CPT | Performed by: NURSE PRACTITIONER

## 2020-09-09 NOTE — PATIENT INSTRUCTIONS
Refer to ortho will call with appt,   May try tylenol OTC as needed for pain,  Cont knee brace, encouraged ice, elevation, rest, good shoes.   Xray today will call with results.  Increase fluid intake, get plenty of rest.   Patient agrees with plan of care and understands instructions. Call if worsening symptoms or any problems or concerns.

## 2020-09-09 NOTE — PROGRESS NOTES
Subjective   Jared Rose is a 65 y.o. male.     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 He does not see ortho. He denies right knee pain.       The following portions of the patient's history were reviewed and updated as appropriate: allergies, current medications, past family history, past medical history, past social history, past surgical history and problem list.    Review of Systems   Constitutional: Negative for chills, diaphoresis and fever.   Respiratory: Negative for cough and shortness of breath.    Cardiovascular: Negative for chest pain.   Musculoskeletal: Positive for arthralgias, joint swelling and myalgias.   Neurological: Negative for dizziness, light-headedness and headache.   All other systems reviewed and are negative.      Objective   Physical Exam   Constitutional: He is oriented to person, place, and time. He appears well-developed and well-nourished.   HENT:   Head: Normocephalic.   Eyes: Pupils are equal, round, and reactive to light.   Cardiovascular: Normal rate, regular rhythm, normal heart sounds and intact distal pulses.   Pulmonary/Chest: Effort normal and breath sounds normal.   Musculoskeletal:        Right knee: Normal.        Left knee: He exhibits decreased range of motion, swelling and effusion. He exhibits no ecchymosis, no deformity and no erythema. Tenderness found. Medial joint line and lateral joint line tenderness noted.   Neurological: He is alert and oriented to person, place, and time.   Skin: Skin is warm and dry.   Psychiatric: He has a normal mood and affect. His behavior is normal.   Nursing note and vitals reviewed.  left knee xray today for pain no comparison shows NAD, medial joint space narrowing, awaiting radiology over read.        Assessment/Plan   Jared was seen today for left knee pain.    Diagnoses and all orders for this visit:    Acute pain of left knee  -     Ambulatory Referral to Orthopedic Surgery  -     XR Knee 1 or 2 View Left (In Office)        Refer to ortho will call with appt,   May try tylenol OTC as needed for pain,  Cont knee brace, encouraged ice, elevation, rest, good shoes.   Xray today will call with results.  Increase fluid intake, get plenty of rest.   Patient agrees with plan of care and understands instructions. Call if worsening symptoms or any problems or concerns.

## 2020-09-21 ENCOUNTER — OFFICE VISIT (OUTPATIENT)
Dept: ORTHOPEDIC SURGERY | Facility: CLINIC | Age: 65
End: 2020-09-21

## 2020-09-21 VITALS — HEIGHT: 71 IN | WEIGHT: 258 LBS | BODY MASS INDEX: 36.12 KG/M2 | TEMPERATURE: 97.1 F

## 2020-09-21 DIAGNOSIS — M17.12 PRIMARY OSTEOARTHRITIS OF LEFT KNEE: ICD-10-CM

## 2020-09-21 DIAGNOSIS — M25.562 LEFT KNEE PAIN, UNSPECIFIED CHRONICITY: Primary | ICD-10-CM

## 2020-09-21 DIAGNOSIS — Z86.711 HISTORY OF PULMONARY EMBOLUS (PE): ICD-10-CM

## 2020-09-21 DIAGNOSIS — C18.7 MALIGNANT NEOPLASM OF SIGMOID COLON (HCC): ICD-10-CM

## 2020-09-21 PROCEDURE — 20610 DRAIN/INJ JOINT/BURSA W/O US: CPT | Performed by: NURSE PRACTITIONER

## 2020-09-21 PROCEDURE — 73562 X-RAY EXAM OF KNEE 3: CPT | Performed by: NURSE PRACTITIONER

## 2020-09-21 PROCEDURE — 99213 OFFICE O/P EST LOW 20 MIN: CPT | Performed by: NURSE PRACTITIONER

## 2020-09-21 RX ORDER — METHYLPREDNISOLONE ACETATE 80 MG/ML
80 INJECTION, SUSPENSION INTRA-ARTICULAR; INTRALESIONAL; INTRAMUSCULAR; SOFT TISSUE
Status: COMPLETED | OUTPATIENT
Start: 2020-09-21 | End: 2020-09-21

## 2020-09-21 RX ADMIN — METHYLPREDNISOLONE ACETATE 80 MG: 80 INJECTION, SUSPENSION INTRA-ARTICULAR; INTRALESIONAL; INTRAMUSCULAR; SOFT TISSUE at 09:18

## 2020-09-21 NOTE — PROGRESS NOTES
Patient Name: Jared Rose   YOB: 1955  Referring Primary Care Physician: Ras Ash MD  BMI: Body mass index is 35.98 kg/m².    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 Pt has pain left knee pain with movement and standing.  Denies any trauma or injury has been told in the past he has arthritis.  Mask were worn by everyone for the duration of the visit.  Patient has a history of pulmonary embolism and is on blood thinners.  Patient also has a history of colon cancer is currently in treatment for  Jared Rose is a 65 y.o. male who presents today for evaluation of   Chief Complaint   Patient presents with   • Left Knee - Pain       This problem is new to this examiner.     Subjective   Medications:   Home Medications:  Current Outpatient Medications on File Prior to Visit   Medication Sig   • atorvastatin (LIPITOR) 40 MG tablet Take 1 tablet by mouth Daily.   • losartan (COZAAR) 50 MG tablet Take 1 tablet by mouth Daily.   • rivaroxaban (XARELTO) 20 MG tablet Take 1 tablet by mouth Daily With Dinner. Must be seen   • sildenafil (Viagra) 100 MG tablet Take 1/2 to 1 tablet daily if needed for ED     No current facility-administered medications on file prior to visit.      Current Medications:  Scheduled Meds:  Continuous Infusions:No current facility-administered medications for this visit.     PRN Meds:.    I have reviewed the patient's medical history in detail and updated the computerized patient record.  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 RADHA WHITT   • Stress fracture of right foot 05/11/2013    4TH METATARSAL, SEEN AT [facility] ER   • Thrombophilia (CMS/HCC)     FOLLOWED BY DR. BALAJI MCGEE   • Tinea pedis 11/25/2007    SEEN AT [facility] ER        Past Surgical History:   Procedure Laterality Date   • CARDIAC CATHETERIZATION Left 05/11/2006    NORMAL LV SYSTOLIC FUNCTION(EF 50%), MOD DZ OF 2 SMALL CORONARY BRANCHES (D2 AND OM2), DR. BOOM GALLEGO AT [facility]   • CARDIAC CATHETERIZATION Left 07/20/2015    NORMAL LM, DIFFUSE LCx DZ, LI LAD, 60-70% OSTIAL DIAG(<1MM VESSEL), 10%STENOSIS IN LAD, DR. CHERI VARGAS AT [facility]   • CLOSED REDUCTION WRIST FRACTURE Right    • COLON RESECTION N/A 9/26/2019    Procedure: LOW ANTERIOR COLON RESECTION IMMOBILIZATION OF SPLENIC FLEXURE;  Surgeon: Radha Whitt MD;  Location: [facility];  Service: General   • COLONOSCOPY N/A 9/21/2019    INT/EXT HEMORRHOIDS, 18 MM POLYPOID LESION IN MID SIGMOID, PATH: INVASIVE MODERATELY DIFFERENTIATED GRADE 2 ADENOCARCINOMA, 2 TUBULOVILLOUS ADENOMA POLYPS IN CECUM, ADENOMATOUS POLYP IN TRANSVERSE, ADENOMATOUS POLYP IN ASCENDING, MULTIPLE SMALL AND LARGE DIVERTICULA, DR. TRUONG MONTEZ AT [facility]   • LUNG SURGERY Right 2009    RIGHT LOWER LOBE, BLOT CLOT REMOVED   • SIGMOIDOSCOPY N/A 9/24/2019    AN INFILTRATIVE NON OBSTRUCTING MASS IN SIGMOID, AREA TATTOOED, DR. RADHA WHITT AT [facility]   • TONSILLECTOMY  AND ADENOIDECTOMY Bilateral     DURING CHILDHOOD   • UMBILICAL HERNIA REPAIR N/A 05/14/2014    DR. ROMERO SCHUSTER AT [facility]        Social History     Occupational History   • Occupation:      Employer: [employer]   Tobacco Use   • Smoking status: Never Smoker   • Smokeless tobacco: Never Used   Substance and Sexual Activity   • Alcohol use: No     Frequency: Never     Comment: Caffeine use   • Drug use: No   • Sexual activity: Yes      Social History     Social History Narrative   • Not on file        Family History   Problem Relation Age of Onset   • Coronary artery disease Father    • Heart disease Mother 83   • Hypertension Mother    • Asthma Mother    • Diabetes Mother    • Kidney disease Mother    • Heart attack Mother    • Hypertension Sister    • Diabetes Sister    • Kidney disease Sister    • Heart attack Brother    • Alcohol abuse Brother    • Diabetes Sister    • Heart disease Sister        ROS: 14 point review of systems was performed and all other systems were reviewed and are negative except for documented findings in HPI and today's encounter.     Allergies:   Allergies   Allergen Reactions   • Adhesive Tape Other (See Comments)     blisters     Constitutional:  Denies fever, shaking or chills   Eyes:  Denies change in visual acuity   HENT:  Denies nasal congestion or sore throat   Respiratory:  Denies cough or shortness of breath   Cardiovascular:  Denies chest pain or severe LE edema   GI:  Denies abdominal pain, nausea, vomiting, bloody stools or diarrhea   Musculoskeletal:  Numbness, tingling, pain, or loss of motor function only as noted above in history of present illness.  : Denies painful urination or hematuria  Integument:  Denies rash, lesion or ulceration   Neurologic:  Denies headache or focal weakness  Endocrine:  Denies lymphadenopathy  Psych:  Denies confusion or change in mental status   Hem:  Denies active bleeding    OBJECTIVE:  Physical Exam: 65 y.o.  "male  Wt Readings from Last 3 Encounters:   09/21/20 117 kg (258 lb)   09/09/20 117 kg (257 lb 9.6 oz)   07/27/20 117 kg (258 lb 9.6 oz)     Ht Readings from Last 1 Encounters:   09/21/20 180.3 cm (71\")     Body mass index is 35.98 kg/m².  Vitals:    09/21/20 0854   Temp: 97.1 °F (36.2 °C)     Vital signs reviewed.     General Appearance:    Alert, cooperative, in no acute distress                  Eyes: conjunctiva clear  ENT: external ears and nose atraumatic  CV: no peripheral edema  Resp: normal respiratory effort  Skin: no rashes or wounds; normal turgor  Psych: mood and affect appropriate  Lymph: no nodes appreciated  Neuro: gross sensation intact  Vascular:  Palpable peripheral pulse in noted extremity  Musculoskeletal Extremities: left knee medial joint line tenderness with effusion skin is warm dry intact good pulses mood sensation calves are soft and nontender patient ambulates without any assistive devices    Radiology:   Left knee 3 views done for pain with end stage osteoarthritic changes in the medial compartment no comparison views done for pain    Assessment:     ICD-10-CM ICD-9-CM   1. Left knee pain, unspecified chronicity  M25.562 719.46   2. Primary osteoarthritis of left knee  M17.12 715.16   3. History of pulmonary embolus (PE)  Z86.711 V12.55   4. Malignant neoplasm of sigmoid colon (CMS/HCC)  C18.7 153.3        Large Joint Arthrocentesis: L knee  Date/Time: 9/21/2020 9:18 AM  Consent given by: patient  Site marked: site marked  Timeout: Immediately prior to procedure a time out was called to verify the correct patient, procedure, equipment, support staff and site/side marked as required   Supporting Documentation  Indications: pain and joint swelling   Procedure Details  Location: knee - L knee  Preparation: Patient was prepped and draped in the usual sterile fashion  Needle gauge: 21 G.  Approach: anterolateral  Medications administered: 2 mL lidocaine (cardiac); 80 mg methylPREDNISolone " acetate 80 MG/ML  Patient tolerance: patient tolerated the procedure well with no immediate complications             Plan: Biomechanics of pertinent body area discussed.  Risks, benefits, alternatives, comparisons, and complications of accepted medicines, injections, recommendations, surgical procedures, and therapies explained and education provided in laymen's terms. Natural history and expected course of this patient's diagnosis discussed along with evaluation of therapies. Questions answered. When appropriate I also discussed proper use of cane, walker, trekking poles.   BMI:  The concept of BMI body mass index and its importance and implications discussed.  BMI suggested to be < 40 or as low as possible. Lifestyle measures for weight loss and how this affects orthopedic condition.  EXERCISES:  Advice on benefits of, and types of regular/moderate exercise including biomechanical forces involved as it pertains to this complaint.  MEDICATIONS:  Prescription, OTC and Monitoring of Medications per orders to address ortho complaints; Evaluation and discussion of safety, precautions, side effects, and warnings given especially of long term NSAID or steroid therapy.    RICE: Rest, ice, compression, and elevation therapy, Cryotherapy/brachy therapy, and or OTC linaments as indicated with instructions.   Cortisone Injection. See procedure note.      9/21/2020    Much of this encounter note is an electronic transcription/translation of spoken language to printed text. The electronic translation of spoken language may permit erroneous, or at times, nonsensical words or phrases to be inadvertently transcribed; Although I have reviewed the note for such errors, some may still exist

## 2020-10-06 RX ORDER — ATORVASTATIN CALCIUM 40 MG/1
40 TABLET, FILM COATED ORAL DAILY
Qty: 90 TABLET | Refills: 3 | Status: SHIPPED | OUTPATIENT
Start: 2020-10-06 | End: 2021-07-23 | Stop reason: SDUPTHER

## 2020-10-16 ENCOUNTER — HOSPITAL ENCOUNTER (OUTPATIENT)
Dept: PET IMAGING | Facility: HOSPITAL | Age: 65
Discharge: HOME | End: 2020-10-16
Admitting: INTERNAL MEDICINE

## 2020-10-16 DIAGNOSIS — C18.7 MALIGNANT NEOPLASM OF SIGMOID COLON (HCC): ICD-10-CM

## 2020-10-16 LAB — CREAT BLDA-MCNC: 1.2 MG/DL (ref 0.6–1.3)

## 2020-10-16 PROCEDURE — 82565 ASSAY OF CREATININE: CPT

## 2020-10-16 PROCEDURE — 0 DIATRIZOATE MEGLUMINE & SODIUM PER 1 ML: Performed by: INTERNAL MEDICINE

## 2020-10-16 PROCEDURE — 74177 CT ABD & PELVIS W/CONTRAST: CPT

## 2020-10-16 PROCEDURE — 25010000002 IOPAMIDOL 61 % SOLUTION: Performed by: INTERNAL MEDICINE

## 2020-10-16 RX ADMIN — DIATRIZOATE MEGLUMINE AND DIATRIZOATE SODIUM 30 ML: 660; 100 LIQUID ORAL; RECTAL at 08:30

## 2020-10-16 RX ADMIN — IOPAMIDOL 85 ML: 612 INJECTION, SOLUTION INTRAVENOUS at 09:15

## 2020-10-21 ENCOUNTER — HOSPITAL ENCOUNTER (EMERGENCY)
Facility: HOSPITAL | Age: 65
Discharge: HOME | End: 2020-10-21
Attending: EMERGENCY MEDICINE | Admitting: EMERGENCY MEDICINE

## 2020-10-21 ENCOUNTER — APPOINTMENT (OUTPATIENT)
Dept: GENERAL RADIOLOGY | Facility: HOSPITAL | Age: 65
End: 2020-10-21

## 2020-10-21 VITALS
OXYGEN SATURATION: 96 % | HEIGHT: 71 IN | DIASTOLIC BLOOD PRESSURE: 108 MMHG | HEART RATE: 83 BPM | RESPIRATION RATE: 16 BRPM | SYSTOLIC BLOOD PRESSURE: 152 MMHG | BODY MASS INDEX: 35.98 KG/M2 | TEMPERATURE: 97.2 F

## 2020-10-21 DIAGNOSIS — S43.401A SPRAIN OF RIGHT SHOULDER, UNSPECIFIED SHOULDER SPRAIN TYPE, INITIAL ENCOUNTER: Primary | ICD-10-CM

## 2020-10-21 PROCEDURE — 73030 X-RAY EXAM OF SHOULDER: CPT

## 2020-10-21 PROCEDURE — 99283 EMERGENCY DEPT VISIT LOW MDM: CPT

## 2020-10-21 RX ORDER — IBUPROFEN 800 MG/1
800 TABLET ORAL ONCE
Status: COMPLETED | OUTPATIENT
Start: 2020-10-21 | End: 2020-10-21

## 2020-10-21 RX ADMIN — IBUPROFEN 800 MG: 800 TABLET ORAL at 21:41

## 2020-10-22 ENCOUNTER — OFFICE VISIT (OUTPATIENT)
Dept: ONCOLOGY | Facility: CLINIC | Age: 65
End: 2020-10-22

## 2020-10-22 ENCOUNTER — LAB (OUTPATIENT)
Dept: LAB | Facility: HOSPITAL | Age: 65
End: 2020-10-22

## 2020-10-22 ENCOUNTER — TRANSCRIBE ORDERS (OUTPATIENT)
Dept: PHYSICAL THERAPY | Facility: CLINIC | Age: 65
End: 2020-10-22

## 2020-10-22 VITALS
DIASTOLIC BLOOD PRESSURE: 84 MMHG | HEART RATE: 78 BPM | OXYGEN SATURATION: 98 % | WEIGHT: 256.3 LBS | RESPIRATION RATE: 16 BRPM | BODY MASS INDEX: 37.96 KG/M2 | SYSTOLIC BLOOD PRESSURE: 163 MMHG | HEIGHT: 69 IN | TEMPERATURE: 97.5 F

## 2020-10-22 DIAGNOSIS — C18.7 MALIGNANT NEOPLASM OF SIGMOID COLON (HCC): Primary | ICD-10-CM

## 2020-10-22 DIAGNOSIS — C18.7 MALIGNANT NEOPLASM OF SIGMOID COLON (HCC): ICD-10-CM

## 2020-10-22 DIAGNOSIS — M25.512 LEFT SHOULDER PAIN, UNSPECIFIED CHRONICITY: Primary | ICD-10-CM

## 2020-10-22 DIAGNOSIS — Z79.01 CHRONIC ANTICOAGULATION: Primary | ICD-10-CM

## 2020-10-22 LAB
ALBUMIN SERPL-MCNC: 4.3 G/DL (ref 3.5–5.2)
ALBUMIN/GLOB SERPL: 1.3 G/DL (ref 1.1–2.4)
ALP SERPL-CCNC: 75 U/L (ref 38–116)
ALT SERPL W P-5'-P-CCNC: 22 U/L (ref 0–41)
ANION GAP SERPL CALCULATED.3IONS-SCNC: 8.5 MMOL/L (ref 5–15)
AST SERPL-CCNC: 24 U/L (ref 0–40)
BASOPHILS # BLD AUTO: 0.03 10*3/MM3 (ref 0–0.2)
BASOPHILS NFR BLD AUTO: 0.6 % (ref 0–1.5)
BILIRUB SERPL-MCNC: 0.6 MG/DL (ref 0.2–1.2)
BUN SERPL-MCNC: 18 MG/DL (ref 6–20)
BUN/CREAT SERPL: 14.8 (ref 7.3–30)
CALCIUM SPEC-SCNC: 9.4 MG/DL (ref 8.5–10.2)
CEA SERPL-MCNC: 1.04 NG/ML
CHLORIDE SERPL-SCNC: 105 MMOL/L (ref 98–107)
CO2 SERPL-SCNC: 24.5 MMOL/L (ref 22–29)
CREAT SERPL-MCNC: 1.22 MG/DL (ref 0.7–1.3)
DEPRECATED RDW RBC AUTO: 46.8 FL (ref 37–54)
EOSINOPHIL # BLD AUTO: 0.13 10*3/MM3 (ref 0–0.4)
EOSINOPHIL NFR BLD AUTO: 2.7 % (ref 0.3–6.2)
ERYTHROCYTE [DISTWIDTH] IN BLOOD BY AUTOMATED COUNT: 13.6 % (ref 12.3–15.4)
GFR SERPL CREATININE-BSD FRML MDRD: 72 ML/MIN/1.73
GLOBULIN UR ELPH-MCNC: 3.4 GM/DL (ref 1.8–3.5)
GLUCOSE SERPL-MCNC: 117 MG/DL (ref 74–124)
HCT VFR BLD AUTO: 38.9 % (ref 37.5–51)
HGB BLD-MCNC: 12.9 G/DL (ref 13–17.7)
IMM GRANULOCYTES # BLD AUTO: 0.01 10*3/MM3 (ref 0–0.05)
IMM GRANULOCYTES NFR BLD AUTO: 0.2 % (ref 0–0.5)
LYMPHOCYTES # BLD AUTO: 2.21 10*3/MM3 (ref 0.7–3.1)
LYMPHOCYTES NFR BLD AUTO: 46.3 % (ref 19.6–45.3)
MCH RBC QN AUTO: 31 PG (ref 26.6–33)
MCHC RBC AUTO-ENTMCNC: 33.2 G/DL (ref 31.5–35.7)
MCV RBC AUTO: 93.5 FL (ref 79–97)
MONOCYTES # BLD AUTO: 0.52 10*3/MM3 (ref 0.1–0.9)
MONOCYTES NFR BLD AUTO: 10.9 % (ref 5–12)
NEUTROPHILS NFR BLD AUTO: 1.87 10*3/MM3 (ref 1.7–7)
NEUTROPHILS NFR BLD AUTO: 39.3 % (ref 42.7–76)
NRBC BLD AUTO-RTO: 0 /100 WBC (ref 0–0.2)
PLATELET # BLD AUTO: 235 10*3/MM3 (ref 140–450)
PMV BLD AUTO: 9 FL (ref 6–12)
POTASSIUM SERPL-SCNC: 4.2 MMOL/L (ref 3.5–4.7)
PROT SERPL-MCNC: 7.7 G/DL (ref 6.3–8)
RBC # BLD AUTO: 4.16 10*6/MM3 (ref 4.14–5.8)
SODIUM SERPL-SCNC: 138 MMOL/L (ref 134–145)
WBC # BLD AUTO: 4.77 10*3/MM3 (ref 3.4–10.8)

## 2020-10-22 PROCEDURE — 85025 COMPLETE CBC W/AUTO DIFF WBC: CPT

## 2020-10-22 PROCEDURE — 80053 COMPREHEN METABOLIC PANEL: CPT

## 2020-10-22 PROCEDURE — 82378 CARCINOEMBRYONIC ANTIGEN: CPT | Performed by: INTERNAL MEDICINE

## 2020-10-22 PROCEDURE — 36415 COLL VENOUS BLD VENIPUNCTURE: CPT

## 2020-10-22 PROCEDURE — 99214 OFFICE O/P EST MOD 30 MIN: CPT | Performed by: INTERNAL MEDICINE

## 2020-10-22 RX ORDER — SODIUM CHLORIDE, SODIUM LACTATE, POTASSIUM CHLORIDE, CALCIUM CHLORIDE 600; 310; 30; 20 MG/100ML; MG/100ML; MG/100ML; MG/100ML
30 INJECTION, SOLUTION INTRAVENOUS CONTINUOUS
Status: CANCELLED | OUTPATIENT
Start: 2020-12-10

## 2020-10-22 NOTE — PROGRESS NOTES
[facility] OUTPATIENT FOLLOW UP CLINIC VISIT    REASON FOR FOLLOW-UP:    1.  History of unprovoked pulmonary embolism with a positive lupus anticoagulant test in June 2010.  Chronically anticoagulated with Xarelto 20 mg daily.  Last annual follow-up with Dr. Monroy on 9/26/2018.  2.  Iron deficiency anemia diagnosed at the time of hospitalization on 9/19/2019.  He received a total of 900 mg of intravenous Venofer.  3.  Stage IIIa (pT1, pN1b) adenocarcinoma of the sigmoid colon status post laparoscopic low anterior resection on 9/26/2019.  1.6 x 1.4 cm tumor, grade 2, moderately differentiated with widely negative margins with 2 out of 15 lymph nodes positive for metastatic disease without evidence for extracapsular extension, greatest measuring 3.5 mm.  Microsatellite stable.  4.  Acute right upper extremity superficial thrombophlebitis in the cephalic vein diagnosed on 9/27/2019.  5.  Acute left upper extremity DVT in the brachial vein and acute left upper extremity superficial thrombophlebitis in the basilic vein on 9/27/2019.  Associated with an intravenous catheter.  On Xarelto.  6.  Four cycles of adjuvant CAPEOX complete 1/23/2020    HISTORY OF PRESENT ILLNESS:  Jared Rose is a 65 y.o. male who returns today for follow up and lab review.  He completed his fourth cycle being on 1/23/2020.    He was in the emergency room last night with left shoulder pain after a work-related incident.  He had an x-ray that showed no fracture or subluxation.  He is going to follow-up with occupational medicine later today.    He denies any bleeding.  He continues to have some migratory abdominal discomfort that sounds like gas pain.  He otherwise has been feeling very well.      ALLERGIES:  Allergies   Allergen Reactions   • Adhesive Tape Other (See Comments)     blisters       MEDICATIONS:  The medication list has been reviewed with the patient by the medical assistant, and the list has been updated in the  "electronic medical record, which I reviewed.  Medication dosages and frequencies were confirmed to be accurate.    REVIEW OF SYSTEMS:  PAIN:  See Vital Signs below.  GENERAL:  No fevers, chills, night sweats, or unintended weight loss.  SKIN: No rash  HEME/LYMPH:  No abnormal bleeding.  No palpable lymphadenopathy.  EYES:  No vision changes or diplopia.  ENT:  No sore throat or difficulty swallowing.  RESPIRATORY:  No cough, shortness of breath, hemoptysis, or wheezing.  CARDIOVASCULAR: Mild dyspnea on exertion.  GASTROINTESTINAL: Migratory abdominal discomfort that sounds like gas pain.  GENITOURINARY:  No dysuria or hematuria.  MUSCULOSKELETAL: Left shoulder pain  NEUROLOGIC: No paresthesias  PSYCHIATRIC:  No depression, anxiety, or mood changes.  Reviewed 10/22/2020    Vitals:    10/22/20 1009   BP: 163/84   Pulse: 78   Resp: 16   Temp: 97.5 °F (36.4 °C)   TempSrc: Temporal   SpO2: 98%   Weight: 116 kg (256 lb 4.8 oz)   Height: 175.8 cm (69.21\")   PainSc: 0-No pain  Comment: colon cancer       PHYSICAL EXAMINATION:  GENERAL:  Well-developed well-nourished male; awake, alert and oriented, in no acute distress.  Wearing a face mask.  SKIN:  Warm and dry, without rashes, purpura, or petechiae.  HEAD:  Normocephalic, atraumatic.  EARS:  Hearing intact.  LYMPHATICS:  No cervical, supraclavicular, or axillary lymphadenopathy.  CHEST:  Lungs are clear to auscultation bilaterally.  No wheezes, rales, or rhonchi.  HEART:  Regular rate; normal rhythm.  No murmurs, gallops or rubs.  ABDOMEN: Not examined today  EXTREMITIES: No clubbing cyanosis or edema.  Left shoulder is in a sling.  NEUROLOGICAL:  No focal neurologic deficits.      DIAGNOSTIC DATA:  Results for orders placed or performed in visit on 10/22/20   CBC Auto Differential    Specimen: Blood   Result Value Ref Range    WBC 4.77 3.40 - 10.80 10*3/mm3    RBC 4.16 4.14 - 5.80 10*6/mm3    Hemoglobin 12.9 (L) 13.0 - 17.7 g/dL    Hematocrit 38.9 37.5 - 51.0 %    MCV " 93.5 79.0 - 97.0 fL    MCH 31.0 26.6 - 33.0 pg    MCHC 33.2 31.5 - 35.7 g/dL    RDW 13.6 12.3 - 15.4 %    RDW-SD 46.8 37.0 - 54.0 fl    MPV 9.0 6.0 - 12.0 fL    Platelets 235 140 - 450 10*3/mm3    Neutrophil % 39.3 (L) 42.7 - 76.0 %    Lymphocyte % 46.3 (H) 19.6 - 45.3 %    Monocyte % 10.9 5.0 - 12.0 %    Eosinophil % 2.7 0.3 - 6.2 %    Basophil % 0.6 0.0 - 1.5 %    Immature Grans % 0.2 0.0 - 0.5 %    Neutrophils, Absolute 1.87 1.70 - 7.00 10*3/mm3    Lymphocytes, Absolute 2.21 0.70 - 3.10 10*3/mm3    Monocytes, Absolute 0.52 0.10 - 0.90 10*3/mm3    Eosinophils, Absolute 0.13 0.00 - 0.40 10*3/mm3    Basophils, Absolute 0.03 0.00 - 0.20 10*3/mm3    Immature Grans, Absolute 0.01 0.00 - 0.05 10*3/mm3    nRBC 0.0 0.0 - 0.2 /100 WBC       IMAGING:  CT chest abd pelvis from 7/17/2020.  Two cystic lesions along the colonic anastomosis.  Follow up in 3 mos suggested.    CT abdomen/pelvis from 10/16/2020 images personally reviewed. Stable cystic lesions.      ASSESSMENT:  This is a 65 y.o. male with:  1.  History of unprovoked pulmonary embolism with a positive lupus anticoagulant test in June 2010.  Chronically anticoagulated with Xarelto 20 mg daily.  Last annual meeting with Dr. Monroy on 9/26/2019    2.  Iron deficiency anemia diagnosed at the time of hospitalization on 9/19/2019.  He received a total of 900 mg of intravenous Venofer.  Iron studies and ferritin were normal.  Hemoglobin normalized at 13.4.  Hemoglobin is 12.9 with a normal MCV today.    3.  Stage IIIa (pT1, pN1b) adenocarcinoma of the sigmoid colon status post laparoscopic low anterior resection on 9/26/2019.  1.6 x 1.4 cm tumor, grade 2, moderately differentiated with widely negative margins with 2 out of 15 lymph nodes positive for metastatic disease without evidence for extracapsular extension, greatest measuring 3.5 mm.  Microsatellite stable.    Discussed pursuing adjuvant therapy with CAPEOX for at least 3 months. This is in accordance with [facility]CCN  guidelines.    4 cycles of therapy complete as of 1/23/2020.    CT imaging on 7/17/2020 with some concerning findings.  I alerted his surgeon Dr. Whitt.  He elected to proceed with CT imaging of the abdomen and pelvis in 3 months.  Follow up CT imaging of the abdomen and pelvis performed on 10/16/2020 stable.    4.  Acute right upper extremity superficial thrombophlebitis in the cephalic vein diagnosed on 9/27/2019.  Acute left upper extremity DVT in the brachial vein and acute left upper extremity superficial thrombophlebitis in the basilic vein on 9/27/2019.  Associated with an intravenous catheter.      Repeat left upper extremity venous duplex on 1/16/2020 with chronic left upper extremity superficial thrombophlebitis in the basilic vein.  No DVT.      He continues Xarelto 20 mg daily.    5. Shoulder pain: he was in the ER last night with left shoulder pain related to a work-related incident.  X-ray is unremarkable.  He will follow up with occupational medicine this afternoon.    PLAN:  1.  Continue Xarelto.  He is chronically anticoagulated.      2. Follow up CBC, CMP, CEA in 3 months. CT imaging of the abdomen/pelvis in 6 months

## 2020-10-22 NOTE — DISCHARGE INSTRUCTIONS
Wear the sling provided as needed for the next 3 to 5 days.  Do not wear beyond this length of time unless directed by your orthopedist.  Tylenol 1000 mg every 6 hours as needed for pain.  Use ice 3-4 times a day as needed for pain, see handout for details  Return to the ER with any further concerns, should your condition change/worsen, or should you develop a fever.
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
## 2020-10-22 NOTE — ED PROVIDER NOTES
EMERGENCY DEPARTMENT ENCOUNTER    Room Number:  38/38  Date of encounter:  10/21/2020  PCP: Ras Ash MD  Historian: Patient      HPI:  Chief Complaint: Shoulder pain  A complete HPI/ROS/PMH/PSH/SH/FH are unobtainable due to: Nothing    Context: Jared Rose is a very pleasant 65 y.o. 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  Patient states he felt like his left shoulder got pulled in the process he thought the symptoms would subside on their own.  However, they have not improved and even gotten a little worse since the time of injury.  The pain is stated to be a 6 out of 10 when moving the arm above the 90 degree angle.  Is localized to the anterior and lateral aspects of the shoulder.  Is worse in the anterior aspect of the shoulder.  It is said to be a 3-4 out of 10 when the arm is at rest.  There is no associated numbness or tingling with the injury.  Patient denies associated chest pain, shortness of breath nausea, vomiting, fever chills, pain associated with her shoulder pain.       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   • Hyperlipidemia 05/18/2018   • Hematuria 09/19/2019   • Symptomatic anemia 09/19/2019   • History of pulmonary embolus (PE) 09/19/2019   • Colonic mass 09/24/2019   • Malignant neoplasm of sigmoid colon (CMS/[facility]) 09/25/2019   • Acute deep vein thrombosis (DVT) of upper extremity (CMS/[facility]) 09/29/2019   • Iron deficiency anemia due to chronic blood loss 10/09/2019     Resolved Ambulatory Problems     Diagnosis Date Noted   • Shortness of breath 09/19/2019   • Chest pain 09/19/2019     Past  Medical History:   Diagnosis Date   • Bell's palsy 02/23/2011   • Blister of foot 06/2017   • Chronic fatigue    • Chronic left-sided low back pain without sciatica    • Dermoid cyst of leg, right 05/2017   • ED (erectile dysfunction)    • GI hemorrhage 09/19/2019   • H/O Anemia    • H/O Leukopenia    • History of transfusion    • Lupus anticoagulant positive    • Muscle spasm of back 10/2018   • Obesity    • Pulmonary embolism (CMS/HCC) 06/08/2010   • Rectal bleeding 09/2019   • Rectal cancer (CMS/HCC) 09/24/2019   • Stress fracture of right foot 05/11/2013   • Tinea pedis 11/25/2007         PAST SURGICAL HISTORY  Past Surgical History:   Procedure Laterality Date   • CARDIAC CATHETERIZATION Left 05/11/2006    NORMAL LV SYSTOLIC FUNCTION(EF 50%), MOD DZ OF 2 SMALL CORONARY BRANCHES (D2 AND OM2), DR. BOOM GALLEGO AT [facility]   • CARDIAC CATHETERIZATION Left 07/20/2015    NORMAL LM, DIFFUSE LCx DZ, LI LAD, 60-70% OSTIAL DIAG(<1MM VESSEL), 10%STENOSIS IN LAD, DR. CHERI VARGAS AT [facility]   • CLOSED REDUCTION WRIST FRACTURE Right    • COLON RESECTION N/A 9/26/2019    Procedure: LOW ANTERIOR COLON RESECTION IMMOBILIZATION OF SPLENIC FLEXURE;  Surgeon: Radha Whitt MD;  Location: [facility];  Service: General   • COLONOSCOPY N/A 9/21/2019    INT/EXT HEMORRHOIDS, 18 MM POLYPOID LESION IN MID SIGMOID, PATH: INVASIVE MODERATELY DIFFERENTIATED GRADE 2 ADENOCARCINOMA, 2 TUBULOVILLOUS ADENOMA POLYPS IN CECUM, ADENOMATOUS POLYP IN TRANSVERSE, ADENOMATOUS POLYP IN ASCENDING, MULTIPLE SMALL AND LARGE DIVERTICULA, DR. TRUONG MONTEZ AT [facility]   • LUNG SURGERY Right 2009    RIGHT LOWER LOBE, BLOT CLOT REMOVED   • SIGMOIDOSCOPY N/A 9/24/2019    AN INFILTRATIVE NON OBSTRUCTING MASS IN SIGMOID, AREA TATTOOED, DR. RADHA WHITT AT [facility]   • TONSILLECTOMY AND ADENOIDECTOMY Bilateral     DURING CHILDHOOD   • UMBILICAL HERNIA REPAIR N/A 05/14/2014    DR. ROMERO SCHUSTER AT [facility]         FAMILY HISTORY  Family History   Problem Relation Age of Onset    • Coronary artery disease Father    • Heart disease Mother 83   • Hypertension Mother    • Asthma Mother    • Diabetes Mother    • Kidney disease Mother    • Heart attack Mother    • Hypertension Sister    • Diabetes Sister    • Kidney disease Sister    • Heart attack Brother    • Alcohol abuse Brother    • Diabetes Sister    • Heart disease Sister          SOCIAL HISTORY  Social History     Socioeconomic History   • Marital status:      Spouse name: Not on file   • Number of children: Not on file   • Years of education: Not on file   • Highest education level: Not on file   Occupational History   • Occupation:      Employer: [employer]   Tobacco Use   • Smoking status: Never Smoker   • Smokeless tobacco: Never Used   Substance and Sexual Activity   • Alcohol use: No     Frequency: Never     Comment: Caffeine use   • Drug use: No   • Sexual activity: Yes         ALLERGIES  Adhesive tape        REVIEW OF SYSTEMS  Review of Systems   Constitutional: Negative for chills and fever.   Eyes: Negative.    Respiratory: Negative for cough and shortness of breath.    Cardiovascular: Negative for chest pain and palpitations.   Gastrointestinal: Negative.    Genitourinary: Negative.    Musculoskeletal: Negative.    Neurological: Negative.         All systems reviewed and negative except for those discussed in HPI.       PHYSICAL EXAM    I have reviewed the triage vital signs and nursing notes.    ED Triage Vitals   Temp Heart Rate Resp BP SpO2   10/21/20 2056 10/21/20 2056 10/21/20 2056 10/21/20 2106 10/21/20 2056   97.2 °F (36.2 °C) 83 16 (!) 152/108 96 %      Temp src Heart Rate Source Patient Position BP Location FiO2 (%)   -- -- 10/21/20 2106 10/21/20 2106 --     Sitting Right arm        Physical Exam  GENERAL: WDWN male in no acute distress, does appear slightly uncomfortable  HENT: nares patent, mucous membranes moist  EYES: no scleral icterus  CV: regular rhythm, regular  rate  RESPIRATORY: normal effort, brachial and radial pulse + 2, CRF<2 sec  ABDOMEN: soft  MUSCULOSKELETAL: Left shoulder: Pain in the bicipital groove/positive empty can sign, pain with abduction to 90 degrees, and external rotation anterior and laterally, mild TTP in the vicinity of the left AC joint however there is no deformity or soft tissue swelling noted vicinity of the AC joint.    NEURO: alert and oriented x 3, moves all extremities, follows commands, sensation intact left upper extremity.  SKIN: warm, dry        LAB RESULTS  No results found for this or any previous visit (from the past 24 hour(s)).    Ordered the above labs and independently reviewed the results.        RADIOLOGY  Xr Shoulder 2+ View Left    Result Date: 10/21/2020  2 VIEWS LEFT SHOULDER  HISTORY: Left shoulder injury  COMPARISON: None available.  FINDINGS: No acute fracture or subluxation of the left shoulder is seen. The patient does have severe degenerative changes noted at the left AC joint, with additional milder degenerative changes noted involving the left shoulder.      No acute fracture or subluxation identified.  This report was finalized on 10/21/2020 9:48 PM by Dr. Priyanka Mott M.D.        I ordered the above noted radiological studies. Reviewed by me and discussed with radiologist.  See dictation for official radiology interpretation.      PROCEDURES    Procedures      MEDICATIONS GIVEN IN ER    Medications   ibuprofen (ADVIL,MOTRIN) tablet 800 mg (800 mg Oral Given 10/21/20 2141)         PROGRESS, DATA ANALYSIS, CONSULTS, AND MEDICAL DECISION MAKING    All labs have been independently reviewed by me.  All radiology studies have been reviewed by me and discussed with radiologist dictating the report.   EKG's independently viewed and interpreted by me.  Discussion below represents my analysis of pertinent findings related to patient's condition, differential diagnosis, treatment plan and final disposition.    DDx includes  but is not limited to: Bicipital tendinitis, impingement syndrome, rotator cuff injury, labrum injury, AC joint separation/strain.  Physical exam most consistent with bicipital tendinitis and/or rotator cuff injury.  We will treat this patient conservatively with a sling at this time and orthopedic follow-up where he can be further evaluated with MRI and/or treated with physical therapy as seen necessary.  ED Course as of Oct 21 2231   Wed Oct 21, 2020   2230 Left shoulder x-ray shows no acute fracture as viewed by me.    [RC]      ED Course User Index  [RC] Tony Lerma III, PA       Patient was placed in face mask in first look. Patient was wearing facemask when I entered the room and throughout our encounter. I wore full protective equipment throughout this patient encounter including a face mask, and gloves. Hand hygiene was performed before donning protective equipment and after removal when leaving the room.    AS OF 22:31 EDT VITALS:    BP - (!) 152/108  HR - 83  TEMP - 97.2 °F (36.2 °C)  O2 SATS - 96%        DIAGNOSIS  Final diagnoses:   Sprain of right shoulder, unspecified shoulder sprain type, initial encounter         DISPOSITION  DISCHARGE    Patient discharged in stable condition.    Reviewed implications of results, diagnosis, meds, responsibility to follow up, warning signs and symptoms of possible worsening, potential complications and reasons to return to ER.    Patient/Family voiced understanding of above instructions.    Discussed plan for discharge, as there is no emergent indication for admission. Patient referred to primary care provider for BP management due to today's BP. Pt/family is agreeable and understands need for follow up and repeat testing.  Pt is aware that discharge does not mean that nothing is wrong but it indicates no emergency is present that requires admission and they must continue care with follow-up as given below or physician of their choice.      FOLLOW-UP  Marry Waters, APRN  [address] [facility]  [address]  [place] [address]  [phone]    Schedule an appointment as soon as possible for a visit   For further evaluation and treatment    Chevy Shipley Jr., MD  [address] [facility] [address]  [place] [id number] [phone]    Schedule an appointment as soon as possible for a visit   For further evaluation and treatment    [facility] [place] [address]  [phone]  Schedule an appointment as soon as possible for a visit   For further evaluation and treatment as per your place of work's protocol         Medication List      No changes were made to your prescriptions during this visit.                Tony Lerma III, PA  10/21/20 2230       Tony Lerma III, PA  10/21/20 2231

## 2020-10-26 ENCOUNTER — TREATMENT (OUTPATIENT)
Dept: PHYSICAL THERAPY | Facility: CLINIC | Age: 65
End: 2020-10-26

## 2020-10-26 DIAGNOSIS — S46.912A STRAIN OF LEFT SHOULDER, INITIAL ENCOUNTER: ICD-10-CM

## 2020-10-26 DIAGNOSIS — M25.512 ACUTE PAIN OF LEFT SHOULDER: Primary | ICD-10-CM

## 2020-10-26 PROCEDURE — 97530 THERAPEUTIC ACTIVITIES: CPT | Performed by: PHYSICAL THERAPIST

## 2020-10-26 PROCEDURE — 97161 PT EVAL LOW COMPLEX 20 MIN: CPT | Performed by: PHYSICAL THERAPIST

## 2020-10-26 PROCEDURE — 97110 THERAPEUTIC EXERCISES: CPT | Performed by: PHYSICAL THERAPIST
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
## 2020-10-28 ENCOUNTER — TREATMENT (OUTPATIENT)
Dept: PHYSICAL THERAPY | Facility: CLINIC | Age: 65
End: 2020-10-28

## 2020-10-28 DIAGNOSIS — S46.912D STRAIN OF LEFT SHOULDER, SUBSEQUENT ENCOUNTER: ICD-10-CM

## 2020-10-28 DIAGNOSIS — M25.512 ACUTE PAIN OF LEFT SHOULDER: Primary | ICD-10-CM

## 2020-10-28 PROCEDURE — 97110 THERAPEUTIC EXERCISES: CPT | Performed by: PHYSICAL THERAPIST

## 2020-10-28 NOTE — PROGRESS NOTES
"Physical Therapy Daily Progress Note    Visit # 2    Jared Rose reports: \"My shoulder feels wonderful.  I'm not having any pain in it and the exercises and stretches have really been helping.\"    Subjective     Objective   See Exercise, Manual, and Modality Logs for complete treatment.       Assessment & Plan     Assessment  Assessment details: Patient reports good improvement in (L) shoulder symptoms since initial PT session.  He demonstrates improved (L) shoulder AROM and AAROM arcs without significant discomfort.  He is able to initiate shoulder and scapular strengthening activities vs theraband but does verbalize some \"tightening\" of shoulder muscles with performance.         Other - reassess for MD.         Manual Therapy:         mins  41656;  Therapeutic Exercise:    28     mins  18900;     Neuromuscular Macario:        mins  44431;    Therapeutic Activity:          mins  21292;     Gait Training:           mins  02247;     Ultrasound:          mins  80566;    Electrical Stimulation:         mins  99920 ( );  Dry Needling          mins self-pay    Timed Treatment:   28   mins   Total Treatment:     38   mins    Vesta Blackwood PT, DPT  Physical Therapist  [place] License # [id number]    "

## 2020-10-29 ENCOUNTER — TREATMENT (OUTPATIENT)
Dept: PHYSICAL THERAPY | Facility: CLINIC | Age: 65
End: 2020-10-29

## 2020-10-29 DIAGNOSIS — S46.912D STRAIN OF LEFT SHOULDER, SUBSEQUENT ENCOUNTER: ICD-10-CM

## 2020-10-29 DIAGNOSIS — M25.512 ACUTE PAIN OF LEFT SHOULDER: Primary | ICD-10-CM

## 2020-10-29 PROCEDURE — 97110 THERAPEUTIC EXERCISES: CPT | Performed by: PHYSICAL THERAPIST

## 2020-10-29 NOTE — PROGRESS NOTES
"Physical Therapy Progress Note    10/29/2020  Thaddeus Hopkins MD    Re: Jared Rose  ________________________________________________________________    Visit # 3    Mr. Jared Rose, has attended 3/3 PT sessions.  Treatment has consisted of: patient education, therapeutic activity, therapeutic exercise, and modalities.     S: Mr. Jared Rose states: \"My shoulder feels good.  I'm not having any pain.  I did fine with the exercises we added in yesterday.  I feel ready to be released from the doctor's and physical therapy's care.\"    Subjective Evaluation    Pain  Current pain ratin  Location: (L) shoulder           Objective          Tenderness     Additional Tenderness Details  Neg TTP (L) shoulder    Active Range of Motion   Left Shoulder   Flexion: 161 degrees   Extension: 73 degrees   Abduction: 148 degrees   External rotation 90°: 78 degrees   Internal rotation 90°: 79 degrees     Strength/Myotome Testing     Left Shoulder     Planes of Motion   Flexion: 5   Extension: 5   Abduction: 5   External rotation at 0°: 5   Internal rotation at 0°: 5     Additional Strength Details  All resisted testing of (L) shoulder is painfree      See Exercise, Manual, and Modality Logs for complete treatment.       Assessment & Plan     Assessment  Assessment details: Patient has been compliant and motivated with PT interventions. He reports resolution of (L) shoulder symptoms and has tolerated an early transition to strengthening activities in physical therapy.  His shoulder is nontender to palpation and he demonstrates normal, painfree (L) shoulder AROM and strength.  He expresses motivation to return to regular work tasks and from a PT standpoint, he appears ready and capable of doing so.        Other - patient to return to MD following PT this date.           Manual Therapy:         mins  04702;  Therapeutic Exercise:    28     mins  98846;     Neuromuscular Macario:        mins  07251;    Therapeutic " Activity:          mins  30414;     Gait Training:           mins  03526;     Ultrasound:          mins  79573;    Electrical Stimulation:         mins  69433 ( );  Dry Needling          mins self-pay    Timed Treatment:   28   mins   Total Treatment:     38   mins    Vesta Blackwood PT, DPT  Physical Therapist  [place] License # [id number]

## 2020-11-20 ENCOUNTER — TRANSCRIBE ORDERS (OUTPATIENT)
Dept: SLEEP MEDICINE | Facility: HOSPITAL | Age: 65
End: 2020-11-20

## 2020-11-20 DIAGNOSIS — Z01.818 OTHER SPECIFIED PRE-OPERATIVE EXAMINATION: Primary | ICD-10-CM

## 2020-12-04 ENCOUNTER — DOCUMENTATION (OUTPATIENT)
Dept: PHYSICAL THERAPY | Facility: CLINIC | Age: 65
End: 2020-12-04

## 2020-12-08 ENCOUNTER — LAB (OUTPATIENT)
Dept: LAB | Facility: HOSPITAL | Age: 65
End: 2020-12-08

## 2020-12-08 DIAGNOSIS — Z01.818 OTHER SPECIFIED PRE-OPERATIVE EXAMINATION: ICD-10-CM

## 2020-12-08 PROCEDURE — C9803 HOPD COVID-19 SPEC COLLECT: HCPCS

## 2020-12-08 PROCEDURE — U0004 COV-19 TEST NON-CDC HGH THRU: HCPCS

## 2020-12-09 ENCOUNTER — TELEPHONE (OUTPATIENT)
Dept: FAMILY MEDICINE CLINIC | Facility: CLINIC | Age: 65
End: 2020-12-09

## 2020-12-09 PROBLEM — U07.1 REAL TIME REVERSE TRANSCRIPTASE PCR POSITIVE FOR COVID-19 VIRUS: Status: ACTIVE | Noted: 2020-12-09

## 2020-12-09 LAB — SARS-COV-2 RNA RESP QL NAA+PROBE: DETECTED

## 2020-12-09 NOTE — TELEPHONE ENCOUNTER
PATIENT REPORTING COVID + TEST.  ONLY SYMPTOM IS COUGHING AND SOME SHORTNESS OF BREATH.  SURGERY SCHEDULED ON 12/10/20 CANCELLED.  PLEASE ADVISE PATIENT.    CALL BACK #: [phone]

## 2020-12-20 ENCOUNTER — IMMUNIZATION (OUTPATIENT)
Dept: VACCINE CLINIC | Facility: HOSPITAL | Age: 65
End: 2020-12-20

## 2020-12-20 PROCEDURE — 91300 HC SARSCOV02 VAC 30MCG/0.3ML IM: CPT | Performed by: INTERNAL MEDICINE

## 2020-12-20 PROCEDURE — 0001A: CPT | Performed by: INTERNAL MEDICINE

## 2020-12-31 ENCOUNTER — PREP FOR SURGERY (OUTPATIENT)
Dept: OTHER | Facility: HOSPITAL | Age: 65
End: 2020-12-31

## 2020-12-31 ENCOUNTER — OFFICE VISIT (OUTPATIENT)
Dept: ORTHOPEDIC SURGERY | Facility: CLINIC | Age: 65
End: 2020-12-31

## 2020-12-31 VITALS — BODY MASS INDEX: 37.37 KG/M2 | HEIGHT: 70 IN | TEMPERATURE: 97.3 F | WEIGHT: 261 LBS

## 2020-12-31 DIAGNOSIS — M17.12 ARTHRITIS OF LEFT KNEE: Primary | ICD-10-CM

## 2020-12-31 DIAGNOSIS — Z86.711 HISTORY OF PULMONARY EMBOLUS (PE): ICD-10-CM

## 2020-12-31 PROCEDURE — 99204 OFFICE O/P NEW MOD 45 MIN: CPT | Performed by: ORTHOPAEDIC SURGERY

## 2020-12-31 RX ORDER — PREGABALIN 75 MG/1
150 CAPSULE ORAL ONCE
Status: CANCELLED | OUTPATIENT
Start: 2021-03-16 | End: 2020-12-31

## 2020-12-31 RX ORDER — ACETAMINOPHEN 500 MG
1000 TABLET ORAL ONCE
Status: CANCELLED | OUTPATIENT
Start: 2021-03-16 | End: 2020-12-31

## 2020-12-31 RX ORDER — CEFAZOLIN SODIUM 2 G/100ML
2 INJECTION, SOLUTION INTRAVENOUS ONCE
Status: CANCELLED | OUTPATIENT
Start: 2021-03-16 | End: 2020-12-31

## 2020-12-31 RX ORDER — MELOXICAM 15 MG/1
15 TABLET ORAL ONCE
Status: CANCELLED | OUTPATIENT
Start: 2021-03-16 | End: 2020-12-31

## 2020-12-31 NOTE — PROGRESS NOTES
Patient Name: Jared Rose   YOB: 1955  Referring Primary Care Physician: Ras Ash MD  BMI: Body mass index is 37.45 kg/m².    Chief Complaint:    Chief Complaint   Patient presents with   • Left Knee - Follow-up        HPI:     Jared Rose is a 65 y.o. male who presents today for evaluation of   Chief Complaint   Patient presents with   • Left Knee - Follow-up   .  He is seen today complaining of relatively severe left knee pain.  Is been bothering for a long time.  He saw Marry several months ago and had an injection that did all right but only last about 2 weeks he is tried to do therapy it made it worse and he has a history of inflammatory disease with lupus.  Reviewing his medical records he is also had a pulmonary embolism in the past and he has been on Xarelto although he has had procedure since that and they have removed him from the Xarelto for a few days prior.  He is limping and is hard for him to get around his BMI is 37.  Reviewing his chart he has had heart issues in the past as well.  He is due to see Dr. Panchal pretty soon.  The pain is interfering with his activities of daily living      Subjective   Medications:   Home Medications:  Current Outpatient Medications on File Prior to Visit   Medication Sig   • atorvastatin (LIPITOR) 40 MG tablet Take 1 tablet by mouth Daily.   • losartan (COZAAR) 50 MG tablet Take 1 tablet by mouth Daily.   • rivaroxaban (XARELTO) 20 MG tablet Take 1 tablet by mouth Daily With Dinner. Must be seen   • sildenafil (Viagra) 100 MG tablet Take 1/2 to 1 tablet by mouth daily if needed for ED.     No current facility-administered medications on file prior to visit.      Current Medications:  Scheduled Meds:  Continuous Infusions:No current facility-administered medications for this visit.     PRN Meds:.    I have reviewed the patient's medical history in detail and updated the computerized patient record.  Review and summarization of old  records includes:    Past Medical History:   Diagnosis Date   • Acute deep vein thrombosis (DVT) of upper extremity (CMS/[facility]) 9/29/2019   • Bell's palsy 02/23/2011    SEEN AT [facility] ER   • Blister of foot 06/2017    RIGHT FOOT   • Chronic anticoagulation    • Chronic fatigue    • Chronic left-sided low back pain without sciatica    • Coronary atherosclerosis     cath 7/2015: normal LM, diffuse LCx disease, LI LAD, 60-70% ostial diag (<1 mm vessel), LI RCA   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   • GI hemorrhage 09/19/2019    ADMITTED TO [facility]   • H/O Anemia    • H/O Leukopenia    • History of transfusion    • Hyperlipidemia    • Hypertension    • Lupus anticoagulant disorder (CMS/HCC) 5/6/2016   • Lupus anticoagulant positive    • Muscle spasm of back 10/2018   • OA (osteoarthritis)    • Obesity    • Pulmonary embolism (CMS/HCC) 06/08/2010     Right lower lobe pulmonary embolus, ADMITTED TO [facility]   • Pulmonary embolus (CMS/HCC) 5/6/2016   • Rectal bleeding 09/2019   • Rectal cancer (CMS/HCC) 09/24/2019    MODERATELY DIFFERENTIATED ADENOCARCINOMA GRADE 2, FOLLOWED BY DR. RADHA DONOVAN   • Sprain of right shoulder 10/21/2020    SEEN AT [facility] ER   • Strain of left shoulder 10/2020   • Stress fracture of right foot 05/11/2013    4TH METATARSAL, SEEN AT [facility] ER   • Thrombophilia (CMS/HCC)     FOLLOWED BY DR. BALAJI MCGEE   • Tinea pedis 11/25/2007    SEEN AT [facility] ER        Past Surgical History:   Procedure Laterality Date   • CARDIAC CATHETERIZATION Left 05/11/2006    NORMAL LV SYSTOLIC FUNCTION(EF 50%), MOD DZ OF 2 SMALL CORONARY BRANCHES (D2 AND OM2), DR. BOOM GALLEGO AT [facility]   • CARDIAC CATHETERIZATION Left 07/20/2015    NORMAL LM, DIFFUSE LCx DZ, LI LAD, 60-70% OSTIAL DIAG(<1MM VESSEL), 10%STENOSIS IN LAD, DR. CHERI VARGAS AT [facility]   • CLOSED REDUCTION WRIST FRACTURE Right    • COLON RESECTION N/A 9/26/2019    Procedure: LOW ANTERIOR COLON RESECTION IMMOBILIZATION OF SPLENIC  FLEXURE;  Surgeon: Isabella Donovan MD;  Location: [facility] OR;  Service: General   • COLONOSCOPY N/A 9/21/2019    INT/EXT HEMORRHOIDS, 18 MM POLYPOID LESION IN MID SIGMOID, PATH: INVASIVE MODERATELY DIFFERENTIATED GRADE 2 ADENOCARCINOMA, 2 TUBULOVILLOUS ADENOMA POLYPS IN CECUM, ADENOMATOUS POLYP IN TRANSVERSE, ADENOMATOUS POLYP IN ASCENDING, MULTIPLE SMALL AND LARGE DIVERTICULA, DR. TRUONG MONTEZ AT [facility]   • LUNG SURGERY Right 2009    RIGHT LOWER LOBE, BLOT CLOT REMOVED   • SIGMOIDOSCOPY N/A 9/24/2019    AN INFILTRATIVE NON OBSTRUCTING MASS IN SIGMOID, AREA TATTOOED, DR. ISABELLA DONOVAN AT [facility]   • TONSILLECTOMY AND ADENOIDECTOMY Bilateral     DURING CHILDHOOD   • UMBILICAL HERNIA REPAIR N/A 05/14/2014    DR. ROMERO SCHUSTER AT [facility]        Social History     Occupational History   • Occupation:      Employer: [employer]   Tobacco Use   • Smoking status: Never Smoker   • Smokeless tobacco: Never Used   Substance and Sexual Activity   • Alcohol use: No     Frequency: Never     Comment: Caffeine use   • Drug use: No   • Sexual activity: Yes      Social History     Social History Narrative   • Not on file        Family History   Problem Relation Age of Onset   • Coronary artery disease Father    • Heart disease Mother 83   • Hypertension Mother    • Asthma Mother    • Diabetes Mother    • Kidney disease Mother    • Heart attack Mother    • Hypertension Sister    • Diabetes Sister    • Kidney disease Sister    • Heart attack Brother    • Alcohol abuse Brother    • Diabetes Sister    • Heart disease Sister        ROS: 14 point review of systems was performed and all other systems were reviewed and are negative except for documented findings in HPI and today's encounter.     Allergies:   Allergies   Allergen Reactions   • Adhesive Tape Rash     blisters     Constitutional:  Denies fever, shaking or chills   Eyes:  Denies change in visual acuity   HENT:  Denies nasal congestion or sore throat  "  Respiratory:  Denies cough or shortness of breath   Cardiovascular:  Denies chest pain or severe LE edema   GI:  Denies abdominal pain, nausea, vomiting, bloody stools or diarrhea   Musculoskeletal:  Numbness, tingling, pain, or loss of motor function only as noted above in history of present illness.  : Denies painful urination or hematuria  Integument:  Denies rash, lesion or ulceration   Neurologic:  Denies headache or focal weakness  Endocrine:  Denies lymphadenopathy  Psych:  Denies confusion or change in mental status   Hem:  Denies active bleeding    OBJECTIVE:  Physical Exam: 65 y.o. male  Wt Readings from Last 3 Encounters:   12/31/20 118 kg (261 lb)   10/22/20 116 kg (256 lb 4.8 oz)   09/21/20 117 kg (258 lb)     Ht Readings from Last 1 Encounters:   12/31/20 177.8 cm (70\")     Body mass index is 37.45 kg/m².  Vitals:    12/31/20 0854   Temp: 97.3 °F (36.3 °C)     Vital signs reviewed.     General Appearance:    Alert, cooperative, in no acute distress                  Eyes: conjunctiva clear  ENT: external ears and nose atraumatic  CV: no peripheral edema  Resp: normal respiratory effort  Skin: no rashes or wounds; normal turgor  Psych: mood and affect appropriate  Lymph: no nodes appreciated  Neuro: gross sensation intact  Vascular:  Palpable peripheral pulse in noted extremity  Musculoskeletal Extremities: Exam today shows a very pleasant gentleman he has medial joint line tenderness he has effusion in his knee and some swelling in the leg his calf is soft stiffness through range of motion is noted as well and hips noncontributory to pain but a little bit stiff    Radiology:   AP lateral 4 degree PA x-ray left knee taken previously in the office for pain reviewed at this time that comparison view shows end-stage osteoarthritis of the knees    Assessment:     ICD-10-CM ICD-9-CM   1. Arthritis of left knee  M17.12 716.96   2. History of pulmonary embolus (PE)  Z86.711 V12.55        Procedures   "     Plan: The diagnosis(es), natural history, pathophysiology and treatment for diagnosis(es) were discussed. Opportunity given and questions answered.  Biomechanics of pertinent body areas discussed.  When appropriate, the use of ambulatory aids discussed.  MEDICAL RECORDS reviewed from other provider(s) for past and current medical history pertinent to this complaint.  TKA: Continuation of conservative management vs. TKA: I reviewed anatomy of a total knee arthroplasty in laymen's terms, as well as typical postoperative recovery and possibly 6-12 months for maximal recovery, and possible need for rehabilitation stay after hospitalization. We also discussed risks, benefits, alternatives, and limitations of procedure with risks including but not limited to neurovascular damage, bleeding, infection, malalignment, chronic pain, failure of implants, osteolysis, loosening of implants, loss of motion, weakness, stiffness, instability, DVT, pulmonary embolus, death, stroke, complex regional pain syndrome, myocardial infarction, and need for additional procedures. Concept of substitution vs. replacement discussed.  No guarantees were given regarding results of surgery.  Patient verbalized understanding, and was given the opportunity to ask and have all questions answered today.   I we have asked for medical clearance from cardiology and also need to have periprocedural management of his Xarelto by Dr. Carter Lares and hematology for him.    12/31/2020    Much of this encounter note is an electronic transcription/translation of spoken language to printed text. The electronic translation of spoken language may permit erroneous, or at times, nonsensical words or phrases to be inadvertently transcribed; Although I have reviewed the note for such errors, some may still exist

## 2021-01-05 NOTE — PROGRESS NOTES
RM:________     PCP: Ras Ash MD    : 1955  AGE: 65 y.o.  EST PATIENT   REASON FOR VISIT/  CC:  PRE OP   BP Readings from Last 3 Encounters:   10/22/20 163/84   10/21/20 (!) 152/108   20 120/66        WT: ____________ BP: __________L __________R HR______    CHEST PAIN: _____________    SOA: _____________PALPS: _______________     LIGHTHEADED: ___________FATIGUE: ________________ EDEMA __________    ALLERGIES:Adhesive tape SMOKING HISTORY:  Social History     Tobacco Use   • Smoking status: Never Smoker   • Smokeless tobacco: Never Used   Substance Use Topics   • Alcohol use: No     Frequency: Never     Comment: Caffeine use   • Drug use: No     CAFFEINE USE_________________  ALCOHOL ______________________    Below is the patient's most recent value for Albumin, ALT, AST, BUN, Calcium, Chloride, Cholesterol, CO2, Creatinine, GFR, Glucose, HDL, Hematocrit, Hemoglobin, Hemoglobin A1C, LDL, Magnesium, Phosphorus, Platelets, Potassium, PSA, Sodium, Triglycerides, TSH and WBC.   Lab Results   Component Value Date    ALBUMIN 4.30 10/22/2020    ALT 22 10/22/2020    AST 24 10/22/2020    BUN 18 10/22/2020    CALCIUM 9.4 10/22/2020     10/22/2020    CHOL 166 2018    CO2 24.5 10/22/2020    CREATININE 1.22 10/22/2020    HDL 56 2018    HCT 38.9 10/22/2020    HGB 12.9 (L) 10/22/2020    HGBA1C 5.93 (H) 2018    LDL 97 2018    MG 2.3 2019     10/22/2020    K 4.2 10/22/2020     10/22/2020    TRIG 65 2018    TSH 1.340 05/10/2017    WBC 4.77 10/22/2020          NEW DIAGNOSIS/ SURGERY/ HOSP OR ED VISITS: ______________________    __________________________________________________________________      RECENT LABS OR DIAGNOSTIC TESTING:  _____________________________    __________________________________________________________________      ASSESSMENT/ PLAN:  _______________________________________________    __________________________________________________________________

## 2021-01-06 ENCOUNTER — OFFICE VISIT (OUTPATIENT)
Dept: CARDIOLOGY | Facility: CLINIC | Age: 66
End: 2021-01-06

## 2021-01-06 VITALS
BODY MASS INDEX: 37.22 KG/M2 | SYSTOLIC BLOOD PRESSURE: 122 MMHG | HEART RATE: 70 BPM | WEIGHT: 260 LBS | DIASTOLIC BLOOD PRESSURE: 82 MMHG | HEIGHT: 70 IN

## 2021-01-06 DIAGNOSIS — I10 ESSENTIAL HYPERTENSION: ICD-10-CM

## 2021-01-06 DIAGNOSIS — D68.62 LUPUS ANTICOAGULANT DISORDER (HCC): ICD-10-CM

## 2021-01-06 DIAGNOSIS — I25.10 ATHEROSCLEROSIS OF NATIVE CORONARY ARTERY OF NATIVE HEART WITHOUT ANGINA PECTORIS: Primary | ICD-10-CM

## 2021-01-06 DIAGNOSIS — Z01.810 PREOP CARDIOVASCULAR EXAM: ICD-10-CM

## 2021-01-06 PROBLEM — Z86.718 HISTORY OF DVT (DEEP VEIN THROMBOSIS): Status: ACTIVE | Noted: 2021-01-06

## 2021-01-06 PROBLEM — U07.1 REAL TIME REVERSE TRANSCRIPTASE PCR POSITIVE FOR COVID-19 VIRUS: Status: RESOLVED | Noted: 2020-12-09 | Resolved: 2021-01-06

## 2021-01-06 PROCEDURE — 99214 OFFICE O/P EST MOD 30 MIN: CPT | Performed by: INTERNAL MEDICINE

## 2021-01-06 PROCEDURE — 93000 ELECTROCARDIOGRAM COMPLETE: CPT | Performed by: INTERNAL MEDICINE

## 2021-01-06 NOTE — PROGRESS NOTES
Date of Office Visit: 2021  Encounter Provider: Eligio Panchal MD  Place of Service: [facility]  Patient Name: Jared Rose  :1955    Chief Complaint   Patient presents with   • Pre-op Exam   :     HPI: Jared Rose is a 65 y.o. male who presents today to follow-up.  I have reviewed prior notes and there are no changes except for any new updates described below. I have also reviewed any information entered into the medical record by the patient or by ancillary staff.     He was seen in our chest pain unit in 2015 with significant dyspnea. Due to his symptoms, cardiac catheterization was recommended. This revealed small-vessel disease of the diagonal and circumflex, and medical therapy was recommended. He was started on aspirin and atorvastatin, as well as losartan for hypertension. He has a history of VTE due to lupus anticoagulant/antiphospholipid antibody syndrome and was on warfarin for years, which was then switched to rivaroxaban.   He then had a recurrent DVT in the perioperative period while his DOAC was being held for colon cancer surgery.     He's doing well from a cardiac standpoint.  He has chronic fatigue but no chest pain, palpitations, lightheadedness, or syncope.  He does get short winded with exertion but he attributes this to decreased activity from his knee pain. He will be undergoing knee replacement and colonoscopy soon.     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   • GI hemorrhage 09/19/2019    ADMITTED TO [facility]   • H/O Anemia    • H/O Leukopenia    • History of transfusion    • Hyperlipidemia    • Hypertension    • Lupus anticoagulant disorder (CMS/[facility]) 5/6/2016   • Lupus anticoagulant positive    • Muscle spasm of back 10/2018   • OA (osteoarthritis)    • Obesity    • Pulmonary embolism (CMS/[facility]) 06/08/2010     Right lower lobe pulmonary embolus, ADMITTED TO [facility]   • Pulmonary embolus (CMS/[facility]) 5/6/2016   • Rectal bleeding 09/2019   • Rectal cancer (CMS/[facility]) 09/24/2019    MODERATELY DIFFERENTIATED ADENOCARCINOMA GRADE 2, FOLLOWED BY DR. RADHA WHITT   • Sprain of right shoulder 10/21/2020    SEEN AT [facility] ER   • Strain of left shoulder 10/2020   • Stress fracture of right foot 05/11/2013    4TH METATARSAL, SEEN AT [facility] ER   • Thrombophilia (CMS/[facility])     FOLLOWED BY DR. BALAJI MCGEE   • Tinea pedis 11/25/2007    SEEN AT [facility] ER       Past Surgical History:   Procedure Laterality Date   • CARDIAC CATHETERIZATION Left 05/11/2006    NORMAL LV SYSTOLIC FUNCTION(EF 50%), MOD DZ OF 2 SMALL CORONARY BRANCHES (D2 AND OM2), DR. BOOM GALLEGO AT [facility]   • CARDIAC CATHETERIZATION Left 07/20/2015    NORMAL LM, DIFFUSE LCx DZ, LI LAD, 60-70% OSTIAL DIAG(<1MM VESSEL), 10%STENOSIS IN LAD, DR. CHERI VARGAS AT [facility]   • CLOSED REDUCTION WRIST FRACTURE Right    • COLON RESECTION N/A 9/26/2019    Procedure: LOW ANTERIOR COLON RESECTION IMMOBILIZATION OF SPLENIC FLEXURE;  Surgeon: Radha Whitt MD;  Location: [facility];  Service: General   • COLONOSCOPY N/A 9/21/2019    INT/EXT HEMORRHOIDS, 18 MM POLYPOID LESION IN MID SIGMOID, PATH: INVASIVE MODERATELY DIFFERENTIATED GRADE 2 ADENOCARCINOMA, 2 TUBULOVILLOUS ADENOMA POLYPS IN CECUM, ADENOMATOUS POLYP IN TRANSVERSE, ADENOMATOUS POLYP IN ASCENDING, MULTIPLE SMALL AND LARGE DIVERTICULA, DR. TRUONG MONTEZ AT [facility]   • LUNG SURGERY Right 2009    RIGHT LOWER LOBE, BLOT CLOT REMOVED   • SIGMOIDOSCOPY N/A 9/24/2019    AN INFILTRATIVE  NON OBSTRUCTING MASS IN SIGMOID, AREA TATTOOED, DR. RADHA DONOVAN AT [facility]   • TONSILLECTOMY AND ADENOIDECTOMY Bilateral     DURING CHILDHOOD   • UMBILICAL HERNIA REPAIR N/A 05/14/2014    DR. ROMERO SCHUSTER AT [facility]       Social History     Socioeconomic History   • Marital status:      Spouse name: Not on file   • Number of children: Not on file   • Years of education: Not on file   • Highest education level: Not on file   Occupational History   • Occupation:      Employer: [employer]   Tobacco Use   • Smoking status: Never Smoker   • Smokeless tobacco: Never Used   Substance and Sexual Activity   • Alcohol use: No     Frequency: Never     Comment: Caffeine use: 2 soft drink daily and sweet tea.    • Drug use: No   • Sexual activity: Yes       Family History   Problem Relation Age of Onset   • Coronary artery disease Father    • Heart disease Mother 83   • Hypertension Mother    • Asthma Mother    • Diabetes Mother    • Kidney disease Mother    • Heart attack Mother    • Hypertension Sister    • Diabetes Sister    • Kidney disease Sister    • Heart attack Brother    • Alcohol abuse Brother    • Diabetes Sister    • Heart disease Sister        Review of Systems   Constitution: Positive for weight gain.   Cardiovascular: Negative for chest pain.   Respiratory: Positive for shortness of breath.    Musculoskeletal: Positive for arthritis, joint pain and myalgias.   Neurological: Positive for paresthesias. Negative for dizziness and light-headedness.   All other systems reviewed and are negative.      Allergies   Allergen Reactions   • Adhesive Tape Rash     blisters         Current Outpatient Medications:   •  atorvastatin (LIPITOR) 40 MG tablet, Take 1 tablet by mouth Daily., Disp: 90 tablet, Rfl: 3  •  losartan (COZAAR) 50 MG tablet, Take 1 tablet by mouth Daily., Disp: 90 tablet, Rfl: 3  •  rivaroxaban (XARELTO) 20 MG tablet, Take 1 tablet by mouth Daily With Dinner. Must be seen, Disp:  "90 tablet, Rfl: 1  •  sildenafil (Viagra) 100 MG tablet, Take 1/2 to 1 tablet by mouth daily if needed for ED., Disp: 10 tablet, Rfl: 1     Objective:     Vitals:    01/06/21 1049   BP: 122/82   BP Location: Left arm   Pulse: 70   Weight: 118 kg (260 lb)   Height: 177.8 cm (70\")     Body mass index is 37.31 kg/m².    Physical Exam   Constitutional: He is oriented to person, place, and time.   Overweight   HENT:   Head: Normocephalic.   Nose: Nose normal.   Masked   Eyes: Conjunctivae and EOM are normal.   Neck: Normal range of motion. No JVD present.   Cardiovascular: Normal rate, regular rhythm, normal heart sounds and intact distal pulses.   No murmur heard.  Pulmonary/Chest: Effort normal and breath sounds normal.   Abdominal: Soft. There is no abdominal tenderness.   Musculoskeletal: Normal range of motion.         General: No edema.   Neurological: He is alert and oriented to person, place, and time. No cranial nerve deficit.   Skin: Skin is warm and dry. No rash noted.   Psychiatric: He has a normal mood and affect. His behavior is normal. Judgment and thought content normal.   Vitals reviewed.        ECG 12 Lead    Date/Time: 1/6/2021 11:17 AM  Performed by: Eligio Panchal MD  Authorized by: Eligio Panchal MD   Comparison: compared with previous ECG   Similar to previous ECG  Rhythm: sinus rhythm  Conduction: conduction normal  ST Segments: ST segments normal  T inversion: I, aVL, V6 and V5  T flattening: II and aVF  QRS axis: normal  Other: no other findings    Clinical impression: abnormal EKG              Assessment:       Diagnosis Plan   1. Atherosclerosis of native coronary artery of native heart without angina pectoris     2. Essential hypertension     3. Lupus anticoagulant disorder (CMS/HCC)     4. Preop cardiovascular exam            Plan:       1.  He had moderate nonobstructive disease involving the circumflex and diagonal in 2015. Since he is fully anticoagulated, I don't feel strongly about " aspirin therapy.  He is on atorvastatin.  I suspect his exertional dyspnea is due to deconditioning, but as he is having surgery, I am going to get a stress test.     2.  His BP is well controlled.    3.  He's anticoagulated and follows with Dr Lares. I will defer perioperative management of his AC to him.     4.  His functional status is limited but still fairly good.  This is predominantly due to his knee pain.  He has some exertional dyspnea, so I will get a perfusion stress test prior to surgery.  Of note, he had a positive COVID PCR in December when being screened prior to colonoscopy and he never had any symptoms.  I'm worried if we retest him for a stress test, it may still be positive, but he clearly does not have active COVID infection.  I will check with my administration.     Sincerely,       Eligio Panchal MD

## 2021-01-08 ENCOUNTER — TRANSCRIBE ORDERS (OUTPATIENT)
Dept: SLEEP MEDICINE | Facility: HOSPITAL | Age: 66
End: 2021-01-08

## 2021-01-08 ENCOUNTER — LAB (OUTPATIENT)
Dept: LAB | Facility: HOSPITAL | Age: 66
End: 2021-01-08

## 2021-01-08 ENCOUNTER — IMMUNIZATION (OUTPATIENT)
Dept: VACCINE CLINIC | Facility: HOSPITAL | Age: 66
End: 2021-01-08

## 2021-01-08 DIAGNOSIS — Z01.818 OTHER SPECIFIED PRE-OPERATIVE EXAMINATION: ICD-10-CM

## 2021-01-08 DIAGNOSIS — Z01.818 OTHER SPECIFIED PRE-OPERATIVE EXAMINATION: Primary | ICD-10-CM

## 2021-01-08 PROCEDURE — 0001A: CPT | Performed by: INTERNAL MEDICINE

## 2021-01-08 PROCEDURE — U0004 COV-19 TEST NON-CDC HGH THRU: HCPCS

## 2021-01-08 PROCEDURE — 91300 HC SARSCOV02 VAC 30MCG/0.3ML IM: CPT | Performed by: INTERNAL MEDICINE

## 2021-01-08 PROCEDURE — 0002A: CPT | Performed by: INTERNAL MEDICINE

## 2021-01-08 PROCEDURE — C9803 HOPD COVID-19 SPEC COLLECT: HCPCS

## 2021-01-09 LAB — SARS-COV-2 RNA RESP QL NAA+PROBE: NOT DETECTED

## 2021-01-11 ENCOUNTER — HOSPITAL ENCOUNTER (OUTPATIENT)
Dept: CARDIOLOGY | Facility: HOSPITAL | Age: 66
Discharge: HOME | End: 2021-01-11
Admitting: INTERNAL MEDICINE

## 2021-01-11 VITALS — BODY MASS INDEX: 37.22 KG/M2 | WEIGHT: 260 LBS | HEIGHT: 70 IN

## 2021-01-11 DIAGNOSIS — Z01.810 PREOP CARDIOVASCULAR EXAM: ICD-10-CM

## 2021-01-11 DIAGNOSIS — I25.10 ATHEROSCLEROSIS OF NATIVE CORONARY ARTERY OF NATIVE HEART WITHOUT ANGINA PECTORIS: ICD-10-CM

## 2021-01-11 LAB
BH CV NUCLEAR PRIOR STUDY: 2
BH CV STRESS BP STAGE 1: NORMAL
BH CV STRESS COMMENTS STAGE 1: NORMAL
BH CV STRESS DOSE REGADENOSON STAGE 1: 0.4
BH CV STRESS DURATION MIN STAGE 1: 0
BH CV STRESS DURATION SEC STAGE 1: 10
BH CV STRESS HR STAGE 1: 95
BH CV STRESS PROTOCOL 1: NORMAL
BH CV STRESS RECOVERY BP: NORMAL MMHG
BH CV STRESS RECOVERY HR: 79 BPM
BH CV STRESS STAGE 1: 1
LV EF NUC BP: 51 %
MAXIMAL PREDICTED HEART RATE: 154 BPM
PERCENT MAX PREDICTED HR: 61.69 %
STRESS BASELINE BP: NORMAL MMHG
STRESS BASELINE HR: 66 BPM
STRESS PERCENT HR: 73 %
STRESS POST EXERCISE DUR SEC: 10 SEC
STRESS POST PEAK BP: NORMAL MMHG
STRESS POST PEAK HR: 95 BPM
STRESS TARGET HR: 131 BPM

## 2021-01-11 PROCEDURE — 93018 CV STRESS TEST I&R ONLY: CPT | Performed by: INTERNAL MEDICINE

## 2021-01-11 PROCEDURE — 78452 HT MUSCLE IMAGE SPECT MULT: CPT

## 2021-01-11 PROCEDURE — 93017 CV STRESS TEST TRACING ONLY: CPT

## 2021-01-11 PROCEDURE — 25010000002 REGADENOSON 0.4 MG/5ML SOLUTION: Performed by: INTERNAL MEDICINE

## 2021-01-11 PROCEDURE — 0 TECHNETIUM TETROFOSMIN KIT: Performed by: INTERNAL MEDICINE

## 2021-01-11 PROCEDURE — A9502 TC99M TETROFOSMIN: HCPCS | Performed by: INTERNAL MEDICINE

## 2021-01-11 PROCEDURE — 78452 HT MUSCLE IMAGE SPECT MULT: CPT | Performed by: INTERNAL MEDICINE

## 2021-01-11 PROCEDURE — 93016 CV STRESS TEST SUPVJ ONLY: CPT | Performed by: INTERNAL MEDICINE

## 2021-01-11 RX ADMIN — TETROFOSMIN 1 DOSE: 1.38 INJECTION, POWDER, LYOPHILIZED, FOR SOLUTION INTRAVENOUS at 07:24

## 2021-01-11 RX ADMIN — REGADENOSON 0.4 MG: 0.08 INJECTION, SOLUTION INTRAVENOUS at 08:13

## 2021-01-11 RX ADMIN — TETROFOSMIN 1 DOSE: 1.38 INJECTION, POWDER, LYOPHILIZED, FOR SOLUTION INTRAVENOUS at 08:13

## 2021-01-13 NOTE — PROGRESS NOTES
[facility] GROUP OUTPATIENT FOLLOW UP CLINIC VISIT    REASON FOR FOLLOW-UP:    1.  History of unprovoked pulmonary embolism with a positive lupus anticoagulant test in June 2010.  Chronically anticoagulated with Xarelto 20 mg daily.  Last annual follow-up with Dr. Monroy on 9/26/2018.  2.  Iron deficiency anemia diagnosed at the time of hospitalization on 9/19/2019.  He received a total of 900 mg of intravenous Venofer.  3.  Stage IIIa (pT1, pN1b) adenocarcinoma of the sigmoid colon status post laparoscopic low anterior resection on 9/26/2019.  1.6 x 1.4 cm tumor, grade 2, moderately differentiated with widely negative margins with 2 out of 15 lymph nodes positive for metastatic disease without evidence for extracapsular extension, greatest measuring 3.5 mm.  Microsatellite stable.  4.  Acute right upper extremity superficial thrombophlebitis in the cephalic vein diagnosed on 9/27/2019.  5.  Acute left upper extremity DVT in the brachial vein and acute left upper extremity superficial thrombophlebitis in the basilic vein on 9/27/2019.  Associated with an intravenous catheter.  On Xarelto.  6.  Four cycles of adjuvant CAPEOX complete 1/23/2020    HISTORY OF PRESENT ILLNESS:  Jared Rose is a 65 y.o. male who returns today for follow up and lab review.      He overall is doing well.  His left shoulder pain has improved after some injections.  He does note some mild dyspnea on exertion after he walks long distances around the hospital.  Occasional wheezing.  His dyspnea improves after rest for a couple of minutes.    He also complains of some abdominal distention without pain.  This has been occurring over the past few months.  It is migratory a little bit but always near the midline.  He is eating and drinking well.  No nausea vomiting or diarrhea.      REVIEW OF SYSTEMS:  Mild dyspnea on exertion, abdominal distention    Vitals:    01/14/21 0927   BP: 156/81   Pulse: 88   Resp: 16   Temp: 97.7 °F (36.5 °C)  "  TempSrc: Temporal   SpO2: 94%   Weight: 118 kg (260 lb)   Height: 175.8 cm (69.21\")   PainSc: 0-No pain  Comment: colon cancer       PHYSICAL EXAMINATION:  GENERAL:  Well-developed well-nourished male; awake, alert and oriented, in no acute distress.  Wearing a face mask.  SKIN:  Warm and dry, without rashes, purpura, or petechiae.  HEAD:  Normocephalic, atraumatic.  EARS:  Hearing intact.  LYMPHATICS:  No cervical, supraclavicular, or axillary lymphadenopathy.  CHEST:  Lungs are clear to auscultation bilaterally.  No wheezes, rales, or rhonchi.  HEART:  Regular rate; normal rhythm.  No murmurs, gallops or rubs.  ABDOMEN: Soft, nontender.  Ventral hernia is present.  Healed surgical incisions.  Normal active bowel sounds.  EXTREMITIES: No clubbing cyanosis or edema.  Left shoulder is in a sling.  NEUROLOGICAL:  No focal neurologic deficits.      DIAGNOSTIC DATA:  CBC & Differential (01/14/2021 09:13)      IMAGING: None reviewed      ASSESSMENT:  This is a 65 y.o. male with:  *History of unprovoked pulmonary embolism with a positive lupus anticoagulant test in June 2010.  Chronically anticoagulated with Xarelto 20 mg daily.  Last annual meeting with Dr. Monroy on 9/26/2019    *Iron deficiency anemia diagnosed at the time of hospitalization on 9/19/2019.  He received a total of 900 mg of intravenous Venofer.  Iron studies and ferritin were normal.  Hemoglobin normalized at 13.4.  Hemoglobin is 12.9 with a normal MCV today.    *Stage IIIa (pT1, pN1b) adenocarcinoma of the sigmoid colon   · Status post laparoscopic low anterior resection on 9/26/2019.    · 1.6 x 1.4 cm tumor, grade 2, moderately differentiated with widely negative margins with 2 out of 15 lymph nodes positive for metastatic disease without evidence for extracapsular extension, greatest measuring 3.5 mm.  Microsatellite stable.  · Discussed pursuing adjuvant therapy with CAPEOX for at least 3 months. This is in accordance with NCCN guidelines.  · 4 " cycles of therapy complete as of 1/23/2020.  · CT imaging on 7/17/2020 with some concerning findings.  I alerted his surgeon Dr. Whitt.  He elected to proceed with CT imaging of the abdomen and pelvis in 3 months.  Follow up CT imaging of the abdomen and pelvis performed on 10/16/2020 stable.    *Acute right upper extremity superficial thrombophlebitis in the cephalic vein diagnosed on 9/27/2019.  Acute left upper extremity DVT in the brachial vein and acute left upper extremity superficial thrombophlebitis in the basilic vein on 9/27/2019.    · Associated with an intravenous catheter.    · Repeat left upper extremity venous duplex on 1/16/2020 with chronic left upper extremity superficial thrombophlebitis in the basilic vein.  No DVT.    · He continues Xarelto 20 mg daily.    *Abdominal distention: I think this is related to his ventral hernia.  He is asymptomatic otherwise without pain or GI problems.  We will follow this also with CT imaging in 3 months as planned otherwise.    *Dyspnea on exertion: His lungs are clear today without wheezing.  He does note occasional wheezing he states.  It sounds like this is mild and not bothering him much.  I did advise some weight loss which I think will help with this.    PLAN:  1. Continue Xarelto.  He is chronically anticoagulated.    2. Follow up CBC, CMP, CEA and CT imaging of the abdomen/pelvis in 3 months and I will see him back after that.  3. Continue monitoring his dyspnea on exertion abdominal discomfort.    Orders Placed This Encounter   Procedures   • CT Abdomen Pelvis With Contrast     Standing Status:   Future     Standing Expiration Date:   1/15/2022     Scheduling Instructions:      CBC, CMP, CEA same day     Order Specific Question:   Will Oral Contrast be needed for this procedure?     Answer:   Yes   • Comprehensive Metabolic Panel     Standing Status:   Future     Standing Expiration Date:   1/14/2022   • CEA     Standing Status:   Future     Standing  Expiration Date:   1/14/2022   • CBC & Differential     Standing Status:   Future     Standing Expiration Date:   1/14/2022     Order Specific Question:   Manual Differential     Answer:   No      Diagnosis Plan   1. History of DVT (deep vein thrombosis)  CBC & Differential    Comprehensive Metabolic Panel    CEA    CT Abdomen Pelvis With Contrast   2. Chronic anticoagulation  CBC & Differential    Comprehensive Metabolic Panel    CEA    CT Abdomen Pelvis With Contrast   3. Malignant neoplasm of sigmoid colon (CMS/HCC)  CBC & Differential    Comprehensive Metabolic Panel    CEA    CT Abdomen Pelvis With Contrast   4. Dyspnea on exertion     5. Ventral hernia without obstruction or gangrene

## 2021-01-14 ENCOUNTER — LAB (OUTPATIENT)
Dept: LAB | Facility: HOSPITAL | Age: 66
End: 2021-01-14

## 2021-01-14 ENCOUNTER — OFFICE VISIT (OUTPATIENT)
Dept: ONCOLOGY | Facility: CLINIC | Age: 66
End: 2021-01-14

## 2021-01-14 VITALS
OXYGEN SATURATION: 94 % | HEART RATE: 88 BPM | RESPIRATION RATE: 16 BRPM | HEIGHT: 69 IN | TEMPERATURE: 97.7 F | WEIGHT: 260 LBS | SYSTOLIC BLOOD PRESSURE: 156 MMHG | DIASTOLIC BLOOD PRESSURE: 81 MMHG | BODY MASS INDEX: 38.51 KG/M2

## 2021-01-14 DIAGNOSIS — K43.9 VENTRAL HERNIA WITHOUT OBSTRUCTION OR GANGRENE: ICD-10-CM

## 2021-01-14 DIAGNOSIS — C18.7 MALIGNANT NEOPLASM OF SIGMOID COLON (HCC): ICD-10-CM

## 2021-01-14 DIAGNOSIS — Z86.718 HISTORY OF DVT (DEEP VEIN THROMBOSIS): Primary | ICD-10-CM

## 2021-01-14 DIAGNOSIS — R06.09 DYSPNEA ON EXERTION: ICD-10-CM

## 2021-01-14 DIAGNOSIS — Z79.01 CHRONIC ANTICOAGULATION: ICD-10-CM

## 2021-01-14 LAB
ALBUMIN SERPL-MCNC: 4.2 G/DL (ref 3.5–5.2)
ALBUMIN/GLOB SERPL: 1.2 G/DL (ref 1.1–2.4)
ALP SERPL-CCNC: 83 U/L (ref 38–116)
ALT SERPL W P-5'-P-CCNC: 29 U/L (ref 0–41)
ANION GAP SERPL CALCULATED.3IONS-SCNC: 10.1 MMOL/L (ref 5–15)
AST SERPL-CCNC: 24 U/L (ref 0–40)
BASOPHILS # BLD AUTO: 0.03 10*3/MM3 (ref 0–0.2)
BASOPHILS NFR BLD AUTO: 0.6 % (ref 0–1.5)
BILIRUB SERPL-MCNC: 1.1 MG/DL (ref 0.2–1.2)
BUN SERPL-MCNC: 16 MG/DL (ref 6–20)
BUN/CREAT SERPL: 12.9 (ref 7.3–30)
CALCIUM SPEC-SCNC: 9.5 MG/DL (ref 8.5–10.2)
CEA SERPL-MCNC: 0.94 NG/ML
CHLORIDE SERPL-SCNC: 102 MMOL/L (ref 98–107)
CO2 SERPL-SCNC: 24.9 MMOL/L (ref 22–29)
CREAT SERPL-MCNC: 1.24 MG/DL (ref 0.7–1.3)
DEPRECATED RDW RBC AUTO: 47.5 FL (ref 37–54)
EOSINOPHIL # BLD AUTO: 0.15 10*3/MM3 (ref 0–0.4)
EOSINOPHIL NFR BLD AUTO: 2.8 % (ref 0.3–6.2)
ERYTHROCYTE [DISTWIDTH] IN BLOOD BY AUTOMATED COUNT: 13.9 % (ref 12.3–15.4)
GFR SERPL CREATININE-BSD FRML MDRD: 71 ML/MIN/1.73
GLOBULIN UR ELPH-MCNC: 3.6 GM/DL (ref 1.8–3.5)
GLUCOSE SERPL-MCNC: 109 MG/DL (ref 74–124)
HCT VFR BLD AUTO: 38.4 % (ref 37.5–51)
HGB BLD-MCNC: 12.9 G/DL (ref 13–17.7)
IMM GRANULOCYTES # BLD AUTO: 0.01 10*3/MM3 (ref 0–0.05)
IMM GRANULOCYTES NFR BLD AUTO: 0.2 % (ref 0–0.5)
LYMPHOCYTES # BLD AUTO: 2.78 10*3/MM3 (ref 0.7–3.1)
LYMPHOCYTES NFR BLD AUTO: 51.2 % (ref 19.6–45.3)
MCH RBC QN AUTO: 31.2 PG (ref 26.6–33)
MCHC RBC AUTO-ENTMCNC: 33.6 G/DL (ref 31.5–35.7)
MCV RBC AUTO: 93 FL (ref 79–97)
MONOCYTES # BLD AUTO: 0.63 10*3/MM3 (ref 0.1–0.9)
MONOCYTES NFR BLD AUTO: 11.6 % (ref 5–12)
NEUTROPHILS NFR BLD AUTO: 1.83 10*3/MM3 (ref 1.7–7)
NEUTROPHILS NFR BLD AUTO: 33.6 % (ref 42.7–76)
NRBC BLD AUTO-RTO: 0 /100 WBC (ref 0–0.2)
PLATELET # BLD AUTO: 250 10*3/MM3 (ref 140–450)
PMV BLD AUTO: 9.4 FL (ref 6–12)
POTASSIUM SERPL-SCNC: 4 MMOL/L (ref 3.5–4.7)
PROT SERPL-MCNC: 7.8 G/DL (ref 6.3–8)
RBC # BLD AUTO: 4.13 10*6/MM3 (ref 4.14–5.8)
SODIUM SERPL-SCNC: 137 MMOL/L (ref 134–145)
WBC # BLD AUTO: 5.43 10*3/MM3 (ref 3.4–10.8)

## 2021-01-14 PROCEDURE — 99214 OFFICE O/P EST MOD 30 MIN: CPT | Performed by: INTERNAL MEDICINE

## 2021-01-14 PROCEDURE — 36415 COLL VENOUS BLD VENIPUNCTURE: CPT

## 2021-01-14 PROCEDURE — 80053 COMPREHEN METABOLIC PANEL: CPT

## 2021-01-14 PROCEDURE — 82378 CARCINOEMBRYONIC ANTIGEN: CPT | Performed by: INTERNAL MEDICINE

## 2021-01-14 PROCEDURE — 85025 COMPLETE CBC W/AUTO DIFF WBC: CPT

## 2021-02-05 ENCOUNTER — TELEPHONE (OUTPATIENT)
Dept: CARDIOLOGY | Facility: CLINIC | Age: 66
End: 2021-02-05

## 2021-02-05 NOTE — TELEPHONE ENCOUNTER
Pt is scheduled for total knee arthroplasty on 03/16/21 with Dr. Abraham Waters.  Pt's last OV 01/06/21, stress test 01/11/2021.

## 2021-02-05 NOTE — TELEPHONE ENCOUNTER
I sent a message to Dr Waters the day of the stress test, saying it was normal and he was low risk. It's in the result note of his stress test.      [email or web address]

## 2021-03-01 ENCOUNTER — TRANSCRIBE ORDERS (OUTPATIENT)
Dept: PREADMISSION TESTING | Facility: HOSPITAL | Age: 66
End: 2021-03-01

## 2021-03-01 DIAGNOSIS — Z01.818 OTHER SPECIFIED PRE-OPERATIVE EXAMINATION: Primary | ICD-10-CM

## 2021-03-01 RX ORDER — SILDENAFIL 100 MG/1
TABLET, FILM COATED ORAL
Qty: 10 TABLET | Refills: 0 | Status: SHIPPED | OUTPATIENT
Start: 2021-03-01 | End: 2021-06-11 | Stop reason: SDUPTHER

## 2021-03-01 RX ORDER — LOSARTAN POTASSIUM 50 MG/1
50 TABLET ORAL DAILY
Qty: 90 TABLET | Refills: 3 | OUTPATIENT
Start: 2021-03-01

## 2021-03-02 ENCOUNTER — TRANSCRIBE ORDERS (OUTPATIENT)
Dept: SLEEP MEDICINE | Facility: HOSPITAL | Age: 66
End: 2021-03-02

## 2021-03-02 DIAGNOSIS — Z01.818 OTHER SPECIFIED PRE-OPERATIVE EXAMINATION: Primary | ICD-10-CM

## 2021-03-04 ENCOUNTER — TELEPHONE (OUTPATIENT)
Dept: ONCOLOGY | Facility: CLINIC | Age: 66
End: 2021-03-04

## 2021-03-04 ENCOUNTER — APPOINTMENT (OUTPATIENT)
Dept: PREADMISSION TESTING | Facility: HOSPITAL | Age: 66
End: 2021-03-04

## 2021-03-04 VITALS
RESPIRATION RATE: 20 BRPM | HEIGHT: 71 IN | BODY MASS INDEX: 36.68 KG/M2 | OXYGEN SATURATION: 98 % | WEIGHT: 262 LBS | DIASTOLIC BLOOD PRESSURE: 82 MMHG | SYSTOLIC BLOOD PRESSURE: 143 MMHG | HEART RATE: 88 BPM | TEMPERATURE: 97.7 F

## 2021-03-04 DIAGNOSIS — M17.12 ARTHRITIS OF LEFT KNEE: ICD-10-CM

## 2021-03-04 LAB
ANION GAP SERPL CALCULATED.3IONS-SCNC: 9 MMOL/L (ref 5–15)
BUN SERPL-MCNC: 15 MG/DL (ref 8–23)
BUN/CREAT SERPL: 12.4 (ref 7–25)
CALCIUM SPEC-SCNC: 9.6 MG/DL (ref 8.6–10.5)
CHLORIDE SERPL-SCNC: 103 MMOL/L (ref 98–107)
CO2 SERPL-SCNC: 26 MMOL/L (ref 22–29)
CREAT SERPL-MCNC: 1.21 MG/DL (ref 0.76–1.27)
DEPRECATED RDW RBC AUTO: 48.9 FL (ref 37–54)
ERYTHROCYTE [DISTWIDTH] IN BLOOD BY AUTOMATED COUNT: 13.8 % (ref 12.3–15.4)
GFR SERPL CREATININE-BSD FRML MDRD: 73 ML/MIN/1.73
GLUCOSE SERPL-MCNC: 109 MG/DL (ref 65–99)
HCT VFR BLD AUTO: 39.8 % (ref 37.5–51)
HGB BLD-MCNC: 13.1 G/DL (ref 13–17.7)
MCH RBC QN AUTO: 31.4 PG (ref 26.6–33)
MCHC RBC AUTO-ENTMCNC: 32.9 G/DL (ref 31.5–35.7)
MCV RBC AUTO: 95.4 FL (ref 79–97)
PLATELET # BLD AUTO: 268 10*3/MM3 (ref 140–450)
PMV BLD AUTO: 9.6 FL (ref 6–12)
POTASSIUM SERPL-SCNC: 4.7 MMOL/L (ref 3.5–5.2)
RBC # BLD AUTO: 4.17 10*6/MM3 (ref 4.14–5.8)
SODIUM SERPL-SCNC: 138 MMOL/L (ref 136–145)
WBC # BLD AUTO: 4.7 10*3/MM3 (ref 3.4–10.8)

## 2021-03-04 PROCEDURE — 36415 COLL VENOUS BLD VENIPUNCTURE: CPT

## 2021-03-04 PROCEDURE — 85027 COMPLETE CBC AUTOMATED: CPT

## 2021-03-04 PROCEDURE — 80048 BASIC METABOLIC PNL TOTAL CA: CPT

## 2021-03-04 RX ORDER — CHLORHEXIDINE GLUCONATE 500 MG/1
1 CLOTH TOPICAL TAKE AS DIRECTED
Status: ON HOLD | COMMUNITY
End: 2021-03-16

## 2021-03-04 ASSESSMENT — KOOS JR
KOOS JR SCORE: 59.381
KOOS JR SCORE: 11

## 2021-03-04 NOTE — DISCHARGE INSTRUCTIONS
Take the following medications the morning of surgery:    none    If you are on prescription narcotic pain medication to control your pain you may also take that medication the morning of surgery.    General Instructions:  • Do not eat solid food after midnight the night before surgery.  • You may drink clear liquids day of surgery but must stop at least one hour before your hospital arrival time.  • It is beneficial for you to have a clear drink that contains carbohydrates the day of surgery.  We suggest a 12 to 20 ounce bottle of Gatorade or Powerade for non-diabetic patients or a 12 to 20 ounce bottle of G2 or Powerade Zero for diabetic patients. (Pediatric patients, are not advised to drink a 12 to 20 ounce carbohydrate drink)    Clear liquids are liquids you can see through.  Nothing red in color.     Plain water                               Sports drinks  Sodas                                   Gelatin (Jell-O)  Fruit juices without pulp such as white grape juice and apple juice  Popsicles that contain no fruit or yogurt  Tea or coffee (no cream or milk added)  Gatorade / Powerade  G2 / Powerade Zero    • Infants may have breast milk up to four hours before surgery.  • Infants drinking formula may drink formula up to six hours before surgery.   • Patients who avoid smoking, chewing tobacco and alcohol for 4 weeks prior to surgery have a reduced risk of post-operative complications.  Quit smoking as many days before surgery as you can.  • Do not smoke, use chewing tobacco or drink alcohol the day of surgery.   • If applicable bring your C-PAP/ BI-PAP machine.  • Bring any papers given to you in the doctor’s office.  • Wear clean comfortable clothes.  • Do not wear contact lenses, false eyelashes or make-up.  Bring a case for your glasses.   • Bring crutches or walker if applicable.  • Remove all piercings.  Leave jewelry and any other valuables at home.  • Hair extensions with metal clips must be removed prior  to surgery.  • The Pre-Admission Testing nurse will instruct you to bring medications if unable to obtain an accurate list in Pre-Admission Testing.        If you were given a blood bank ID arm band remember to bring it with you the day of surgery.    Preventing a Surgical Site Infection:  • For 2 to 3 days before surgery, avoid shaving with a razor because the razor can irritate skin and make it easier to develop an infection.    • Any areas of open skin can increase the risk of a post-operative wound infection by allowing bacteria to enter and travel throughout the body.  Notify your surgeon if you have any skin wounds / rashes even if it is not near the expected surgical site.  The area will need assessed to determine if surgery should be delayed until it is healed.  • The night prior to surgery shower using a fresh bar of anti-bacterial soap (such as Dial) and clean washcloth.  Sleep in a clean bed with clean clothing.  Do not allow pets to sleep with you.  • Shower on the morning of surgery using a fresh bar of anti-bacterial soap (such as Dial) and clean washcloth.  Dry with a clean towel and dress in clean clothing.  • Ask your surgeon if you will be receiving antibiotics prior to surgery.  • Make sure you, your family, and all healthcare providers clean their hands with soap and water or an alcohol based hand  before caring for you or your wound.    Day of surgery:3/16/2021   0700  Your arrival time is approximately two hours before your scheduled surgery time.  Upon arrival, a Pre-op nurse and Anesthesiologist will review your health history, obtain vital signs, and answer questions you may have.  The only belongings needed at this time will be a list of your home medications and if applicable your C-PAP/BI-PAP machine.  A Pre-op nurse will start an IV and you may receive medication in preparation for surgery, including something to help you relax.     Please be aware that surgery does come with  discomfort.  We want to make every effort to control your discomfort so please discuss any uncontrolled symptoms with your nurse.   Your doctor will most likely have prescribed pain medications.      If you are going home after surgery you will receive individualized written care instructions before being discharged.  A responsible adult must drive you to and from the hospital on the day of your surgery and stay with you for 24 hours.  Discharge prescriptions can be filled by the hospital pharmacy during regular pharmacy hours.  If you are having surgery late in the day/evening your prescription may be e-prescribed to your pharmacy.  Please verify your pharmacy hours or chose a 24 hour pharmacy to avoid not having access to your prescription because your pharmacy has closed for the day.    If you are staying overnight following surgery, you will be transported to your hospital room following the recovery period.  [facility] has all private rooms.    If you have any questions please call Pre-Admission Testing at [phone].  Deductibles and co-payments are collected on the day of service. Please be prepared to pay the required co-pay, deductible or deposit on the day of service as defined by your plan.    Patient Education for Self-Quarantine Process    Following your COVID testing, we strongly recommend that you do not leave your home after you have been tested for COVID except to get medical care. This includes not going to work, school or to public areas.  If this is not possible for you to do please limit your activities to only required outings.  Be sure to wear a mask when you are with other people, practice social distancing and wash your hands frequently.      The following items provide additional details to keep you safe.  • Wash your hands with soap and water frequently for at least 20 seconds.   • Avoid touching your eyes, nose and mouth with unwashed hands.  • Do not share anything -  utensils, towels, food from the same bowl.   • Have your own utensils, drinking glass, dishes, towels and bedding.   • Do not have visitors.   • Do use FaceTime to stay in touch with family and friends.  • You should stay in a specific room away from others if possible.   • Stay at least 6 feet away from others in the home if you cannot have a dedicated room to yourself.   • Do not snuggle with your pet. While the CDC says there is no evidence that pets can spread COVID-19 or be infected from humans, it is probably best to avoid “petting, snuggling, being kissed or licked and sharing food (during self-quarantine)”, according to the CDC.   • Sanitize household surfaces daily. Include all high touch areas (door handles, light switches, phones, countertops, etc.)  • Do not share a bathroom with others, if possible.   • Wear a mask around others in your home if you are unable to stay in a separate room or 6 feet apart. If  you are unable to wear a mask, have your family member wear a mask if they must be within 6 feet of you.   Call your surgeon immediately if you experience any of the following symptoms:  • Sore Throat  • Shortness of Breath or difficulty breathing  • Cough  • Chills  • Body soreness or muscle pain  • Headache  • Fever  • New loss of taste or smell  • Do not arrive for your surgery ill.  Your procedure will need to be rescheduled to another time.  You will need to call your physician before the day of surgery to avoid any unnecessary exposure to hospital staff as well as other patients.    CHLORHEXIDINE CLOTH INSTRUCTIONS  The morning of surgery follow these instructions using the Chlorhexidine cloths you've been given.  These steps reduce bacteria on the body.  Do not use the cloths near your eyes, ears mouth, genitalia or on open wounds.  Throw the cloths away after use but do not try to flush them down a toilet.      • Open and remove one cloth at a time from the package.    • Leave the cloth  unfolded and begin the bathing.  • Massage the skin with the cloths using gentle pressure to remove bacteria.  Do not scrub harshly.   • Follow the steps below with one 2% CHG cloth per area (6 total cloths).  • One cloth for neck, shoulders and chest.  • One cloth for both arms, hands, fingers and underarms (do underarms last).  • One cloth for the abdomen followed by groin.  • One cloth for right leg and foot including between the toes.  • One cloth for left leg and foot including between the toes.  • The last cloth is to be used for the back of the neck, back and buttocks.    Allow the CHG to air dry 3 minutes on the skin which will give it time to work and decrease the chance of irritation.  The skin may feel sticky until it is dry.  Do not rinse with water or any other liquid or you will lose the beneficial effects of the CHG.  If mild skin irritation occurs, do rinse the skin to remove the CHG.  Report this to the nurse at time of admission.  Do not apply lotions, creams, ointments, deodorants or perfumes after using the clothes. Dress in clean clothes before coming to the hospital.    BACTROBAN NASAL OINTMENT  There are many germs normally in your nose. Bactroban is an ointment that will help reduce these germs. Please follow these instructions for Bactroban use:      __1__The day before surgery in the morning  Date_3/15/2021_______    __2__The day before surgery in the evening              Date__3/15/2021______    _3___The day of surgery in the morning    Date_3/16/2021_______    **Squirt ½ package of Bactroban Ointment onto a cotton applicator and apply to inside of 1st nostril.  Squirt the remaining Bactroban and apply to the inside of the other nostril.    PERIDEX- ORAL:  Use only if your surgeon has ordered  Use the night before and morning of surgery - Swish, gargle, and spit - do not swallow.

## 2021-03-04 NOTE — TELEPHONE ENCOUNTER
----- Message from Iris Odom RN sent at 3/4/2021  1:05 PM EST -----  Regarding: FW: Non-Urgent Medical Question  Contact: [phone]    ----- Message -----  From: Jared Rose  Sent: 3/4/2021   1:04 PM EST  To: [facility]  Subject: Non-Urgent Medical Question                      Should I stop taking my xarelto before I have surgery on the 10th and how soon

## 2021-03-05 ENCOUNTER — TELEPHONE (OUTPATIENT)
Dept: ONCOLOGY | Facility: CLINIC | Age: 66
End: 2021-03-05

## 2021-03-05 NOTE — TELEPHONE ENCOUNTER
Pt did not read his message RN sent regarding holding Xarelto. Called pt and informed him of instructions on how to hold his Xarelto (as outlined in pt advise request). He v/u.

## 2021-03-08 ENCOUNTER — LAB (OUTPATIENT)
Dept: LAB | Facility: HOSPITAL | Age: 66
End: 2021-03-08

## 2021-03-08 DIAGNOSIS — Z01.818 OTHER SPECIFIED PRE-OPERATIVE EXAMINATION: ICD-10-CM

## 2021-03-08 PROCEDURE — U0004 COV-19 TEST NON-CDC HGH THRU: HCPCS

## 2021-03-08 PROCEDURE — C9803 HOPD COVID-19 SPEC COLLECT: HCPCS

## 2021-03-09 LAB — SARS-COV-2 RNA RESP QL NAA+PROBE: NOT DETECTED

## 2021-03-10 ENCOUNTER — ANESTHESIA (OUTPATIENT)
Dept: GASTROENTEROLOGY | Facility: HOSPITAL | Age: 66
End: 2021-03-10

## 2021-03-10 ENCOUNTER — ANESTHESIA EVENT (OUTPATIENT)
Dept: GASTROENTEROLOGY | Facility: HOSPITAL | Age: 66
End: 2021-03-10

## 2021-03-10 ENCOUNTER — HOSPITAL ENCOUNTER (OUTPATIENT)
Facility: HOSPITAL | Age: 66
Setting detail: HOSPITAL OUTPATIENT SURGERY
Discharge: HOME | End: 2021-03-10
Attending: COLON & RECTAL SURGERY | Admitting: COLON & RECTAL SURGERY

## 2021-03-10 VITALS
SYSTOLIC BLOOD PRESSURE: 146 MMHG | WEIGHT: 260 LBS | HEART RATE: 71 BPM | OXYGEN SATURATION: 99 % | BODY MASS INDEX: 36.4 KG/M2 | RESPIRATION RATE: 18 BRPM | DIASTOLIC BLOOD PRESSURE: 97 MMHG | HEIGHT: 71 IN

## 2021-03-10 DIAGNOSIS — C18.7 MALIGNANT NEOPLASM OF SIGMOID COLON (HCC): ICD-10-CM

## 2021-03-10 PROCEDURE — 25010000002 PROPOFOL 10 MG/ML EMULSION: Performed by: NURSE ANESTHETIST, CERTIFIED REGISTERED

## 2021-03-10 PROCEDURE — 88305 TISSUE EXAM BY PATHOLOGIST: CPT | Performed by: COLON & RECTAL SURGERY

## 2021-03-10 RX ORDER — SODIUM CHLORIDE 0.9 % (FLUSH) 0.9 %
3 SYRINGE (ML) INJECTION EVERY 12 HOURS SCHEDULED
Status: DISCONTINUED | OUTPATIENT
Start: 2021-03-10 | End: 2021-03-10 | Stop reason: HOSPADM

## 2021-03-10 RX ORDER — PROPOFOL 10 MG/ML
VIAL (ML) INTRAVENOUS CONTINUOUS PRN
Status: DISCONTINUED | OUTPATIENT
Start: 2021-03-10 | End: 2021-03-10 | Stop reason: SURG

## 2021-03-10 RX ORDER — SODIUM CHLORIDE, SODIUM LACTATE, POTASSIUM CHLORIDE, CALCIUM CHLORIDE 600; 310; 30; 20 MG/100ML; MG/100ML; MG/100ML; MG/100ML
30 INJECTION, SOLUTION INTRAVENOUS CONTINUOUS
Status: DISCONTINUED | OUTPATIENT
Start: 2021-03-10 | End: 2021-03-10 | Stop reason: HOSPADM

## 2021-03-10 RX ORDER — SODIUM CHLORIDE 0.9 % (FLUSH) 0.9 %
10 SYRINGE (ML) INJECTION AS NEEDED
Status: DISCONTINUED | OUTPATIENT
Start: 2021-03-10 | End: 2021-03-10 | Stop reason: HOSPADM

## 2021-03-10 RX ORDER — LIDOCAINE HYDROCHLORIDE 20 MG/ML
INJECTION, SOLUTION INFILTRATION; PERINEURAL AS NEEDED
Status: DISCONTINUED | OUTPATIENT
Start: 2021-03-10 | End: 2021-03-10 | Stop reason: SURG

## 2021-03-10 RX ORDER — PROPOFOL 10 MG/ML
VIAL (ML) INTRAVENOUS AS NEEDED
Status: DISCONTINUED | OUTPATIENT
Start: 2021-03-10 | End: 2021-03-10 | Stop reason: SURG

## 2021-03-10 RX ADMIN — PROPOFOL 150 MG: 10 INJECTION, EMULSION INTRAVENOUS at 07:30

## 2021-03-10 RX ADMIN — LIDOCAINE HYDROCHLORIDE 60 MG: 20 INJECTION, SOLUTION INFILTRATION; PERINEURAL at 07:30

## 2021-03-10 RX ADMIN — PROPOFOL 50 MG: 10 INJECTION, EMULSION INTRAVENOUS at 07:46

## 2021-03-10 RX ADMIN — SODIUM CHLORIDE, POTASSIUM CHLORIDE, SODIUM LACTATE AND CALCIUM CHLORIDE 30 ML/HR: 600; 310; 30; 20 INJECTION, SOLUTION INTRAVENOUS at 06:58

## 2021-03-10 RX ADMIN — PROPOFOL 200 MCG/KG/MIN: 10 INJECTION, EMULSION INTRAVENOUS at 07:30

## 2021-03-10 NOTE — ANESTHESIA POSTPROCEDURE EVALUATION
"Patient: Jared Rose    Procedure Summary     Date: 03/10/21 Room / Location: [facility] ENDOSCOPY 9 /  [facility]    Anesthesia Start: 0725 Anesthesia Stop: 0756    Procedure: COLONOSCOPY to cecum with biopsy / polypectomy (N/A ) Diagnosis:       Malignant neoplasm of sigmoid colon (CMS/HCC)      (Malignant neoplasm of sigmoid colon (CMS/HCC) [C18.7])    Surgeons: Isabella Whitt MD Provider: Zane Wilson MD    Anesthesia Type: MAC ASA Status: 3          Anesthesia Type: MAC    Vitals  Vitals Value Taken Time   /81 03/10/21 0755   Temp     Pulse 86 03/10/21 0755   Resp 16 03/10/21 0755   SpO2 98 % 03/10/21 0755           Post Anesthesia Care and Evaluation    Patient location during evaluation: bedside  Patient participation: complete - patient participated  Level of consciousness: awake and alert  Pain management: adequate  Airway patency: patent  Anesthetic complications: No anesthetic complications    Cardiovascular status: acceptable  Respiratory status: acceptable  Hydration status: acceptable    Comments: /81 (BP Location: Left arm, Patient Position: Lying)   Pulse 86   Resp 16   Ht 180.3 cm (71\")   Wt 118 kg (260 lb)   SpO2 98%   BMI 36.26 kg/m²       "

## 2021-03-10 NOTE — ANESTHESIA PREPROCEDURE EVALUATION
Anesthesia Evaluation     Patient summary reviewed and Nursing notes reviewed   no history of anesthetic complications:  NPO Solid Status: > 8 hours  NPO Liquid Status: > 8 hours           Airway   Mallampati: III  Dental      Pulmonary - normal exam   (+) pulmonary embolism, shortness of breath,   Cardiovascular - normal exam    (+) hypertension less than 2 medications, CAD, DVT, hyperlipidemia,       Neuro/Psych  (+) numbness,     GI/Hepatic/Renal/Endo    (+) obesity,  GI bleeding ,     Musculoskeletal     Abdominal    Substance History      OB/GYN          Other   arthritis,    history of cancer remission                    Anesthesia Plan    ASA 3     MAC     intravenous induction     Anesthetic plan, all risks, benefits, and alternatives have been provided, discussed and informed consent has been obtained with: patient.

## 2021-03-10 NOTE — H&P
Jared Rose is a 65 y.o. male  who is referred by Isabella Donovan MD for a colonoscopy. He   has an indications: previous colon cancer.     He denies any change in bowel function, melena, or hematochezia.    Past Medical History:   Diagnosis Date   • Acute deep vein thrombosis (DVT) of upper extremity (CMS/[facility]) 9/29/2019   • At risk for sleep apnea     6   • Bell's palsy 02/23/2011    SEEN AT 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   • H/O Anemia    • H/O Leukopenia    • History of chemotherapy    • History of transfusion     no reaction   • Hyperlipidemia    • Hypertension    • Knee pain    • Lupus anticoagulant disorder (CMS/[facility]) 5/6/2016   • Lupus anticoagulant positive    • Muscle spasm of back 10/2018   • OA (osteoarthritis)    • Obesity    • Pulmonary embolism (CMS/[facility]) 06/08/2010     Right lower lobe pulmonary embolus, ADMITTED TO [facility]   • Pulmonary embolus (CMS/[facility]) 5/6/2016   • Rectal bleeding 09/2019   • Rectal cancer (CMS/[facility]) 09/24/2019    MODERATELY DIFFERENTIATED ADENOCARCINOMA GRADE 2, FOLLOWED BY DR. ISABELLA DONOVAN   • Sprain of right shoulder 10/21/2020    SEEN AT [facility] ER   • Strain of left shoulder 10/2020   • Stress fracture of right foot 05/11/2013    4TH METATARSAL, SEEN AT [facility] ER   • Thrombophilia (CMS/[facility])     FOLLOWED BY DR. BALAJI MCGEE   • Tinea pedis 11/25/2007    SEEN AT [facility] ER       Past Surgical History:   Procedure Laterality Date   • CARDIAC CATHETERIZATION Left 05/11/2006    NORMAL LV SYSTOLIC FUNCTION(EF 50%), MOD DZ OF 2 SMALL CORONARY BRANCHES (D2 AND OM2),  DR. BOOM GALLEGO AT [facility]   • CARDIAC CATHETERIZATION Left 07/20/2015    NORMAL LM, DIFFUSE LCx DZ, LI LAD, 60-70% OSTIAL DIAG(<1MM VESSEL), 10%STENOSIS IN LAD, DR. CHERI VARGAS AT [facility]   • CLOSED REDUCTION WRIST FRACTURE Right    • COLON RESECTION N/A 9/26/2019    Procedure: LOW ANTERIOR COLON RESECTION IMMOBILIZATION OF SPLENIC FLEXURE;  Surgeon: Radha Whitt MD;  Location: [facility];  Service: General   • COLONOSCOPY N/A 9/21/2019    INT/EXT HEMORRHOIDS, 18 MM POLYPOID LESION IN MID SIGMOID, PATH: INVASIVE MODERATELY DIFFERENTIATED GRADE 2 ADENOCARCINOMA, 2 TUBULOVILLOUS ADENOMA POLYPS IN CECUM, ADENOMATOUS POLYP IN TRANSVERSE, ADENOMATOUS POLYP IN ASCENDING, MULTIPLE SMALL AND LARGE DIVERTICULA, DR. TRUONG MONTEZ AT [facility]   • ENDOSCOPY     • LUNG SURGERY Right 2009    RIGHT LOWER LOBE, BLOT CLOT REMOVED   • SIGMOIDOSCOPY N/A 9/24/2019    AN INFILTRATIVE NON OBSTRUCTING MASS IN SIGMOID, AREA TATTOOED, DR. RADHA WHITT AT [facility]   • TONSILLECTOMY AND ADENOIDECTOMY Bilateral     DURING CHILDHOOD   • UMBILICAL HERNIA REPAIR N/A 05/14/2014    DR. ROMERO SCHUSTER AT [facility]       Medications Prior to Admission   Medication Sig Dispense Refill Last Dose   • atorvastatin (LIPITOR) 40 MG tablet Take 1 tablet by mouth Daily. 90 tablet 3 Past Week at Unknown time   • losartan (COZAAR) 50 MG tablet Take 1 tablet by mouth Daily. 90 tablet 3 Past Week at Unknown time   • Chlorhexidine Gluconate Cloth 2 % pads Apply 1 application topically. USE AS DIRECTED PREOP      • mupirocin (BACTROBAN) 2 % nasal ointment 1 application into the nostril(s) as directed by provider. USE AS DIRECTED PREOP      • rivaroxaban (XARELTO) 20 MG tablet Take 1 tablet by mouth Daily With Dinner. Must be seen (Patient taking differently: Take 20 mg by mouth Daily With Dinner. Pt to call MD to ask when to stop prior to surgery) 90 tablet 1 3/8/2021   • sildenafil (Viagra) 100 MG tablet Take 1/2 to 1 tablet by mouth daily if needed for ED 10 tablet 0         Allergies   Allergen Reactions   • Adhesive Tape Rash     blisters       Family History   Problem Relation Age of Onset   • Coronary artery disease Father    • Heart disease Mother 83   • Hypertension Mother    • Asthma Mother    • Diabetes Mother    • Kidney disease Mother    • Heart attack Mother    • Hypertension Sister    • Diabetes Sister    • Kidney disease Sister    • Heart attack Brother    • Alcohol abuse Brother    • Diabetes Sister    • Heart disease Sister    • Malig Hyperthermia Neg Hx        Social History     Socioeconomic History   • Marital status:      Spouse name: Not on file   • Number of children: Not on file   • Years of education: Not on file   • Highest education level: Not on file   Tobacco Use   • Smoking status: Never Smoker   • Smokeless tobacco: Never Used   Vaping Use   • Vaping Use: Never used   Substance and Sexual Activity   • Alcohol use: Never     Comment: Caffeine use: 2 soft drink daily and sweet tea.    • Drug use: Never   • Sexual activity: Yes       Review of Systems   Gastrointestinal: Negative for abdominal pain, nausea and vomiting.   All other systems reviewed and are negative.      Vitals:    03/10/21 0649   BP: 176/88   Pulse: 78   Resp: 16   SpO2: 99%         Physical Exam  Constitutional:       Appearance: He is well-developed.   HENT:      Head: Normocephalic and atraumatic.   Eyes:      Pupils: Pupils are equal, round, and reactive to light.   Cardiovascular:      Rate and Rhythm: Regular rhythm.   Pulmonary:      Effort: Pulmonary effort is normal.   Abdominal:      General: There is no distension.      Palpations: Abdomen is soft.   Musculoskeletal:         General: Normal range of motion.   Skin:     General: Skin is warm and dry.   Neurological:      Mental Status: He is alert and oriented to person, place, and time.   Psychiatric:         Thought Content: Thought content normal.         Judgment: Judgment normal.           Assessment/Plan       indications: previous colon cancer         I recommend colonoscopy.  I described risks, benefits of the procedure with the patient including but not limited to bleeding, infection, possibility of perforation and possible polypectomy. All of the patient's questions were answered and they would like to proceed with the above recommendations.

## 2021-03-11 LAB
LAB AP CASE REPORT: NORMAL
LAB AP CLINICAL INFORMATION: NORMAL
PATH REPORT.FINAL DX SPEC: NORMAL
PATH REPORT.GROSS SPEC: NORMAL

## 2021-03-12 ENCOUNTER — OFFICE VISIT (OUTPATIENT)
Dept: ORTHOPEDIC SURGERY | Facility: CLINIC | Age: 66
End: 2021-03-12

## 2021-03-12 VITALS — HEIGHT: 71 IN | WEIGHT: 260.14 LBS | BODY MASS INDEX: 36.42 KG/M2 | TEMPERATURE: 98.6 F

## 2021-03-12 DIAGNOSIS — M17.12 ARTHRITIS OF LEFT KNEE: Primary | ICD-10-CM

## 2021-03-12 DIAGNOSIS — M25.562 LEFT KNEE PAIN, UNSPECIFIED CHRONICITY: ICD-10-CM

## 2021-03-12 PROCEDURE — S0260 H&P FOR SURGERY: HCPCS | Performed by: NURSE PRACTITIONER

## 2021-03-12 PROCEDURE — 73562 X-RAY EXAM OF KNEE 3: CPT | Performed by: NURSE PRACTITIONER

## 2021-03-12 NOTE — H&P (VIEW-ONLY)
"   History & Physical       Patient: Jared Rose  YOB: 1955  Medical Record Number: [id number]  Wt Readings from Last 3 Encounters:   03/12/21 118 kg (260 lb 2.3 oz)   03/10/21 118 kg (260 lb)   03/04/21 119 kg (262 lb)     Ht Readings from Last 3 Encounters:   03/12/21 180.3 cm (70.98\")   03/10/21 180.3 cm (71\")   03/04/21 180.3 cm (71\")     Body mass index is 36.3 kg/m².  Facility age limit for growth percentiles is 20 years.    Surgeon:  Dr. Abraham Waters    Chief Complaints:   Chief Complaint   Patient presents with   • Left Knee - Pre-op Exam     Surgery:  LEFT TOTAL KNEE ARTHROPLASTY WITH MARTHA NAVIGATION      History of Present Illness: 65 y.o. male presents with   Chief Complaint   Patient presents with   • Left Knee - Pre-op Exam   . Chronic symptoms have been progressively worsening despite more conservative treatment measures including medications OTC and prescription, cortisone injections and or gel injections, and physical therapy.  Symptoms are associated with ability to move, exercise, and perform activities of daily living.  Symptoms are aggravated by weight bearing and ROM necessary for activities of daily living.   Symptoms improve with rest, ice and elevation only minimally.      Allergies:   Allergies   Allergen Reactions   • Adhesive Tape Rash     blisters       Medications:   Home Medications:  Current Outpatient Medications on File Prior to Visit   Medication Sig   • atorvastatin (LIPITOR) 40 MG tablet Take 1 tablet by mouth Daily.   • Chlorhexidine Gluconate Cloth 2 % pads Apply 1 application topically. USE AS DIRECTED PREOP   • losartan (COZAAR) 50 MG tablet Take 1 tablet by mouth Daily.   • mupirocin (BACTROBAN) 2 % nasal ointment 1 application into the nostril(s) as directed by provider. USE AS DIRECTED PREOP   • rivaroxaban (XARELTO) 20 MG tablet Take 1 tablet by mouth Daily With Dinner. Must be seen (Patient taking differently: Take 20 mg by mouth Daily With " Dinner. Pt to call MD to ask when to stop prior to surgery)   • sildenafil (Viagra) 100 MG tablet Take 1/2 to 1 tablet by mouth daily if needed for ED     Current Facility-Administered Medications on File Prior to Visit   Medication   • Chlorhexidine Gluconate 2 % pads 1 each     Current Medications:  Scheduled Meds:  Continuous Infusions:No current facility-administered medications for this visit.    PRN Meds:.    I have reviewed the patient's medical history in detail and updated the computerized patient record.  Review and summarization of old records include:    Past Medical History:   Diagnosis Date   • Acute deep vein thrombosis (DVT) of upper extremity (CMS/HCC) 9/29/2019   • At risk for sleep apnea     6   • Bell's palsy 02/23/2011    SEEN AT [facility] ER   • Blister of foot 06/2017    RIGHT FOOT   • Chronic anticoagulation    • Chronic fatigue    • Chronic left-sided low back pain without sciatica    • Coronary atherosclerosis     cath 7/2015: normal LM, diffuse LCx disease, LI LAD, 60-70% ostial diag (<1 mm vessel), LI RCA   • COVID-19 virus detected     12/8/2020    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   • GI hemorrhage 09/19/2019    ADMITTED TO [facility]   • H/O Anemia    • H/O Leukopenia    • History of chemotherapy    • History of transfusion     no reaction   • Hyperlipidemia    • Hypertension    • Knee pain    • Lupus anticoagulant disorder (CMS/HCC) 5/6/2016   • Lupus anticoagulant positive    • Muscle spasm of back 10/2018   • OA (osteoarthritis)    • Obesity    • Pulmonary embolism (CMS/HCC) 06/08/2010     Right lower lobe pulmonary embolus, ADMITTED TO [facility]   • Pulmonary embolus (CMS/HCC) 5/6/2016   • Rectal bleeding 09/2019   • Rectal cancer (CMS/HCC) 09/24/2019    MODERATELY DIFFERENTIATED ADENOCARCINOMA GRADE 2, FOLLOWED BY DR. RADHA DONOVAN   • Sprain of right shoulder 10/21/2020    SEEN AT [facility] ER   • Strain of left shoulder 10/2020   • Stress  fracture of right foot 05/11/2013    4TH METATARSAL, SEEN AT [facility] ER   • Thrombophilia (CMS/HCC)     FOLLOWED BY DR. BALAJI MCGEE   • Tinea pedis 11/25/2007    SEEN AT [facility] ER        Past Surgical History:   Procedure Laterality Date   • CARDIAC CATHETERIZATION Left 05/11/2006    NORMAL LV SYSTOLIC FUNCTION(EF 50%), MOD DZ OF 2 SMALL CORONARY BRANCHES (D2 AND OM2), DR. BOOM GALLEGO AT [facility]   • CARDIAC CATHETERIZATION Left 07/20/2015    NORMAL LM, DIFFUSE LCx DZ, LI LAD, 60-70% OSTIAL DIAG(<1MM VESSEL), 10%STENOSIS IN LAD, DR. CHERI VARGAS AT [facility]   • CLOSED REDUCTION WRIST FRACTURE Right    • COLON RESECTION N/A 9/26/2019    Procedure: LOW ANTERIOR COLON RESECTION IMMOBILIZATION OF SPLENIC FLEXURE;  Surgeon: Radha Whitt MD;  Location: [facility] MAIN OR;  Service: General   • COLONOSCOPY N/A 9/21/2019    INT/EXT HEMORRHOIDS, 18 MM POLYPOID LESION IN MID SIGMOID, PATH: INVASIVE MODERATELY DIFFERENTIATED GRADE 2 ADENOCARCINOMA, 2 TUBULOVILLOUS ADENOMA POLYPS IN CECUM, ADENOMATOUS POLYP IN TRANSVERSE, ADENOMATOUS POLYP IN ASCENDING, MULTIPLE SMALL AND LARGE DIVERTICULA, DR. TRUONG MONTEZ AT [facility]   • COLONOSCOPY N/A 3/10/2021    Procedure: COLONOSCOPY to cecum with biopsy / polypectomy;  Surgeon: Radha Whitt MD;  Location: [facility] ENDOSCOPY;  Service: General;  Laterality: N/A;  pre-hx colon cancer  post-polyp   • ENDOSCOPY     • LUNG SURGERY Right 2009    RIGHT LOWER LOBE, BLOT CLOT REMOVED   • SIGMOIDOSCOPY N/A 9/24/2019    AN INFILTRATIVE NON OBSTRUCTING MASS IN SIGMOID, AREA TATTOOED, DR. RADHA WHITT AT [facility]   • TONSILLECTOMY AND ADENOIDECTOMY Bilateral     DURING CHILDHOOD   • UMBILICAL HERNIA REPAIR N/A 05/14/2014    DR. ROMERO SCHUSTER AT [facility]        Social History     Occupational History   • Occupation:      Employer: [employer]   Tobacco Use   • Smoking status: Never Smoker   • Smokeless tobacco: Never Used   Vaping Use   • Vaping Use: Never used   Substance and Sexual Activity   •  "Alcohol use: Never     Comment: Caffeine use: 2 soft drink daily and sweet tea.    • Drug use: Never   • Sexual activity: Yes      Social History     Social History Narrative   • Not on file        Family History   Problem Relation Age of Onset   • Coronary artery disease Father    • Heart disease Mother 83   • Hypertension Mother    • Asthma Mother    • Diabetes Mother    • Kidney disease Mother    • Heart attack Mother    • Hypertension Sister    • Diabetes Sister    • Kidney disease Sister    • Heart attack Brother    • Alcohol abuse Brother    • Diabetes Sister    • Heart disease Sister    • Malig Hyperthermia Neg Hx        ROS: 14 point review of systems was performed and was negative except for documented findings in HPI and today's encounter.       Constitutional:  Denies fever, shaking or chills   Eyes:  Denies change in visual acuity   HENT:  Denies nasal congestion or sore throat   Respiratory:  Denies cough or shortness of breath   Cardiovascular:  Denies chest pain or severe LE edema   GI:  Denies abdominal pain, nausea, vomiting, bloody stools or diarrhea   Musculoskeletal:  Denies numbness tingling or loss of motor function except as outlined above in history of present illness.  : Denies painful urination or hematuria  Integument:  Denies rash, lesion or ulceration   Neurologic:  Denies headache or focal weakness  Endocrine:  Denies lymphadenopathy  Psych:  Denies confusion or change in mental status   Hem:  Denies active bleeding    Physical Exam: 65 y.o. male  Wt Readings from Last 3 Encounters:   03/12/21 118 kg (260 lb 2.3 oz)   03/10/21 118 kg (260 lb)   03/04/21 119 kg (262 lb)     Ht Readings from Last 3 Encounters:   03/12/21 180.3 cm (70.98\")   03/10/21 180.3 cm (71\")   03/04/21 180.3 cm (71\")     Body mass index is 36.3 kg/m².  Facility age limit for growth percentiles is 20 years.  Vitals:    03/12/21 1256   Temp: 98.6 °F (37 °C)       Vital signs reviewed.   General Appearance:    Alert, " cooperative, in no acute distress                  Eyes: conjunctiva clear  ENT: external ears and nose atraumatic  CV: no peripheral edema  Resp: normal respiratory effort  Skin: no rashes or wounds; normal turgor  Psych: mood and affect appropriate  Lymph: no nodes appreciated  Neuro: gross sensation intact  Vascular:  Palpable peripheral pulse in noted extremity  Musculoskeletal Extremities: KNEE Exam: medial and lateral joint line tenderness with crepitation, synovitis, swelling, and joint effusion left knee.        Diagnostic Tests:  Results for orders placed or performed in visit on 01/14/21   CBC Auto Differential    Specimen: Blood   Result Value Ref Range    WBC 5.43 3.40 - 10.80 10*3/mm3    RBC 4.13 (L) 4.14 - 5.80 10*6/mm3    Hemoglobin 12.9 (L) 13.0 - 17.7 g/dL    Hematocrit 38.4 37.5 - 51.0 %    MCV 93.0 79.0 - 97.0 fL    MCH 31.2 26.6 - 33.0 pg    MCHC 33.6 31.5 - 35.7 g/dL    RDW 13.9 12.3 - 15.4 %    RDW-SD 47.5 37.0 - 54.0 fl    MPV 9.4 6.0 - 12.0 fL    Platelets 250 140 - 450 10*3/mm3    Neutrophil % 33.6 (L) 42.7 - 76.0 %    Lymphocyte % 51.2 (H) 19.6 - 45.3 %    Monocyte % 11.6 5.0 - 12.0 %    Eosinophil % 2.8 0.3 - 6.2 %    Basophil % 0.6 0.0 - 1.5 %    Immature Grans % 0.2 0.0 - 0.5 %    Neutrophils, Absolute 1.83 1.70 - 7.00 10*3/mm3    Lymphocytes, Absolute 2.78 0.70 - 3.10 10*3/mm3    Monocytes, Absolute 0.63 0.10 - 0.90 10*3/mm3    Eosinophils, Absolute 0.15 0.00 - 0.40 10*3/mm3    Basophils, Absolute 0.03 0.00 - 0.20 10*3/mm3    Immature Grans, Absolute 0.01 0.00 - 0.05 10*3/mm3    nRBC 0.0 0.0 - 0.2 /100 WBC     Lab Results   Component Value Date    GLUCOSE 109 (H) 03/04/2021    CALCIUM 9.6 03/04/2021     03/04/2021    K 4.7 03/04/2021    CO2 26.0 03/04/2021     03/04/2021    BUN 15 03/04/2021    CREATININE 1.21 03/04/2021    EGFRIFAFRI 73 03/04/2021    BCR 12.4 03/04/2021    ANIONGAP 9.0 03/04/2021       EKG:    Cleared by cardiology, Dr. Panchal on 2/5/2021    ECG 12 Lead      Date/Time: 1/6/2021 11:17 AM   Performed by: Eligio Panchal MD   Authorized by: Eligio Panchal MD   Comparison: compared with previous ECG   Similar to previous ECG   Rhythm: sinus rhythm   Conduction: conduction normal   ST Segments: ST segments normal   T inversion: I, aVL, V6 and V5   T flattening: II and aVF   QRS axis: normal   Other: no other findings     Clinical impression: abnormal EKG     I have reviewed all the lab & EKG results. There are some abnormalities that are not critical to the patient's health, but I would like to discuss these in person at an office appointment.     Imaging was done today, viewed images and discussed with the patient:    Indication: pain related symptoms,  Assessment:  Patient Active Problem List   Diagnosis   • Thrombophilia (CMS/HCC)   • Lupus anticoagulant disorder (CMS/HCC)   • Chronic anticoagulation   • Coronary atherosclerosis   • Hypertension   • Ventral hernia without obstruction or gangrene   • Hyperlipidemia   • Hematuria   • Dyspnea on exertion   • Symptomatic anemia   • History of pulmonary embolus (PE)   • Colonic mass   • Malignant neoplasm of sigmoid colon (CMS/HCC)   • Iron deficiency anemia due to chronic blood loss   • Arthritis of left knee   • History of DVT (deep vein thrombosis)       Plan:  Reviewed anatomy of a total joint arthroplasty in laymen's terms, as well as typical postoperative recovery and possibly 6-12 months for maximal recovery, and possible need for rehabilitation stay after hospitalization. We also discussed risks, benefits, alternatives, and limitations of procedure with risks including but not limited to neurovascular damage, bleeding, infection, malalignment, chronic pian, failure of implants, osteolysis, loosening of implants, loss of motion, weakness, stiffness, instability, DVT, pulmonary embolus, death, stroke, complex regional pain syndrome, myocardial infarction, and need for additional procedures. Concept of substitution vs.  replacement discussed.  No guarantees were given regarding results of surgery.      Jared Rose was given the opportunity to ask and have all questions answered today.  The patient voiced understanding of the risks, benefits, and alternative forms of treatment that were discussed and the patient consents to proceed with surgery.     Skin breakdown? WNL  Metal allergy? No  DVT Risk Factors: hx of DVT, PE and on xarelto chronically    DVT Prophylaxis:  Xarelto    Discharge Plan: POD 1 to home and home health    To be updated    Date: 3/12/2021  MALIA Mg

## 2021-03-12 NOTE — PROGRESS NOTES
"   History & Physical       Patient: Jared Rose  YOB: 1955  Medical Record Number: [id number]  Wt Readings from Last 3 Encounters:   03/12/21 118 kg (260 lb 2.3 oz)   03/10/21 118 kg (260 lb)   03/04/21 119 kg (262 lb)     Ht Readings from Last 3 Encounters:   03/12/21 180.3 cm (70.98\")   03/10/21 180.3 cm (71\")   03/04/21 180.3 cm (71\")     Body mass index is 36.3 kg/m².  Facility age limit for growth percentiles is 20 years.    Surgeon:  Dr. Abraham Waters    Chief Complaints:   Chief Complaint   Patient presents with   • Left Knee - Pre-op Exam     Surgery:  LEFT TOTAL KNEE ARTHROPLASTY WITH MARTHA NAVIGATION      History of Present Illness: 65 y.o. male presents with   Chief Complaint   Patient presents with   • Left Knee - Pre-op Exam   . Chronic symptoms have been progressively worsening despite more conservative treatment measures including medications OTC and prescription, cortisone injections and or gel injections, and physical therapy.  Symptoms are associated with ability to move, exercise, and perform activities of daily living.  Symptoms are aggravated by weight bearing and ROM necessary for activities of daily living.   Symptoms improve with rest, ice and elevation only minimally.      Allergies:   Allergies   Allergen Reactions   • Adhesive Tape Rash     blisters       Medications:   Home Medications:  Current Outpatient Medications on File Prior to Visit   Medication Sig   • atorvastatin (LIPITOR) 40 MG tablet Take 1 tablet by mouth Daily.   • Chlorhexidine Gluconate Cloth 2 % pads Apply 1 application topically. USE AS DIRECTED PREOP   • losartan (COZAAR) 50 MG tablet Take 1 tablet by mouth Daily.   • mupirocin (BACTROBAN) 2 % nasal ointment 1 application into the nostril(s) as directed by provider. USE AS DIRECTED PREOP   • rivaroxaban (XARELTO) 20 MG tablet Take 1 tablet by mouth Daily With Dinner. Must be seen (Patient taking differently: Take 20 mg by mouth Daily With " Dinner. Pt to call MD to ask when to stop prior to surgery)   • sildenafil (Viagra) 100 MG tablet Take 1/2 to 1 tablet by mouth daily if needed for ED     Current Facility-Administered Medications on File Prior to Visit   Medication   • Chlorhexidine Gluconate 2 % pads 1 each     Current Medications:  Scheduled Meds:  Continuous Infusions:No current facility-administered medications for this visit.    PRN Meds:.    I have reviewed the patient's medical history in detail and updated the computerized patient record.  Review and summarization of old records include:    Past Medical History:   Diagnosis Date   • Acute deep vein thrombosis (DVT) of upper extremity (CMS/HCC) 9/29/2019   • At risk for sleep apnea     6   • Bell's palsy 02/23/2011    SEEN AT [facility] ER   • Blister of foot 06/2017    RIGHT FOOT   • Chronic anticoagulation    • Chronic fatigue    • Chronic left-sided low back pain without sciatica    • Coronary atherosclerosis     cath 7/2015: normal LM, diffuse LCx disease, LI LAD, 60-70% ostial diag (<1 mm vessel), LI RCA   • COVID-19 virus detected     12/8/2020    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   • GI hemorrhage 09/19/2019    ADMITTED TO [facility]   • H/O Anemia    • H/O Leukopenia    • History of chemotherapy    • History of transfusion     no reaction   • Hyperlipidemia    • Hypertension    • Knee pain    • Lupus anticoagulant disorder (CMS/HCC) 5/6/2016   • Lupus anticoagulant positive    • Muscle spasm of back 10/2018   • OA (osteoarthritis)    • Obesity    • Pulmonary embolism (CMS/HCC) 06/08/2010     Right lower lobe pulmonary embolus, ADMITTED TO [facility]   • Pulmonary embolus (CMS/HCC) 5/6/2016   • Rectal bleeding 09/2019   • Rectal cancer (CMS/HCC) 09/24/2019    MODERATELY DIFFERENTIATED ADENOCARCINOMA GRADE 2, FOLLOWED BY DR. RADHA DONOVAN   • Sprain of right shoulder 10/21/2020    SEEN AT [facility] ER   • Strain of left shoulder 10/2020   • Stress  fracture of right foot 05/11/2013    4TH METATARSAL, SEEN AT [facility] ER   • Thrombophilia (CMS/HCC)     FOLLOWED BY DR. BALAJI MCGEE   • Tinea pedis 11/25/2007    SEEN AT [facility] ER        Past Surgical History:   Procedure Laterality Date   • CARDIAC CATHETERIZATION Left 05/11/2006    NORMAL LV SYSTOLIC FUNCTION(EF 50%), MOD DZ OF 2 SMALL CORONARY BRANCHES (D2 AND OM2), DR. BOOM GALLEGO AT [facility]   • CARDIAC CATHETERIZATION Left 07/20/2015    NORMAL LM, DIFFUSE LCx DZ, LI LAD, 60-70% OSTIAL DIAG(<1MM VESSEL), 10%STENOSIS IN LAD, DR. CHERI VARGAS AT [facility]   • CLOSED REDUCTION WRIST FRACTURE Right    • COLON RESECTION N/A 9/26/2019    Procedure: LOW ANTERIOR COLON RESECTION IMMOBILIZATION OF SPLENIC FLEXURE;  Surgeon: Radha Whitt MD;  Location: [facility] MAIN OR;  Service: General   • COLONOSCOPY N/A 9/21/2019    INT/EXT HEMORRHOIDS, 18 MM POLYPOID LESION IN MID SIGMOID, PATH: INVASIVE MODERATELY DIFFERENTIATED GRADE 2 ADENOCARCINOMA, 2 TUBULOVILLOUS ADENOMA POLYPS IN CECUM, ADENOMATOUS POLYP IN TRANSVERSE, ADENOMATOUS POLYP IN ASCENDING, MULTIPLE SMALL AND LARGE DIVERTICULA, DR. TRUONG MONTEZ AT [facility]   • COLONOSCOPY N/A 3/10/2021    Procedure: COLONOSCOPY to cecum with biopsy / polypectomy;  Surgeon: Radha Whitt MD;  Location: [facility] ENDOSCOPY;  Service: General;  Laterality: N/A;  pre-hx colon cancer  post-polyp   • ENDOSCOPY     • LUNG SURGERY Right 2009    RIGHT LOWER LOBE, BLOT CLOT REMOVED   • SIGMOIDOSCOPY N/A 9/24/2019    AN INFILTRATIVE NON OBSTRUCTING MASS IN SIGMOID, AREA TATTOOED, DR. RADHA WHITT AT [facility]   • TONSILLECTOMY AND ADENOIDECTOMY Bilateral     DURING CHILDHOOD   • UMBILICAL HERNIA REPAIR N/A 05/14/2014    DR. ROMERO SCHUSTER AT [facility]        Social History     Occupational History   • Occupation:      Employer: [employer]   Tobacco Use   • Smoking status: Never Smoker   • Smokeless tobacco: Never Used   Vaping Use   • Vaping Use: Never used   Substance and Sexual Activity   •  "Alcohol use: Never     Comment: Caffeine use: 2 soft drink daily and sweet tea.    • Drug use: Never   • Sexual activity: Yes      Social History     Social History Narrative   • Not on file        Family History   Problem Relation Age of Onset   • Coronary artery disease Father    • Heart disease Mother 83   • Hypertension Mother    • Asthma Mother    • Diabetes Mother    • Kidney disease Mother    • Heart attack Mother    • Hypertension Sister    • Diabetes Sister    • Kidney disease Sister    • Heart attack Brother    • Alcohol abuse Brother    • Diabetes Sister    • Heart disease Sister    • Malig Hyperthermia Neg Hx        ROS: 14 point review of systems was performed and was negative except for documented findings in HPI and today's encounter.       Constitutional:  Denies fever, shaking or chills   Eyes:  Denies change in visual acuity   HENT:  Denies nasal congestion or sore throat   Respiratory:  Denies cough or shortness of breath   Cardiovascular:  Denies chest pain or severe LE edema   GI:  Denies abdominal pain, nausea, vomiting, bloody stools or diarrhea   Musculoskeletal:  Denies numbness tingling or loss of motor function except as outlined above in history of present illness.  : Denies painful urination or hematuria  Integument:  Denies rash, lesion or ulceration   Neurologic:  Denies headache or focal weakness  Endocrine:  Denies lymphadenopathy  Psych:  Denies confusion or change in mental status   Hem:  Denies active bleeding    Physical Exam: 65 y.o. male  Wt Readings from Last 3 Encounters:   03/12/21 118 kg (260 lb 2.3 oz)   03/10/21 118 kg (260 lb)   03/04/21 119 kg (262 lb)     Ht Readings from Last 3 Encounters:   03/12/21 180.3 cm (70.98\")   03/10/21 180.3 cm (71\")   03/04/21 180.3 cm (71\")     Body mass index is 36.3 kg/m².  Facility age limit for growth percentiles is 20 years.  Vitals:    03/12/21 1256   Temp: 98.6 °F (37 °C)       Vital signs reviewed.   General Appearance:    Alert, " cooperative, in no acute distress                  Eyes: conjunctiva clear  ENT: external ears and nose atraumatic  CV: no peripheral edema  Resp: normal respiratory effort  Skin: no rashes or wounds; normal turgor  Psych: mood and affect appropriate  Lymph: no nodes appreciated  Neuro: gross sensation intact  Vascular:  Palpable peripheral pulse in noted extremity  Musculoskeletal Extremities: KNEE Exam: medial and lateral joint line tenderness with crepitation, synovitis, swelling, and joint effusion left knee.        Diagnostic Tests:  Results for orders placed or performed in visit on 01/14/21   CBC Auto Differential    Specimen: Blood   Result Value Ref Range    WBC 5.43 3.40 - 10.80 10*3/mm3    RBC 4.13 (L) 4.14 - 5.80 10*6/mm3    Hemoglobin 12.9 (L) 13.0 - 17.7 g/dL    Hematocrit 38.4 37.5 - 51.0 %    MCV 93.0 79.0 - 97.0 fL    MCH 31.2 26.6 - 33.0 pg    MCHC 33.6 31.5 - 35.7 g/dL    RDW 13.9 12.3 - 15.4 %    RDW-SD 47.5 37.0 - 54.0 fl    MPV 9.4 6.0 - 12.0 fL    Platelets 250 140 - 450 10*3/mm3    Neutrophil % 33.6 (L) 42.7 - 76.0 %    Lymphocyte % 51.2 (H) 19.6 - 45.3 %    Monocyte % 11.6 5.0 - 12.0 %    Eosinophil % 2.8 0.3 - 6.2 %    Basophil % 0.6 0.0 - 1.5 %    Immature Grans % 0.2 0.0 - 0.5 %    Neutrophils, Absolute 1.83 1.70 - 7.00 10*3/mm3    Lymphocytes, Absolute 2.78 0.70 - 3.10 10*3/mm3    Monocytes, Absolute 0.63 0.10 - 0.90 10*3/mm3    Eosinophils, Absolute 0.15 0.00 - 0.40 10*3/mm3    Basophils, Absolute 0.03 0.00 - 0.20 10*3/mm3    Immature Grans, Absolute 0.01 0.00 - 0.05 10*3/mm3    nRBC 0.0 0.0 - 0.2 /100 WBC     Lab Results   Component Value Date    GLUCOSE 109 (H) 03/04/2021    CALCIUM 9.6 03/04/2021     03/04/2021    K 4.7 03/04/2021    CO2 26.0 03/04/2021     03/04/2021    BUN 15 03/04/2021    CREATININE 1.21 03/04/2021    EGFRIFAFRI 73 03/04/2021    BCR 12.4 03/04/2021    ANIONGAP 9.0 03/04/2021       EKG:    Cleared by cardiology, Dr. Panchal on 2/5/2021    ECG 12 Lead      Date/Time: 1/6/2021 11:17 AM   Performed by: Eligio Panchal MD   Authorized by: Eligio Panchal MD   Comparison: compared with previous ECG   Similar to previous ECG   Rhythm: sinus rhythm   Conduction: conduction normal   ST Segments: ST segments normal   T inversion: I, aVL, V6 and V5   T flattening: II and aVF   QRS axis: normal   Other: no other findings     Clinical impression: abnormal EKG     I have reviewed all the lab & EKG results. There are some abnormalities that are not critical to the patient's health, but I would like to discuss these in person at an office appointment.     Imaging was done today, viewed images and discussed with the patient:    Indication: pain related symptoms,  Assessment:  Patient Active Problem List   Diagnosis   • Thrombophilia (CMS/HCC)   • Lupus anticoagulant disorder (CMS/HCC)   • Chronic anticoagulation   • Coronary atherosclerosis   • Hypertension   • Ventral hernia without obstruction or gangrene   • Hyperlipidemia   • Hematuria   • Dyspnea on exertion   • Symptomatic anemia   • History of pulmonary embolus (PE)   • Colonic mass   • Malignant neoplasm of sigmoid colon (CMS/HCC)   • Iron deficiency anemia due to chronic blood loss   • Arthritis of left knee   • History of DVT (deep vein thrombosis)       Plan:  Reviewed anatomy of a total joint arthroplasty in laymen's terms, as well as typical postoperative recovery and possibly 6-12 months for maximal recovery, and possible need for rehabilitation stay after hospitalization. We also discussed risks, benefits, alternatives, and limitations of procedure with risks including but not limited to neurovascular damage, bleeding, infection, malalignment, chronic pian, failure of implants, osteolysis, loosening of implants, loss of motion, weakness, stiffness, instability, DVT, pulmonary embolus, death, stroke, complex regional pain syndrome, myocardial infarction, and need for additional procedures. Concept of substitution vs.  replacement discussed.  No guarantees were given regarding results of surgery.      Jared Rose was given the opportunity to ask and have all questions answered today.  The patient voiced understanding of the risks, benefits, and alternative forms of treatment that were discussed and the patient consents to proceed with surgery.     Skin breakdown? WNL  Metal allergy? No  DVT Risk Factors: hx of DVT, PE and on xarelto chronically    DVT Prophylaxis:  Xarelto    Discharge Plan: POD 1 to home and home health    To be updated    Date: 3/12/2021  MALIA Mg

## 2021-03-13 ENCOUNTER — LAB (OUTPATIENT)
Dept: LAB | Facility: HOSPITAL | Age: 66
End: 2021-03-13

## 2021-03-13 DIAGNOSIS — Z01.818 OTHER SPECIFIED PRE-OPERATIVE EXAMINATION: ICD-10-CM

## 2021-03-13 PROCEDURE — C9803 HOPD COVID-19 SPEC COLLECT: HCPCS

## 2021-03-13 PROCEDURE — U0004 COV-19 TEST NON-CDC HGH THRU: HCPCS

## 2021-03-15 LAB — SARS-COV-2 RNA RESP QL NAA+PROBE: NOT DETECTED

## 2021-03-16 ENCOUNTER — APPOINTMENT (OUTPATIENT)
Dept: GENERAL RADIOLOGY | Facility: HOSPITAL | Age: 66
End: 2021-03-16

## 2021-03-16 ENCOUNTER — HOSPITAL ENCOUNTER (OUTPATIENT)
Facility: HOSPITAL | Age: 66
Discharge: HOME | End: 2021-03-17
Attending: ORTHOPAEDIC SURGERY | Admitting: ORTHOPAEDIC SURGERY

## 2021-03-16 ENCOUNTER — ANESTHESIA EVENT (OUTPATIENT)
Dept: PERIOP | Facility: HOSPITAL | Age: 66
End: 2021-03-16

## 2021-03-16 ENCOUNTER — ANESTHESIA (OUTPATIENT)
Dept: PERIOP | Facility: HOSPITAL | Age: 66
End: 2021-03-16

## 2021-03-16 DIAGNOSIS — M17.12 ARTHRITIS OF LEFT KNEE: ICD-10-CM

## 2021-03-16 PROCEDURE — 25010000002 FENTANYL CITRATE (PF) 100 MCG/2ML SOLUTION: Performed by: NURSE ANESTHETIST, CERTIFIED REGISTERED

## 2021-03-16 PROCEDURE — 25010000002 EPINEPHRINE PER 0.1 MG: Performed by: ORTHOPAEDIC SURGERY

## 2021-03-16 PROCEDURE — 25010000002 ROPIVACAINE PER 1 MG: Performed by: ORTHOPAEDIC SURGERY

## 2021-03-16 PROCEDURE — 25010000002 MIDAZOLAM PER 1 MG: Performed by: ANESTHESIOLOGY

## 2021-03-16 PROCEDURE — 73560 X-RAY EXAM OF KNEE 1 OR 2: CPT

## 2021-03-16 PROCEDURE — C1713 ANCHOR/SCREW BN/BN,TIS/BN: HCPCS | Performed by: ORTHOPAEDIC SURGERY

## 2021-03-16 PROCEDURE — C1876 STENT, NON-COA/NON-COV W/DEL: HCPCS | Performed by: ORTHOPAEDIC SURGERY

## 2021-03-16 PROCEDURE — 25010000002 DEXAMETHASONE PER 1 MG: Performed by: NURSE ANESTHETIST, CERTIFIED REGISTERED

## 2021-03-16 PROCEDURE — 25010000002 KETOROLAC TROMETHAMINE PER 15 MG: Performed by: ORTHOPAEDIC SURGERY

## 2021-03-16 PROCEDURE — 25010000002 ONDANSETRON PER 1 MG: Performed by: NURSE ANESTHETIST, CERTIFIED REGISTERED

## 2021-03-16 PROCEDURE — 20985 CPTR-ASST DIR MS PX: CPT | Performed by: ORTHOPAEDIC SURGERY

## 2021-03-16 PROCEDURE — 25010000002 PROPOFOL 10 MG/ML EMULSION: Performed by: NURSE ANESTHETIST, CERTIFIED REGISTERED

## 2021-03-16 PROCEDURE — 97161 PT EVAL LOW COMPLEX 20 MIN: CPT

## 2021-03-16 PROCEDURE — C1776 JOINT DEVICE (IMPLANTABLE): HCPCS | Performed by: ORTHOPAEDIC SURGERY

## 2021-03-16 PROCEDURE — 25010000003 CEFAZOLIN IN DEXTROSE 2-4 GM/100ML-% SOLUTION: Performed by: NURSE PRACTITIONER

## 2021-03-16 PROCEDURE — G0378 HOSPITAL OBSERVATION PER HR: HCPCS

## 2021-03-16 PROCEDURE — 25010000003 CEFAZOLIN IN DEXTROSE 2-4 GM/100ML-% SOLUTION: Performed by: ORTHOPAEDIC SURGERY

## 2021-03-16 PROCEDURE — 25010000002 NEOSTIGMINE PER 0.5 MG: Performed by: NURSE ANESTHETIST, CERTIFIED REGISTERED

## 2021-03-16 PROCEDURE — 25010000002 CLONIDINE PER 1 MG: Performed by: ORTHOPAEDIC SURGERY

## 2021-03-16 PROCEDURE — 25010000002 HYDROMORPHONE PER 4 MG: Performed by: NURSE ANESTHETIST, CERTIFIED REGISTERED

## 2021-03-16 PROCEDURE — 97110 THERAPEUTIC EXERCISES: CPT

## 2021-03-16 PROCEDURE — 27447 TOTAL KNEE ARTHROPLASTY: CPT | Performed by: ORTHOPAEDIC SURGERY

## 2021-03-16 PROCEDURE — C1889 IMPLANT/INSERT DEVICE, NOC: HCPCS | Performed by: ORTHOPAEDIC SURGERY

## 2021-03-16 DEVICE — SEAL HEMO SURG ARISTA/AH ABS/PWDR 3GM: Type: IMPLANTABLE DEVICE | Site: KNEE | Status: FUNCTIONAL

## 2021-03-16 DEVICE — DEV CONTRL TISS STRATAFIXSPIRALMNCRYL PLSPS2 REV3/0 45CM: Type: IMPLANTABLE DEVICE | Site: KNEE | Status: FUNCTIONAL

## 2021-03-16 DEVICE — SIGMA FEMORAL CRUCIATE RETAINING CEMENTED 4 LEFT
Type: IMPLANTABLE DEVICE | Site: KNEE | Status: FUNCTIONAL
Brand: SIGMA

## 2021-03-16 DEVICE — SMARTSET HIGH PERFORMANCE MV MEDIUM VISCOSITY BONE CEMENT 40G
Type: IMPLANTABLE DEVICE | Site: KNEE | Status: FUNCTIONAL
Brand: SMARTSET

## 2021-03-16 DEVICE — IMPLANTABLE DEVICE: Type: IMPLANTABLE DEVICE | Site: KNEE | Status: FUNCTIONAL

## 2021-03-16 DEVICE — SIGMA TIBIAL INSERT FIXED BEARING CURVED PLUS 4 12.5MM XLK
Type: IMPLANTABLE DEVICE | Site: KNEE | Status: FUNCTIONAL
Brand: SIGMA

## 2021-03-16 DEVICE — DEV CONTRL TISS STRATAFIX SYMM PDS PLUS VIL CT-1 60CM: Type: IMPLANTABLE DEVICE | Site: KNEE | Status: FUNCTIONAL

## 2021-03-16 DEVICE — P.F.C. SIGMA TIBIAL TRAY FIXED BEARING MODULAR COCR 4 CEMENTED
Type: IMPLANTABLE DEVICE | Site: KNEE | Status: FUNCTIONAL
Brand: P.F.C. SIGMA

## 2021-03-16 DEVICE — P.F.C. SIGMA OVAL DOME PATELLA 3-PEG 41MM CEMENTED
Type: IMPLANTABLE DEVICE | Site: KNEE | Status: FUNCTIONAL
Brand: P.F.C. SIGMA

## 2021-03-16 RX ORDER — HYDROCODONE BITARTRATE AND ACETAMINOPHEN 7.5; 325 MG/1; MG/1
1 TABLET ORAL ONCE AS NEEDED
Status: DISCONTINUED | OUTPATIENT
Start: 2021-03-16 | End: 2021-03-16 | Stop reason: HOSPADM

## 2021-03-16 RX ORDER — DEXAMETHASONE SODIUM PHOSPHATE 10 MG/ML
INJECTION INTRAMUSCULAR; INTRAVENOUS AS NEEDED
Status: DISCONTINUED | OUTPATIENT
Start: 2021-03-16 | End: 2021-03-16 | Stop reason: SURG

## 2021-03-16 RX ORDER — FAMOTIDINE 20 MG/1
40 TABLET, FILM COATED ORAL DAILY
Status: DISCONTINUED | OUTPATIENT
Start: 2021-03-16 | End: 2021-03-17 | Stop reason: HOSPADM

## 2021-03-16 RX ORDER — ONDANSETRON 4 MG/1
4 TABLET, FILM COATED ORAL EVERY 6 HOURS PRN
Status: DISCONTINUED | OUTPATIENT
Start: 2021-03-16 | End: 2021-03-17 | Stop reason: HOSPADM

## 2021-03-16 RX ORDER — PREGABALIN 75 MG/1
150 CAPSULE ORAL ONCE
Status: COMPLETED | OUTPATIENT
Start: 2021-03-16 | End: 2021-03-16

## 2021-03-16 RX ORDER — HYDROMORPHONE HYDROCHLORIDE 1 MG/ML
0.5 INJECTION, SOLUTION INTRAMUSCULAR; INTRAVENOUS; SUBCUTANEOUS
Status: DISCONTINUED | OUTPATIENT
Start: 2021-03-16 | End: 2021-03-17 | Stop reason: HOSPADM

## 2021-03-16 RX ORDER — BISACODYL 10 MG
10 SUPPOSITORY, RECTAL RECTAL DAILY PRN
Status: DISCONTINUED | OUTPATIENT
Start: 2021-03-16 | End: 2021-03-17 | Stop reason: HOSPADM

## 2021-03-16 RX ORDER — SODIUM CHLORIDE, SODIUM LACTATE, POTASSIUM CHLORIDE, CALCIUM CHLORIDE 600; 310; 30; 20 MG/100ML; MG/100ML; MG/100ML; MG/100ML
100 INJECTION, SOLUTION INTRAVENOUS CONTINUOUS
Status: ACTIVE | OUTPATIENT
Start: 2021-03-16 | End: 2021-03-17

## 2021-03-16 RX ORDER — ACETAMINOPHEN 650 MG
TABLET, EXTENDED RELEASE ORAL AS NEEDED
Status: DISCONTINUED | OUTPATIENT
Start: 2021-03-16 | End: 2021-03-16 | Stop reason: HOSPADM

## 2021-03-16 RX ORDER — MIDAZOLAM HYDROCHLORIDE 1 MG/ML
1 INJECTION INTRAMUSCULAR; INTRAVENOUS
Status: DISCONTINUED | OUTPATIENT
Start: 2021-03-16 | End: 2021-03-16 | Stop reason: HOSPADM

## 2021-03-16 RX ORDER — ACETAMINOPHEN 325 MG/1
325 TABLET ORAL EVERY 4 HOURS PRN
Status: DISCONTINUED | OUTPATIENT
Start: 2021-03-16 | End: 2021-03-17 | Stop reason: HOSPADM

## 2021-03-16 RX ORDER — HYDROMORPHONE HYDROCHLORIDE 1 MG/ML
0.5 INJECTION, SOLUTION INTRAMUSCULAR; INTRAVENOUS; SUBCUTANEOUS
Status: DISCONTINUED | OUTPATIENT
Start: 2021-03-16 | End: 2021-03-16 | Stop reason: HOSPADM

## 2021-03-16 RX ORDER — ONDANSETRON 2 MG/ML
INJECTION INTRAMUSCULAR; INTRAVENOUS AS NEEDED
Status: DISCONTINUED | OUTPATIENT
Start: 2021-03-16 | End: 2021-03-16 | Stop reason: SURG

## 2021-03-16 RX ORDER — NALOXONE HCL 0.4 MG/ML
0.1 VIAL (ML) INJECTION
Status: DISCONTINUED | OUTPATIENT
Start: 2021-03-16 | End: 2021-03-17 | Stop reason: HOSPADM

## 2021-03-16 RX ORDER — FLUMAZENIL 0.1 MG/ML
0.2 INJECTION INTRAVENOUS AS NEEDED
Status: DISCONTINUED | OUTPATIENT
Start: 2021-03-16 | End: 2021-03-16 | Stop reason: HOSPADM

## 2021-03-16 RX ORDER — SODIUM CHLORIDE 0.9 % (FLUSH) 0.9 %
3-10 SYRINGE (ML) INJECTION AS NEEDED
Status: DISCONTINUED | OUTPATIENT
Start: 2021-03-16 | End: 2021-03-16 | Stop reason: HOSPADM

## 2021-03-16 RX ORDER — DIPHENHYDRAMINE HCL 25 MG
25 CAPSULE ORAL
Status: DISCONTINUED | OUTPATIENT
Start: 2021-03-16 | End: 2021-03-16 | Stop reason: HOSPADM

## 2021-03-16 RX ORDER — SODIUM CHLORIDE 0.9 % (FLUSH) 0.9 %
3 SYRINGE (ML) INJECTION EVERY 12 HOURS SCHEDULED
Status: DISCONTINUED | OUTPATIENT
Start: 2021-03-16 | End: 2021-03-16 | Stop reason: HOSPADM

## 2021-03-16 RX ORDER — ONDANSETRON 2 MG/ML
4 INJECTION INTRAMUSCULAR; INTRAVENOUS EVERY 6 HOURS PRN
Status: DISCONTINUED | OUTPATIENT
Start: 2021-03-16 | End: 2021-03-17 | Stop reason: HOSPADM

## 2021-03-16 RX ORDER — PROMETHAZINE HYDROCHLORIDE 25 MG/1
25 SUPPOSITORY RECTAL ONCE AS NEEDED
Status: DISCONTINUED | OUTPATIENT
Start: 2021-03-16 | End: 2021-03-16 | Stop reason: HOSPADM

## 2021-03-16 RX ORDER — BISACODYL 5 MG/1
10 TABLET, DELAYED RELEASE ORAL DAILY PRN
Status: DISCONTINUED | OUTPATIENT
Start: 2021-03-16 | End: 2021-03-17 | Stop reason: HOSPADM

## 2021-03-16 RX ORDER — PROPOFOL 10 MG/ML
VIAL (ML) INTRAVENOUS AS NEEDED
Status: DISCONTINUED | OUTPATIENT
Start: 2021-03-16 | End: 2021-03-16 | Stop reason: SURG

## 2021-03-16 RX ORDER — FAMOTIDINE 10 MG/ML
20 INJECTION, SOLUTION INTRAVENOUS ONCE
Status: COMPLETED | OUTPATIENT
Start: 2021-03-16 | End: 2021-03-16

## 2021-03-16 RX ORDER — GLYCOPYRROLATE 0.2 MG/ML
INJECTION INTRAMUSCULAR; INTRAVENOUS AS NEEDED
Status: DISCONTINUED | OUTPATIENT
Start: 2021-03-16 | End: 2021-03-16 | Stop reason: SURG

## 2021-03-16 RX ORDER — DIPHENHYDRAMINE HYDROCHLORIDE 50 MG/ML
12.5 INJECTION INTRAMUSCULAR; INTRAVENOUS
Status: DISCONTINUED | OUTPATIENT
Start: 2021-03-16 | End: 2021-03-16 | Stop reason: HOSPADM

## 2021-03-16 RX ORDER — DOCUSATE SODIUM 100 MG/1
100 CAPSULE, LIQUID FILLED ORAL 2 TIMES DAILY
Status: DISCONTINUED | OUTPATIENT
Start: 2021-03-16 | End: 2021-03-17 | Stop reason: HOSPADM

## 2021-03-16 RX ORDER — NALOXONE HCL 0.4 MG/ML
0.2 VIAL (ML) INJECTION AS NEEDED
Status: DISCONTINUED | OUTPATIENT
Start: 2021-03-16 | End: 2021-03-16 | Stop reason: HOSPADM

## 2021-03-16 RX ORDER — POLYETHYLENE GLYCOL 3350 17 G/17G
17 POWDER, FOR SOLUTION ORAL DAILY
Status: DISCONTINUED | OUTPATIENT
Start: 2021-03-16 | End: 2021-03-17 | Stop reason: HOSPADM

## 2021-03-16 RX ORDER — HYDROMORPHONE HCL 110MG/55ML
PATIENT CONTROLLED ANALGESIA SYRINGE INTRAVENOUS AS NEEDED
Status: DISCONTINUED | OUTPATIENT
Start: 2021-03-16 | End: 2021-03-16 | Stop reason: SURG

## 2021-03-16 RX ORDER — MAGNESIUM HYDROXIDE 1200 MG/15ML
LIQUID ORAL AS NEEDED
Status: DISCONTINUED | OUTPATIENT
Start: 2021-03-16 | End: 2021-03-16 | Stop reason: HOSPADM

## 2021-03-16 RX ORDER — ATORVASTATIN CALCIUM 20 MG/1
40 TABLET, FILM COATED ORAL DAILY
Status: DISCONTINUED | OUTPATIENT
Start: 2021-03-16 | End: 2021-03-17 | Stop reason: HOSPADM

## 2021-03-16 RX ORDER — FENTANYL CITRATE 50 UG/ML
50 INJECTION, SOLUTION INTRAMUSCULAR; INTRAVENOUS
Status: DISCONTINUED | OUTPATIENT
Start: 2021-03-16 | End: 2021-03-16 | Stop reason: HOSPADM

## 2021-03-16 RX ORDER — CEFAZOLIN SODIUM 2 G/100ML
2 INJECTION, SOLUTION INTRAVENOUS ONCE
Status: COMPLETED | OUTPATIENT
Start: 2021-03-16 | End: 2021-03-16

## 2021-03-16 RX ORDER — CEFAZOLIN SODIUM 2 G/100ML
2 INJECTION, SOLUTION INTRAVENOUS EVERY 8 HOURS
Status: COMPLETED | OUTPATIENT
Start: 2021-03-16 | End: 2021-03-16

## 2021-03-16 RX ORDER — LIDOCAINE HYDROCHLORIDE 10 MG/ML
0.5 INJECTION, SOLUTION EPIDURAL; INFILTRATION; INTRACAUDAL; PERINEURAL ONCE AS NEEDED
Status: DISCONTINUED | OUTPATIENT
Start: 2021-03-16 | End: 2021-03-16 | Stop reason: HOSPADM

## 2021-03-16 RX ORDER — LABETALOL HYDROCHLORIDE 5 MG/ML
5 INJECTION, SOLUTION INTRAVENOUS
Status: DISCONTINUED | OUTPATIENT
Start: 2021-03-16 | End: 2021-03-16 | Stop reason: HOSPADM

## 2021-03-16 RX ORDER — ROCURONIUM BROMIDE 10 MG/ML
INJECTION, SOLUTION INTRAVENOUS AS NEEDED
Status: DISCONTINUED | OUTPATIENT
Start: 2021-03-16 | End: 2021-03-16 | Stop reason: SURG

## 2021-03-16 RX ORDER — EPHEDRINE SULFATE 50 MG/ML
5 INJECTION, SOLUTION INTRAVENOUS ONCE AS NEEDED
Status: DISCONTINUED | OUTPATIENT
Start: 2021-03-16 | End: 2021-03-16 | Stop reason: HOSPADM

## 2021-03-16 RX ORDER — LIDOCAINE HYDROCHLORIDE 20 MG/ML
INJECTION, SOLUTION INFILTRATION; PERINEURAL AS NEEDED
Status: DISCONTINUED | OUTPATIENT
Start: 2021-03-16 | End: 2021-03-16 | Stop reason: SURG

## 2021-03-16 RX ORDER — MIDAZOLAM HYDROCHLORIDE 1 MG/ML
0.5 INJECTION INTRAMUSCULAR; INTRAVENOUS
Status: DISCONTINUED | OUTPATIENT
Start: 2021-03-16 | End: 2021-03-16 | Stop reason: HOSPADM

## 2021-03-16 RX ORDER — PROMETHAZINE HYDROCHLORIDE 25 MG/1
25 TABLET ORAL ONCE AS NEEDED
Status: DISCONTINUED | OUTPATIENT
Start: 2021-03-16 | End: 2021-03-16 | Stop reason: HOSPADM

## 2021-03-16 RX ORDER — ACETAMINOPHEN 500 MG
1000 TABLET ORAL ONCE
Status: COMPLETED | OUTPATIENT
Start: 2021-03-16 | End: 2021-03-16

## 2021-03-16 RX ORDER — ONDANSETRON 2 MG/ML
4 INJECTION INTRAMUSCULAR; INTRAVENOUS ONCE AS NEEDED
Status: DISCONTINUED | OUTPATIENT
Start: 2021-03-16 | End: 2021-03-16 | Stop reason: HOSPADM

## 2021-03-16 RX ORDER — MELOXICAM 15 MG/1
15 TABLET ORAL ONCE
Status: DISCONTINUED | OUTPATIENT
Start: 2021-03-16 | End: 2021-03-16 | Stop reason: HOSPADM

## 2021-03-16 RX ORDER — FENTANYL CITRATE 50 UG/ML
INJECTION, SOLUTION INTRAMUSCULAR; INTRAVENOUS AS NEEDED
Status: DISCONTINUED | OUTPATIENT
Start: 2021-03-16 | End: 2021-03-16 | Stop reason: SURG

## 2021-03-16 RX ORDER — OXYCODONE AND ACETAMINOPHEN 7.5; 325 MG/1; MG/1
1 TABLET ORAL ONCE AS NEEDED
Status: DISCONTINUED | OUTPATIENT
Start: 2021-03-16 | End: 2021-03-16 | Stop reason: HOSPADM

## 2021-03-16 RX ORDER — LOSARTAN POTASSIUM 50 MG/1
50 TABLET ORAL DAILY
Status: DISCONTINUED | OUTPATIENT
Start: 2021-03-16 | End: 2021-03-17 | Stop reason: HOSPADM

## 2021-03-16 RX ORDER — HYDROCODONE BITARTRATE AND ACETAMINOPHEN 7.5; 325 MG/1; MG/1
1 TABLET ORAL EVERY 4 HOURS PRN
Status: DISCONTINUED | OUTPATIENT
Start: 2021-03-16 | End: 2021-03-17 | Stop reason: HOSPADM

## 2021-03-16 RX ORDER — HYDROCODONE BITARTRATE AND ACETAMINOPHEN 7.5; 325 MG/1; MG/1
2 TABLET ORAL EVERY 4 HOURS PRN
Status: DISCONTINUED | OUTPATIENT
Start: 2021-03-16 | End: 2021-03-17 | Stop reason: HOSPADM

## 2021-03-16 RX ORDER — SODIUM CHLORIDE, SODIUM LACTATE, POTASSIUM CHLORIDE, CALCIUM CHLORIDE 600; 310; 30; 20 MG/100ML; MG/100ML; MG/100ML; MG/100ML
9 INJECTION, SOLUTION INTRAVENOUS CONTINUOUS
Status: DISCONTINUED | OUTPATIENT
Start: 2021-03-16 | End: 2021-03-16

## 2021-03-16 RX ADMIN — FENTANYL CITRATE 50 MCG: 50 INJECTION, SOLUTION INTRAMUSCULAR; INTRAVENOUS at 11:34

## 2021-03-16 RX ADMIN — MUPIROCIN 1 APPLICATION: 20 OINTMENT TOPICAL at 20:28

## 2021-03-16 RX ADMIN — DEXAMETHASONE SODIUM PHOSPHATE 6 MG: 10 INJECTION INTRAMUSCULAR; INTRAVENOUS at 09:07

## 2021-03-16 RX ADMIN — SODIUM CHLORIDE, POTASSIUM CHLORIDE, SODIUM LACTATE AND CALCIUM CHLORIDE: 600; 310; 30; 20 INJECTION, SOLUTION INTRAVENOUS at 10:21

## 2021-03-16 RX ADMIN — SODIUM CHLORIDE, POTASSIUM CHLORIDE, SODIUM LACTATE AND CALCIUM CHLORIDE 9 ML/HR: 600; 310; 30; 20 INJECTION, SOLUTION INTRAVENOUS at 08:40

## 2021-03-16 RX ADMIN — SODIUM CHLORIDE, POTASSIUM CHLORIDE, SODIUM LACTATE AND CALCIUM CHLORIDE 500 ML: 600; 310; 30; 20 INJECTION, SOLUTION INTRAVENOUS at 07:55

## 2021-03-16 RX ADMIN — FENTANYL CITRATE 50 MCG: 50 INJECTION INTRAMUSCULAR; INTRAVENOUS at 09:01

## 2021-03-16 RX ADMIN — FENTANYL CITRATE 50 MCG: 50 INJECTION INTRAMUSCULAR; INTRAVENOUS at 09:09

## 2021-03-16 RX ADMIN — NEOSTIGMINE METHYLSULFATE 3 MG: 1 INJECTION INTRAMUSCULAR; INTRAVENOUS; SUBCUTANEOUS at 10:21

## 2021-03-16 RX ADMIN — ONDANSETRON 4 MG: 2 INJECTION INTRAMUSCULAR; INTRAVENOUS at 10:22

## 2021-03-16 RX ADMIN — GLYCOPYRROLATE 0.1 MG: 0.2 INJECTION INTRAMUSCULAR; INTRAVENOUS at 09:01

## 2021-03-16 RX ADMIN — ROCURONIUM BROMIDE 50 MG: 50 INJECTION INTRAVENOUS at 09:02

## 2021-03-16 RX ADMIN — DOCUSATE SODIUM 100 MG: 100 CAPSULE, LIQUID FILLED ORAL at 20:28

## 2021-03-16 RX ADMIN — HYDROMORPHONE HYDROCHLORIDE 0.5 MG: 1 INJECTION, SOLUTION INTRAMUSCULAR; INTRAVENOUS; SUBCUTANEOUS at 11:46

## 2021-03-16 RX ADMIN — DOCUSATE SODIUM 100 MG: 100 CAPSULE, LIQUID FILLED ORAL at 14:20

## 2021-03-16 RX ADMIN — FENTANYL CITRATE 50 MCG: 50 INJECTION INTRAMUSCULAR; INTRAVENOUS at 09:26

## 2021-03-16 RX ADMIN — HYDROMORPHONE HYDROCHLORIDE 0.5 MG: 2 INJECTION, SOLUTION INTRAMUSCULAR; INTRAVENOUS; SUBCUTANEOUS at 09:19

## 2021-03-16 RX ADMIN — ACETAMINOPHEN 1000 MG: 500 TABLET, FILM COATED ORAL at 07:56

## 2021-03-16 RX ADMIN — LOSARTAN POTASSIUM 50 MG: 50 TABLET, FILM COATED ORAL at 14:20

## 2021-03-16 RX ADMIN — LIDOCAINE HYDROCHLORIDE 100 MG: 20 INJECTION, SOLUTION INFILTRATION; PERINEURAL at 09:01

## 2021-03-16 RX ADMIN — GLYCOPYRROLATE 0.3 MG: 0.2 INJECTION INTRAMUSCULAR; INTRAVENOUS at 10:21

## 2021-03-16 RX ADMIN — PROPOFOL 10 MG: 10 INJECTION, EMULSION INTRAVENOUS at 10:35

## 2021-03-16 RX ADMIN — MIDAZOLAM 0.5 MG: 1 INJECTION INTRAMUSCULAR; INTRAVENOUS at 08:02

## 2021-03-16 RX ADMIN — HYDROMORPHONE HYDROCHLORIDE 0.5 MG: 1 INJECTION, SOLUTION INTRAMUSCULAR; INTRAVENOUS; SUBCUTANEOUS at 11:57

## 2021-03-16 RX ADMIN — HYDROMORPHONE HYDROCHLORIDE 0.5 MG: 2 INJECTION, SOLUTION INTRAMUSCULAR; INTRAVENOUS; SUBCUTANEOUS at 10:28

## 2021-03-16 RX ADMIN — CEFAZOLIN SODIUM 2 G: 2 INJECTION, SOLUTION INTRAVENOUS at 19:43

## 2021-03-16 RX ADMIN — HYDROMORPHONE HYDROCHLORIDE 0.5 MG: 1 INJECTION, SOLUTION INTRAMUSCULAR; INTRAVENOUS; SUBCUTANEOUS at 12:12

## 2021-03-16 RX ADMIN — FENTANYL CITRATE 50 MCG: 50 INJECTION, SOLUTION INTRAMUSCULAR; INTRAVENOUS at 11:05

## 2021-03-16 RX ADMIN — CEFAZOLIN SODIUM 2 G: 2 INJECTION, SOLUTION INTRAVENOUS at 09:05

## 2021-03-16 RX ADMIN — FAMOTIDINE 20 MG: 10 INJECTION INTRAVENOUS at 08:02

## 2021-03-16 RX ADMIN — CEFAZOLIN SODIUM 2 G: 2 INJECTION, SOLUTION INTRAVENOUS at 11:35

## 2021-03-16 RX ADMIN — HYDROMORPHONE HYDROCHLORIDE 0.5 MG: 1 INJECTION, SOLUTION INTRAMUSCULAR; INTRAVENOUS; SUBCUTANEOUS at 11:22

## 2021-03-16 RX ADMIN — ATORVASTATIN CALCIUM 40 MG: 20 TABLET, FILM COATED ORAL at 14:20

## 2021-03-16 RX ADMIN — PROPOFOL 200 MG: 10 INJECTION, EMULSION INTRAVENOUS at 09:01

## 2021-03-16 RX ADMIN — FENTANYL CITRATE 50 MCG: 50 INJECTION INTRAMUSCULAR; INTRAVENOUS at 09:43

## 2021-03-16 RX ADMIN — HYDROCODONE BITARTRATE AND ACETAMINOPHEN 1 TABLET: 7.5; 325 TABLET ORAL at 21:23

## 2021-03-16 RX ADMIN — PREGABALIN 150 MG: 75 CAPSULE ORAL at 07:56

## 2021-03-16 NOTE — ADDENDUM NOTE
Addendum  created 03/16/21 1308 by Juancarlos Salas MD    Attestation recorded in Intraprocedure, Intraprocedure Attestations filed

## 2021-03-16 NOTE — OP NOTE
Operative Note    Name: Jared Rose  YOB: 1955  MRN: [id number]  BMI: Body mass index is 36.25 kg/m².    DATE OF SURGERY: 3/16/2021    PREOPERATIVE DIAGNOSIS: left knee end-stage osteoarthritis    POSTOPERATIVE DIAGNOSIS: left knee end-stage osteoarthritis    PROCEDURE PERFORMED: left total knee replacement with Jessica navigation    SURGEON: Dr. Abraham Waters    ASSISTANT: PETE Nunez    IMPLANTS: [id number]    Estimated Blood Loss: 100cc  Specimens : none  Complications: none  22 Modifier:  none    DESCRIPTION OF PROCEDURE: The patient was taken to the operating room and placed in the supine position. Preoperative antibiotics were administered. Surgical time out was performed. After adequate induction of anesthesia, the leg was prepped and draped in the usual sterile fashion.  Surgical time out was performed. The leg was exsanguinated with an Esmarch bandage and the tourniquet inflated to 250 mmHg. A midline incision was performed followed by a medial parapatellar arthrotomy. The patella was subluxed laterally.  A portion of the fat pad, ACL, and anterior horns of the meniscus were excised.  The [employer] navigation device was affixed and navigated. The distal cut was made. The femur was then sized with a sizing guide. The femoral cutting block was placed and all femoral cuts were performed. The proximal tibia was exposed. Cabot navigation protocol was accomplished.  11 millimeters were removed.  We used the extramedullary tibial cutting guide set for removal of 3mm of bone off the low side. The tibial cut was performed. The posterior horns of the menisci were excised. The posterior osteophytes were removed. Flexion extension blocks were then used to balance the knee. The tibial cut surface was then sized with the sizing templates and the tibial and femoral trial were then placed. The tray was aligned with the middle third of the tubercle.    Attention was then placed to the patella. The  patella was balanced to track centrally through range of motion.  It measured 26 and patella resurfacing was performed.   At this point all trial components were removed, the knee was copiously irrigated with pulsed lavage, and the knee was injected with anesthetic cocktail solution. The cut surfaces were then dried with clean lap sponges, and the components were cemented, first the tibia, followed by femur. The knee was held in full extension and all excess cement was removed. The knee was held still until the cement had completely hardened. We then placed the trial polyethylene spacer which resulted in full extension and excellent flexion-extension balance. We placed the final polyethylene spacer.   The knee was then copiously irrigated with the full 3 liters. The tourniquet was then released. There was excellent hemostasis. We closed the knee in multiple layers in standard fashion.  A sterile dressing was applied. At the end of the case, the sponge and needle counts were reported as being correct. There were no known complications. The patient was then transported to the recovery room.    The surgical assistant performed retraction, suction, hemostasis, and specific limb positioning and manipulation required for accuracy of joint placement, and assistance in wound closure and dressing application.       Abraham Waters M.D.

## 2021-03-16 NOTE — PLAN OF CARE
Goal Outcome Evaluation:  Plan of Care Reviewed With: patient     Outcome Summary: Pt. presents with typical post op impairments related to TKR surgery that include decreased ROM, strength, and overall balance.  Pt. will benefit from skilled inpt. P.T. to address his functional deficits and to assist pt. in regaining his maximum level of independence with functional mobility.    Patient was wearing a face mask during this therapy encounter. Therapist used appropriate personal protective equipment including eye protection, mask, and gloves.  Mask used was standard procedure mask. Appropriate PPE was worn during the entire therapy session. Hand hygiene was completed before and after therapy session. Patient is not in enhanced droplet precautions.

## 2021-03-16 NOTE — ANESTHESIA PROCEDURE NOTES
Airway  Urgency: elective    Date/Time: 3/16/2021 9:04 AM  Difficult airway    General Information and Staff    Patient location during procedure: OR  Anesthesiologist: Juancarlos Salas MD  CRNA: Oksana Strong CRNA    Indications and Patient Condition  Indications for airway management: airway protection    Preoxygenated: yes  MILS maintained throughout  Mask difficulty assessment: 2 - vent by mask + OA or adjuvant +/- NMBA    Final Airway Details  Final airway type: endotracheal airway      Successful airway: ETT  Cuffed: yes   Successful intubation technique: direct laryngoscopy  Facilitating devices/methods: intubating stylet and cricoid pressure  Endotracheal tube insertion site: oral  Blade: Danilo  Blade size: 4  ETT size (mm): 7.5  Cormack-Lehane Classification: grade III - view of epiglottis only  Placement verified by: chest auscultation and capnometry   Cuff volume (mL): 8  Measured from: teeth  ETT/EBT  to teeth (cm): 23  Number of attempts at approach: 1  Assessment: lips, teeth, and gum same as pre-op and atraumatic intubation

## 2021-03-16 NOTE — ANESTHESIA PREPROCEDURE EVALUATION
Anesthesia Evaluation     Patient summary reviewed and Nursing notes reviewed   NPO Solid Status: > 8 hours  NPO Liquid Status: > 2 hours           Airway   Mallampati: II  TM distance: >3 FB  Neck ROM: full  Dental - normal exam     Pulmonary - normal exam   (+) pulmonary embolism, shortness of breath,   Cardiovascular - normal exam    (+) hypertension, CAD, DVT, hyperlipidemia,       Neuro/Psych  (+) numbness,     GI/Hepatic/Renal/Endo    (+) morbid obesity, GI bleeding ,     Musculoskeletal     Abdominal    Substance History - negative use     OB/GYN negative ob/gyn ROS         Other   arthritis,    history of cancer                    Anesthesia Plan    ASA 3     general       Anesthetic plan, all risks, benefits, and alternatives have been provided, discussed and informed consent has been obtained with: patient.

## 2021-03-16 NOTE — PLAN OF CARE
Goal Outcome Evaluation:        Outcome Summary: A&OX4, DROWSY, WORKED C PT, PAIN CONTROLLED, VSS, O2, IVF, IV ABX, DSG CDI, NVI, EDUCATED PT ON S/S OF SEJAL AND THE NEED FOR O2

## 2021-03-16 NOTE — ANESTHESIA POSTPROCEDURE EVALUATION
"Patient: Jared Rose    Procedure Summary     Date: 03/16/21 Room / Location: [facility] OR 21 / [facility] MAIN OR    Anesthesia Start: 0855 Anesthesia Stop: 1052    Procedure: LEFT TOTAL KNEE ARTHROPLASTY WITH MARTHA NAVIGATION (Left Knee) Diagnosis:       Arthritis of left knee      (Arthritis of left knee [M17.12])    Surgeons: Abraham Waters MD Provider: Juancarlos Salas MD    Anesthesia Type: general ASA Status: 3          Anesthesia Type: general    Vitals  Vitals Value Taken Time   /96 03/16/21 1220   Temp 36.6 °C (97.8 °F) 03/16/21 1048   Pulse 89 03/16/21 1225   Resp 18 03/16/21 1220   SpO2 100 % 03/16/21 1225   Vitals shown include unvalidated device data.        Post Anesthesia Care and Evaluation    Patient location during evaluation: bedside  Patient participation: complete - patient participated  Level of consciousness: awake  Pain management: adequate  Airway patency: patent  Anesthetic complications: No anesthetic complications    Cardiovascular status: acceptable  Respiratory status: acceptable  Hydration status: acceptable    Comments: /96   Pulse 75   Temp 36.6 °C (97.8 °F) (Oral)   Resp 18   Ht 180.3 cm (71\")   Wt 118 kg (259 lb 14.4 oz)   SpO2 98%   BMI 36.25 kg/m²       "

## 2021-03-16 NOTE — THERAPY EVALUATION
Patient Name: aJred Rose  : 1955    MRN: [id number]                              Today's Date: 3/16/2021       Admit Date: 3/16/2021    Visit Dx:     ICD-10-CM ICD-9-CM   1. Arthritis of left knee  M17.12 716.96     Patient Active Problem List   Diagnosis   • Thrombophilia (CMS/HCC)   • Lupus anticoagulant disorder (CMS/HCC)   • Chronic anticoagulation   • Coronary atherosclerosis   • Hypertension   • Ventral hernia without obstruction or gangrene   • Hyperlipidemia   • Hematuria   • Dyspnea on exertion   • Symptomatic anemia   • History of pulmonary embolus (PE)   • Colonic mass   • Malignant neoplasm of sigmoid colon (CMS/HCC)   • Iron deficiency anemia due to chronic blood loss   • Arthritis of left knee   • History of DVT (deep vein thrombosis)     Past Medical History:   Diagnosis Date   • Acute deep vein thrombosis (DVT) of upper extremity (CMS/HCC) 2019   • At risk for sleep apnea     6   • Bell's palsy 2011    SEEN AT [facility] ER   • Blister of foot 2017    RIGHT FOOT   • Chronic anticoagulation    • Chronic fatigue    • Chronic left-sided low back pain without sciatica    • Colon polyps     FOLLOWED BY DR. RADHA DONOVAN   • Coronary atherosclerosis     cath 2015: normal LM, diffuse LCx disease, LI LAD, 60-70% ostial diag (<1 mm vessel), LI RCA   • COVID-19 virus detected     2020  [facility].    • Dermoid cyst of leg, right 2017   • ED (erectile dysfunction)    • Exomphalos 2013   • GI hemorrhage 2019    ADMITTED TO [facility]   • H/O Anemia    • H/O Leukopenia    • History of chemotherapy    • History of transfusion     no reaction   • Hyperlipidemia    • Hypertension    • Knee pain    • Lupus anticoagulant disorder (CMS/HCC) 2016   • Muscle spasm of back 10/2018   • OA (osteoarthritis)    • Obesity    • Pulmonary embolism (CMS/HCC) 2010     Right lower lobe pulmonary embolus, ADMITTED TO [facility]   • Pulmonary embolus (CMS/HCC) 2016   • Rectal bleeding  09/2019   • Rectal cancer (CMS/HCC) 09/24/2019    MODERATELY DIFFERENTIATED ADENOCARCINOMA GRADE 2, FOLLOWED BY DR. RADHA WHITT   • Sprain of right shoulder 10/21/2020    SEEN AT [facility] ER   • Strain of left shoulder 10/2020   • Stress fracture of right foot 05/11/2013    4TH METATARSAL, SEEN AT [facility] ER   • Thrombophilia (CMS/[facility])     FOLLOWED BY DR. BALAJI MCGEE   • Tinea pedis 11/25/2007    SEEN AT [facility] ER     Past Surgical History:   Procedure Laterality Date   • CARDIAC CATHETERIZATION Left 05/11/2006    NORMAL LV SYSTOLIC FUNCTION(EF 50%), MOD DZ OF 2 SMALL CORONARY BRANCHES (D2 AND OM2), DR. BOOM GALLEGO AT [facility]   • CARDIAC CATHETERIZATION Left 07/20/2015    NORMAL LM, DIFFUSE LCx DZ, LI LAD, 60-70% OSTIAL DIAG(<1MM VESSEL), 10%STENOSIS IN LAD, DR. CHERI VARGAS AT [facility]   • CLOSED REDUCTION WRIST FRACTURE Right    • COLON RESECTION N/A 9/26/2019    Procedure: LOW ANTERIOR COLON RESECTION IMMOBILIZATION OF SPLENIC FLEXURE;  Surgeon: Radha Whitt MD;  Location: [facility];  Service: General   • COLONOSCOPY N/A 9/21/2019    INT/EXT HEMORRHOIDS, 18 MM POLYPOID LESION IN MID SIGMOID, PATH: INVASIVE MODERATELY DIFFERENTIATED GRADE 2 ADENOCARCINOMA, 2 TUBULOVILLOUS ADENOMA POLYPS IN CECUM, ADENOMATOUS POLYP IN TRANSVERSE, ADENOMATOUS POLYP IN ASCENDING, MULTIPLE SMALL AND LARGE DIVERTICULA, DR. TRUONG MONTEZ AT [facility]   • COLONOSCOPY N/A 3/10/2021    5 MM BENIGN POLYP WITH MELANOSIS COLI IN TRANSVERSE, RESCOPE IN 1 YR, DR. RADHA WHITT AT [facility]   • ENDOSCOPY N/A    • LUNG SURGERY Right 2009    RIGHT LOWER LOBE, BLOT CLOT REMOVED   • SIGMOIDOSCOPY N/A 9/24/2019    AN INFILTRATIVE NON OBSTRUCTING MASS IN SIGMOID, AREA TATTOOED, DR. RADHA WHITT AT [facility]   • TONSILLECTOMY AND ADENOIDECTOMY Bilateral     DURING CHILDHOOD   • UMBILICAL HERNIA REPAIR N/A 05/14/2014    DR. ROMERO SCHUSTER AT [facility]     General Information     Row Name 03/16/21 1546          Physical Therapy Time and Intention    Document Type  evaluation Pt. is s/p  Left TKR  -MS     Mode of Treatment  physical therapy;individual therapy  -MS     Row Name 03/16/21 1542          General Information    Patient Profile Reviewed  yes  -MS     Prior Level of Function  independent:  -MS     Existing Precautions/Restrictions  (S) fall Exit alarm  -MS     Barriers to Rehab  none identified  -MS     Row Name 03/16/21 1542          Cognition    Orientation Status (Cognition)  oriented x 3  -MS     Row Name 03/16/21 1542          Safety Issues, Functional Mobility    Comment, Safety Issues/Impairments (Mobility)  Gait belt used for safety.  -MS       User Key  (r) = Recorded By, (t) = Taken By, (c) = Cosigned By    Initials Name Provider Type    Isac Reeves, PT Physical Therapist        Mobility     Row Name 03/16/21 1542          Bed Mobility    Bed Mobility  supine-sit  -MS     Supine-Sit [place] (Bed Mobility)  standby assist  -MS     Row Name 03/16/21 1542          Sit-Stand Transfer    Sit-Stand [place] (Transfers)  contact guard  -MS     Assistive Device (Sit-Stand Transfers)  walker, front-wheeled  -MS     Row Name 03/16/21 1542          Gait/Stairs (Locomotion)    [place] Level (Gait)  contact guard  -MS     Assistive Device (Gait)  walker, front-wheeled  -MS     Distance in Feet (Gait)  25 feet  -MS     Deviations/Abnormal Patterns (Gait)  antalgic;sandra decreased  -MS     Bilateral Gait Deviations  forward flexed posture  -MS     Comment (Gait/Stairs)  Verbal/tactile cues for posture correction and for proper gait sequencing with use of the Rwx.  -MS     Row Name 03/16/21 1542          Mobility    Extremity Weight-bearing Status  left lower extremity  -MS     Left Lower Extremity (Weight-bearing Status)  weight-bearing as tolerated (WBAT)  -MS       User Key  (r) = Recorded By, (t) = Taken By, (c) = Cosigned By    Initials Name Provider Type    Isac Reeves, PT Physical Therapist        Obj/Interventions     Row Name 03/16/21 1543          Range  of Motion Comprehensive    Comment, General Range of Motion  BUE/RLE (WFL's)  -MS     Row Name 03/16/21 1543          Strength Comprehensive (MMT)    Comment, General Manual Muscle Testing (MMT) Assessment  BUE/RLE (4-/5)  -MS     Row Name 03/16/21 1543          Motor Skills    Therapeutic Exercise  -- Left TKR ther. ex. program x 10 reps completed  -MS       User Key  (r) = Recorded By, (t) = Taken By, (c) = Cosigned By    Initials Name Provider Type    MS Harrison Isac MONDRAGON, PT Physical Therapist        Goals/Plan     Row Name 03/16/21 1544          Transfer Goal 1 (PT)    Activity/Assistive Device (Transfer Goal 1, PT)  transfers, all;walker, rolling  -MS     [place] Level/Cues Needed (Transfer Goal 1, PT)  independent  -MS     Time Frame (Transfer Goal 1, PT)  long term goal (LTG);3 days  -MS     Row Name 03/16/21 1544          Gait Training Goal 1 (PT)    Activity/Assistive Device (Gait Training Goal 1, PT)  gait (walking locomotion);walker, rolling  -MS     [place] Level (Gait Training Goal 1, PT)  independent  -MS     Distance (Gait Training Goal 1, PT)  100 feet  -MS     Time Frame (Gait Training Goal 1, PT)  long term goal (LTG);3 days  -MS     Row Name 03/16/21 1544          ROM Goal 1 (PT)    ROM Goal 1 (PT)  Left knee AROM (5, 85)  -MS     Time Frame (ROM Goal 1, PT)  long-term goal (LTG);3 days  -MS     Row Name 03/16/21 1544          Stairs Goal 1 (PT)    Activity/Assistive Device (Stairs Goal 1, PT)  stairs, all skills  -MS     [place] Level/Cues Needed (Stairs Goal 1, PT)  contact guard assist  -MS     Number of Stairs (Stairs Goal 1, PT)  2, no handrails  -MS     Time Frame (Stairs Goal 1, PT)  long term goal (LTG);3 days  -MS       User Key  (r) = Recorded By, (t) = Taken By, (c) = Cosigned By    Initials Name Provider Type    MS HarrisonIsac, PT Physical Therapist        Clinical Impression     Row Name 03/16/21 1543          Pain    Additional Documentation  Pain Scale: Numbers  Pre/Post-Treatment (Group)  -MS     Row Name 03/16/21 1543          Pain Scale: Numbers Pre/Post-Treatment    Pretreatment Pain Rating  5/10  -MS     Posttreatment Pain Rating  5/10  -MS     Pain Location - Side  Left  -MS     Pain Location  knee  -MS     Pain Intervention(s)  Medication (See MAR);Rest;Elevated;Repositioned  -MS     Row Name 03/16/21 1543          Plan of Care Review    Plan of Care Reviewed With  patient  -MS     Row Name 03/16/21 1543          Therapy Assessment/Plan (PT)    Rehab Potential (PT)  good, to achieve stated therapy goals  -MS     Criteria for Skilled Interventions Met (PT)  skilled treatment is necessary  -MS     Row Name 03/16/21 1543          Positioning and Restraints    Pre-Treatment Position  in bed  -MS     Post Treatment Position  chair  -MS     In Chair  notified nsg;reclined;sitting;call light within reach;encouraged to call for assist;exit alarm on;with family/caregiver All lines intact. Ice pack to Left knee.  -MS       User Key  (r) = Recorded By, (t) = Taken By, (c) = Cosigned By    Initials Name Provider Type    Isac Reeves, PT Physical Therapist        Outcome Measures     Row Name 03/16/21 1545          How much help from another person do you currently need...    Turning from your back to your side while in flat bed without using bedrails?  4  -MS     Moving from lying on back to sitting on the side of a flat bed without bedrails?  3  -MS     Moving to and from a bed to a chair (including a wheelchair)?  3  -MS     Standing up from a chair using your arms (e.g., wheelchair, bedside chair)?  3  -MS     Climbing 3-5 steps with a railing?  3  -MS     To walk in hospital room?  3  -MS     AM-PAC 6 Clicks Score (PT)  19  -MS     Row Name 03/16/21 1545          Functional Assessment    Outcome Measure Options  AM-PAC 6 Clicks Basic Mobility (PT)  -MS       User Key  (r) = Recorded By, (t) = Taken By, (c) = Cosigned By    Initials Name Provider Type    MS Harrison  Isac MONDRAGON PT Physical Therapist        Physical Therapy Education                 Title: PT OT SLP Therapies (Done)     Topic: Physical Therapy (Done)     Point: Mobility training (Done)     Learning Progress Summary           Patient Acceptance, E,D, VU,NR by MS at 3/16/2021 1546                   Point: Home exercise program (Done)     Learning Progress Summary           Patient Acceptance, E,D, VU,NR by MS at 3/16/2021 1546                   Point: Body mechanics (Done)     Learning Progress Summary           Patient Acceptance, E,D, VU,NR by MS at 3/16/2021 1546                   Point: Precautions (Done)     Learning Progress Summary           Patient Acceptance, E,D, VU,NR by MS at 3/16/2021 1546                               User Key     Initials Effective Dates Name Provider Type Discipline    MS 04/03/18 -  Isac Harrison PT Physical Therapist PT              PT Recommendation and Plan  Planned Therapy Interventions (PT): balance training, bed mobility training, home exercise program, gait training, patient/family education, postural re-education, transfer training, strengthening, stair training, ROM (range of motion)  Plan of Care Reviewed With: patient  Outcome Summary: Pt. presents with typical post op impairments related to TKR surgery that include decreased ROM, strength, and overall balance.  Pt. will benefit from skilled inpt. P.T. to address his functional deficits and to assist pt. in regaining his maximum level of independence with functional mobility.     Time Calculation:   PT Charges     Row Name 03/16/21 1546             Time Calculation    Start Time  1400  -MS      Stop Time  1418  -MS      Time Calculation (min)  18 min  -MS      PT Received On  03/16/21  -MS      PT - Next Appointment  03/17/21  -MS      PT Goal Re-Cert Due Date  03/23/21  -MS         Time Calculation- PT    Total Timed Code Minutes- PT  17 minute(s)  -MS        User Key  (r) = Recorded By, (t) = Taken By, (c) =  Cosigned By    Initials Name Provider Type    Isac Reeves, PT Physical Therapist        Therapy Charges for Today     Code Description Service Date Service Provider Modifiers Qty    03160134270 HC PT THER PROC EA 15 MIN 3/16/2021 Isac Harrison, PT GP 1    68166575906 HC PT EVAL LOW COMPLEXITY 2 3/16/2021 Isac Harrison, PT GP 1          PT G-Codes  Outcome Measure Options: AM-PAC 6 Clicks Basic Mobility (PT)  AM-PAC 6 Clicks Score (PT): 19    Isac Harrison, PT  3/16/2021

## 2021-03-17 VITALS
BODY MASS INDEX: 36.39 KG/M2 | OXYGEN SATURATION: 94 % | TEMPERATURE: 98 F | RESPIRATION RATE: 16 BRPM | DIASTOLIC BLOOD PRESSURE: 80 MMHG | HEART RATE: 78 BPM | HEIGHT: 71 IN | WEIGHT: 259.9 LBS | SYSTOLIC BLOOD PRESSURE: 145 MMHG

## 2021-03-17 LAB
HCT VFR BLD AUTO: 35.3 % (ref 37.5–51)
HGB BLD-MCNC: 12 G/DL (ref 13–17.7)

## 2021-03-17 PROCEDURE — 97110 THERAPEUTIC EXERCISES: CPT

## 2021-03-17 PROCEDURE — 97530 THERAPEUTIC ACTIVITIES: CPT

## 2021-03-17 PROCEDURE — 99024 POSTOP FOLLOW-UP VISIT: CPT | Performed by: NURSE PRACTITIONER

## 2021-03-17 PROCEDURE — 85018 HEMOGLOBIN: CPT | Performed by: NURSE PRACTITIONER

## 2021-03-17 PROCEDURE — 85014 HEMATOCRIT: CPT | Performed by: NURSE PRACTITIONER

## 2021-03-17 RX ORDER — PSEUDOEPHEDRINE HCL 30 MG
100 TABLET ORAL 2 TIMES DAILY
Qty: 60 CAPSULE | Refills: 0 | Status: SHIPPED | OUTPATIENT
Start: 2021-03-17 | End: 2021-04-16

## 2021-03-17 RX ORDER — HYDROCODONE BITARTRATE AND ACETAMINOPHEN 7.5; 325 MG/1; MG/1
1-2 TABLET ORAL EVERY 4 HOURS PRN
Qty: 60 TABLET | Refills: 0 | Status: SHIPPED | OUTPATIENT
Start: 2021-03-17 | End: 2021-03-26 | Stop reason: SDUPTHER

## 2021-03-17 RX ORDER — FAMOTIDINE 40 MG/1
40 TABLET, FILM COATED ORAL DAILY
Qty: 30 TABLET | Refills: 0 | Status: SHIPPED | OUTPATIENT
Start: 2021-03-18 | End: 2021-04-17

## 2021-03-17 RX ADMIN — HYDROCODONE BITARTRATE AND ACETAMINOPHEN 2 TABLET: 7.5; 325 TABLET ORAL at 11:00

## 2021-03-17 RX ADMIN — DOCUSATE SODIUM 100 MG: 100 CAPSULE, LIQUID FILLED ORAL at 08:52

## 2021-03-17 RX ADMIN — LOSARTAN POTASSIUM 50 MG: 50 TABLET, FILM COATED ORAL at 08:46

## 2021-03-17 RX ADMIN — HYDROCODONE BITARTRATE AND ACETAMINOPHEN 1 TABLET: 7.5; 325 TABLET ORAL at 06:16

## 2021-03-17 RX ADMIN — POLYETHYLENE GLYCOL 3350 17 G: 17 POWDER, FOR SOLUTION ORAL at 08:52

## 2021-03-17 RX ADMIN — FAMOTIDINE 40 MG: 20 TABLET, FILM COATED ORAL at 08:52

## 2021-03-17 RX ADMIN — MUPIROCIN 1 APPLICATION: 20 OINTMENT TOPICAL at 08:46

## 2021-03-17 RX ADMIN — HYDROCODONE BITARTRATE AND ACETAMINOPHEN 1 TABLET: 7.5; 325 TABLET ORAL at 02:24

## 2021-03-17 NOTE — DISCHARGE SUMMARY
Orthopedic Discharge Summary      Patient: Jared Rose  YOB: 1955  Medical Record Number: [id number]    Attending Physician: Abraham Waters MD  Consulting Physician(s):   Consults     No orders found for last 30 day(s).          Date of Admission: 3/16/2021  7:04 AM  Date of Discharge: 3/17/2021    Discharge Diagnosis: IA TOTAL KNEE ARTHROPLASTY [69688] (LEFT TOTAL KNEE ARTHROPLASTY WITH MARTHA NAVIGATION),   Acute Blood Loss Anemia, stable  Post-operative Pain  Limited mobility, requires use of walker and assistance when OOB.    Presenting Problem/History of Present Illness: Arthritis of left knee [M17.12]    Allergies:   Allergies   Allergen Reactions   • Adhesive Tape Rash     blisters       Discharge Medications       Discharge Medications      New Medications      Instructions Start Date   docusate sodium 100 MG capsule   100 mg, Oral, 2 Times Daily      famotidine 40 MG tablet  Commonly known as: PEPCID   40 mg, Oral, Daily   Start Date: March 18, 2021     HYDROcodone-acetaminophen 7.5-325 MG per tablet  Commonly known as: NORCO   Take 1-2 tablets by mouth Every 4 (Four) to 6 (Six) Hours As Needed for pain         Changes to Medications      Instructions Start Date   sildenafil 100 MG tablet  Commonly known as: Viagra  What changed:   · how to take this  · when to take this  · reasons to take this   Take 1/2 to 1 tablet by mouth daily if needed for ED      Xarelto 20 MG tablet  Generic drug: rivaroxaban  What changed: additional instructions   20 mg, Oral, Daily With Dinner, Must be seen         Continue These Medications      Instructions Start Date   atorvastatin 40 MG tablet  Commonly known as: LIPITOR   40 mg, Oral, Daily      losartan 50 MG tablet  Commonly known as: COZAAR   50 mg, Oral, Daily               Past Medical History:   Diagnosis Date   • Acute deep vein thrombosis (DVT) of upper extremity (CMS/[facility]) 9/29/2019   • At risk for sleep apnea     6   • Bell's palsy 02/23/2011     SEEN AT [facility] ER   • Blister of foot 06/2017    RIGHT FOOT   • Chronic anticoagulation    • Chronic fatigue    • Chronic left-sided low back pain without sciatica    • Colon polyps     FOLLOWED BY DR. RADHA DONOVAN   • Coronary atherosclerosis     cath 7/2015: normal LM, diffuse LCx disease, LI LAD, 60-70% ostial diag (<1 mm vessel), LI RCA   • COVID-19 virus detected     12/8/2020  [facility].    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   • GI hemorrhage 09/19/2019    ADMITTED TO [facility]   • H/O Anemia    • H/O Leukopenia    • History of chemotherapy    • History of transfusion     no reaction   • Hyperlipidemia    • Hypertension    • Knee pain    • Lupus anticoagulant disorder (CMS/HCC) 5/6/2016   • Muscle spasm of back 10/2018   • OA (osteoarthritis)    • Obesity    • Pulmonary embolism (CMS/HCC) 06/08/2010     Right lower lobe pulmonary embolus, ADMITTED TO [facility]   • Pulmonary embolus (CMS/[facility]) 5/6/2016   • Rectal bleeding 09/2019   • Rectal cancer (CMS/[facility]) 09/24/2019    MODERATELY DIFFERENTIATED ADENOCARCINOMA GRADE 2, FOLLOWED BY DR. RADHA DONOVAN   • Sprain of right shoulder 10/21/2020    SEEN AT [facility] ER   • Strain of left shoulder 10/2020   • Stress fracture of right foot 05/11/2013    4TH METATARSAL, SEEN AT [facility] ER   • Thrombophilia (CMS/[facility])     FOLLOWED BY DR. BALAJI MCGEE   • Tinea pedis 11/25/2007    SEEN AT [facility] ER        Past Surgical History:   Procedure Laterality Date   • CARDIAC CATHETERIZATION Left 05/11/2006    NORMAL LV SYSTOLIC FUNCTION(EF 50%), MOD DZ OF 2 SMALL CORONARY BRANCHES (D2 AND OM2), DR. BOOM GALLEGO AT [facility]   • CARDIAC CATHETERIZATION Left 07/20/2015    NORMAL LM, DIFFUSE LCx DZ, LI LAD, 60-70% OSTIAL DIAG(<1MM VESSEL), 10%STENOSIS IN LAD, DR. CHERI VARGAS AT [facility]   • CLOSED REDUCTION WRIST FRACTURE Right    • COLON RESECTION N/A 9/26/2019    Procedure: LOW ANTERIOR COLON RESECTION IMMOBILIZATION OF SPLENIC FLEXURE;  Surgeon: Jose Eduardo  Isabella MONDRAGON MD;  Location: [facility] OR;  Service: General   • COLONOSCOPY N/A 9/21/2019    INT/EXT HEMORRHOIDS, 18 MM POLYPOID LESION IN MID SIGMOID, PATH: INVASIVE MODERATELY DIFFERENTIATED GRADE 2 ADENOCARCINOMA, 2 TUBULOVILLOUS ADENOMA POLYPS IN CECUM, ADENOMATOUS POLYP IN TRANSVERSE, ADENOMATOUS POLYP IN ASCENDING, MULTIPLE SMALL AND LARGE DIVERTICULA, DR. TRUONG MONTEZ AT [facility]   • COLONOSCOPY N/A 3/10/2021    5 MM BENIGN POLYP WITH MELANOSIS COLI IN TRANSVERSE, RESCOPE IN 1 YR, DR. ISABELLA DONOVAN AT [facility]   • ENDOSCOPY N/A    • LUNG SURGERY Right 2009    RIGHT LOWER LOBE, BLOT CLOT REMOVED   • SIGMOIDOSCOPY N/A 9/24/2019    AN INFILTRATIVE NON OBSTRUCTING MASS IN SIGMOID, AREA TATTOOED, DR. ISABELLA DONOVAN AT [facility]   • TONSILLECTOMY AND ADENOIDECTOMY Bilateral     DURING CHILDHOOD   • UMBILICAL HERNIA REPAIR N/A 05/14/2014    DR. ROMERO SCHUSTER AT [facility]        Social History     Occupational History   • Occupation:      Employer: [employer]   Tobacco Use   • Smoking status: Never Smoker   • Smokeless tobacco: Never Used   Vaping Use   • Vaping Use: Never used   Substance and Sexual Activity   • Alcohol use: Never     Comment: Caffeine use: 2 soft drink daily and sweet tea.    • Drug use: Never   • Sexual activity: Yes      Social History     Social History Narrative   • Not on file        Family History   Problem Relation Age of Onset   • Coronary artery disease Father    • Heart disease Mother 83   • Hypertension Mother    • Asthma Mother    • Diabetes Mother    • Kidney disease Mother    • Heart attack Mother    • Hypertension Sister    • Diabetes Sister    • Kidney disease Sister    • Heart attack Brother    • Alcohol abuse Brother    • Diabetes Sister    • Heart disease Sister    • Malig Hyperthermia Neg Hx          Physical Exam: 65 y.o. male   Body mass index is 36.25 kg/m².  Facility age limit for growth percentiles is 20 years.  Vitals:    03/17/21 0648   BP: 145/80   Pulse: 78    Resp: 16   Temp: 98 °F (36.7 °C)   SpO2: 94%         General Appearance:    Alert, cooperative, in no acute distress                      Vitals:    03/16/21 1901 03/16/21 2242 03/17/21 0230 03/17/21 0648   BP: 149/79 167/77 133/74 145/80   BP Location: Right arm Right arm Right arm Right arm   Patient Position: Lying Lying Lying Sitting   Pulse: 92 102 95 78   Resp: 15 15 15 16   Temp: 98.5 °F (36.9 °C) 98.7 °F (37.1 °C) 97.8 °F (36.6 °C) 98 °F (36.7 °C)   TempSrc: Oral Skin Skin Skin   SpO2: 95% 93% 94% 94%   Weight:       Height:            DIAGNOSTIC TESTS:   Admission on 03/16/2021   Component Date Value Ref Range Status   • Hemoglobin 03/17/2021 12.0* 13.0 - 17.7 g/dL Final   • Hematocrit 03/17/2021 35.3* 37.5 - 51.0 % Final       Imaging Results (Last 72 Hours)     Procedure Component Value Units Date/Time    XR Knee 1 or 2 View Left [[id number]] Collected: 03/16/21 1118     Updated: 03/16/21 1122    Narrative:      XR KNEE 1 OR 2 VW LEFT-     POSTOP PORTABLE 2 VIEWS LEFT KNEE     CLINICAL INFORMATION: Post arthroplasty     FINDINGS: Prosthesis is satisfactory in position. A complicating process  is not identified.     This report was finalized on 3/16/2021 11:19 AM by Dr. Deep Joy M.D.             Hospital Course:  65 y.o. male admitted to [facility] to services of Abraham Waters MD with Arthritis of left knee [M17.12] on 3/16/2021 and underwent TN TOTAL KNEE ARTHROPLASTY [25554] (LEFT TOTAL KNEE ARTHROPLASTY WITH MARTHA NAVIGATION)  Per Abraham Waters MD. Antibiotic and VTE prophylaxis were per [facility] protocols. Post-operatively the patient transferred to the post-operative floor where the patient underwent mobilization therapy that included active as well as passive ROM exercises. Opioids were titrated to achieve appropriate pain management to allow for participation in mobilization exercises. Vital signs are now stable. On the day of discharge the wound was clean, dry and intact and calf  was soft and non tender and Homans sign was negative. Operative extremity neurovascular status remains intact.   Appropriate education re: incision care, activity levels, medications, and follow up visits was completed and all questions were answered. The patient is now deemed stable for discharge.    Condition on Discharge:  Stable    Total Joint Replacement Discharge Instructions: Patient is to continue with physical therapy exercises twice daily and continue working with the physical therapist as ordered  and should be up walking every 2 hours during the day in addition to the physical therapy exercises. Patient may weight bear as tolerated unless otherwise specified. Continue to ice regularly. Do frequent ankle pumping exercises while you are sitting with legs elevated.  Patient also instructed on deep breathing, coughing, and using  incentive spirometer during hospitalization and encouraged to continue to use at home regularly.        VI. FOLLOW-UP VISITS:  ? Follow up in the office with Dr Abraham Waters in 2 weeks - If already scheduled (see Future Appointments) for date and time, if not yet scheduled, patient to call the office at [phone] to schedule. Prescriptions were given for pain medication, nausea, constipation, and blood thinner therapy.    ? If you have any concerns or suspected complications prior to your follow up visit, please call your surgeon's office. Do not wait until your appointment time if you suspect complications. These will need to be addressed in the office promptly.      Future Appointments   Date Time Provider Department Center   3/30/2021  1:00 PM Briseyda Tejeda APRN [facility] L[facility]   4/2/2021  8:45 AM BRAN PET CT [facility] PET [facility]   4/8/2021  9:40 AM VITALS ONLY [facility] [place]   4/8/2021 10:00 AM Carter Lares MD [facility] [place]   7/23/2021  9:00 AM Rama Simmons APRN [facility] None     Additional Instructions for the Follow-ups that You Need to Schedule      Ambulatory Referral to Home Health   As directed      Face to Face Visit Date: 3/17/2021    Follow-up provider for Plan of Care?: I will be treating the patient on an ongoing basis.  Please send me the Plan of Care for signature.    Follow-up provider: ABRAHAM ANDERS [[id number]]    Reason/Clinical Findings: post surgical    Describe mobility limitations that make leaving home difficult: Requires the assistance of another to leave home    Nursing/Therapeutic Services Requested: Physical Therapy    PT orders: Total joint pathway    Frequency: 1 Week 1         Discharge Follow-up with Specialty: Orthopedics; 2 Weeks   As directed      Specialty: Orthopedics    Follow Up: 2 Weeks    Follow Up Details: Return to the office to see Dr. Abraham Anders               Discharge Disposition Plan:today to home, home health and when cleared by physical therapy as safe for discharge    Date: 3/17/2021    MALIA Mg

## 2021-03-17 NOTE — PLAN OF CARE
Goal Outcome Evaluation:  Plan of Care Reviewed With: patient     Outcome Summary: pt being discharged home with spousemanuel on pain control, and monitoring his bp on discharge  Problem: Adult Inpatient Plan of Care  Goal: Plan of Care Review  Outcome: Met  Flowsheets (Taken 3/17/2021 1344)  Plan of Care Reviewed With: patient  Outcome Summary: pt being discharged home with spousemanuel on pain control, and monitoring his bp on discharge  Goal: Patient-Specific Goal (Individualized)  Outcome: Met  Goal: Absence of Hospital-Acquired Illness or Injury  Outcome: Met  Intervention: Identify and Manage Fall Risk  Recent Flowsheet Documentation  Taken 3/17/2021 1200 by Clare Vasquez RN  Safety Promotion/Fall Prevention:   activity supervised   assistive device/personal items within reach   clutter free environment maintained   fall prevention program maintained   nonskid shoes/slippers when out of bed   room organization consistent   safety round/check completed  Taken 3/17/2021 1048 by Clare Vasquez RN  Safety Promotion/Fall Prevention:   activity supervised   assistive device/personal items within reach   clutter free environment maintained   fall prevention program maintained   nonskid shoes/slippers when out of bed   room organization consistent   safety round/check completed  Taken 3/17/2021 0800 by Clare Vasquez RN  Safety Promotion/Fall Prevention:   activity supervised   assistive device/personal items within reach   clutter free environment maintained   fall prevention program maintained   nonskid shoes/slippers when out of bed   room organization consistent   safety round/check completed  Intervention: Prevent and Manage VTE (venous thromboembolism) Risk  Recent Flowsheet Documentation  Taken 3/17/2021 1200 by Clare Vasquez RN  VTE Prevention/Management:   bilateral   dorsiflexion/plantar flexion performed  Taken 3/17/2021 0800 by Clare Vasquez RN  VTE Prevention/Management:   bilateral    dorsiflexion/plantar flexion performed  Intervention: Prevent Infection  Recent Flowsheet Documentation  Taken 3/17/2021 1200 by Clare Vasquez RN  Infection Prevention: single patient room provided  Taken 3/17/2021 1048 by Clare Vasquez RN  Infection Prevention: single patient room provided  Taken 3/17/2021 0800 by Clare Vasquez RN  Infection Prevention: single patient room provided  Goal: Optimal Comfort and Wellbeing  Outcome: Met  Intervention: Provide Person-Centered Care  Recent Flowsheet Documentation  Taken 3/17/2021 1200 by Clare Vasquez RN  Trust Relationship/Rapport: care explained  Taken 3/17/2021 0800 by Clare Vasquez RN  Trust Relationship/Rapport:   care explained   thoughts/feelings acknowledged  Goal: Readiness for Transition of Care  Outcome: Met     Problem: Fall Injury Risk  Goal: Absence of Fall and Fall-Related Injury  Outcome: Met  Intervention: Promote Injury-Free Environment  Recent Flowsheet Documentation  Taken 3/17/2021 1200 by Clare Vasquez RN  Safety Promotion/Fall Prevention:   activity supervised   assistive device/personal items within reach   clutter free environment maintained   fall prevention program maintained   nonskid shoes/slippers when out of bed   room organization consistent   safety round/check completed  Taken 3/17/2021 1048 by Clare Vasquez RN  Safety Promotion/Fall Prevention:   activity supervised   assistive device/personal items within reach   clutter free environment maintained   fall prevention program maintained   nonskid shoes/slippers when out of bed   room organization consistent   safety round/check completed  Taken 3/17/2021 0800 by Clare Vasquez RN  Safety Promotion/Fall Prevention:   activity supervised   assistive device/personal items within reach   clutter free environment maintained   fall prevention program maintained   nonskid shoes/slippers when out of bed   room organization consistent   safety round/check completed      Problem: Bleeding (Knee Arthroplasty)  Goal: Absence of Bleeding  Outcome: Met     Problem: Bowel Elimination Impaired (Knee Arthroplasty)  Goal: Effective Bowel Elimination  Outcome: Met     Problem: Infection (Knee Arthroplasty)  Goal: Absence of Infection Signs and Symptoms  Outcome: Met     Problem: Joint Function Impaired (Knee Arthroplasty)  Goal: Optimal Functional Ability  Outcome: Met     Problem: Ongoing Anesthesia Effects (Knee Arthroplasty)  Goal: Anesthesia/Sedation Recovery  Outcome: Met  Intervention: Optimize Anesthesia Recovery  Recent Flowsheet Documentation  Taken 3/17/2021 1200 by Clare Vasquez RN  Safety Promotion/Fall Prevention:   activity supervised   assistive device/personal items within reach   clutter free environment maintained   fall prevention program maintained   nonskid shoes/slippers when out of bed   room organization consistent   safety round/check completed  Administration (IS): self-administered  Level Incentive Spirometer (mL): 1500  Taken 3/17/2021 1048 by Clare Vasquez RN  Safety Promotion/Fall Prevention:   activity supervised   assistive device/personal items within reach   clutter free environment maintained   fall prevention program maintained   nonskid shoes/slippers when out of bed   room organization consistent   safety round/check completed  Taken 3/17/2021 0800 by Clare Vasquez RN  Safety Promotion/Fall Prevention:   activity supervised   assistive device/personal items within reach   clutter free environment maintained   fall prevention program maintained   nonskid shoes/slippers when out of bed   room organization consistent   safety round/check completed  Administration (IS): self-administered  Level Incentive Spirometer (mL): 1500     Problem: Postoperative Nausea and Vomiting (Knee Arthroplasty)  Goal: Nausea and Vomiting Relief  Outcome: Met     Problem: Pain (Knee Arthroplasty)  Goal: Acceptable Pain Control  Outcome: Met  Intervention: Prevent or  Manage Pain  Recent Flowsheet Documentation  Taken 3/17/2021 1200 by Clare Vasquez, RN  Diversional Activities: television  Taken 3/17/2021 0800 by Clare Vasquez, RN  Diversional Activities: television     Problem: Postoperative Urinary Retention (Knee Arthroplasty)  Goal: Effective Urinary Elimination  Outcome: Met     Problem: Hypertension Comorbidity  Goal: Blood Pressure in Desired Range  Outcome: Met

## 2021-03-17 NOTE — THERAPY TREATMENT NOTE
Patient Name: Jared Rose  : 1955    MRN: [id number]                              Today's Date: 3/17/2021       Admit Date: 3/16/2021    Visit Dx:     ICD-10-CM ICD-9-CM   1. Arthritis of left knee  M17.12 716.96     Patient Active Problem List   Diagnosis   • Thrombophilia (CMS/HCC)   • Lupus anticoagulant disorder (CMS/HCC)   • Chronic anticoagulation   • Coronary atherosclerosis   • Hypertension   • Ventral hernia without obstruction or gangrene   • Hyperlipidemia   • Hematuria   • Dyspnea on exertion   • Symptomatic anemia   • History of pulmonary embolus (PE)   • Colonic mass   • Malignant neoplasm of sigmoid colon (CMS/HCC)   • Iron deficiency anemia due to chronic blood loss   • Arthritis of left knee   • History of DVT (deep vein thrombosis)     Past Medical History:   Diagnosis Date   • Acute deep vein thrombosis (DVT) of upper extremity (CMS/HCC) 2019   • At risk for sleep apnea     6   • Bell's palsy 2011    SEEN AT [facility]   • Blister of foot 2017    RIGHT FOOT   • Chronic anticoagulation    • Chronic fatigue    • Chronic left-sided low back pain without sciatica    • Colon polyps     FOLLOWED BY DR. RADHA DONOVAN   • Coronary atherosclerosis     cath 2015: normal LM, diffuse LCx disease, LI LAD, 60-70% ostial diag (<1 mm vessel), LI RCA   • COVID-19 virus detected     2020  [facility].    • Dermoid cyst of leg, right 2017   • ED (erectile dysfunction)    • Exomphalos 2013   • GI hemorrhage 2019    ADMITTED TO [facility]   • H/O Anemia    • H/O Leukopenia    • History of chemotherapy    • History of transfusion     no reaction   • Hyperlipidemia    • Hypertension    • Knee pain    • Lupus anticoagulant disorder (CMS/HCC) 2016   • Muscle spasm of back 10/2018   • OA (osteoarthritis)    • Obesity    • Pulmonary embolism (CMS/HCC) 2010     Right lower lobe pulmonary embolus, ADMITTED TO [facility]   • Pulmonary embolus (CMS/HCC) 2016   • Rectal bleeding  09/2019   • Rectal cancer (CMS/HCC) 09/24/2019    MODERATELY DIFFERENTIATED ADENOCARCINOMA GRADE 2, FOLLOWED BY DR. RADHA WHITT   • Sprain of right shoulder 10/21/2020    SEEN AT [facility]   • Strain of left shoulder 10/2020   • Stress fracture of right foot 05/11/2013    4TH METATARSAL, SEEN AT [facility]   • Thrombophilia (CMS/[facility])     FOLLOWED BY DR. BALAJI MCGEE   • Tinea pedis 11/25/2007    SEEN AT [facility]     Past Surgical History:   Procedure Laterality Date   • CARDIAC CATHETERIZATION Left 05/11/2006    NORMAL LV SYSTOLIC FUNCTION(EF 50%), MOD DZ OF 2 SMALL CORONARY BRANCHES (D2 AND OM2), DR. BOOM GALLEGO AT [facility]   • CARDIAC CATHETERIZATION Left 07/20/2015    NORMAL LM, DIFFUSE LCx DZ, LI LAD, 60-70% OSTIAL DIAG(<1MM VESSEL), 10%STENOSIS IN LAD, DR. CHERI VARGAS AT [facility]   • CLOSED REDUCTION WRIST FRACTURE Right    • COLON RESECTION N/A 9/26/2019    Procedure: LOW ANTERIOR COLON RESECTION IMMOBILIZATION OF SPLENIC FLEXURE;  Surgeon: Radha Whitt MD;  Location: [facility];  Service: General   • COLONOSCOPY N/A 9/21/2019    INT/EXT HEMORRHOIDS, 18 MM POLYPOID LESION IN MID SIGMOID, PATH: INVASIVE MODERATELY DIFFERENTIATED GRADE 2 ADENOCARCINOMA, 2 TUBULOVILLOUS ADENOMA POLYPS IN CECUM, ADENOMATOUS POLYP IN TRANSVERSE, ADENOMATOUS POLYP IN ASCENDING, MULTIPLE SMALL AND LARGE DIVERTICULA, DR. TRUONG MONTEZ AT [facility]   • COLONOSCOPY N/A 3/10/2021    5 MM BENIGN POLYP WITH MELANOSIS COLI IN TRANSVERSE, RESCOPE IN 1 YR, DR. RADHA WHITT AT [facility]   • ENDOSCOPY N/A    • LUNG SURGERY Right 2009    RIGHT LOWER LOBE, BLOT CLOT REMOVED   • SIGMOIDOSCOPY N/A 9/24/2019    AN INFILTRATIVE NON OBSTRUCTING MASS IN SIGMOID, AREA TATTOOED, DR. RADHA WHITT AT [facility]   • TONSILLECTOMY AND ADENOIDECTOMY Bilateral     DURING CHILDHOOD   • UMBILICAL HERNIA REPAIR N/A 05/14/2014    DR. ROMERO SCHUSTER AT [facility]     General Information     Row Name 03/17/21 1033          Physical Therapy Time and Intention    Document Type  therapy note (daily  note)  -CF     Mode of Treatment  physical therapy;individual therapy  -CF     Row Name 03/17/21 1033          General Information    Patient Profile Reviewed  yes  -CF     Existing Precautions/Restrictions  fall  -CF     Row Name 03/17/21 1033          Cognition    Orientation Status (Cognition)  oriented x 4  -CF     Row Name 03/17/21 1033          Safety Issues, Functional Mobility    Impairments Affecting Function (Mobility)  range of motion (ROM);endurance/activity tolerance;strength  -CF       User Key  (r) = Recorded By, (t) = Taken By, (c) = Cosigned By    Initials Name Provider Type    CF Candelaria Washburn, PT Physical Therapist        Mobility     Row Name 03/17/21 1034          Bed Mobility    Bed Mobility  supine-sit  -CF     Supine-Sit [place] (Bed Mobility)  standby assist  -CF     Assistive Device (Bed Mobility)  head of bed elevated;bed rails  -CF     Row Name 03/17/21 1034          Sit-Stand Transfer    Sit-Stand [place] (Transfers)  contact guard  -CF     Assistive Device (Sit-Stand Transfers)  walker, front-wheeled  -CF     Row Name 03/17/21 1034          Gait/Stairs (Locomotion)    [place] Level (Gait)  standby assist;contact guard  -CF     Assistive Device (Gait)  walker, front-wheeled  -CF     Distance in Feet (Gait)  150'  -CF     Deviations/Abnormal Patterns (Gait)  antalgic;sandra decreased;gait speed decreased  -CF     Bilateral Gait Deviations  forward flexed posture  -CF     Left Sided Gait Deviations  heel strike decreased;weight shift ability decreased  -CF     [place] Level (Stairs)  contact guard;verbal cues;nonverbal cues (demo/gesture);1 person assist;minimum assist (75% patient effort)  -CF     Handrail Location (Stairs)  other (see comments) walker  -CF     Number of Steps (Stairs)  2  -CF     Ascending Technique (Stairs)  step-to-step  -CF     Descending Technique (Stairs)  step-to-step  -CF     Comment (Gait/Stairs)  Pt ascended stairs backwards using RW for  support as he does not have handrails at home. assist to steady rw  -CF       User Key  (r) = Recorded By, (t) = Taken By, (c) = Cosigned By    Initials Name Provider Type    Candelaria Bassett PT Physical Therapist        Obj/Interventions     Row Name 03/17/21 1035          Range of Motion Comprehensive    Comment, General Range of Motion  R 0-90  -[facility] Name 03/17/21 1035          Motor Skills    Therapeutic Exercise  other (see comments) tka exercises x10  -CF       User Key  (r) = Recorded By, (t) = Taken By, (c) = Cosigned By    Initials Name Provider Type    Candelaria Bassett PT Physical Therapist        Goals/Plan    No documentation.       Clinical Impression     Row Name 03/17/21 1037          Pain    Additional Documentation  Pain Scale: Numbers Pre/Post-Treatment (Group)  -CF     Row Name 03/17/21 1037          Pain Scale: Numbers Pre/Post-Treatment    Pretreatment Pain Rating  8/10  -CF     Posttreatment Pain Rating  8/10  -CF     Pain Location - Side  Left  -CF     Pain Location  knee  -CF     Pain Intervention(s)  Repositioned;Medication (See MAR);Ambulation/increased activity;Rest  -     Row Name 03/17/21 1037          Plan of Care Review    Plan of Care Reviewed With  patient  -CF     Outcome Summary  Pt progressed well with PT. He was able to increase ambulation distance with less assist and completed stairs. Performed 2 steps backwards using RW as support due to lack of handrails at home.  -     Row Name 03/17/21 1037          Vital Signs    O2 Delivery Pre Treatment  room air  -[facility] Name 03/17/21 1037          Positioning and Restraints    Pre-Treatment Position  in bed  -CF     Post Treatment Position  chair  -CF     In Chair  reclined;call light within reach;encouraged to call for assist;exit alarm on;notified nsg  -CF       User Key  (r) = Recorded By, (t) = Taken By, (c) = Cosigned By    Initials Name Provider Type    Candelaria Bassett PT Physical Therapist         Outcome Measures     Row Name 03/17/21 1040          How much help from another person do you currently need...    Turning from your back to your side while in flat bed without using bedrails?  4  -CF     Moving from lying on back to sitting on the side of a flat bed without bedrails?  4  -CF     Moving to and from a bed to a chair (including a wheelchair)?  3  -CF     Standing up from a chair using your arms (e.g., wheelchair, bedside chair)?  3  -CF     Climbing 3-5 steps with a railing?  3  -CF     To walk in hospital room?  3  -CF     AM-PAC 6 Clicks Score (PT)  20  -CF     Row Name 03/17/21 1040          Functional Assessment    Outcome Measure Options  AM-PAC 6 Clicks Basic Mobility (PT)  -CF       User Key  (r) = Recorded By, (t) = Taken By, (c) = Cosigned By    Initials Name Provider Type    CF Candelaria Washburn, PT Physical Therapist        Physical Therapy Education                 Title: PT OT SLP Therapies (Done)     Topic: Physical Therapy (Done)     Point: Mobility training (Done)     Learning Progress Summary           Patient Acceptance, E, VU,DU,NR by  at 3/17/2021 1040    Acceptance, E,D, VU,NR by MS at 3/16/2021 1546                   Point: Home exercise program (Done)     Learning Progress Summary           Patient Acceptance, E, VU,DU,NR by  at 3/17/2021 1040    Acceptance, E,D, VU,NR by MS at 3/16/2021 1546                   Point: Body mechanics (Done)     Learning Progress Summary           Patient Acceptance, E, VU,DU,NR by  at 3/17/2021 1040    Acceptance, E,D, VU,NR by MS at 3/16/2021 1546                   Point: Precautions (Done)     Learning Progress Summary           Patient Acceptance, E, VU,DU,NR by  at 3/17/2021 1040    Acceptance, E,D, VU,NR by MS at 3/16/2021 1546                               User Key     Initials Effective Dates Name Provider Type Discipline    MS 04/03/18 -  Isac Harrison PT Physical Therapist PT    CF 09/02/20 -  Candelaria Washburn, WILFRID  Physical Therapist PT              PT Recommendation and Plan     Plan of Care Reviewed With: patient  Outcome Summary: Pt progressed well with PT. He was able to increase ambulation distance with less assist and completed stairs. Performed 2 steps backwards using RW as support due to lack of handrails at home.     Time Calculation:   PT Charges     Row Name 03/17/21 1033             Time Calculation    Start Time  1017  -CF      Stop Time  1045  -CF      Time Calculation (min)  28 min  -CF      PT Received On  03/17/21  -CF      PT - Next Appointment  03/18/21  -CF        User Key  (r) = Recorded By, (t) = Taken By, (c) = Cosigned By    Initials Name Provider Type    CF Candelaria Washburn, PT Physical Therapist        Therapy Charges for Today     Code Description Service Date Service Provider Modifiers Qty    03500475705  PT THER PROC EA 15 MIN 3/17/2021 Candelaria Washburn, PT GP 1    [id number] HC PT THERAPEUTIC ACT EA 15 MIN 3/17/2021 Candelaria Washburn, PT GP 1          PT G-Codes  Outcome Measure Options: AM-PAC 6 Clicks Basic Mobility (PT)  AM-PAC 6 Clicks Score (PT): 20    Candelaria Washburn PT  3/17/2021

## 2021-03-17 NOTE — PROGRESS NOTES
"    Orthopedic Total Joint Progress Note        Patient: Jared Rose    Date of Admission: 3/16/2021  7:04 AM    YOB: 1955    Medical Record Number: [id number]    Attending Physician: Abraham Waters MD      POD # 1 Day Post-Op Procedure(s) (LRB):  LEFT TOTAL KNEE ARTHROPLASTY WITH MARTHA NAVIGATION (Left)       Systemic or Specific Complaints: The patient has had a relatively normal postoperative course. He has no current complaints. He has had pain in the operative extremity that is well controlled with PO pain medication.  The patient has had no issues with the wound. He has worked with physical therapy and is ambulating well.      Allergies:   Allergies   Allergen Reactions   • Adhesive Tape Rash     blisters       Medications:   Current Medications:  Scheduled Meds:atorvastatin, 40 mg, Oral, Daily  docusate sodium, 100 mg, Oral, BID  famotidine, 40 mg, Oral, Daily  losartan, 50 mg, Oral, Daily  mupirocin, , Each Nare, BID  polyethylene glycol, 17 g, Oral, Daily  rivaroxaban, 20 mg, Oral, Daily With Dinner      Continuous Infusions:lactated ringers, 100 mL/hr, Last Rate: Stopped (03/16/21 1900)      PRN Meds:.•  acetaminophen  •  bisacodyl  •  bisacodyl  •  HYDROcodone-acetaminophen  •  HYDROcodone-acetaminophen  •  HYDROmorphone **AND** naloxone  •  ondansetron **OR** ondansetron      Physical Exam: 65 y.o. male   Wt Readings from Last 3 Encounters:   03/16/21 118 kg (259 lb 14.4 oz)   03/12/21 118 kg (260 lb 2.3 oz)   03/10/21 118 kg (260 lb)     Ht Readings from Last 3 Encounters:   03/16/21 180.3 cm (71\")   03/12/21 180.3 cm (70.98\")   03/10/21 180.3 cm (71\")     Body mass index is 36.25 kg/m².    Vitals:    03/16/21 1901 03/16/21 2242 03/17/21 0230 03/17/21 0648   BP: 149/79 167/77 133/74 145/80   BP Location: Right arm Right arm Right arm Right arm   Patient Position: Lying Lying Lying Sitting   Pulse: 92 102 95 78   Resp: 15 15 15 16   Temp: 98.5 °F (36.9 °C) 98.7 °F (37.1 °C) 97.8 °F " (36.6 °C) 98 °F (36.7 °C)   TempSrc: Oral Skin Skin Skin   SpO2: 95% 93% 94% 94%   Weight:       Height:            General Appearance:    General: alert and oriented         Abdomen/:     soft non-tender, non-distended, voiding without difficulty       Extremities:   Operative extremity neurovascular status intact. ROM appropriate.  Incision intact w/out signs or symptoms of infection.  No cyanosis, calf is soft and nontender.     Activity: Mobilizing Per P.T.   Weight Bearing: As Tolerated    Diagnostic Tests:   Admission on 03/16/2021   Component Date Value Ref Range Status   • Hemoglobin 03/17/2021 12.0* 13.0 - 17.7 g/dL Final   • Hematocrit 03/17/2021 35.3* 37.5 - 51.0 % Final       Imaging Results (Last 72 Hours)     Procedure Component Value Units Date/Time    XR Knee 1 or 2 View Left [[id number]] Collected: 03/16/21 1118     Updated: 03/16/21 1122    Narrative:      XR KNEE 1 OR 2 VW LEFT-     POSTOP PORTABLE 2 VIEWS LEFT KNEE     CLINICAL INFORMATION: Post arthroplasty     FINDINGS: Prosthesis is satisfactory in position. A complicating process  is not identified.     This report was finalized on 3/16/2021 11:19 AM by Dr. Deep Joy M.D.             Personally viewed ortho images and report     Assessment:  Doing well 1 Day Post-Op following total joint replacement  Acute Blood Loss Anemia, stable  Post-operative Pain  Limited mobility, requires use of walker and assistance when OOB.    Patient Active Problem List   Diagnosis   • Thrombophilia (CMS/HCC)   • Lupus anticoagulant disorder (CMS/HCC)   • Chronic anticoagulation   • Coronary atherosclerosis   • Hypertension   • Ventral hernia without obstruction or gangrene   • Hyperlipidemia   • Hematuria   • Dyspnea on exertion   • Symptomatic anemia   • History of pulmonary embolus (PE)   • Colonic mass   • Malignant neoplasm of sigmoid colon (CMS/HCC)   • Iron deficiency anemia due to chronic blood loss   • Arthritis of left knee   • History of DVT  (deep vein thrombosis)        Plan:    Consults: none  Continue to monitor labs and/or v/s, for tolerance to post op blood loss.  Continue efforts to increase mobilization.  Continue Pain Control Measures.  Continue incisional Care.  DVT prophylaxis- xarelto  Follow up in office with Abraham Waters M.D. In 2 weeks.    Discharge Plan:today to home, home health and when cleared by physical therapy as safe for discharge    Date: 3/17/2021  MALIA Mg

## 2021-03-17 NOTE — PROGRESS NOTES
Continued Stay Note  [facility]     Patient Name: Jared Rose  MRN: [id number]  Today's Date: 3/17/2021    Admit Date: 3/16/2021    Discharge Plan     Row Name 03/17/21 1132       Plan    Plan  [facility]    Provided Post Acute Provider List?  Yes    Post Acute Provider List  Home Health    Provided Post Acute Provider Quality & Resource List?  Yes    Post Acute Provider Quality and Resource List  Home Health    Delivered To  Patient    Method of Delivery  Telephone    Patient/Family in Agreement with Plan  yes    Plan Comments  Spoke with pt, verified correct information on facesheet and explained the role of CCP. Pt would like to d/c home with [facility], referral sent in Epic to [facility]. Plan will be to d/c home with [facility] and family support. No other needs identified.        Discharge Codes    No documentation.             Kathie Hernandez RN

## 2021-03-17 NOTE — DISCHARGE PLACEMENT REQUEST
"Jared Rose (65 y.o. Male)     Date of Birth Social Security Number Address Home Phone MRN    1955  [id number] [facility] [address] [phone] [id number]    Zoroastrian Marital Status          Faith        Admission Date Admission Type Admitting Provider Attending Provider Department, Room/Bed    3/16/21 Elective Abraham Waters MD Makk, Stephen P, MD [address], P792/1    Discharge Date Discharge Disposition Discharge Destination                       Attending Provider: Abraham Waters MD    Allergies: Adhesive Tape    Isolation: None   Infection: None   Code Status: CPR    Ht: 180.3 cm (71\")   Wt: 118 kg (259 lb 14.4 oz)    Admission Cmt: None   Principal Problem: Arthritis of left knee [M17.12]                 Active Insurance as of 3/16/2021     Primary Coverage     Payor Plan Insurance Group Employer/Plan Group    [employer] [facility] [id number]     Payor Plan Address Payor Plan Phone Number Payor Plan Fax Number Effective Dates    [address] [phone]  1/1/2018 - None Entered    [facility] [id number]       Subscriber Name Subscriber Birth Date Member ID       JARED ROSE 1955 [id number]           Secondary Coverage     Payor Plan Insurance Group Employer/Plan Group    MEDICARE MEDICARE A ONLY      Payor Plan Address Payor Plan Phone Number Payor Plan Fax Number Effective Dates    [address] [phone]  8/1/2019 - None Entered    [facility] [id number]       Subscriber Name Subscriber Birth Date Member ID       JARED ROSE 1955 [id number]                 Emergency Contacts      (Rel.) Home Phone Work Phone Mobile Phone    Kaylyn Pollock (Spouse) [phone] -- [phone]          "

## 2021-03-17 NOTE — PLAN OF CARE
Goal Outcome Evaluation:  Plan of Care Reviewed With: patient     Outcome Summary: Pt progressed well with PT. He was able to increase ambulation distance with less assist and completed stairs. Performed 2 steps backwards using RW as support due to lack of handrails at home. OK to d/c home with assist today from PT standpoint.    Patient was intermittently wearing a face mask during this therapy encounter. Therapist used appropriate personal protective equipment including eye protection, mask, and gloves.  Mask used was standard procedure mask. Appropriate PPE was worn during the entire therapy session. Hand hygiene was completed before and after therapy session. Patient is not in enhanced droplet precautions.

## 2021-03-23 ENCOUNTER — DOCUMENTATION (OUTPATIENT)
Dept: SURGERY | Facility: CLINIC | Age: 66
End: 2021-03-23

## 2021-03-23 NOTE — PROGRESS NOTES
03/24/2021, copy of colonoscopy from 03/10/2021 & polyp letter with result mailed to patient.    Recall added; rescope in 1 year.    Thank you.   Lovenox sc

## 2021-03-26 DIAGNOSIS — M17.12 ARTHRITIS OF LEFT KNEE: ICD-10-CM

## 2021-03-29 RX ORDER — HYDROCODONE BITARTRATE AND ACETAMINOPHEN 7.5; 325 MG/1; MG/1
1-2 TABLET ORAL EVERY 4 HOURS PRN
Qty: 60 TABLET | Refills: 0 | Status: SHIPPED | OUTPATIENT
Start: 2021-03-29

## 2021-03-29 RX ORDER — LOSARTAN POTASSIUM 50 MG/1
50 TABLET ORAL DAILY
Qty: 90 TABLET | Refills: 3 | OUTPATIENT
Start: 2021-03-29

## 2021-03-30 ENCOUNTER — OFFICE VISIT (OUTPATIENT)
Dept: ORTHOPEDIC SURGERY | Facility: CLINIC | Age: 66
End: 2021-03-30

## 2021-03-30 VITALS — TEMPERATURE: 98.2 F | HEIGHT: 71 IN | WEIGHT: 260 LBS | BODY MASS INDEX: 36.4 KG/M2

## 2021-03-30 DIAGNOSIS — R52 PAIN: ICD-10-CM

## 2021-03-30 DIAGNOSIS — Z96.652 STATUS POST TOTAL LEFT KNEE REPLACEMENT: Primary | ICD-10-CM

## 2021-03-30 PROCEDURE — 99024 POSTOP FOLLOW-UP VISIT: CPT | Performed by: NURSE PRACTITIONER

## 2021-03-30 RX ORDER — LOSARTAN POTASSIUM 50 MG/1
50 TABLET ORAL DAILY
Qty: 90 TABLET | Refills: 0 | Status: SHIPPED | OUTPATIENT
Start: 2021-03-30 | End: 2021-06-14 | Stop reason: SDUPTHER

## 2021-03-30 NOTE — PROGRESS NOTES
Jared Rose : 1955 MRN: [id number] DATE: 3/30/2021  Body mass index is 36.26 kg/m².  Vitals:    21 1255   Temp: 98.2 °F (36.8 °C)       DIAGNOSIS: 2 week follow up left total knee arthroplasty    SUBJECTIVE:Patient returns today for 2 week follow up of left total knee replacement. He is doing well but does have concerns that his knee feels tight. He is taking norco PRN still. He is ambulating with a cane. He has generalized swelling in the operative extremity but no pain in the calf. According to his PT notes AROM of the operative knee is -20-98.     OBJECTIVE:   Exam:. The incision is healing appropriately. No sign of infection. Range of motion is progressing as expected. Generalized swelling of the LLE but calf is soft and nontender with a negative Homans sign.    DIAGNOSTIC STUDIES  2V AP&Lat of the left knee were done in the office today  Indication, findings and comparison: images were reviewed for evaluation of recent knee replacement. They demonstrate a well positioned, well aligned knee replacement without complicating factors noted. In comparison with previous films there has been interval implant placement.    ASSESSMENT: 2 week status post left knee replacement expected healing.    PLAN: 1) island dressing removed, fusion still in place and intact- left open to air   2) Order given for PT- has outpatient scheduled for tomorrow   3) Continue ice PRN   4) continue xarelto   5) Follow up in 4 weeks with repeat Xrays of left knee (2 views)   6) Wait for 3 months after surgery for routine teeth cleaning and continue with taking a dose of antibiotics before dental procedures for 2 yrs post total joint replacement.    MALIA Mg  3/30/2021

## 2021-03-31 ENCOUNTER — HOSPITAL ENCOUNTER (OUTPATIENT)
Dept: PHYSICAL THERAPY | Facility: HOSPITAL | Age: 66
Setting detail: THERAPIES SERIES
Discharge: HOME | End: 2021-03-31

## 2021-03-31 ENCOUNTER — TELEPHONE (OUTPATIENT)
Dept: ORTHOPEDIC SURGERY | Facility: CLINIC | Age: 66
End: 2021-03-31

## 2021-03-31 DIAGNOSIS — Z98.890 S/P LEFT KNEE SURGERY: ICD-10-CM

## 2021-03-31 DIAGNOSIS — Z47.89 ORTHOPEDIC AFTERCARE: Primary | ICD-10-CM

## 2021-03-31 DIAGNOSIS — Z47.1 AFTERCARE FOLLOWING LEFT KNEE JOINT REPLACEMENT SURGERY: ICD-10-CM

## 2021-03-31 DIAGNOSIS — Z96.652 STATUS POST TOTAL LEFT KNEE REPLACEMENT: Primary | ICD-10-CM

## 2021-03-31 DIAGNOSIS — R26.9 GAIT DIFFICULTY: ICD-10-CM

## 2021-03-31 DIAGNOSIS — Z96.652 AFTERCARE FOLLOWING LEFT KNEE JOINT REPLACEMENT SURGERY: ICD-10-CM

## 2021-03-31 PROCEDURE — 97161 PT EVAL LOW COMPLEX 20 MIN: CPT

## 2021-03-31 PROCEDURE — 97110 THERAPEUTIC EXERCISES: CPT

## 2021-04-02 ENCOUNTER — LAB (OUTPATIENT)
Dept: LAB | Facility: HOSPITAL | Age: 66
End: 2021-04-02

## 2021-04-02 ENCOUNTER — HOSPITAL ENCOUNTER (OUTPATIENT)
Dept: PET IMAGING | Facility: HOSPITAL | Age: 66
Discharge: HOME | End: 2021-04-02
Admitting: INTERNAL MEDICINE

## 2021-04-02 DIAGNOSIS — Z79.01 CHRONIC ANTICOAGULATION: ICD-10-CM

## 2021-04-02 DIAGNOSIS — Z86.718 HISTORY OF DVT (DEEP VEIN THROMBOSIS): ICD-10-CM

## 2021-04-02 DIAGNOSIS — C18.7 MALIGNANT NEOPLASM OF SIGMOID COLON (HCC): ICD-10-CM

## 2021-04-02 LAB
ALBUMIN SERPL-MCNC: 4.2 G/DL (ref 3.5–5.2)
ALBUMIN/GLOB SERPL: 1 G/DL (ref 1.1–2.4)
ALP SERPL-CCNC: 131 U/L (ref 38–116)
ALT SERPL W P-5'-P-CCNC: 30 U/L (ref 0–41)
ANION GAP SERPL CALCULATED.3IONS-SCNC: 10.3 MMOL/L (ref 5–15)
AST SERPL-CCNC: 27 U/L (ref 0–40)
BASOPHILS # BLD AUTO: 0.05 10*3/MM3 (ref 0–0.2)
BASOPHILS NFR BLD AUTO: 0.9 % (ref 0–1.5)
BILIRUB SERPL-MCNC: 0.7 MG/DL (ref 0.2–1.2)
BUN SERPL-MCNC: 13 MG/DL (ref 6–20)
BUN/CREAT SERPL: 10.7 (ref 7.3–30)
CALCIUM SPEC-SCNC: 10.3 MG/DL (ref 8.5–10.2)
CEA SERPL-MCNC: 0.9 NG/ML
CHLORIDE SERPL-SCNC: 99 MMOL/L (ref 98–107)
CO2 SERPL-SCNC: 28.7 MMOL/L (ref 22–29)
CREAT BLDA-MCNC: 1.2 MG/DL (ref 0.6–1.3)
CREAT SERPL-MCNC: 1.22 MG/DL (ref 0.7–1.3)
DEPRECATED RDW RBC AUTO: 49.1 FL (ref 37–54)
EOSINOPHIL # BLD AUTO: 0.23 10*3/MM3 (ref 0–0.4)
EOSINOPHIL NFR BLD AUTO: 4 % (ref 0.3–6.2)
ERYTHROCYTE [DISTWIDTH] IN BLOOD BY AUTOMATED COUNT: 14.1 % (ref 12.3–15.4)
GFR SERPL CREATININE-BSD FRML MDRD: 72 ML/MIN/1.73
GLOBULIN UR ELPH-MCNC: 4.3 GM/DL (ref 1.8–3.5)
GLUCOSE SERPL-MCNC: 97 MG/DL (ref 74–124)
HCT VFR BLD AUTO: 37.3 % (ref 37.5–51)
HGB BLD-MCNC: 12.1 G/DL (ref 13–17.7)
IMM GRANULOCYTES # BLD AUTO: 0.01 10*3/MM3 (ref 0–0.05)
IMM GRANULOCYTES NFR BLD AUTO: 0.2 % (ref 0–0.5)
LYMPHOCYTES # BLD AUTO: 2.15 10*3/MM3 (ref 0.7–3.1)
LYMPHOCYTES NFR BLD AUTO: 37.6 % (ref 19.6–45.3)
MCH RBC QN AUTO: 30.6 PG (ref 26.6–33)
MCHC RBC AUTO-ENTMCNC: 32.4 G/DL (ref 31.5–35.7)
MCV RBC AUTO: 94.4 FL (ref 79–97)
MONOCYTES # BLD AUTO: 0.58 10*3/MM3 (ref 0.1–0.9)
MONOCYTES NFR BLD AUTO: 10.1 % (ref 5–12)
NEUTROPHILS NFR BLD AUTO: 2.7 10*3/MM3 (ref 1.7–7)
NEUTROPHILS NFR BLD AUTO: 47.2 % (ref 42.7–76)
NRBC BLD AUTO-RTO: 0 /100 WBC (ref 0–0.2)
PLATELET # BLD AUTO: 499 10*3/MM3 (ref 140–450)
PMV BLD AUTO: 9.3 FL (ref 6–12)
POTASSIUM SERPL-SCNC: 4.6 MMOL/L (ref 3.5–4.7)
PROT SERPL-MCNC: 8.5 G/DL (ref 6.3–8)
RBC # BLD AUTO: 3.95 10*6/MM3 (ref 4.14–5.8)
SODIUM SERPL-SCNC: 138 MMOL/L (ref 134–145)
WBC # BLD AUTO: 5.72 10*3/MM3 (ref 3.4–10.8)

## 2021-04-02 PROCEDURE — 0 DIATRIZOATE MEGLUMINE & SODIUM PER 1 ML: Performed by: INTERNAL MEDICINE

## 2021-04-02 PROCEDURE — 36415 COLL VENOUS BLD VENIPUNCTURE: CPT

## 2021-04-02 PROCEDURE — 82565 ASSAY OF CREATININE: CPT

## 2021-04-02 PROCEDURE — 80053 COMPREHEN METABOLIC PANEL: CPT

## 2021-04-02 PROCEDURE — 85025 COMPLETE CBC W/AUTO DIFF WBC: CPT

## 2021-04-02 PROCEDURE — 74177 CT ABD & PELVIS W/CONTRAST: CPT

## 2021-04-02 PROCEDURE — 25010000002 IOPAMIDOL 61 % SOLUTION: Performed by: INTERNAL MEDICINE

## 2021-04-02 PROCEDURE — 82378 CARCINOEMBRYONIC ANTIGEN: CPT | Performed by: INTERNAL MEDICINE

## 2021-04-02 RX ADMIN — IOPAMIDOL 85 ML: 612 INJECTION, SOLUTION INTRAVENOUS at 09:28

## 2021-04-02 RX ADMIN — DIATRIZOATE MEGLUMINE AND DIATRIZOATE SODIUM 30 ML: 660; 100 LIQUID ORAL; RECTAL at 08:40

## 2021-04-05 ENCOUNTER — HOSPITAL ENCOUNTER (OUTPATIENT)
Dept: PHYSICAL THERAPY | Facility: HOSPITAL | Age: 66
Setting detail: THERAPIES SERIES
Discharge: HOME | End: 2021-04-05

## 2021-04-05 DIAGNOSIS — Z47.1 AFTERCARE FOLLOWING LEFT KNEE JOINT REPLACEMENT SURGERY: ICD-10-CM

## 2021-04-05 DIAGNOSIS — R26.9 GAIT DIFFICULTY: ICD-10-CM

## 2021-04-05 DIAGNOSIS — Z47.89 ORTHOPEDIC AFTERCARE: Primary | ICD-10-CM

## 2021-04-05 DIAGNOSIS — Z98.890 S/P LEFT KNEE SURGERY: ICD-10-CM

## 2021-04-05 DIAGNOSIS — Z96.652 AFTERCARE FOLLOWING LEFT KNEE JOINT REPLACEMENT SURGERY: ICD-10-CM

## 2021-04-05 PROCEDURE — 97110 THERAPEUTIC EXERCISES: CPT

## 2021-04-05 PROCEDURE — 97140 MANUAL THERAPY 1/> REGIONS: CPT

## 2021-04-05 NOTE — THERAPY TREATMENT NOTE
Outpatient Physical Therapy Ortho Treatment Note  [facility]     Patient Name: Jared Rose  : 1955  MRN: [id number]  Today's Date: 2021      Visit Date: 2021    Visit Dx:    ICD-10-CM ICD-9-CM   1. Orthopedic aftercare  Z47.89 V54.9   2. S/P left knee surgery  Z98.890 V45.89   3. Gait difficulty  R26.9 781.2   4. Aftercare following left knee joint replacement surgery  Z47.1 V54.81    Z96.652 V43.65       Patient Active Problem List   Diagnosis   • Thrombophilia (CMS/HCC)   • Lupus anticoagulant disorder (CMS/HCC)   • Chronic anticoagulation   • Coronary atherosclerosis   • Hypertension   • Ventral hernia without obstruction or gangrene   • Hyperlipidemia   • Hematuria   • Dyspnea on exertion   • Symptomatic anemia   • History of pulmonary embolus (PE)   • Colonic mass   • Malignant neoplasm of sigmoid colon (CMS/HCC)   • Iron deficiency anemia due to chronic blood loss   • Arthritis of left knee   • History of DVT (deep vein thrombosis)        Past Medical History:   Diagnosis Date   • Acute deep vein thrombosis (DVT) of upper extremity (CMS/HCC) 2019   • At risk for sleep apnea     6   • Bell's palsy 2011    SEEN AT [facility] ER   • Blister of foot 2017    RIGHT FOOT   • Chronic anticoagulation    • Chronic fatigue    • Chronic left-sided low back pain without sciatica    • Colon polyps     FOLLOWED BY DR. RADHA DONOVAN   • Coronary atherosclerosis     cath 2015: normal LM, diffuse LCx disease, LI LAD, 60-70% ostial diag (<1 mm vessel), LI RCA   • COVID-19 virus detected     2020  [facility].    • Dermoid cyst of leg, right 2017   • ED (erectile dysfunction)    • Exomphalos 2013   • GI hemorrhage 2019    ADMITTED TO [facility]   • H/O Anemia    • H/O Leukopenia    • History of chemotherapy    • History of transfusion     no reaction   • Hyperlipidemia    • Hypertension    • Knee pain    • Lupus anticoagulant disorder (CMS/HCC) 2016   • Muscle spasm of  back 10/2018   • OA (osteoarthritis)    • Obesity    • Pulmonary embolism (CMS/HCC) 06/08/2010     Right lower lobe pulmonary embolus, ADMITTED TO [facility]   • Pulmonary embolus (CMS/HCC) 5/6/2016   • Rectal bleeding 09/2019   • Rectal cancer (CMS/HCC) 09/24/2019    MODERATELY DIFFERENTIATED ADENOCARCINOMA GRADE 2, FOLLOWED BY DR. RADHA WHITT   • Sprain of right shoulder 10/21/2020    SEEN AT [facility] ER   • Strain of left shoulder 10/2020   • Stress fracture of right foot 05/11/2013    4TH METATARSAL, SEEN AT [facility] ER   • Thrombophilia (CMS/HCC)     FOLLOWED BY DR. BALAJI MCGEE   • Tinea pedis 11/25/2007    SEEN AT [facility] ER        Past Surgical History:   Procedure Laterality Date   • CARDIAC CATHETERIZATION Left 05/11/2006    NORMAL LV SYSTOLIC FUNCTION(EF 50%), MOD DZ OF 2 SMALL CORONARY BRANCHES (D2 AND OM2), DR. BOOM GALLEGO AT [facility]   • CARDIAC CATHETERIZATION Left 07/20/2015    NORMAL LM, DIFFUSE LCx DZ, LI LAD, 60-70% OSTIAL DIAG(<1MM VESSEL), 10%STENOSIS IN LAD, DR. CHERI VARGAS AT [facility]   • CLOSED REDUCTION WRIST FRACTURE Right    • COLON RESECTION N/A 9/26/2019    Procedure: LOW ANTERIOR COLON RESECTION IMMOBILIZATION OF SPLENIC FLEXURE;  Surgeon: Radha Whitt MD;  Location: [facility];  Service: General   • COLONOSCOPY N/A 9/21/2019    INT/EXT HEMORRHOIDS, 18 MM POLYPOID LESION IN MID SIGMOID, PATH: INVASIVE MODERATELY DIFFERENTIATED GRADE 2 ADENOCARCINOMA, 2 TUBULOVILLOUS ADENOMA POLYPS IN CECUM, ADENOMATOUS POLYP IN TRANSVERSE, ADENOMATOUS POLYP IN ASCENDING, MULTIPLE SMALL AND LARGE DIVERTICULA, DR. TRUONG MONTEZ AT [facility]   • COLONOSCOPY N/A 3/10/2021    5 MM BENIGN POLYP WITH MELANOSIS COLI IN TRANSVERSE, RESCOPE IN 1 YR, DR. RADHA WHITT AT [facility]   • ENDOSCOPY N/A    • LUNG SURGERY Right 2009    RIGHT LOWER LOBE, BLOT CLOT REMOVED   • SIGMOIDOSCOPY N/A 9/24/2019    AN INFILTRATIVE NON OBSTRUCTING MASS IN SIGMOID, AREA TATTOOED, DR. RADHA WHITT AT [facility]   • TONSILLECTOMY AND ADENOIDECTOMY Bilateral     DURING  CHILDHOOD   • TOTAL KNEE ARTHROPLASTY Left 3/16/2021    Procedure: LEFT TOTAL KNEE ARTHROPLASTY WITH MARTHA NAVIGATION;  Surgeon: Abraham Waters MD;  Location: [facility] OR;  Service: Orthopedics;  Laterality: Left;   • UMBILICAL HERNIA REPAIR N/A 05/14/2014    DR. ROMERO SCHUSTER AT [facility]                       PT Assessment/Plan     Row Name 04/05/21 0818          PT Assessment    Assessment Comments  Patient s/p left TKA currently ambulating with SC. Tolerated advancment of exercises to HEP. Tightness noted post knee and thigh with manual. Introduces and instructed in ice massage  -WS       User Key  (r) = Recorded By, (t) = Taken By, (c) = Cosigned By    Initials Name Provider Type    WS Thaddeus Mac PTA Physical Therapy Assistant            OP Exercises     Row Name 04/05/21 0725 04/05/21 0700          Subjective Comments    Subjective Comments  still have alot of swelling  -WS  --        Subjective Pain    Able to rate subjective pain?  yes  -WS  --     Pre-Treatment Pain Level  -- 5.5  -WS  --        Total Minutes    06282 - PT Therapeutic Exercise Minutes  35  -WS  --     28924 - PT Manual Therapy Minutes  --  10  -WS        Exercise 1    Exercise Name 1  SLR w/ quad set  -WS  --     Cueing 1  Verbal;Tactile  -WS  --     Sets 1  1  -WS  --     Reps 1  10  -WS  --        Exercise 2    Exercise Name 2  heel prop on towel  -WS  --     Cueing 2  Verbal;Tactile  -WS  --     Sets 2  1  -WS  --     Time 2  2 minutes  -WS  --        Exercise 3    Exercise Name 3  sitting HS strecth  -WS  --     Cueing 3  Verbal;Demo  -WS  --     Reps 3  3  -WS  --     Time 3  20 sec  -WS  --        Exercise 4    Exercise Name 4  quad set on towel  -WS  --     Cueing 4  Verbal;Tactile  -WS  --     Sets 4  1  -WS  --     Reps 4  15  -WS  --     Time 4  3-5 sec  -WS  --        Exercise 5    Exercise Name 5  ankle pumps  -WS  --     Cueing 5  Verbal  -WS  --     Sets 5  1  -WS  --     Reps 5  10  -WS  --        Exercise 6     Exercise Name 6  stair knee flexion  -WS  --     Cueing 6  Verbal;Demo  -WS  --     Sets 6  1  -WS  --     Reps 6  3  -WS  --     Time 6  20 sec  -WS  --        Exercise 7    Exercise Name 7  stair HS strecth  -WS  --     Cueing 7  Verbal;Demo  -WS  --     Sets 7  1  -WS  --     Reps 7  3  -WS  --     Time 7  20 seconds  -WS  --        Exercise 8    Exercise Name 8  Nustep UE/LE L5  -WS  --     Time 8  5 min  -WS  --        Exercise 9    Exercise Name 9  calf raise  -WS  --     Reps 9  10  -WS  --        Exercise 10    Exercise Name 10  LAQ  -WS  --        Exercise 11    Exercise Name 11  standing HS curl  -WS  --     Reps 11  10  -WS  --       User Key  (r) = Recorded By, (t) = Taken By, (c) = Cosigned By    Initials Name Provider Type    Thaddeus Hays PTA Physical Therapy Assistant                      Manual Rx (last 36 hours)      Manual Treatments     Row Name 04/05/21 0700             Total Minutes    53947 - PT Manual Therapy Minutes  10  -WS         Manual Rx 1    Manual Rx 1 Location  left knee  -WS      Manual Rx 1 Type  manual flex, extension, manaul massage med/lat knee and thigh, retrograde  -WS      Manual Rx 1 Duration  10  -WS        User Key  (r) = Recorded By, (t) = Taken By, (c) = Cosigned By    Initials Name Provider Type    Tahddeus Hays PTA Physical Therapy Assistant          PT OP Goals     Row Name 04/05/21 0800          PT Short Term Goals    STG Date to Achieve  04/30/21  -     STG 1  The pt will demonstrate IND and compliant with initial HEP focused on improved Left knee mobility and quad strength for increased functional independence  -WS     STG 1 Progress  Ongoing  -WS     STG 2  The pt will demonstrate L knee ext AROM to only lacking 15 degrees or less for improved gait pattern.  -     STG 2 Progress  Ongoing  -     STG 3  The pt will ambulate with SPC over even ground with near normal gait pattern for improved community navigation.  -     STG 3 Progress   Ongoing  -        Long Term Goals    LTG Date to Achieve  06/29/21  -     LTG 1  The pt will demonstrate IND with progressive HEP focused on IND condition management and return to PLOF.  -     LTG 1 Progress  Ongoing  -     LTG 2  The pt will demonstrate L knee flex PROM to at least 120 or greater for improved transitional position and stair navigation.  -     LTG 2 Progress  Ongoing  -     LTG 3  The pt will resume reciprocal stair navigation for improved community and household navigation.  -     LTG 3 Progress  Ongoing  -     LTG 4  The pt will demonstrate L knee AROM to at least 0-120 for improved functional mobility.  -     LTG 4 Progress  Ongoing  -     LTG 5  The pt will demonstrate LLE strength to at least 4+/5 for improved stair navigation and transitional position performance.  -     LTG 5 Progress  Ongoing  -       User Key  (r) = Recorded By, (t) = Taken By, (c) = Cosigned By    Initials Name Provider Type    Thaddeus Hays PTA Physical Therapy Assistant          Therapy Education  Given: HEP, Symptoms/condition management, Pain management, Posture/body mechanics  Program: Reinforced  How Provided: Verbal  Provided to: Patient  Level of Understanding: Teach back education performed              Time Calculation:   Start Time: 0725  Stop Time: 0810  Time Calculation (min): 45 min  Therapy Charges for Today     Code Description Service Date Service Provider Modifiers Qty    06896779099  PT THER PROC EA 15 MIN 4/5/2021 Thaddeus Mac PTA GP 2    64570858391 HC PT MANUAL THERAPY EA 15 MIN 4/5/2021 Thaddeus Mac PTA GP 1                    Thaddeus Mac PTA  4/5/2021

## 2021-04-07 NOTE — PROGRESS NOTES
"[facility] OUTPATIENT FOLLOW UP CLINIC VISIT    REASON FOR FOLLOW-UP:    1.  History of unprovoked pulmonary embolism with a positive lupus anticoagulant test in June 2010.  Chronically anticoagulated with Xarelto 20 mg daily.  Last annual follow-up with Dr. Monroy on 9/26/2018.  2.  Iron deficiency anemia diagnosed at the time of hospitalization on 9/19/2019.  He received a total of 900 mg of intravenous Venofer.  3.  Stage IIIa (pT1, pN1b) adenocarcinoma of the sigmoid colon status post laparoscopic low anterior resection on 9/26/2019.  1.6 x 1.4 cm tumor, grade 2, moderately differentiated with widely negative margins with 2 out of 15 lymph nodes positive for metastatic disease without evidence for extracapsular extension, greatest measuring 3.5 mm.  Microsatellite stable.  4.  Acute right upper extremity superficial thrombophlebitis in the cephalic vein diagnosed on 9/27/2019.  5.  Acute left upper extremity DVT in the brachial vein and acute left upper extremity superficial thrombophlebitis in the basilic vein on 9/27/2019.  Associated with an intravenous catheter.  On Xarelto.  6.  Four cycles of adjuvant CAPEOX complete 1/23/2020    HISTORY OF PRESENT ILLNESS:  Jared Rose is a 65 y.o. male who returns today for follow up.    He had a left total knee replacement on 3/16.  He is recovering from this.  He continues to have some pain, knee swelling, and stiffness.  He denies any respiratory symptoms.  No bleeding.      REVIEW OF SYSTEMS:  Left knee pain and stiffness    Vitals:    04/08/21 0947   BP: 154/82   Pulse: 110   Resp: 16   Temp: 97.5 °F (36.4 °C)   TempSrc: Temporal   SpO2: 97%   Weight: 119 kg (262 lb 1.6 oz)   Height: 175.8 cm (69.21\")   PainSc: 0-No pain  Comment: DVT       PHYSICAL EXAMINATION:  GENERAL:  Well-developed well-nourished male; awake, alert and oriented, in no acute distress.  Wearing a face mask.  Walks with a cane.  SKIN:  Warm and dry, without rashes, purpura, or " petechiae.  HEAD:  Normocephalic, atraumatic.  EARS:  Hearing intact.  LYMPHATICS:  No cervical, supraclavicular, or axillary lymphadenopathy.  CHEST:  Lungs are clear to auscultation bilaterally.  No wheezes, rales, or rhonchi.  HEART:  Regular rate; normal rhythm.  No murmurs, gallops or rubs.  ABDOMEN: Soft, nontender.    EXTREMITIES: Left knee is stiff and swollen.  NEUROLOGICAL:  No focal neurologic deficits.      DIAGNOSTIC DATA:    CEA (04/02/2021 09:25)  Comprehensive Metabolic Panel (04/02/2021 09:25)  CBC & Differential (04/02/2021 09:25)      IMAGING:   CT Abdomen Pelvis With Contrast (04/02/2021 09:42)    Images reviewed. 1 cm RLL nodule.    ASSESSMENT:  This is a 65 y.o. male with:  *History of unprovoked pulmonary embolism with a positive lupus anticoagulant test in June 2010.    · Chronically anticoagulated with Xarelto 20 mg daily.    · Last annual meeting with Dr. Monroy on 9/26/2019    *Iron deficiency anemia diagnosed at the time of hospitalization on 9/19/2019.    · He received a total of 900 mg of intravenous Venofer  · Due to colon ca  · Normalized    *Stage IIIa (pT1, pN1b) adenocarcinoma of the sigmoid colon   · Preop CEA 9/25/19 1.28  · Status post laparoscopic low anterior resection on 9/26/2019.    · 1.6 x 1.4 cm tumor, grade 2, moderately differentiated with widely negative margins with 2 out of 15 lymph nodes positive for metastatic disease without evidence for extracapsular extension, greatest measuring 3.5 mm.  Microsatellite stable.  · Discussed pursuing adjuvant therapy with CAPEOX for at least 3 months. This is in accordance with NCCN guidelines.  · 4 cycles of therapy complete as of 1/23/2020.  · CT imaging on 7/17/2020 with some concerning findings.  I alerted his surgeon Dr. Whitt.  He elected to proceed with CT imaging of the abdomen and pelvis in 3 months.  Follow up CT imaging of the abdomen and pelvis performed on 10/16/2020 stable.    *Acute right upper extremity superficial  thrombophlebitis in the cephalic vein diagnosed on 9/27/2019.  Acute left upper extremity DVT in the brachial vein and acute left upper extremity superficial thrombophlebitis in the basilic vein on 9/27/2019.    · Associated with an intravenous catheter.    · Repeat left upper extremity venous duplex on 1/16/2020 with chronic left upper extremity superficial thrombophlebitis in the basilic vein.  No DVT.    · He continues Xarelto 20 mg daily.    *Abdominal distention: I think this is related to his ventral hernia.  He is asymptomatic otherwise without pain or GI problems.  We will follow this also with CT imaging in 3 months as planned otherwise.    *Dyspnea on exertion: His lungs are clear today without wheezing.  He does note occasional wheezing he states.  It sounds like this is mild and not bothering him much.  I did advise some weight loss which I think will help with this.    *s/p L total knee replacement on 3/16/2021    *1 cm RLL pulm nodule on CT abd/pelvis 4/2/2021    PLAN:  1. Continue Xarelto.  He is chronically anticoagulated.    2. PET scan with further consideration of a needle biopsy vs referral to thoracic surgery to consider resection if this is FDG avid and a solitary nodule.   3. In addition, we will also send [employer] in search of circulating tumor cells that would predict colon cancer recurrence.  4. Follow-up to be determined based on results of the PET scan.    I did spend 40 minutes in this visit today reviewing his record, communicating with him and our staff, speaking with a [employer] representative, and documenting the encounter.

## 2021-04-08 ENCOUNTER — OFFICE VISIT (OUTPATIENT)
Dept: ONCOLOGY | Facility: CLINIC | Age: 66
End: 2021-04-08

## 2021-04-08 ENCOUNTER — HOSPITAL ENCOUNTER (OUTPATIENT)
Dept: PHYSICAL THERAPY | Facility: HOSPITAL | Age: 66
Setting detail: THERAPIES SERIES
Discharge: HOME | End: 2021-04-08

## 2021-04-08 ENCOUNTER — OFFICE VISIT (OUTPATIENT)
Dept: LAB | Facility: HOSPITAL | Age: 66
End: 2021-04-08

## 2021-04-08 VITALS
BODY MASS INDEX: 38.82 KG/M2 | DIASTOLIC BLOOD PRESSURE: 82 MMHG | SYSTOLIC BLOOD PRESSURE: 154 MMHG | HEIGHT: 69 IN | WEIGHT: 262.1 LBS | HEART RATE: 110 BPM | OXYGEN SATURATION: 97 % | TEMPERATURE: 97.5 F | RESPIRATION RATE: 16 BRPM

## 2021-04-08 DIAGNOSIS — Z47.89 ORTHOPEDIC AFTERCARE: Primary | ICD-10-CM

## 2021-04-08 DIAGNOSIS — Z98.890 S/P LEFT KNEE SURGERY: ICD-10-CM

## 2021-04-08 DIAGNOSIS — R26.9 GAIT DIFFICULTY: ICD-10-CM

## 2021-04-08 DIAGNOSIS — R91.1 PULMONARY NODULE: ICD-10-CM

## 2021-04-08 DIAGNOSIS — Z47.1 AFTERCARE FOLLOWING LEFT KNEE JOINT REPLACEMENT SURGERY: ICD-10-CM

## 2021-04-08 DIAGNOSIS — Z96.652 AFTERCARE FOLLOWING LEFT KNEE JOINT REPLACEMENT SURGERY: ICD-10-CM

## 2021-04-08 DIAGNOSIS — C18.7 MALIGNANT NEOPLASM OF SIGMOID COLON (HCC): Primary | ICD-10-CM

## 2021-04-08 DIAGNOSIS — C18.7 MALIGNANT NEOPLASM OF SIGMOID COLON (HCC): ICD-10-CM

## 2021-04-08 PROCEDURE — 36415 COLL VENOUS BLD VENIPUNCTURE: CPT

## 2021-04-08 PROCEDURE — 97110 THERAPEUTIC EXERCISES: CPT

## 2021-04-08 PROCEDURE — 97140 MANUAL THERAPY 1/> REGIONS: CPT

## 2021-04-08 PROCEDURE — 99215 OFFICE O/P EST HI 40 MIN: CPT | Performed by: INTERNAL MEDICINE

## 2021-04-08 NOTE — THERAPY TREATMENT NOTE
Outpatient Physical Therapy Ortho Treatment Note  [facility]     Patient Name: Jared Rose  : 1955  MRN: [id number]  Today's Date: 2021      Visit Date: 2021    Visit Dx:    ICD-10-CM ICD-9-CM   1. Orthopedic aftercare  Z47.89 V54.9   2. S/P left knee surgery  Z98.890 V45.89   3. Gait difficulty  R26.9 781.2   4. Aftercare following left knee joint replacement surgery  Z47.1 V54.81    Z96.652 V43.65       Patient Active Problem List   Diagnosis   • Thrombophilia (CMS/HCC)   • Lupus anticoagulant disorder (CMS/HCC)   • Chronic anticoagulation   • Coronary atherosclerosis   • Hypertension   • Ventral hernia without obstruction or gangrene   • Hyperlipidemia   • Hematuria   • Dyspnea on exertion   • Symptomatic anemia   • History of pulmonary embolus (PE)   • Colonic mass   • Malignant neoplasm of sigmoid colon (CMS/HCC)   • Iron deficiency anemia due to chronic blood loss   • Arthritis of left knee   • History of DVT (deep vein thrombosis)        Past Medical History:   Diagnosis Date   • Acute deep vein thrombosis (DVT) of upper extremity (CMS/HCC) 2019   • At risk for sleep apnea     6   • Bell's palsy 2011    SEEN AT [facility] ER   • Blister of foot 2017    RIGHT FOOT   • Chronic anticoagulation    • Chronic fatigue    • Chronic left-sided low back pain without sciatica    • Colon polyps     FOLLOWED BY DR. RADHA DONOVAN   • Coronary atherosclerosis     cath 2015: normal LM, diffuse LCx disease, LI LAD, 60-70% ostial diag (<1 mm vessel), LI RCA   • COVID-19 virus detected     2020  [facility].    • Dermoid cyst of leg, right 2017   • ED (erectile dysfunction)    • Exomphalos 2013   • GI hemorrhage 2019    ADMITTED TO [facility]   • H/O Anemia    • H/O Leukopenia    • History of chemotherapy    • History of transfusion     no reaction   • Hyperlipidemia    • Hypertension    • Knee pain    • Lupus anticoagulant disorder (CMS/HCC) 2016   • Muscle spasm of  back 10/2018   • OA (osteoarthritis)    • Obesity    • Pulmonary embolism (CMS/HCC) 06/08/2010     Right lower lobe pulmonary embolus, ADMITTED TO [facility]   • Pulmonary embolus (CMS/HCC) 5/6/2016   • Rectal bleeding 09/2019   • Rectal cancer (CMS/HCC) 09/24/2019    MODERATELY DIFFERENTIATED ADENOCARCINOMA GRADE 2, FOLLOWED BY DR. RADHA WHITT   • Sprain of right shoulder 10/21/2020    SEEN AT [facility] ER   • Strain of left shoulder 10/2020   • Stress fracture of right foot 05/11/2013    4TH METATARSAL, SEEN AT [facility] ER   • Thrombophilia (CMS/HCC)     FOLLOWED BY DR. BALAJI MCGEE   • Tinea pedis 11/25/2007    SEEN AT [facility] ER        Past Surgical History:   Procedure Laterality Date   • CARDIAC CATHETERIZATION Left 05/11/2006    NORMAL LV SYSTOLIC FUNCTION(EF 50%), MOD DZ OF 2 SMALL CORONARY BRANCHES (D2 AND OM2), DR. BOOM GALLEGO AT [facility]   • CARDIAC CATHETERIZATION Left 07/20/2015    NORMAL LM, DIFFUSE LCx DZ, LI LAD, 60-70% OSTIAL DIAG(<1MM VESSEL), 10%STENOSIS IN LAD, DR. CHERI VARGAS AT [facility]   • CLOSED REDUCTION WRIST FRACTURE Right    • COLON RESECTION N/A 9/26/2019    Procedure: LOW ANTERIOR COLON RESECTION IMMOBILIZATION OF SPLENIC FLEXURE;  Surgeon: Radha Whitt MD;  Location: [facility];  Service: General   • COLONOSCOPY N/A 9/21/2019    INT/EXT HEMORRHOIDS, 18 MM POLYPOID LESION IN MID SIGMOID, PATH: INVASIVE MODERATELY DIFFERENTIATED GRADE 2 ADENOCARCINOMA, 2 TUBULOVILLOUS ADENOMA POLYPS IN CECUM, ADENOMATOUS POLYP IN TRANSVERSE, ADENOMATOUS POLYP IN ASCENDING, MULTIPLE SMALL AND LARGE DIVERTICULA, DR. TRUONG MONTEZ AT [facility]   • COLONOSCOPY N/A 3/10/2021    5 MM BENIGN POLYP WITH MELANOSIS COLI IN TRANSVERSE, RESCOPE IN 1 YR, DR. RADHA WHITT AT [facility]   • ENDOSCOPY N/A    • LUNG SURGERY Right 2009    RIGHT LOWER LOBE, BLOT CLOT REMOVED   • SIGMOIDOSCOPY N/A 9/24/2019    AN INFILTRATIVE NON OBSTRUCTING MASS IN SIGMOID, AREA TATTOOED, DR. RADHA WHITT AT [facility]   • TONSILLECTOMY AND ADENOIDECTOMY Bilateral     DURING  CHILDHOOD   • TOTAL KNEE ARTHROPLASTY Left 3/16/2021    Procedure: LEFT TOTAL KNEE ARTHROPLASTY WITH MARTHA NAVIGATION;  Surgeon: Abraham Waters MD;  Location: [facility] OR;  Service: Orthopedics;  Laterality: Left;   • UMBILICAL HERNIA REPAIR N/A 05/14/2014    DR. ROMERO SCHUSTER AT [facility]                       PT Assessment/Plan     Row Name 04/08/21 0811          PT Assessment    Assessment Comments  Patient is s/p left TKA currently ambulating with SC with flexed knee. Tolerated ankle weights on several exercises. Working on improving knee extension  -WS       User Key  (r) = Recorded By, (t) = Taken By, (c) = Cosigned By    Initials Name Provider Type    WS Thaddeus Mac PTA Physical Therapy Assistant            OP Exercises     Row Name 04/08/21 0725 04/08/21 0700          Subjective Comments    Subjective Comments  knee still real stiff  -WS  --        Subjective Pain    Able to rate subjective pain?  yes  -WS  --     Pre-Treatment Pain Level  6  -WS  --        Total Minutes    38132 - PT Therapeutic Exercise Minutes  34  -WS  --     77661 - PT Manual Therapy Minutes  --  12  -WS        Exercise 1    Exercise Name 1  SLR w/ quad set  -WS  --     Cueing 1  Verbal;Tactile  -WS  --     Sets 1  1  -WS  --     Reps 1  10  -WS  --        Exercise 2    Exercise Name 2  heel prop on towel  -WS  --     Cueing 2  Verbal;Tactile  -WS  --     Sets 2  1  -WS  --     Time 2  2 minutes  -WS  --        Exercise 3    Exercise Name 3  sitting HS strecth  -WS  --     Cueing 3  Verbal;Demo  -WS  --     Reps 3  3  -WS  --     Time 3  20 sec  -WS  --        Exercise 4    Exercise Name 4  quad set on towel  -WS  --     Cueing 4  Verbal;Tactile  -WS  --     Sets 4  1  -WS  --     Reps 4  15  -WS  --     Time 4  3-5 sec  -WS  --        Exercise 5    Exercise Name 5  ankle pumps  -WS  --     Cueing 5  Verbal  -WS  --     Sets 5  1  -WS  --     Reps 5  10  -WS  --        Exercise 6    Exercise Name 6  stair knee flexion  -WS  --      Cueing 6  Verbal;Demo  -WS  --     Sets 6  1  -WS  --     Reps 6  3  -WS  --     Time 6  20 sec  -WS  --        Exercise 7    Exercise Name 7  stair HS strecth  -WS  --     Cueing 7  Verbal;Demo  -WS  --     Sets 7  1  -WS  --     Reps 7  3  -WS  --     Time 7  20 seconds  -WS  --        Exercise 8    Exercise Name 8  Nustep UE/LE L5  -WS  --     Time 8  5 min  -WS  --        Exercise 9    Exercise Name 9  calf raise  -WS  --     Reps 9  15  -WS  --        Exercise 10    Exercise Name 10  LAQ  -WS  --     Reps 10  15  -WS  --     Additional Comments  3#  -WS  --        Exercise 11    Exercise Name 11  standing HS curl  -WS  --     Reps 11  15  -WS  --     Additional Comments  2#  -WS  --        Exercise 12    Exercise Name 12  add sidestep  -WS  --        Exercise 13    Exercise Name 13  mini squat  -WS  --     Reps 13  15  -WS  --        Exercise 14    Exercise Name 14  add jim jim  -WS  --        Exercise 15    Exercise Name 15  add seated TKE into leni  -WS  --       User Key  (r) = Recorded By, (t) = Taken By, (c) = Cosigned By    Initials Name Provider Type     Thaddeus Mac PTA Physical Therapy Assistant                      Manual Rx (last 36 hours)      Manual Treatments     Row Name 04/08/21 0700             Total Minutes    32058 - PT Manual Therapy Minutes  12  -WS         Manual Rx 1    Manual Rx 1 Location  left knee  -WS      Manual Rx 1 Type  manual flex, extension, manaul massage med/lat knee and thigh, retrograde  -WS      Manual Rx 1 Duration  12  -WS        User Key  (r) = Recorded By, (t) = Taken By, (c) = Cosigned By    Initials Name Provider Type     Thaddeus Mac PTA Physical Therapy Assistant          PT OP Goals     Row Name 04/08/21 0800          PT Short Term Goals    STG Date to Achieve  04/30/21  -WS     STG 1  The pt will demonstrate IND and compliant with initial HEP focused on improved Left knee mobility and quad strength for increased functional independence  -WS      STG 1 Progress  Ongoing  -     STG 2  The pt will demonstrate L knee ext AROM to only lacking 15 degrees or less for improved gait pattern.  -     STG 2 Progress  Ongoing  -     STG 3  The pt will ambulate with SPC over even ground with near normal gait pattern for improved community navigation.  -     STG 3 Progress  Ongoing  -        Long Term Goals    LTG Date to Achieve  06/29/21  -     LTG 1  The pt will demonstrate IND with progressive HEP focused on IND condition management and return to PLOF.  -     LTG 1 Progress  Ongoing  -     LTG 2  The pt will demonstrate L knee flex PROM to at least 120 or greater for improved transitional position and stair navigation.  -     LTG 2 Progress  Ongoing  -     LTG 3  The pt will resume reciprocal stair navigation for improved community and household navigation.  -     LTG 3 Progress  Ongoing  -     LTG 4  The pt will demonstrate L knee AROM to at least 0-120 for improved functional mobility.  -     LTG 4 Progress  Ongoing  -     LTG 5  The pt will demonstrate LLE strength to at least 4+/5 for improved stair navigation and transitional position performance.  -     LTG 5 Progress  Ongoing  -       User Key  (r) = Recorded By, (t) = Taken By, (c) = Cosigned By    Initials Name Provider Type     Thaddeus Mac PTA Physical Therapy Assistant          Therapy Education  Given: HEP, Posture/body mechanics, Symptoms/condition management  Program: Reinforced  How Provided: Verbal, Demonstration  Provided to: Patient              Time Calculation:   Start Time: 0725  Stop Time: 0811  Time Calculation (min): 46 min  Therapy Charges for Today     Code Description Service Date Service Provider Modifiers Qty    85152014253 HC PT THER PROC EA 15 MIN 4/8/2021 Thaddeus Mac PTA GP 2    65089934673 HC PT MANUAL THERAPY EA 15 MIN 4/8/2021 Thaddeus Mac PTA GP 1                    Thaddeus Mac PTA  4/8/2021

## 2021-04-12 ENCOUNTER — HOSPITAL ENCOUNTER (OUTPATIENT)
Dept: PHYSICAL THERAPY | Facility: HOSPITAL | Age: 66
Setting detail: THERAPIES SERIES
Discharge: HOME | End: 2021-04-12

## 2021-04-12 DIAGNOSIS — Z96.652 AFTERCARE FOLLOWING LEFT KNEE JOINT REPLACEMENT SURGERY: ICD-10-CM

## 2021-04-12 DIAGNOSIS — Z98.890 S/P LEFT KNEE SURGERY: ICD-10-CM

## 2021-04-12 DIAGNOSIS — Z47.89 ORTHOPEDIC AFTERCARE: Primary | ICD-10-CM

## 2021-04-12 DIAGNOSIS — Z47.1 AFTERCARE FOLLOWING LEFT KNEE JOINT REPLACEMENT SURGERY: ICD-10-CM

## 2021-04-12 DIAGNOSIS — R26.9 GAIT DIFFICULTY: ICD-10-CM

## 2021-04-12 PROCEDURE — 97140 MANUAL THERAPY 1/> REGIONS: CPT

## 2021-04-12 PROCEDURE — 97110 THERAPEUTIC EXERCISES: CPT

## 2021-04-12 NOTE — THERAPY TREATMENT NOTE
Outpatient Physical Therapy Ortho Treatment Note  [facility]     Patient Name: Jared Rose  : 1955  MRN: [id number]  Today's Date: 2021      Visit Date: 2021    Visit Dx:    ICD-10-CM ICD-9-CM   1. Orthopedic aftercare  Z47.89 V54.9   2. S/P left knee surgery  Z98.890 V45.89   3. Gait difficulty  R26.9 781.2   4. Aftercare following left knee joint replacement surgery  Z47.1 V54.81    Z96.652 V43.65       Patient Active Problem List   Diagnosis   • Thrombophilia (CMS/HCC)   • Lupus anticoagulant disorder (CMS/HCC)   • Chronic anticoagulation   • Coronary atherosclerosis   • Hypertension   • Ventral hernia without obstruction or gangrene   • Hyperlipidemia   • Hematuria   • Dyspnea on exertion   • Symptomatic anemia   • History of pulmonary embolus (PE)   • Colonic mass   • Malignant neoplasm of sigmoid colon (CMS/HCC)   • Iron deficiency anemia due to chronic blood loss   • Arthritis of left knee   • History of DVT (deep vein thrombosis)   • Pulmonary nodule        Past Medical History:   Diagnosis Date   • Acute deep vein thrombosis (DVT) of upper extremity (CMS/HCC) 2019   • At risk for sleep apnea     6   • Bell's palsy 2011    SEEN AT [facility] ER   • Blister of foot 2017    RIGHT FOOT   • Chronic anticoagulation    • Chronic fatigue    • Chronic left-sided low back pain without sciatica    • Colon polyps     FOLLOWED BY DR. RADHA DONOVAN   • Coronary atherosclerosis     cath 2015: normal LM, diffuse LCx disease, LI LAD, 60-70% ostial diag (<1 mm vessel), LI RCA   • COVID-19 virus detected     2020  [facility].    • Dermoid cyst of leg, right 2017   • ED (erectile dysfunction)    • Exomphalos 2013   • GI hemorrhage 2019    ADMITTED TO [facility]   • H/O Anemia    • H/O Leukopenia    • History of chemotherapy    • History of transfusion     no reaction   • Hyperlipidemia    • Hypertension    • Knee pain    • Lupus anticoagulant disorder (CMS/HCC)  5/6/2016   • Muscle spasm of back 10/2018   • OA (osteoarthritis)    • Obesity    • Pulmonary embolism (CMS/HCC) 06/08/2010     Right lower lobe pulmonary embolus, ADMITTED TO [facility]   • Pulmonary embolus (CMS/HCC) 5/6/2016   • Rectal bleeding 09/2019   • Rectal cancer (CMS/HCC) 09/24/2019    MODERATELY DIFFERENTIATED ADENOCARCINOMA GRADE 2, FOLLOWED BY DR. RADHA WHITT   • Sprain of right shoulder 10/21/2020    SEEN AT [facility] ER   • Strain of left shoulder 10/2020   • Stress fracture of right foot 05/11/2013    4TH METATARSAL, SEEN AT [facility] ER   • Thrombophilia (CMS/HCC)     FOLLOWED BY DR. BALAJI MCGEE   • Tinea pedis 11/25/2007    SEEN AT [facility] ER        Past Surgical History:   Procedure Laterality Date   • CARDIAC CATHETERIZATION Left 05/11/2006    NORMAL LV SYSTOLIC FUNCTION(EF 50%), MOD DZ OF 2 SMALL CORONARY BRANCHES (D2 AND OM2), DR. BOOM GALLEGO AT [facility]   • CARDIAC CATHETERIZATION Left 07/20/2015    NORMAL LM, DIFFUSE LCx DZ, LI LAD, 60-70% OSTIAL DIAG(<1MM VESSEL), 10%STENOSIS IN LAD, DR. CHERI VARGAS AT [facility]   • CLOSED REDUCTION WRIST FRACTURE Right    • COLON RESECTION N/A 9/26/2019    Procedure: LOW ANTERIOR COLON RESECTION IMMOBILIZATION OF SPLENIC FLEXURE;  Surgeon: Radha Whitt MD;  Location: [facility];  Service: General   • COLONOSCOPY N/A 9/21/2019    INT/EXT HEMORRHOIDS, 18 MM POLYPOID LESION IN MID SIGMOID, PATH: INVASIVE MODERATELY DIFFERENTIATED GRADE 2 ADENOCARCINOMA, 2 TUBULOVILLOUS ADENOMA POLYPS IN CECUM, ADENOMATOUS POLYP IN TRANSVERSE, ADENOMATOUS POLYP IN ASCENDING, MULTIPLE SMALL AND LARGE DIVERTICULA, DR. TRUONG MONTEZ AT [facility]   • COLONOSCOPY N/A 3/10/2021    5 MM BENIGN POLYP WITH MELANOSIS COLI IN TRANSVERSE, RESCOPE IN 1 YR, DR. RADHA WHITT AT [facility]   • ENDOSCOPY N/A    • LUNG SURGERY Right 2009    RIGHT LOWER LOBE, BLOT CLOT REMOVED   • SIGMOIDOSCOPY N/A 9/24/2019    AN INFILTRATIVE NON OBSTRUCTING MASS IN SIGMOID, AREA TATTOOED, DR. RADHA WHITT AT [facility]   • TONSILLECTOMY AND  ADENOIDECTOMY Bilateral     DURING CHILDHOOD   • TOTAL KNEE ARTHROPLASTY Left 3/16/2021    Procedure: LEFT TOTAL KNEE ARTHROPLASTY WITH MARTHA NAVIGATION;  Surgeon: Abraham Waters MD;  Location: [facility];  Service: Orthopedics;  Laterality: Left;   • UMBILICAL HERNIA REPAIR N/A 05/14/2014    DR. ROMERO SCHUSTER AT [facility]       PT Ortho     Row Name 04/12/21 0700       Left Lower Ext    Lt Knee Extension/Flexion AROM  AAROM lacing   (Pended)  supine  -AB    Lt Knee Extension/Flexion PROM  lacking    (Pended)  supine  -AB      User Key  (r) = Recorded By, (t) = Taken By, (c) = Cosigned By    Initials Name Provider Type    AB Titus Carrero, PT Student PT Student                      PT Assessment/Plan     Row Name 04/12/21 0700          PT Assessment    Assessment Comments  patient has been seen for 4 skilled visits for s/p L TKA on 3/16/21. patient has met 1/3 STG and 0/5 LTG while progressing towards all other. Patient ambulates with SPC and poor heel/toe pattern secondary to lacking knee extension/flexion at this time. Patient improving knee extension to lacking 13 on this date. Continues to report 6/10 pain that improves by the end of session. Reports that he is going back to work on 5/17/21, should be able to start backon light duty/desk work per patient. Manual therapy for STM due to tightness in gastroc, distal hamstrings, and quads. progressed POC via addition on standing TKE agsinst ball, weighted heel props, stair stretchs for improved knee flexability.   (Pended)   -AB        PT Plan    PT Plan Comments  consider prone knee hangs if patient can tolerate prone, if patients flexion improvves slightly can trial recBike, gait training with heel/toe and proper knee kinematic focus, foot strapp for therapist aassisted knee flexion/distraction  (Pended)   -AB       User Key  (r) = Recorded By, (t) = Taken By, (c) = Cosigned By    Initials Name Provider Type    AB Titus Carrero, PT Student PT  Student            OP Exercises     Row Name 04/12/21 0749 04/12/21 0700          Subjective Comments    Subjective Comments  --  pain is about the same. My calf is really tight when I try to extend my knee.   (Pended)   -AB        Subjective Pain    Able to rate subjective pain?  --  yes  (Pended)   -AB     Pre-Treatment Pain Level  --  6  (Pended)   -AB        Total Minutes    75690 - PT Therapeutic Exercise Minutes  --  (Pended)   -AB  35  (Pended)   -AB     09739 - PT Manual Therapy Minutes  6  (Pended)   -AB  6  (Pended)   -AB        Exercise 1    Exercise Name 1  --  SLR w/ quad set  (Pended)   -AB     Cueing 1  --  Verbal;Tactile  (Pended)   -AB     Sets 1  --  1  (Pended)   -AB     Reps 1  --  10  (Pended)   -AB     Additional Comments  --  focus on QS prior to SLR  (Pended)   -AB        Exercise 2    Exercise Name 2  --  heel prop on towel  (Pended)   -AB     Cueing 2  --  Verbal;Tactile  (Pended)   -AB     Sets 2  --  1  (Pended)   -AB     Time 2  --  2 minutes  (Pended)   -AB     Additional Comments  --  3#  (Pended)   -AB        Exercise 4    Exercise Name 4  --  quad set on towel  (Pended)   -AB     Cueing 4  --  Verbal;Tactile  (Pended)   -AB     Sets 4  --  1  (Pended)   -AB     Reps 4  --  15  (Pended)   -AB     Time 4  --  3-5 sec  (Pended)   -AB        Exercise 6    Exercise Name 6  --  stair knee flexion  (Pended)   -AB     Cueing 6  --  Verbal;Demo  (Pended)   -AB     Sets 6  --  1  (Pended)   -AB     Reps 6  --  3  (Pended)   -AB     Time 6  --  20 sec  (Pended)   -AB        Exercise 7    Exercise Name 7  --  stair HS strecth  (Pended)   -AB     Cueing 7  --  Verbal;Demo  (Pended)   -AB     Sets 7  --  1  (Pended)   -AB     Reps 7  --  3  (Pended)   -AB     Time 7  --  20 seconds  (Pended)   -AB     Additional Comments  --  focus on bending at hip for lengthening of HS  (Pended)   -AB        Exercise 8    Exercise Name 8  --  Nustep UE/LE L5  (Pended)   -AB     Time 8  --  5 min  (Pended)   -AB         Exercise 9    Exercise Name 9  --  calf raise on stiar  (Pended)   -AB     Cueing 9  --  Verbal;Demo  (Pended)   -AB     Sets 9  --  2  (Pended)   -AB     Reps 9  --  10  (Pended)   -AB     Time 9  --  2-3 seconds  (Pended)   -AB     Additional Comments  --  eccentric/concentric  (Pended)   -AB        Exercise 13    Exercise Name 13  --  mini squat  (Pended)   -AB     Cueing 13  --  Verbal;Demo  (Pended)   -AB     Sets 13  --  1  (Pended)   -AB     Reps 13  --  15  (Pended)   -AB     Additional Comments  --  weight on heels  (Pended)   -AB        Exercise 15    Exercise Name 15  --  Standing TKE against wall w/ ball  (Pended)   -AB     Cueing 15  --  Verbal;Demo  (Pended)   -AB     Sets 15  --  2  (Pended)   -AB     Reps 15  --  10  (Pended)   -AB       User Key  (r) = Recorded By, (t) = Taken By, (c) = Cosigned By    Initials Name Provider Type    Titus Rizvi, PT Student PT Student                      Manual Rx (last 36 hours)      Manual Treatments     Row Name 04/12/21 0749 04/12/21 0700          Total Minutes    52553 - PT Manual Therapy Minutes  6  (Pended)   -AB  6  (Pended)   -AB        Manual Rx 1    Manual Rx 1 Location  --  Left knee  (Pended)   -AB     Manual Rx 1 Type  --  STM to medial/lateral gastoc, distal hamstrings, medial/lateral distal quads  (Pended)   -AB     Manual Rx 1 Duration  --  6  (Pended)   -AB       User Key  (r) = Recorded By, (t) = Taken By, (c) = Cosigned By    Initials Name Provider Type    Titus Rizvi, PT Student PT Student          PT OP Goals     Row Name 04/12/21 0700          PT Short Term Goals    STG Date to Achieve  04/30/21  (Pended)   -AB     STG 1  The pt will demonstrate IND and compliant with initial HEP focused on improved Left knee mobility and quad strength for increased functional independence  (Pended)   -AB     STG 1 Progress  Ongoing  (Pended)   -AB     STG 2  The pt will demonstrate L knee ext AROM to only lacking 15 degrees or less for  improved gait pattern.  (Pended)   -AB     STG 2 Progress  Met  (Pended)   -AB     STG 2 Progress Comments  patient lacing 13 degrees extnesion this date  (Pended)   -AB     STG 3  The pt will ambulate with SPC over even ground with near normal gait pattern for improved community navigation.  (Pended)   -AB     STG 3 Progress  Ongoing  (Pended)   -AB        Long Term Goals    LTG Date to Achieve  06/29/21  (Pended)   -AB     LTG 1  The pt will demonstrate IND with progressive HEP focused on IND condition management and return to PLOF.  (Pended)   -AB     LTG 1 Progress  Ongoing  (Pended)   -AB     LTG 2  The pt will demonstrate L knee flex PROM to at least 120 or greater for improved transitional position and stair navigation.  (Pended)   -AB     LTG 2 Progress  Ongoing;Progressing  (Pended)   -AB     LTG 2 Progress Comments  PROM this session of lacking   (Pended)   -AB     LTG 3  The pt will resume reciprocal stair navigation for improved community and household navigation.  (Pended)   -AB     LTG 3 Progress  Ongoing  (Pended)   -AB     LTG 4  The pt will demonstrate L knee AROM to at least 0-120 for improved functional mobility.  (Pended)   -AB     LTG 4 Progress  Ongoing  (Pended)   -AB     LTG 4 Progress Comments  AROM this session lacking   (Pended)   -AB     LTG 5  The pt will demonstrate LLE strength to at least 4+/5 for improved stair navigation and transitional position performance.  (Pended)   -AB     LTG 5 Progress  Ongoing  (Pended)   -AB       User Key  (r) = Recorded By, (t) = Taken By, (c) = Cosigned By    Initials Name Provider Type    Titus Rizvi, PT Student PT Student          Therapy Education  Education Details: (P) updated HEP, education on importance of knee flexion/extension for proper gait patternand decrease pain levels, sleeping postitions  Given: (P) HEP, Symptoms/condition management, Pain management, Mobility training  Program: (P) Reinforced, Progressed  How  Provided: (P) Verbal, Demonstration, Written  Provided to: (P) Patient  Level of Understanding: (P) Verbalized, Demonstrated              Time Calculation:   Start Time: (P) 0705  Stop Time: (P) 0747  Time Calculation (min): (P) 42 min  Total Timed Code Minutes- PT: (P) 41 minute(s)  Therapy Charges for Today     Code Description Service Date Service Provider Modifiers Qty    47235397109 HC PT THER PROC EA 15 MIN 4/12/2021 Titus Carrero, PT Student GP 2                    Titus Carrero, PT Student  4/12/2021

## 2021-04-13 ENCOUNTER — HOSPITAL ENCOUNTER (OUTPATIENT)
Dept: PET IMAGING | Facility: HOSPITAL | Age: 66
Discharge: HOME | End: 2021-04-13

## 2021-04-13 DIAGNOSIS — R91.1 PULMONARY NODULE: ICD-10-CM

## 2021-04-13 DIAGNOSIS — C18.7 MALIGNANT NEOPLASM OF SIGMOID COLON (HCC): ICD-10-CM

## 2021-04-13 LAB — GLUCOSE BLDC GLUCOMTR-MCNC: 95 MG/DL (ref 70–130)

## 2021-04-13 PROCEDURE — 78815 PET IMAGE W/CT SKULL-THIGH: CPT

## 2021-04-13 PROCEDURE — 82962 GLUCOSE BLOOD TEST: CPT

## 2021-04-13 PROCEDURE — A9552 F18 FDG: HCPCS | Performed by: INTERNAL MEDICINE

## 2021-04-13 PROCEDURE — 0 FLUDEOXYGLUCOSE F18 SOLUTION: Performed by: INTERNAL MEDICINE

## 2021-04-13 RX ADMIN — FLUDEOXYGLUCOSE F18 1 DOSE: 300 INJECTION INTRAVENOUS at 11:36

## 2021-04-15 ENCOUNTER — HOSPITAL ENCOUNTER (OUTPATIENT)
Dept: PHYSICAL THERAPY | Facility: HOSPITAL | Age: 66
Setting detail: THERAPIES SERIES
Discharge: HOME | End: 2021-04-15

## 2021-04-15 ENCOUNTER — TELEPHONE (OUTPATIENT)
Dept: ONCOLOGY | Facility: CLINIC | Age: 66
End: 2021-04-15

## 2021-04-15 DIAGNOSIS — Z47.1 AFTERCARE FOLLOWING LEFT KNEE JOINT REPLACEMENT SURGERY: ICD-10-CM

## 2021-04-15 DIAGNOSIS — Z96.652 AFTERCARE FOLLOWING LEFT KNEE JOINT REPLACEMENT SURGERY: ICD-10-CM

## 2021-04-15 DIAGNOSIS — R26.9 GAIT DIFFICULTY: ICD-10-CM

## 2021-04-15 DIAGNOSIS — Z98.890 S/P LEFT KNEE SURGERY: ICD-10-CM

## 2021-04-15 DIAGNOSIS — Z47.89 ORTHOPEDIC AFTERCARE: Primary | ICD-10-CM

## 2021-04-15 PROCEDURE — 97110 THERAPEUTIC EXERCISES: CPT

## 2021-04-15 NOTE — THERAPY TREATMENT NOTE
Outpatient Physical Therapy Ortho Treatment Note  [facility]     Patient Name: Jared Rose  : 1955  MRN: [id number]  Today's Date: 4/15/2021      Visit Date: 04/15/2021    Visit Dx:    ICD-10-CM ICD-9-CM   1. Orthopedic aftercare  Z47.89 V54.9   2. S/P left knee surgery  Z98.890 V45.89   3. Gait difficulty  R26.9 781.2   4. Aftercare following left knee joint replacement surgery  Z47.1 V54.81    Z96.652 V43.65       Patient Active Problem List   Diagnosis   • Thrombophilia (CMS/HCC)   • Lupus anticoagulant disorder (CMS/HCC)   • Chronic anticoagulation   • Coronary atherosclerosis   • Hypertension   • Ventral hernia without obstruction or gangrene   • Hyperlipidemia   • Hematuria   • Dyspnea on exertion   • Symptomatic anemia   • History of pulmonary embolus (PE)   • Colonic mass   • Malignant neoplasm of sigmoid colon (CMS/HCC)   • Iron deficiency anemia due to chronic blood loss   • Arthritis of left knee   • History of DVT (deep vein thrombosis)   • Pulmonary nodule        Past Medical History:   Diagnosis Date   • Acute deep vein thrombosis (DVT) of upper extremity (CMS/HCC) 2019   • At risk for sleep apnea     6   • Bell's palsy 2011    SEEN AT [facility] ER   • Blister of foot 2017    RIGHT FOOT   • Chronic anticoagulation    • Chronic fatigue    • Chronic left-sided low back pain without sciatica    • Colon polyps     FOLLOWED BY DR. RADHA DONOVAN   • Coronary atherosclerosis     cath 2015: normal LM, diffuse LCx disease, LI LAD, 60-70% ostial diag (<1 mm vessel), LI RCA   • COVID-19 virus detected     2020  [facility].    • Dermoid cyst of leg, right 2017   • ED (erectile dysfunction)    • Exomphalos 2013   • GI hemorrhage 2019    ADMITTED TO [facility]   • H/O Anemia    • H/O Leukopenia    • History of chemotherapy    • History of transfusion     no reaction   • Hyperlipidemia    • Hypertension    • Knee pain    • Lupus anticoagulant disorder (CMS/HCC)  5/6/2016   • Muscle spasm of back 10/2018   • OA (osteoarthritis)    • Obesity    • Pulmonary embolism (CMS/HCC) 06/08/2010     Right lower lobe pulmonary embolus, ADMITTED TO [facility]   • Pulmonary embolus (CMS/HCC) 5/6/2016   • Rectal bleeding 09/2019   • Rectal cancer (CMS/HCC) 09/24/2019    MODERATELY DIFFERENTIATED ADENOCARCINOMA GRADE 2, FOLLOWED BY DR. RADHA WHITT   • Sprain of right shoulder 10/21/2020    SEEN AT [facility] ER   • Strain of left shoulder 10/2020   • Stress fracture of right foot 05/11/2013    4TH METATARSAL, SEEN AT [facility] ER   • Thrombophilia (CMS/HCC)     FOLLOWED BY DR. BALAJI MCGEE   • Tinea pedis 11/25/2007    SEEN AT [facility] ER        Past Surgical History:   Procedure Laterality Date   • CARDIAC CATHETERIZATION Left 05/11/2006    NORMAL LV SYSTOLIC FUNCTION(EF 50%), MOD DZ OF 2 SMALL CORONARY BRANCHES (D2 AND OM2), DR. BOOM GALLEGO AT [facility]   • CARDIAC CATHETERIZATION Left 07/20/2015    NORMAL LM, DIFFUSE LCx DZ, LI LAD, 60-70% OSTIAL DIAG(<1MM VESSEL), 10%STENOSIS IN LAD, DR. CHERI VARGAS AT [facility]   • CLOSED REDUCTION WRIST FRACTURE Right    • COLON RESECTION N/A 9/26/2019    Procedure: LOW ANTERIOR COLON RESECTION IMMOBILIZATION OF SPLENIC FLEXURE;  Surgeon: Radha Whitt MD;  Location: [facility];  Service: General   • COLONOSCOPY N/A 9/21/2019    INT/EXT HEMORRHOIDS, 18 MM POLYPOID LESION IN MID SIGMOID, PATH: INVASIVE MODERATELY DIFFERENTIATED GRADE 2 ADENOCARCINOMA, 2 TUBULOVILLOUS ADENOMA POLYPS IN CECUM, ADENOMATOUS POLYP IN TRANSVERSE, ADENOMATOUS POLYP IN ASCENDING, MULTIPLE SMALL AND LARGE DIVERTICULA, DR. TRUONG MONTEZ AT [facility]   • COLONOSCOPY N/A 3/10/2021    5 MM BENIGN POLYP WITH MELANOSIS COLI IN TRANSVERSE, RESCOPE IN 1 YR, DR. RADHA WHITT AT [facility]   • ENDOSCOPY N/A    • LUNG SURGERY Right 2009    RIGHT LOWER LOBE, BLOT CLOT REMOVED   • SIGMOIDOSCOPY N/A 9/24/2019    AN INFILTRATIVE NON OBSTRUCTING MASS IN SIGMOID, AREA TATTOOED, DR. RADHA WHITT AT [facility]   • TONSILLECTOMY AND  ADENOIDECTOMY Bilateral     DURING CHILDHOOD   • TOTAL KNEE ARTHROPLASTY Left 3/16/2021    Procedure: LEFT TOTAL KNEE ARTHROPLASTY WITH MARTHA NAVIGATION;  Surgeon: Abraham Waters MD;  Location: [facility];  Service: Orthopedics;  Laterality: Left;   • UMBILICAL HERNIA REPAIR N/A 05/14/2014    DR. ROMERO SCHUSTER AT [facility]                       PT Assessment/Plan     Row Name 04/15/21 0831          PT Assessment    Assessment Comments  Pt with continued c/o of stiffness and most difficulty sleeping at night. Trialed recumbent bike and pt unable to make full revolutions today. Added prone hang and performed gait in // bars with cues for knee flex during stance, heel strike and knee extension with stance. Pt will benefit from continued program with emphasis on knee ROM per stiffness with functional tasks.  -CN        PT Plan    PT Plan Comments  Continue to progress flexibility exercises and gait training.  -CN       User Key  (r) = Recorded By, (t) = Taken By, (c) = Cosigned By    Initials Name Provider Type    CN Cindy Butcher, PT Physical Therapist            OP Exercises     Row Name 04/15/21 0700             Subjective Comments    Subjective Comments  It feels about the same. One minute it is fine then the next minute it stiffens up.   -CN         Subjective Pain    Able to rate subjective pain?  yes  -CN      Pre-Treatment Pain Level  6  -CN         Total Minutes    67121 - PT Therapeutic Exercise Minutes  40  -CN         Exercise 1    Exercise Name 1  SLR w/ quad set  -CN      Cueing 1  Verbal;Tactile  -CN      Reps 1  15  -CN      Additional Comments  focus on QS prior to SLR  -CN         Exercise 2    Exercise Name 2  Prone hang  -CN      Cueing 2  Verbal  -CN      Time 2  1  min  -CN      Additional Comments  no weight per significant stretch per pt  -CN         Exercise 3    Exercise Name 3  SAQ  -CN      Cueing 3  Verbal;Tactile  -CN      Reps 3  15  -CN      Time 3  3 sec  -CN         Exercise  4    Exercise Name 4  quad set on towel  -CN      Cueing 4  Verbal;Tactile  -CN      Sets 4  1  -CN      Reps 4  15  -CN      Time 4  3-5 sec  -CN         Exercise 5    Exercise Name 5  Stair calf stretch  -CN      Cueing 5  Verbal;Demo  -CN      Reps 5  3  -CN      Time 5  20 sec  -CN         Exercise 6    Exercise Name 6  stair knee flexion  -CN      Cueing 6  Verbal;Demo  -CN      Sets 6  1  -CN      Reps 6  3  -CN      Time 6  20 sec  -CN         Exercise 7    Exercise Name 7  stair HS strecth  -CN      Cueing 7  Verbal;Demo  -CN      Sets 7  1  -CN      Reps 7  3  -CN      Time 7  20 seconds  -CN         Exercise 8    Exercise Name 8  Nustep UE/LE L5  -CN      Time 8  5 min  -CN         Exercise 9    Exercise Name 9  calf raise on stair  -CN      Cueing 9  Verbal;Demo  -CN      Sets 9  2  -CN      Reps 9  10  -CN      Time 9  2-3 seconds  -CN         Exercise 12    Exercise Name 12  Recumbent bike, seat 10  -CN      Cueing 12  Verbal  -CN      Time 12  3 min  -CN      Additional Comments  rocking back/forth; unable to make full revolution today  -CN         Exercise 13    Exercise Name 13  mini squat  -CN      Cueing 13  Verbal;Demo  -CN      Sets 13  1  -CN      Reps 13  15  -CN      Additional Comments  cues for form, weight through heels  -CN         Exercise 14    Exercise Name 14  Gait in // bars emphasizing knee flexion with swing through, heel strike and knee extension with stance  -CN      Cueing 14  Verbal;Demo  -CN      Time 14  4 min  -CN         Exercise 15    Exercise Name 15  Standing TKE against wall w/ ball  -CN      Cueing 15  Verbal;Demo  -CN      Sets 15  2  -CN      Reps 15  10  -CN        User Key  (r) = Recorded By, (t) = Taken By, (c) = Cosigned By    Initials Name Provider Type    Cindy Estrada, WILFRID Physical Therapist                           Therapy Education  Given: HEP, Symptoms/condition management, Pain management, Mobility training  Program: Reinforced, Progressed  How  Provided: Verbal, Demonstration  Provided to: Patient  Level of Understanding: Verbalized, Demonstrated              Time Calculation:   Start Time: 0748  Stop Time: 0828  Time Calculation (min): 40 min  Therapy Charges for Today     Code Description Service Date Service Provider Modifiers Qty    79907220322  PT THER PROC EA 15 MIN 4/15/2021 Cindy Butcher, PT GP 3                    Cindy Butcher, PT  4/15/2021

## 2021-04-15 NOTE — TELEPHONE ENCOUNTER
I spoke to him on the phone today regarding his PET scan result which unfortunately is a little ambiguous especially in light of his recent left total knee replacement and the possibility of reactive adenopathy related to that.    I am waiting on the Guardant Reveal test results and will follow up with him after that.

## 2021-04-19 ENCOUNTER — HOSPITAL ENCOUNTER (OUTPATIENT)
Dept: PHYSICAL THERAPY | Facility: HOSPITAL | Age: 66
Setting detail: THERAPIES SERIES
Discharge: HOME | End: 2021-04-19

## 2021-04-19 DIAGNOSIS — R26.9 GAIT DIFFICULTY: ICD-10-CM

## 2021-04-19 DIAGNOSIS — Z47.1 AFTERCARE FOLLOWING LEFT KNEE JOINT REPLACEMENT SURGERY: ICD-10-CM

## 2021-04-19 DIAGNOSIS — Z47.89 ORTHOPEDIC AFTERCARE: Primary | ICD-10-CM

## 2021-04-19 DIAGNOSIS — Z96.652 AFTERCARE FOLLOWING LEFT KNEE JOINT REPLACEMENT SURGERY: ICD-10-CM

## 2021-04-19 DIAGNOSIS — Z98.890 S/P LEFT KNEE SURGERY: ICD-10-CM

## 2021-04-19 PROCEDURE — 97110 THERAPEUTIC EXERCISES: CPT

## 2021-04-19 NOTE — THERAPY TREATMENT NOTE
Outpatient Physical Therapy Ortho Treatment Note  [facility]     Patient Name: Jared Rose  : 1955  MRN: [id number]  Today's Date: 2021      Visit Date: 2021    Visit Dx:    ICD-10-CM ICD-9-CM   1. Orthopedic aftercare  Z47.89 V54.9   2. S/P left knee surgery  Z98.890 V45.89   3. Gait difficulty  R26.9 781.2   4. Aftercare following left knee joint replacement surgery  Z47.1 V54.81    Z96.652 V43.65       Patient Active Problem List   Diagnosis   • Thrombophilia (CMS/HCC)   • Lupus anticoagulant disorder (CMS/HCC)   • Chronic anticoagulation   • Coronary atherosclerosis   • Hypertension   • Ventral hernia without obstruction or gangrene   • Hyperlipidemia   • Hematuria   • Dyspnea on exertion   • Symptomatic anemia   • History of pulmonary embolus (PE)   • Colonic mass   • Malignant neoplasm of sigmoid colon (CMS/HCC)   • Iron deficiency anemia due to chronic blood loss   • Arthritis of left knee   • History of DVT (deep vein thrombosis)   • Pulmonary nodule        Past Medical History:   Diagnosis Date   • Acute deep vein thrombosis (DVT) of upper extremity (CMS/HCC) 2019   • At risk for sleep apnea     6   • Bell's palsy 2011    SEEN AT [facility] ER   • Blister of foot 2017    RIGHT FOOT   • Chronic anticoagulation    • Chronic fatigue    • Chronic left-sided low back pain without sciatica    • Colon polyps     FOLLOWED BY DR. RADHA DONOVAN   • Coronary atherosclerosis     cath 2015: normal LM, diffuse LCx disease, LI LAD, 60-70% ostial diag (<1 mm vessel), LI RCA   • COVID-19 virus detected     2020  [facility].    • Dermoid cyst of leg, right 2017   • ED (erectile dysfunction)    • Exomphalos 2013   • GI hemorrhage 2019    ADMITTED TO [facility]   • H/O Anemia    • H/O Leukopenia    • History of chemotherapy    • History of transfusion     no reaction   • Hyperlipidemia    • Hypertension    • Knee pain    • Lupus anticoagulant disorder (CMS/HCC)  5/6/2016   • Muscle spasm of back 10/2018   • OA (osteoarthritis)    • Obesity    • Pulmonary embolism (CMS/HCC) 06/08/2010     Right lower lobe pulmonary embolus, ADMITTED TO [facility]   • Pulmonary embolus (CMS/HCC) 5/6/2016   • Rectal bleeding 09/2019   • Rectal cancer (CMS/HCC) 09/24/2019    MODERATELY DIFFERENTIATED ADENOCARCINOMA GRADE 2, FOLLOWED BY DR. RADHA WHITT   • Sprain of right shoulder 10/21/2020    SEEN AT [facility] ER   • Strain of left shoulder 10/2020   • Stress fracture of right foot 05/11/2013    4TH METATARSAL, SEEN AT [facility] ER   • Thrombophilia (CMS/HCC)     FOLLOWED BY DR. BALAJI MCGEE   • Tinea pedis 11/25/2007    SEEN AT [facility] ER        Past Surgical History:   Procedure Laterality Date   • CARDIAC CATHETERIZATION Left 05/11/2006    NORMAL LV SYSTOLIC FUNCTION(EF 50%), MOD DZ OF 2 SMALL CORONARY BRANCHES (D2 AND OM2), DR. BOOM GALLEGO AT [facility]   • CARDIAC CATHETERIZATION Left 07/20/2015    NORMAL LM, DIFFUSE LCx DZ, LI LAD, 60-70% OSTIAL DIAG(<1MM VESSEL), 10%STENOSIS IN LAD, DR. CHERI VARGAS AT [facility]   • CLOSED REDUCTION WRIST FRACTURE Right    • COLON RESECTION N/A 9/26/2019    Procedure: LOW ANTERIOR COLON RESECTION IMMOBILIZATION OF SPLENIC FLEXURE;  Surgeon: Radha Whitt MD;  Location: [facility];  Service: General   • COLONOSCOPY N/A 9/21/2019    INT/EXT HEMORRHOIDS, 18 MM POLYPOID LESION IN MID SIGMOID, PATH: INVASIVE MODERATELY DIFFERENTIATED GRADE 2 ADENOCARCINOMA, 2 TUBULOVILLOUS ADENOMA POLYPS IN CECUM, ADENOMATOUS POLYP IN TRANSVERSE, ADENOMATOUS POLYP IN ASCENDING, MULTIPLE SMALL AND LARGE DIVERTICULA, DR. TRUONG MONTEZ AT [facility]   • COLONOSCOPY N/A 3/10/2021    5 MM BENIGN POLYP WITH MELANOSIS COLI IN TRANSVERSE, RESCOPE IN 1 YR, DR. RADHA WHITT AT [facility]   • ENDOSCOPY N/A    • LUNG SURGERY Right 2009    RIGHT LOWER LOBE, BLOT CLOT REMOVED   • SIGMOIDOSCOPY N/A 9/24/2019    AN INFILTRATIVE NON OBSTRUCTING MASS IN SIGMOID, AREA TATTOOED, DR. RADHA WHITT AT [facility]   • TONSILLECTOMY AND  ADENOIDECTOMY Bilateral     DURING CHILDHOOD   • TOTAL KNEE ARTHROPLASTY Left 3/16/2021    Procedure: LEFT TOTAL KNEE ARTHROPLASTY WITH MARTHA NAVIGATION;  Surgeon: Abraham Waters MD;  Location: [facility];  Service: Orthopedics;  Laterality: Left;   • UMBILICAL HERNIA REPAIR N/A 05/14/2014    DR. ROMERO SCHUSTER AT [facility]                       PT Assessment/Plan     Row Name 04/19/21 1446          PT Assessment    Assessment Comments  Pt with minimal pain and reports of stiffness which limit gait pattern. Added 2# weight with several exercises today and continue to work on mobility with ambulation tasks. Pt progressing well toward goals with greatest limitation into knee flex/ext.  -CN        PT Plan    PT Plan Comments  Continue to progress knee ROM. Consider wall slides into flexion next visit.  -CN       User Key  (r) = Recorded By, (t) = Taken By, (c) = Cosigned By    Initials Name Provider Type    CN Cindy Butcher, PT Physical Therapist            OP Exercises     Row Name 04/19/21 0700             Subjective Comments    Subjective Comments  It really doesn't bother me much except for the stiffness.   -CN         Subjective Pain    Able to rate subjective pain?  yes  -CN      Pre-Treatment Pain Level  4  -CN         Total Minutes    52259 - PT Therapeutic Exercise Minutes  40  -CN         Exercise 1    Exercise Name 1  SLR w/ quad set  -CN      Cueing 1  Verbal;Tactile  -CN      Reps 1  15  -CN      Additional Comments  focus on QS prior to SLR  -CN         Exercise 2    Exercise Name 2  Prone hang  -CN      Cueing 2  Verbal  -CN      Time 2  1  min  -CN      Additional Comments  2#  -CN         Exercise 3    Exercise Name 3  SAQ  -CN      Cueing 3  Verbal;Tactile  -CN      Sets 3  2  -CN      Reps 3  10  -CN      Time 3  3 sec  -CN      Additional Comments  2#  -CN         Exercise 4    Exercise Name 4  quad set on towel  -CN      Cueing 4  Verbal;Tactile  -CN      Sets 4  1  -CN      Reps 4  15   -CN      Time 4  3-5 sec  -CN         Exercise 5    Exercise Name 5  Stair calf stretch  -CN      Cueing 5  Verbal;Demo  -CN      Reps 5  3  -CN      Time 5  20 sec  -CN         Exercise 6    Exercise Name 6  stair knee flexion  -CN      Cueing 6  Verbal;Demo  -CN      Sets 6  1  -CN      Reps 6  3  -CN      Time 6  20 sec  -CN         Exercise 7    Exercise Name 7  stair HS strecth  -CN      Cueing 7  Verbal;Demo  -CN      Sets 7  1  -CN      Reps 7  3  -CN      Time 7  20 seconds  -CN         Exercise 8    Exercise Name 8  Nustep UE/LE L5  -CN      Time 8  5 min  -CN         Exercise 9    Exercise Name 9  calf raise on stair  -CN      Cueing 9  Verbal;Demo  -CN      Sets 9  2  -CN      Reps 9  10  -CN      Time 9  2-3 seconds  -CN         Exercise 10    Exercise Name 10  Standing HS curls  -CN      Cueing 10  Verbal;Demo  -CN      Reps 10  10  -CN         Exercise 11    Exercise Name 11  LAQ  -CN      Cueing 11  Verbal;Demo  -CN      Reps 11  10  -CN      Additional Comments  2#  -CN         Exercise 12    Exercise Name 12  Recumbent bike, seat 10  -CN      Cueing 12  Verbal  -CN      Time 12  3 min  -CN      Additional Comments  able to work into full revolutions, slight R trunk lean  -CN         Exercise 13    Exercise Name 13  mini squat  -CN      Cueing 13  Verbal;Demo  -CN      Sets 13  1  -CN      Reps 13  15  -CN      Additional Comments  cues for form, weight through heels  -CN         Exercise 14    Exercise Name 14  Gait in // bars emphasizing knee flexion with swing through, heel strike and knee extension with stance  -CN      Cueing 14  Verbal;Demo  -CN      Time 14  4 min  -CN         Exercise 15    Exercise Name 15  Standing TKE against wall w/ ball  -CN      Cueing 15  Verbal;Demo  -CN      Sets 15  2  -CN      Reps 15  10  -CN        User Key  (r) = Recorded By, (t) = Taken By, (c) = Cosigned By    Initials Name Provider Type    Cindy Estrada, PT Physical Therapist                        PT OP Goals     Row Name 04/19/21 0800          PT Short Term Goals    STG Date to Achieve  04/30/21  -CN     STG 1  The pt will demonstrate IND and compliant with initial HEP focused on improved Left knee mobility and quad strength for increased functional independence  -CN     STG 1 Progress  Ongoing  -CN     STG 2  The pt will demonstrate L knee ext AROM to only lacking 15 degrees or less for improved gait pattern.  -CN     STG 2 Progress  Met  -CN     STG 3  The pt will ambulate with SPC over even ground with near normal gait pattern for improved community navigation.  -CN     STG 3 Progress  Ongoing  -CN     STG 3 Progress Comments  Continues to demonstrate deficits into end ROM with gait.   -CN        Long Term Goals    LTG Date to Achieve  06/29/21  -CN     LTG 1  The pt will demonstrate IND with progressive HEP focused on IND condition management and return to PLOF.  -CN     LTG 1 Progress  Ongoing  -CN     LTG 2  The pt will demonstrate L knee flex PROM to at least 120 or greater for improved transitional position and stair navigation.  -CN     LTG 2 Progress  Ongoing;Progressing  -CN     LTG 3  The pt will resume reciprocal stair navigation for improved community and household navigation.  -CN     LTG 3 Progress  Ongoing  -CN     LTG 4  The pt will demonstrate L knee AROM to at least 0-120 for improved functional mobility.  -CN     LTG 4 Progress  Ongoing  -CN     LTG 5  The pt will demonstrate LLE strength to at least 4+/5 for improved stair navigation and transitional position performance.  -CN     LTG 5 Progress  Ongoing  -CN       User Key  (r) = Recorded By, (t) = Taken By, (c) = Cosigned By    Initials Name Provider Type    Cindy Estrada, PT Physical Therapist          Therapy Education  Given: HEP, Symptoms/condition management, Pain management, Mobility training  Program: Reinforced, Progressed  How Provided: Verbal, Demonstration  Provided to: Patient  Level of Understanding:  Verbalized, Demonstrated              Time Calculation:   Start Time: 0745  Stop Time: 0825  Time Calculation (min): 40 min  Therapy Charges for Today     Code Description Service Date Service Provider Modifiers Qty    98798394793  PT THER PROC EA 15 MIN 4/19/2021 Cindy Butcher, PT GP 3                    Cindy Butcher, PT  4/19/2021

## 2021-04-21 ENCOUNTER — TELEPHONE (OUTPATIENT)
Dept: GENERAL RADIOLOGY | Facility: HOSPITAL | Age: 66
End: 2021-04-21

## 2021-04-21 DIAGNOSIS — C18.7 MALIGNANT NEOPLASM OF SIGMOID COLON (HCC): Primary | ICD-10-CM

## 2021-04-21 LAB — REF LAB TEST RESULTS: NORMAL

## 2021-04-21 NOTE — TELEPHONE ENCOUNTER
----- Message from Carter Lares MD sent at 4/21/2021 12:32 PM EDT -----  Regarding: CT and follow-up  Sabrina,    Please arrange CT imaging of the chest abdomen pelvis with labs in about 3 months and a 2 unit appointment with me vitals only a few days after that.  Orders are in.    Thanks, LUZ

## 2021-04-21 NOTE — PROGRESS NOTES
Circulating tumor DNA testing negative.  I discussed the result with the patient on the phone today.  Plan to repeat CT chest abdomen and pelvis in 3 months with labs and I will see him back after that.

## 2021-04-22 ENCOUNTER — HOSPITAL ENCOUNTER (OUTPATIENT)
Dept: PHYSICAL THERAPY | Facility: HOSPITAL | Age: 66
Setting detail: THERAPIES SERIES
Discharge: HOME | End: 2021-04-22

## 2021-04-22 DIAGNOSIS — Z47.89 ORTHOPEDIC AFTERCARE: Primary | ICD-10-CM

## 2021-04-22 DIAGNOSIS — Z96.652 AFTERCARE FOLLOWING LEFT KNEE JOINT REPLACEMENT SURGERY: ICD-10-CM

## 2021-04-22 DIAGNOSIS — R26.9 GAIT DIFFICULTY: ICD-10-CM

## 2021-04-22 DIAGNOSIS — Z47.1 AFTERCARE FOLLOWING LEFT KNEE JOINT REPLACEMENT SURGERY: ICD-10-CM

## 2021-04-22 DIAGNOSIS — Z98.890 S/P LEFT KNEE SURGERY: ICD-10-CM

## 2021-04-22 PROCEDURE — 97110 THERAPEUTIC EXERCISES: CPT

## 2021-04-22 PROCEDURE — 97140 MANUAL THERAPY 1/> REGIONS: CPT

## 2021-04-22 NOTE — THERAPY TREATMENT NOTE
Outpatient Physical Therapy Ortho Treatment Note  [facility]     Patient Name: Jared Rose  : 1955  MRN: [id number]  Today's Date: 2021      Visit Date: 2021    Visit Dx:    ICD-10-CM ICD-9-CM   1. Orthopedic aftercare  Z47.89 V54.9   2. S/P left knee surgery  Z98.890 V45.89   3. Gait difficulty  R26.9 781.2   4. Aftercare following left knee joint replacement surgery  Z47.1 V54.81    Z96.652 V43.65       Patient Active Problem List   Diagnosis   • Thrombophilia (CMS/HCC)   • Lupus anticoagulant disorder (CMS/HCC)   • Chronic anticoagulation   • Coronary atherosclerosis   • Hypertension   • Ventral hernia without obstruction or gangrene   • Hyperlipidemia   • Hematuria   • Dyspnea on exertion   • Symptomatic anemia   • History of pulmonary embolus (PE)   • Colonic mass   • Malignant neoplasm of sigmoid colon (CMS/HCC)   • Iron deficiency anemia due to chronic blood loss   • Arthritis of left knee   • History of DVT (deep vein thrombosis)   • Pulmonary nodule        Past Medical History:   Diagnosis Date   • Acute deep vein thrombosis (DVT) of upper extremity (CMS/HCC) 2019   • At risk for sleep apnea     6   • Bell's palsy 2011    SEEN AT [facility] ER   • Blister of foot 2017    RIGHT FOOT   • Chronic anticoagulation    • Chronic fatigue    • Chronic left-sided low back pain without sciatica    • Colon polyps     FOLLOWED BY DR. RADHA DONOVAN   • Coronary atherosclerosis     cath 2015: normal LM, diffuse LCx disease, LI LAD, 60-70% ostial diag (<1 mm vessel), LI RCA   • COVID-19 virus detected     2020  [facility].    • Dermoid cyst of leg, right 2017   • ED (erectile dysfunction)    • Exomphalos 2013   • GI hemorrhage 2019    ADMITTED TO [facility]   • H/O Anemia    • H/O Leukopenia    • History of chemotherapy    • History of transfusion     no reaction   • Hyperlipidemia    • Hypertension    • Knee pain    • Lupus anticoagulant disorder (CMS/HCC)  5/6/2016   • Muscle spasm of back 10/2018   • OA (osteoarthritis)    • Obesity    • Pulmonary embolism (CMS/HCC) 06/08/2010     Right lower lobe pulmonary embolus, ADMITTED TO [facility]   • Pulmonary embolus (CMS/HCC) 5/6/2016   • Rectal bleeding 09/2019   • Rectal cancer (CMS/HCC) 09/24/2019    MODERATELY DIFFERENTIATED ADENOCARCINOMA GRADE 2, FOLLOWED BY DR. RADHA WHITT   • Sprain of right shoulder 10/21/2020    SEEN AT [facility] ER   • Strain of left shoulder 10/2020   • Stress fracture of right foot 05/11/2013    4TH METATARSAL, SEEN AT [facility] ER   • Thrombophilia (CMS/HCC)     FOLLOWED BY DR. BALAJI MCGEE   • Tinea pedis 11/25/2007    SEEN AT [facility] ER        Past Surgical History:   Procedure Laterality Date   • CARDIAC CATHETERIZATION Left 05/11/2006    NORMAL LV SYSTOLIC FUNCTION(EF 50%), MOD DZ OF 2 SMALL CORONARY BRANCHES (D2 AND OM2), DR. BOOM GALLEGO AT [facility]   • CARDIAC CATHETERIZATION Left 07/20/2015    NORMAL LM, DIFFUSE LCx DZ, LI LAD, 60-70% OSTIAL DIAG(<1MM VESSEL), 10%STENOSIS IN LAD, DR. CHERI VARGAS AT [facility]   • CLOSED REDUCTION WRIST FRACTURE Right    • COLON RESECTION N/A 9/26/2019    Procedure: LOW ANTERIOR COLON RESECTION IMMOBILIZATION OF SPLENIC FLEXURE;  Surgeon: Radha Whitt MD;  Location: [facility];  Service: General   • COLONOSCOPY N/A 9/21/2019    INT/EXT HEMORRHOIDS, 18 MM POLYPOID LESION IN MID SIGMOID, PATH: INVASIVE MODERATELY DIFFERENTIATED GRADE 2 ADENOCARCINOMA, 2 TUBULOVILLOUS ADENOMA POLYPS IN CECUM, ADENOMATOUS POLYP IN TRANSVERSE, ADENOMATOUS POLYP IN ASCENDING, MULTIPLE SMALL AND LARGE DIVERTICULA, DR. TRUONG MONTEZ AT [facility]   • COLONOSCOPY N/A 3/10/2021    5 MM BENIGN POLYP WITH MELANOSIS COLI IN TRANSVERSE, RESCOPE IN 1 YR, DR. RADHA WHITT AT [facility]   • ENDOSCOPY N/A    • LUNG SURGERY Right 2009    RIGHT LOWER LOBE, BLOT CLOT REMOVED   • SIGMOIDOSCOPY N/A 9/24/2019    AN INFILTRATIVE NON OBSTRUCTING MASS IN SIGMOID, AREA TATTOOED, DR. RADHA WHITT AT [facility]   • TONSILLECTOMY AND  ADENOIDECTOMY Bilateral     DURING CHILDHOOD   • TOTAL KNEE ARTHROPLASTY Left 3/16/2021    Procedure: LEFT TOTAL KNEE ARTHROPLASTY WITH MARTHA NAVIGATION;  Surgeon: Abraham Waters MD;  [facility];  Service: Orthopedics;  Laterality: Left;   • UMBILICAL HERNIA REPAIR N/A 05/14/2014    DR. ROMERO SCHUSTER AT [facility]       PT Ortho     Row Name 04/22/21 0800       Left Lower Ext    Lt Knee Extension/Flexion AROM   deg supine  -CA      User Key  (r) = Recorded By, (t) = Taken By, (c) = Cosigned By    Initials Name Provider Type    Candelaria Hernandes, PT Physical Therapist                      PT Assessment/Plan     Row Name 04/22/21 0800          PT Assessment    Assessment Comments  Alton returns today, ambulating with SPC, although does not use AD around clinic. Continues to demonstrate significant limitations in L knee AROM, although flexion continues to improve. L knee  deg following manual therapy today. We discused adding knee ext prop with weight for time to HEP throughout the day to address significant extension limitations.  -CA        PT Plan    PT Plan Comments  Continue manual therapy. Consider gastroc str/runners stance ext mobilization   -CA       User Key  (r) = Recorded By, (t) = Taken By, (c) = Cosigned By    Initials Name Provider Type    Candelaria Hernandes, PT Physical Therapist            OP Exercises     Row Name 04/22/21 0700             Subjective Comments    Subjective Comments  I don't really need the cane anymore, my wife just makes me take it  -CA         Subjective Pain    Able to rate subjective pain?  yes  -CA      Pre-Treatment Pain Level  2  -CA         Total Minutes    36646 - PT Therapeutic Exercise Minutes  30  -CA      53221 - PT Manual Therapy Minutes  10  -CA         Exercise 5    Exercise Name 5  Stair calf stretch  -CA      Cueing 5  Verbal;Demo  -CA      Reps 5  3  -CA      Time 5  20 sec  -CA         Exercise 6    Exercise Name 6  stair knee flexion  -CA       Cueing 6  Verbal;Demo  -CA      Sets 6  1  -CA      Reps 6  3  -CA      Time 6  20 sec  -CA         Exercise 7    Exercise Name 7  stair HS strecth  -CA      Cueing 7  Verbal;Demo  -CA      Sets 7  1  -CA      Reps 7  3  -CA      Time 7  20 seconds  -CA         Exercise 8    Exercise Name 8  Nustep UE/LE L5  -CA      Time 8  5 min  -CA         Exercise 9    Exercise Name 9  calf raise on stair  -CA      Cueing 9  Verbal;Demo  -CA      Sets 9  2  -CA      Reps 9  10  -CA      Time 9  2-3 seconds  -CA         Exercise 12    Exercise Name 12  Recumbent bike, seat 10  -CA      Cueing 12  Verbal  -CA      Time 12  3 min  -CA         Exercise 16    Exercise Name 16  L knee flex on wall with bag  -CA      Cueing 16  Verbal  -CA      Reps 16  15  -CA      Time 16  5s  -CA         Exercise 17    Exercise Name 17  L heel prop on chair with 7# weight  -CA      Cueing 17  Verbal  -CA      Time 17  3 min  -CA      Additional Comments  discussed for HEP as well  -CA        User Key  (r) = Recorded By, (t) = Taken By, (c) = Cosigned By    Initials Name Provider Type    Candelaria Hernandes, PT Physical Therapist                      Manual Rx (last 36 hours)      Manual Treatments     Row Name 04/22/21 0700             Total Minutes    18040 - PT Manual Therapy Minutes  10  -CA         Manual Rx 2    Manual Rx 2 Location  Left knee  -CA      Manual Rx 2 Type  seated EOB knee flex mob with mob belt - distraction + IR + flex  -CA         Manual Rx 3    Manual Rx 3 Location  Left knee  -CA      Manual Rx 3 Type  supine knee ext mob with Left heel on towel  -CA         Manual Rx 4    Manual Rx 4 Location  Left knee  -CA      Manual Rx 4 Type  HL knee flex/gapping mobilization  -CA        User Key  (r) = Recorded By, (t) = Taken By, (c) = Cosigned By    Initials Name Provider Type    Candelaria Hernandes, WILFRID Physical Therapist                             Time Calculation:   Start Time: 0731  Stop Time: 0811  Time Calculation (min):  40 min  Total Timed Code Minutes- PT: 40 minute(s)  Therapy Charges for Today     Code Description Service Date Service Provider Modifiers Qty    54298888698 HC PT THER PROC EA 15 MIN 4/22/2021 Candelaria Mendez, PT GP 2    16417680588 HC PT MANUAL THERAPY EA 15 MIN 4/22/2021 Candelaria Mendez, PT GP 1                    Candelaria Mendez, PT  4/22/2021

## 2021-04-26 ENCOUNTER — APPOINTMENT (OUTPATIENT)
Dept: PHYSICAL THERAPY | Facility: HOSPITAL | Age: 66
End: 2021-04-26

## 2021-04-27 ENCOUNTER — HOSPITAL ENCOUNTER (OUTPATIENT)
Dept: PHYSICAL THERAPY | Facility: HOSPITAL | Age: 66
Setting detail: THERAPIES SERIES
Discharge: HOME | End: 2021-04-27

## 2021-04-27 DIAGNOSIS — Z47.1 AFTERCARE FOLLOWING LEFT KNEE JOINT REPLACEMENT SURGERY: ICD-10-CM

## 2021-04-27 DIAGNOSIS — Z98.890 S/P LEFT KNEE SURGERY: ICD-10-CM

## 2021-04-27 DIAGNOSIS — R26.9 GAIT DIFFICULTY: ICD-10-CM

## 2021-04-27 DIAGNOSIS — Z47.89 ORTHOPEDIC AFTERCARE: Primary | ICD-10-CM

## 2021-04-27 DIAGNOSIS — Z96.652 AFTERCARE FOLLOWING LEFT KNEE JOINT REPLACEMENT SURGERY: ICD-10-CM

## 2021-04-27 PROCEDURE — 97110 THERAPEUTIC EXERCISES: CPT

## 2021-04-27 PROCEDURE — 97140 MANUAL THERAPY 1/> REGIONS: CPT

## 2021-04-27 NOTE — THERAPY TREATMENT NOTE
Outpatient Physical Therapy Ortho Treatment Note  [facility]     Patient Name: Jared Rose  : 1955  MRN: [id number]  Today's Date: 2021      Visit Date: 2021    Visit Dx:    ICD-10-CM ICD-9-CM   1. Orthopedic aftercare  Z47.89 V54.9   2. S/P left knee surgery  Z98.890 V45.89   3. Gait difficulty  R26.9 781.2   4. Aftercare following left knee joint replacement surgery  Z47.1 V54.81    Z96.652 V43.65       Patient Active Problem List   Diagnosis   • Thrombophilia (CMS/HCC)   • Lupus anticoagulant disorder (CMS/HCC)   • Chronic anticoagulation   • Coronary atherosclerosis   • Hypertension   • Ventral hernia without obstruction or gangrene   • Hyperlipidemia   • Hematuria   • Dyspnea on exertion   • Symptomatic anemia   • History of pulmonary embolus (PE)   • Colonic mass   • Malignant neoplasm of sigmoid colon (CMS/HCC)   • Iron deficiency anemia due to chronic blood loss   • Arthritis of left knee   • History of DVT (deep vein thrombosis)   • Pulmonary nodule        Past Medical History:   Diagnosis Date   • Acute deep vein thrombosis (DVT) of upper extremity (CMS/HCC) 2019   • At risk for sleep apnea     6   • Bell's palsy 2011    SEEN AT [facility] ER   • Blister of foot 2017    RIGHT FOOT   • Chronic anticoagulation    • Chronic fatigue    • Chronic left-sided low back pain without sciatica    • Colon polyps     FOLLOWED BY DR. RADHA DONOVAN   • Coronary atherosclerosis     cath 2015: normal LM, diffuse LCx disease, LI LAD, 60-70% ostial diag (<1 mm vessel), LI RCA   • COVID-19 virus detected     2020  [facility].    • Dermoid cyst of leg, right 2017   • ED (erectile dysfunction)    • Exomphalos 2013   • GI hemorrhage 2019    ADMITTED TO [facility]   • H/O Anemia    • H/O Leukopenia    • History of chemotherapy    • History of transfusion     no reaction   • Hyperlipidemia    • Hypertension    • Knee pain    • Lupus anticoagulant disorder (CMS/HCC)  5/6/2016   • Muscle spasm of back 10/2018   • OA (osteoarthritis)    • Obesity    • Pulmonary embolism (CMS/HCC) 06/08/2010     Right lower lobe pulmonary embolus, ADMITTED TO [facility]   • Pulmonary embolus (CMS/HCC) 5/6/2016   • Rectal bleeding 09/2019   • Rectal cancer (CMS/HCC) 09/24/2019    MODERATELY DIFFERENTIATED ADENOCARCINOMA GRADE 2, FOLLOWED BY DR. RADHA WHITT   • Sprain of right shoulder 10/21/2020    SEEN AT [facility] ER   • Strain of left shoulder 10/2020   • Stress fracture of right foot 05/11/2013    4TH METATARSAL, SEEN AT [facility] ER   • Thrombophilia (CMS/HCC)     FOLLOWED BY DR. BALAJI MCGEE   • Tinea pedis 11/25/2007    SEEN AT [facility] ER        Past Surgical History:   Procedure Laterality Date   • CARDIAC CATHETERIZATION Left 05/11/2006    NORMAL LV SYSTOLIC FUNCTION(EF 50%), MOD DZ OF 2 SMALL CORONARY BRANCHES (D2 AND OM2), DR. BOOM GALLEGO AT [facility]   • CARDIAC CATHETERIZATION Left 07/20/2015    NORMAL LM, DIFFUSE LCx DZ, LI LAD, 60-70% OSTIAL DIAG(<1MM VESSEL), 10%STENOSIS IN LAD, DR. CHERI VARGAS AT [facility]   • CLOSED REDUCTION WRIST FRACTURE Right    • COLON RESECTION N/A 9/26/2019    Procedure: LOW ANTERIOR COLON RESECTION IMMOBILIZATION OF SPLENIC FLEXURE;  Surgeon: Radha Whitt MD;  Location: [facility]  Service: General   • COLONOSCOPY N/A 9/21/2019    INT/EXT HEMORRHOIDS, 18 MM POLYPOID LESION IN MID SIGMOID, PATH: INVASIVE MODERATELY DIFFERENTIATED GRADE 2 ADENOCARCINOMA, 2 TUBULOVILLOUS ADENOMA POLYPS IN CECUM, ADENOMATOUS POLYP IN TRANSVERSE, ADENOMATOUS POLYP IN ASCENDING, MULTIPLE SMALL AND LARGE DIVERTICULA, DR. TRUONG MONTEZ AT [facility]   • COLONOSCOPY N/A 3/10/2021    5 MM BENIGN POLYP WITH MELANOSIS COLI IN TRANSVERSE, RESCOPE IN 1 YR, DR. RADHA WHITT AT [facility]   • ENDOSCOPY N/A    • LUNG SURGERY Right 2009    RIGHT LOWER LOBE, BLOT CLOT REMOVED   • SIGMOIDOSCOPY N/A 9/24/2019    AN INFILTRATIVE NON OBSTRUCTING MASS IN SIGMOID, AREA TATTOOED, DR. RADHA WHITT AT [facility]   • TONSILLECTOMY AND  ADENOIDECTOMY Bilateral     DURING CHILDHOOD   • TOTAL KNEE ARTHROPLASTY Left 3/16/2021    Procedure: LEFT TOTAL KNEE ARTHROPLASTY WITH MARTHA NAVIGATION;  Surgeon: Abraham Waters MD;  Location: [facility];  Service: Orthopedics;  Laterality: Left;   • UMBILICAL HERNIA REPAIR N/A 05/14/2014    DR. ROMERO SCHUSTER AT [facility]       PT Ortho     Row Name 04/27/21 0900       Left Lower Ext    Lt Knee Extension/Flexion PROM  lacking   -RS      User Key  (r) = Recorded By, (t) = Taken By, (c) = Cosigned By    Initials Name Provider Type    RS Senia Stewart, PT Physical Therapist                      PT Assessment/Plan     Row Name 04/27/21 0900          PT Assessment    Assessment Comments  Alton is approx 6 weeks s/p L TKA, he continues to emonstrate marked imitations in L knee extension PROM and AROM which limit gait pattern and heel strike. Have been focusing treatment on manual therapy with good tolerance and reletively good response. Added prone knee hang with towel for alignment and support, advised pt to add to HEP. Also added gastroc stretch in standing withmanual OP to encourage extension atLknee with good tolerance. Pt remains a good candidate for skilled PT.  -RS        PT Plan    PT Plan Comments  Follow up after MD appointment, continue manual therapy focused on L knee extension, consider STM/IASTM  -RS       User Key  (r) = Recorded By, (t) = Taken By, (c) = Cosigned By    Initials Name Provider Type    RS Senia Stewart, PT Physical Therapist            OP Exercises     Row Name 04/27/21 0900             Subjective Comments    Subjective Comments  Felt OK after last time  -RS         Subjective Pain    Able to rate subjective pain?  yes  -RS      Pre-Treatment Pain Level  4  -RS         Total Minutes    24439 - PT Therapeutic Exercise Minutes  28  -RS      18548 - PT Manual Therapy Minutes  15  -RS         Exercise 5    Exercise Name 5  Stair calf stretch  -RS      Cueing 5  Verbal;Demo  -RS       Reps 5  3  -RS      Time 5  20 sec  -RS         Exercise 6    Exercise Name 6  stair knee flexion  -RS      Cueing 6  Verbal;Demo  -RS      Sets 6  1  -RS      Reps 6  3  -RS      Time 6  20 sec  -RS         Exercise 7    Exercise Name 7  stair HS strecth  -RS      Cueing 7  Verbal;Demo  -RS      Sets 7  1  -RS      Reps 7  3  -RS      Time 7  20 seconds  -RS         Exercise 8    Exercise Name 8  Nustep UE/LE L5  -RS      Time 8  5 min  -RS         Exercise 9    Exercise Name 9  calf raise on stair  -RS      Cueing 9  Verbal;Demo  -RS      Sets 9  2  -RS      Reps 9  10  -RS      Time 9  2-3 seconds  -RS         Exercise 10    Exercise Name 10  gastroc stretch with OP iinto ext  -RS      Reps 10  10  -RS      Time 10  10s  -RS         Exercise 12    Exercise Name 12  Recumbent bike, seat 10  -RS      Cueing 12  Verbal  -RS      Time 12  3 min  -RS         Exercise 16    Exercise Name 16  L knee flex on wall with bag  -RS      Cueing 16  Verbal  -RS      Reps 16  10  -RS      Time 16  10s  -RS         Exercise 17    Exercise Name 17  prone ext hang  -RS      Cueing 17  Verbal  -RS      Time 17  4 min  -RS      Additional Comments  4#- towel under thigh  -RS        User Key  (r) = Recorded By, (t) = Taken By, (c) = Cosigned By    Initials Name Provider Type    RS Senia Stewart, PT Physical Therapist                      Manual Rx (last 36 hours)      Manual Treatments     Row Name 04/27/21 0900             Total Minutes    36572 - PT Manual Therapy Minutes  15  -RS         Manual Rx 2    Manual Rx 2 Location  Left knee  -RS      Manual Rx 2 Type  seated EOB knee flex mob with mob belt - distraction + IR + flex  -RS         Manual Rx 3    Manual Rx 3 Location  Left knee  -RS      Manual Rx 3 Type  supine knee ext mob with Left heel on towel  -RS         Manual Rx 4    Manual Rx 4 Location  Left knee  -RS      Manual Rx 4 Type  HL knee flex/gapping mobilization  -RS        User Key  (r) = Recorded By, (t) =  Taken By, (c) = Cosigned By    Initials Name Provider Type    RS Senia Stewart, PT Physical Therapist          PT OP Goals     Row Name 04/27/21 0900          PT Short Term Goals    STG Date to Achieve  04/30/21  -RS     STG 1  The pt will demonstrate IND and compliant with initial HEP focused on improved Left knee mobility and quad strength for increased functional independence  -RS     STG 1 Progress  Met  -RS     STG 2  The pt will demonstrate L knee ext AROM to only lacking 15 degrees or less for improved gait pattern.  -RS     STG 2 Progress  Met  -RS     STG 3  The pt will ambulate with SPC over even ground with near normal gait pattern for improved community navigation.  -RS     STG 3 Progress  Ongoing  -RS     STG 3 Progress Comments  pt lacks heel strike L LE  -RS        Long Term Goals    LTG Date to Achieve  06/29/21  -RS     LTG 1  The pt will demonstrate IND with progressive HEP focused on IND condition management and return to PLOF.  -RS     LTG 1 Progress  Ongoing  -RS     LTG 2  The pt will demonstrate L knee flex PROM to at least 120 or greater for improved transitional position and stair navigation.  -RS     LTG 2 Progress  Ongoing;Progressing  -RS     LTG 2 Progress Comments  PROM 112 flex  -RS     LTG 3  The pt will resume reciprocal stair navigation for improved community and household navigation.  -RS     LTG 3 Progress  Ongoing  -RS     LTG 4  The pt will demonstrate L knee AROM to at least 0-120 for improved functional mobility.  -RS     LTG 4 Progress  Ongoing  -RS     LTG 4 Progress Comments  lacking 14 passively from ext  -RS     LTG 5  The pt will demonstrate LLE strength to at least 4+/5 for improved stair navigation and transitional position performance.  -RS     LTG 5 Progress  Ongoing  -RS       User Key  (r) = Recorded By, (t) = Taken By, (c) = Cosigned By    Initials Name Provider Type    RS Senia Stewart, PT Physical Therapist                         Time Calculation:    Start Time: 0900  Stop Time: 0945  Time Calculation (min): 45 min  Time Calculation- PT  Start Time: 0900  Stop Time: 0945  Time Calculation (min): 45 min  Timed Charges  47854 - PT Therapeutic Exercise Minutes: 28  17265 - PT Manual Therapy Minutes: 15  Total Minutes  Timed Charges Total Minutes: 43   Total Minutes: 43  Therapy Charges for Today     Code Description Service Date Service Provider Modifiers Qty    47038036879 HC PT THER PROC EA 15 MIN 4/27/2021 Senia Stewart, PT GP 2    41593048977 HC PT MANUAL THERAPY EA 15 MIN 4/27/2021 Senia Stewart, PT GP 1                    Senia Stewart PT  4/27/2021

## 2021-04-28 ENCOUNTER — OFFICE VISIT (OUTPATIENT)
Dept: ORTHOPEDIC SURGERY | Facility: CLINIC | Age: 66
End: 2021-04-28

## 2021-04-28 VITALS — BODY MASS INDEX: 38.8 KG/M2 | TEMPERATURE: 98.2 F | WEIGHT: 262 LBS | HEIGHT: 69 IN

## 2021-04-28 DIAGNOSIS — Z96.652 STATUS POST TOTAL LEFT KNEE REPLACEMENT: ICD-10-CM

## 2021-04-28 DIAGNOSIS — R52 PAIN: Primary | ICD-10-CM

## 2021-04-28 PROCEDURE — 99024 POSTOP FOLLOW-UP VISIT: CPT | Performed by: ORTHOPAEDIC SURGERY

## 2021-04-28 PROCEDURE — 73560 X-RAY EXAM OF KNEE 1 OR 2: CPT | Performed by: ORTHOPAEDIC SURGERY

## 2021-04-28 NOTE — PROGRESS NOTES
Alton follows up today about 6-week status post left total knee replacement has been working over [place] in therapy.  He says is going just fine is just a little stiff.  Today reviewing his notes and examined him he is about -14 212 on the left he has some hypertrophic type scar in his knee and it feel any fluids little stiff he walks with the and with inability to fully extend his leg x-ray AP lateral left knee taken the office today with comparison views for follow-up of a total joint show well aligned well-positioned total knee arthroplasty.  Advice given he cannot take anti-inflammatories is on Xarelto permanently he is weaning off his pain medicine when encouraged him to use ice and Tylenol and continue in therapy there talk about using stim and possibly they could use some kind of extension brace as well I would be an advocate for that I will see him back in a month with x-rays and hopefully his motion will be doing better

## 2021-04-29 ENCOUNTER — HOSPITAL ENCOUNTER (OUTPATIENT)
Dept: PHYSICAL THERAPY | Facility: HOSPITAL | Age: 66
Setting detail: THERAPIES SERIES
Discharge: HOME | End: 2021-04-29

## 2021-04-29 DIAGNOSIS — Z96.652 AFTERCARE FOLLOWING LEFT KNEE JOINT REPLACEMENT SURGERY: ICD-10-CM

## 2021-04-29 DIAGNOSIS — R26.9 GAIT DIFFICULTY: ICD-10-CM

## 2021-04-29 DIAGNOSIS — Z47.89 ORTHOPEDIC AFTERCARE: Primary | ICD-10-CM

## 2021-04-29 DIAGNOSIS — Z98.890 S/P LEFT KNEE SURGERY: ICD-10-CM

## 2021-04-29 DIAGNOSIS — Z47.1 AFTERCARE FOLLOWING LEFT KNEE JOINT REPLACEMENT SURGERY: ICD-10-CM

## 2021-04-29 PROCEDURE — 97140 MANUAL THERAPY 1/> REGIONS: CPT | Performed by: PHYSICAL THERAPIST

## 2021-04-29 PROCEDURE — 97110 THERAPEUTIC EXERCISES: CPT | Performed by: PHYSICAL THERAPIST

## 2021-04-29 NOTE — THERAPY PROGRESS REPORT/RE-CERT
Outpatient Physical Therapy Ortho Progress Note  [facility]     Patient Name: Jared Rose  : 1955  MRN: [id number]  Today's Date: 2021      Visit Date: 2021    Visit Dx:    ICD-10-CM ICD-9-CM   1. Orthopedic aftercare  Z47.89 V54.9   2. S/P left knee surgery  Z98.890 V45.89   3. Gait difficulty  R26.9 781.2   4. Aftercare following left knee joint replacement surgery  Z47.1 V54.81    Z96.652 V43.65       Patient Active Problem List   Diagnosis   • Thrombophilia (CMS/HCC)   • Lupus anticoagulant disorder (CMS/HCC)   • Chronic anticoagulation   • Coronary atherosclerosis   • Hypertension   • Ventral hernia without obstruction or gangrene   • Hyperlipidemia   • Hematuria   • Dyspnea on exertion   • Symptomatic anemia   • History of pulmonary embolus (PE)   • Colonic mass   • Malignant neoplasm of sigmoid colon (CMS/HCC)   • Iron deficiency anemia due to chronic blood loss   • Arthritis of left knee   • History of DVT (deep vein thrombosis)   • Pulmonary nodule        Past Medical History:   Diagnosis Date   • Acute deep vein thrombosis (DVT) of upper extremity (CMS/HCC) 2019   • At risk for sleep apnea     6   • Bell's palsy 2011    SEEN AT [facility] ER   • Blister of foot 2017    RIGHT FOOT   • Chronic anticoagulation    • Chronic fatigue    • Chronic left-sided low back pain without sciatica    • Colon polyps     FOLLOWED BY DR. RADHA DONOVAN   • Coronary atherosclerosis     cath 2015: normal LM, diffuse LCx disease, LI LAD, 60-70% ostial diag (<1 mm vessel), LI RCA   • COVID-19 virus detected     2020  [facility].    • Dermoid cyst of leg, right 2017   • ED (erectile dysfunction)    • Exomphalos 2013   • GI hemorrhage 2019    ADMITTED TO [facility]   • H/O Anemia    • H/O Leukopenia    • History of chemotherapy    • History of transfusion     no reaction   • Hyperlipidemia    • Hypertension    • Knee pain    • Lupus anticoagulant disorder (CMS/HCC) 2016    • Muscle spasm of back 10/2018   • OA (osteoarthritis)    • Obesity    • Pulmonary embolism (CMS/HCC) 06/08/2010     Right lower lobe pulmonary embolus, ADMITTED TO [facility]   • Pulmonary embolus (CMS/HCC) 5/6/2016   • Rectal bleeding 09/2019   • Rectal cancer (CMS/HCC) 09/24/2019    MODERATELY DIFFERENTIATED ADENOCARCINOMA GRADE 2, FOLLOWED BY DR. RADHA WHITT   • Sprain of right shoulder 10/21/2020    SEEN AT [facility] ER   • Strain of left shoulder 10/2020   • Stress fracture of right foot 05/11/2013    4TH METATARSAL, SEEN AT [facility] ER   • Thrombophilia (CMS/HCC)     FOLLOWED BY DR. BALAJI MCGEE   • Tinea pedis 11/25/2007    SEEN AT [facility] ER        Past Surgical History:   Procedure Laterality Date   • CARDIAC CATHETERIZATION Left 05/11/2006    NORMAL LV SYSTOLIC FUNCTION(EF 50%), MOD DZ OF 2 SMALL CORONARY BRANCHES (D2 AND OM2), DR. BOOM GALLEGO AT [facility]   • CARDIAC CATHETERIZATION Left 07/20/2015    NORMAL LM, DIFFUSE LCx DZ, LI LAD, 60-70% OSTIAL DIAG(<1MM VESSEL), 10%STENOSIS IN LAD, DR. CHERI VARGAS AT [facility]   • CLOSED REDUCTION WRIST FRACTURE Right    • COLON RESECTION N/A 9/26/2019    Procedure: LOW ANTERIOR COLON RESECTION IMMOBILIZATION OF SPLENIC FLEXURE;  Surgeon: Radha Whitt MD;  Location: [facility];  Service: General   • COLONOSCOPY N/A 9/21/2019    INT/EXT HEMORRHOIDS, 18 MM POLYPOID LESION IN MID SIGMOID, PATH: INVASIVE MODERATELY DIFFERENTIATED GRADE 2 ADENOCARCINOMA, 2 TUBULOVILLOUS ADENOMA POLYPS IN CECUM, ADENOMATOUS POLYP IN TRANSVERSE, ADENOMATOUS POLYP IN ASCENDING, MULTIPLE SMALL AND LARGE DIVERTICULA, DR. TRUONG MONTEZ AT [facility]   • COLONOSCOPY N/A 3/10/2021    5 MM BENIGN POLYP WITH MELANOSIS COLI IN TRANSVERSE, RESCOPE IN 1 YR, DR. RADHA WHITT AT [facility]   • ENDOSCOPY N/A    • LUNG SURGERY Right 2009    RIGHT LOWER LOBE, BLOT CLOT REMOVED   • SIGMOIDOSCOPY N/A 9/24/2019    AN INFILTRATIVE NON OBSTRUCTING MASS IN SIGMOID, AREA TATTOOED, DR. RADHA WHITT AT [facility]   • TONSILLECTOMY AND ADENOIDECTOMY  Bilateral     DURING CHILDHOOD   • TOTAL KNEE ARTHROPLASTY Left 3/16/2021    Procedure: LEFT TOTAL KNEE ARTHROPLASTY WITH MARTHA NAVIGATION;  Surgeon: Abraham Watesr MD;  Location: [facility] OR;  Service: Orthopedics;  Laterality: Left;   • UMBILICAL HERNIA REPAIR N/A 05/14/2014    DR. ROMERO SCHUSTER AT [facility]       PT Ortho     Row Name 04/29/21 0700       Left Lower Ext    Lt Knee Extension/Flexion AROM  ,  seated  -GJ    Lt Knee Extension/Flexion PROM  , supine  -GJ      User Key  (r) = Recorded By, (t) = Taken By, (c) = Cosigned By    Initials Name Provider Type     Justo Gandhi, PT Physical Therapist                      PT Assessment/Plan     Row Name 04/29/21 0711          PT Assessment    Functional Limitations  Impaired gait;Performance in leisure activities;Limitations in community activities;Limitation in home management;Performance in work activities;Performance in sport activities  -     Impairments  Gait;Endurance;Impaired flexibility;Impaired muscle length;Impaired muscle power;Impaired muscle endurance;Impaired postural alignment;Joint mobility;Muscle strength;Pain;Poor body mechanics;Posture;Range of motion  -     Assessment Comments  Mr. Rose is a 66 y/o male. He is a  here at [facility], currently off work, with likely RTW date of 5/14 carlos alberto. He is 6 weeks s/p L TKA. He has attended 9 sessions of physical therpay to date. He is now ambulating without AD, demonstrates L knee flexion cotracture limiting his normalized gait pattern. He demosntrates limited L knee A/PROM especially into ext (near equal deficit with A/P extension).  He demoosntrates hypertrophic scarring of L knee, without s/s of infection. He has met all STGs and 0/5 LTG's.  He may benefit from Dynasplint like device for extension. Mr. Rose continues to progress toward all functional goals and remains a good candidate for skilled physical therapy.  -GJ     Please refer to paper survey for additional  self-reported information  Yes  -GJ     Rehab Potential  Good  -GJ     Patient/caregiver participated in establishment of treatment plan and goals  Yes  -GJ     Patient would benefit from skilled therapy intervention  Yes  -GJ        PT Plan    PT Plan Comments  continue to focus on ROM especially extension with mobs, STM, functional training, consider retrol gait  -GJ       User Key  (r) = Recorded By, (t) = Taken By, (c) = Cosigned By    Initials Name Provider Type    GJ Justo Gandhi, PT Physical Therapist            OP Exercises     Row Name 04/29/21 0704 04/29/21 0700          Subjective Comments    Subjective Comments  --  I saw dr. grant yesterday, overall he is happy, he wants me to straigthen my knee more.    -GJ        Total Minutes    64810 - PT Therapeutic Exercise Minutes  15  -GJ  --     33702 - PT Manual Therapy Minutes  25  -GJ  --        Exercise 2    Exercise Name 2  --  Prone hang  -GJ     Additional Comments  --  for home  -GJ        Exercise 5    Exercise Name 5  --  Stair calf stretch  -GJ     Cueing 5  --  Verbal;Demo  -GJ     Reps 5  --  3  -GJ     Time 5  --  20 sec  -GJ        Exercise 6    Exercise Name 6  --  stair knee flexion  -GJ     Cueing 6  --  Verbal;Demo  -GJ     Sets 6  --  1  -GJ     Reps 6  --  3  -GJ     Time 6  --  20 sec  -GJ        Exercise 7    Exercise Name 7  --  stair HS strecth  -GJ     Cueing 7  --  Verbal;Demo  -GJ     Sets 7  --  1  -GJ     Reps 7  --  3  -GJ     Time 7  --  20 seconds  -GJ        Exercise 8    Exercise Name 8  --  Nustep UE/LE L5  -GJ     Time 8  --  5 min  -GJ        Exercise 9    Exercise Name 9  --  calf raise on stair  -GJ     Cueing 9  --  Verbal;Demo  -GJ     Sets 9  --  2  -GJ     Reps 9  --  10  -GJ     Time 9  --  2-3 seconds  -GJ        Exercise 12    Exercise Name 12  --  Recumbent bike,   -GJ     Time 12  --  3 min  -GJ     Additional Comments  --  seat 10  -GJ       User Key  (r) = Recorded By, (t) = Taken By, (c) = Cosigned By     Initials Name Provider Type    Justo Phoenix, PT Physical Therapist                      Manual Rx (last 36 hours)      Manual Treatments     Row Name 04/29/21 0704 04/29/21 0700          Total Minutes    26286 - PT Manual Therapy Minutes  25  -GJ  --        Manual Rx 2    Manual Rx 2 Location  --  Left knee  -GJ     Manual Rx 2 Type  --  seated EOB knee flex mob with mob belt - distraction + IR + flex  -GJ        Manual Rx 3    Manual Rx 3 Location  --  --  -GJ        Manual Rx 4    Manual Rx 4 Type  --  HL knee flex/gapping mobilization  -GJ        Manual Rx 5    Manual Rx 5 Location  --  L knee  -GJ     Manual Rx 5 Type  --  extension with femoral IR at wall (runners stretch like position)  -GJ        Manual Rx 6    Manual Rx 6 Location  --  [place], L mid to distal HS tissus, pt prone  -GJ       User Key  (r) = Recorded By, (t) = Taken By, (c) = Cosigned By    Initials Name Provider Type    Justo Phoenix, PT Physical Therapist          PT OP Goals     Row Name 04/29/21 0700          PT Short Term Goals    STG Date to Achieve  04/30/21  -GJ     STG 1  The pt will demonstrate IND and compliant with initial HEP focused on improved Left knee mobility and quad strength for increased functional independence  -GJ     STG 1 Progress  Met  -GJ     STG 2  The pt will demonstrate L knee ext AROM to only lacking 15 degrees or less for improved gait pattern.  -GJ     STG 2 Progress  Met  -GJ     STG 3  The pt will ambulate with SPC over even ground with near normal gait pattern for improved community navigation.  -GJ     STG 3 Progress  Met  -GJ        Long Term Goals    LTG Date to Achieve  06/29/21  -GJ     LTG 1  The pt will demonstrate IND with progressive HEP focused on IND condition management and return to PLOF.  -GJ     LTG 1 Progress  Ongoing  -GJ     LTG 2  The pt will demonstrate L knee flex PROM to at least 120 or greater for improved transitional position and stair navigation.  -GJ     LTG 2 Progress   Ongoing;Progressing  -GJ     LTG 2 Progress Comments  see ortho  -GJ     LTG 3  The pt will resume reciprocal stair navigation for improved community and household navigation.  -GJ     LTG 3 Progress  Ongoing  -GJ     LTG 4  The pt will demonstrate L knee AROM to at least 0-120 for improved functional mobility.  -GJ     LTG 4 Progress  Ongoing  -GJ     LTG 4 Progress Comments  see ortho  -GJ     LTG 5  The pt will demonstrate LLE strength to at least 4+/5 for improved stair navigation and transitional position performance.  -GJ     LTG 5 Progress  Ongoing  -GJ       User Key  (r) = Recorded By, (t) = Taken By, (c) = Cosigned By    Initials Name Provider Type    Justo Phoenix, PT Physical Therapist          Therapy Education  Education Details: discussed possible use of dynasplint, importance of straightening of knee. educated in incision massage to facilitate improved mobility, reviewed anatomy of knee and physiology of healing, realistic expectations and time frames  Given: HEP, Symptoms/condition management, Pain management, Mobility training, Posture/body mechanics  Program: Reinforced  How Provided: Verbal, Demonstration  Provided to: Patient  Level of Understanding: Teach back education performed, Verbalized, Demonstrated              Time Calculation:   Start Time: 0704  Stop Time: 0748  Time Calculation (min): 44 min  Time Calculation- PT  Start Time: 0704  Stop Time: 0748  Time Calculation (min): 44 min  Timed Charges  41173 - PT Therapeutic Exercise Minutes: 15  15732 - PT Manual Therapy Minutes: 25  Total Minutes  Timed Charges Total Minutes: 40   Total Minutes: 40  Therapy Charges for Today     Code Description Service Date Service Provider Modifiers Qty    42775261259  PT THER PROC EA 15 MIN 4/29/2021 Justo Gandhi, PT GP 1    38049779696 HC PT MANUAL THERAPY EA 15 MIN 4/29/2021 Justo Gandhi, PT GP 2                    Justo Gandhi PT  4/29/2021

## 2021-05-03 ENCOUNTER — HOSPITAL ENCOUNTER (OUTPATIENT)
Dept: PHYSICAL THERAPY | Facility: HOSPITAL | Age: 66
Setting detail: THERAPIES SERIES
Discharge: HOME | End: 2021-05-03

## 2021-05-03 DIAGNOSIS — Z98.890 S/P LEFT KNEE SURGERY: ICD-10-CM

## 2021-05-03 DIAGNOSIS — Z47.89 ORTHOPEDIC AFTERCARE: Primary | ICD-10-CM

## 2021-05-03 DIAGNOSIS — Z47.1 AFTERCARE FOLLOWING LEFT KNEE JOINT REPLACEMENT SURGERY: ICD-10-CM

## 2021-05-03 DIAGNOSIS — Z96.652 AFTERCARE FOLLOWING LEFT KNEE JOINT REPLACEMENT SURGERY: ICD-10-CM

## 2021-05-03 DIAGNOSIS — R26.9 GAIT DIFFICULTY: ICD-10-CM

## 2021-05-03 PROCEDURE — 97110 THERAPEUTIC EXERCISES: CPT | Performed by: PHYSICAL THERAPIST

## 2021-05-03 PROCEDURE — 97140 MANUAL THERAPY 1/> REGIONS: CPT | Performed by: PHYSICAL THERAPIST

## 2021-05-03 NOTE — THERAPY TREATMENT NOTE
Outpatient Physical Therapy Ortho Treatment Note  [facility]     Patient Name: Jared Rose  : 1955  MRN: [id number]  Today's Date: 5/3/2021      Visit Date: 2021    Visit Dx:    ICD-10-CM ICD-9-CM   1. Orthopedic aftercare  Z47.89 V54.9   2. S/P left knee surgery  Z98.890 V45.89   3. Gait difficulty  R26.9 781.2   4. Aftercare following left knee joint replacement surgery  Z47.1 V54.81    Z96.652 V43.65       Patient Active Problem List   Diagnosis   • Thrombophilia (CMS/HCC)   • Lupus anticoagulant disorder (CMS/HCC)   • Chronic anticoagulation   • Coronary atherosclerosis   • Hypertension   • Ventral hernia without obstruction or gangrene   • Hyperlipidemia   • Hematuria   • Dyspnea on exertion   • Symptomatic anemia   • History of pulmonary embolus (PE)   • Colonic mass   • Malignant neoplasm of sigmoid colon (CMS/HCC)   • Iron deficiency anemia due to chronic blood loss   • Arthritis of left knee   • History of DVT (deep vein thrombosis)   • Pulmonary nodule        Past Medical History:   Diagnosis Date   • Acute deep vein thrombosis (DVT) of upper extremity (CMS/HCC) 2019   • At risk for sleep apnea     6   • Bell's palsy 2011    SEEN AT [facility] ER   • Blister of foot 2017    RIGHT FOOT   • Chronic anticoagulation    • Chronic fatigue    • Chronic left-sided low back pain without sciatica    • Colon polyps     FOLLOWED BY DR. RADHA DONOVAN   • Coronary atherosclerosis     cath 2015: normal LM, diffuse LCx disease, LI LAD, 60-70% ostial diag (<1 mm vessel), LI RCA   • COVID-19 virus detected     2020  [facility].    • Dermoid cyst of leg, right 2017   • ED (erectile dysfunction)    • Exomphalos 2013   • GI hemorrhage 2019    ADMITTED TO [facility]   • H/O Anemia    • H/O Leukopenia    • History of chemotherapy    • History of transfusion     no reaction   • Hyperlipidemia    • Hypertension    • Knee pain    • Lupus anticoagulant disorder (CMS/HCC) 2016    • Muscle spasm of back 10/2018   • OA (osteoarthritis)    • Obesity    • Pulmonary embolism (CMS/HCC) 06/08/2010     Right lower lobe pulmonary embolus, ADMITTED TO [facility]   • Pulmonary embolus (CMS/HCC) 5/6/2016   • Rectal bleeding 09/2019   • Rectal cancer (CMS/HCC) 09/24/2019    MODERATELY DIFFERENTIATED ADENOCARCINOMA GRADE 2, FOLLOWED BY DR. RADHA WHITT   • Sprain of right shoulder 10/21/2020    SEEN AT [facility] ER   • Strain of left shoulder 10/2020   • Stress fracture of right foot 05/11/2013    4TH METATARSAL, SEEN AT [facility] ER   • Thrombophilia (CMS/HCC)     FOLLOWED BY DR. BALAJI MCGEE   • Tinea pedis 11/25/2007    SEEN AT [facility] ER        Past Surgical History:   Procedure Laterality Date   • CARDIAC CATHETERIZATION Left 05/11/2006    NORMAL LV SYSTOLIC FUNCTION(EF 50%), MOD DZ OF 2 SMALL CORONARY BRANCHES (D2 AND OM2), DR. BOOM GALLEGO AT [facility]   • CARDIAC CATHETERIZATION Left 07/20/2015    NORMAL LM, DIFFUSE LCx DZ, LI LAD, 60-70% OSTIAL DIAG(<1MM VESSEL), 10%STENOSIS IN LAD, DR. CHERI VARGAS AT [facility]   • CLOSED REDUCTION WRIST FRACTURE Right    • COLON RESECTION N/A 9/26/2019    Procedure: LOW ANTERIOR COLON RESECTION IMMOBILIZATION OF SPLENIC FLEXURE;  Surgeon: Radha Whitt MD;  Location: [facility];  Service: General   • COLONOSCOPY N/A 9/21/2019    INT/EXT HEMORRHOIDS, 18 MM POLYPOID LESION IN MID SIGMOID, PATH: INVASIVE MODERATELY DIFFERENTIATED GRADE 2 ADENOCARCINOMA, 2 TUBULOVILLOUS ADENOMA POLYPS IN CECUM, ADENOMATOUS POLYP IN TRANSVERSE, ADENOMATOUS POLYP IN ASCENDING, MULTIPLE SMALL AND LARGE DIVERTICULA, DR. TRUONG MONTEZ AT [facility]   • COLONOSCOPY N/A 3/10/2021    5 MM BENIGN POLYP WITH MELANOSIS COLI IN TRANSVERSE, RESCOPE IN 1 YR, DR. RADHA WHITT AT [facility]   • ENDOSCOPY N/A    • LUNG SURGERY Right 2009    RIGHT LOWER LOBE, BLOT CLOT REMOVED   • SIGMOIDOSCOPY N/A 9/24/2019    AN INFILTRATIVE NON OBSTRUCTING MASS IN SIGMOID, AREA TATTOOED, DR. RADHA WHITT AT [facility]   • TONSILLECTOMY AND ADENOIDECTOMY  Bilateral     DURING CHILDHOOD   • TOTAL KNEE ARTHROPLASTY Left 3/16/2021    Procedure: LEFT TOTAL KNEE ARTHROPLASTY WITH MARTHA NAVIGATION;  Surgeon: Abraham Waters MD;  Location: [facility];  Service: Orthopedics;  Laterality: Left;   • UMBILICAL HERNIA REPAIR N/A 05/14/2014    DR. ROMERO SCHUSTER AT [facility]                       PT Assessment/Plan     Row Name 05/03/21 0832          PT Assessment    Assessment Comments  Mr. Rose demonstrates mild improvementin gait pattern today, no AD. We worked mostly on extension activities today.  Mr. Rose grossly demonstrated improved extension. I have been in contact with [employer] rep whom has already contacted patient. we will try to get him in a dynamic stretching splint for L knee extension as soon as possible. Mr. Rose continues to be a good candidate for skilled physical therapy.  -GJ        PT Plan    PT Plan Comments  continue to focus on manual activities promoting knee extension and gait normalization. Consider piriformis, hip flexor stretch as well  -GJ       User Key  (r) = Recorded By, (t) = Taken By, (c) = Cosigned By    Initials Name Provider Type    GJ Justo Gandhi, PT Physical Therapist            OP Exercises     Row Name 05/03/21 0701 05/03/21 0700          Subjective Comments    Subjective Comments  --  I did ok last time. I did hear from the brace company.   -GJ        Total Minutes    71105 - PT Therapeutic Exercise Minutes  27  -GJ  --     71955 - PT Manual Therapy Minutes  20  -GJ  --        Exercise 2    Exercise Name 2  --  Prone hang  -GJ     Additional Comments  --  for home  -GJ        Exercise 5    Exercise Name 5  --  Stair calf stretch  -GJ     Cueing 5  --  Verbal;Demo  -GJ     Reps 5  --  3  -GJ     Time 5  --  20 sec  -GJ        Exercise 6    Exercise Name 6  --  stair knee flexion  -GJ     Cueing 6  --  Verbal;Demo  -GJ     Reps 6  --  3  -GJ     Time 6  --  20 sec  -GJ        Exercise 7    Exercise Name 7  --  stair HS strecth   -GJ     Cueing 7  --  Verbal;Demo  -GJ     Reps 7  --  3  -GJ     Time 7  --  20 seconds  -GJ        Exercise 8    Exercise Name 8  --  Nustep UE/LE L5  -GJ     Time 8  --  5 min  -GJ        Exercise 9    Exercise Name 9  --  calf raise on stair  -GJ     Cueing 9  --  Verbal;Demo  -GJ     Sets 9  --  2  -GJ     Reps 9  --  10  -GJ        Exercise 10    Exercise Name 10  --  gastroc stretch with OP iinto ext  -GJ     Reps 10  --  3  -GJ     Time 10  --  20 s  -GJ        Exercise 12    Exercise Name 12  --  Recumbent bike,   -GJ     Cueing 12  --  Verbal  -GJ     Time 12  --  3 min  -GJ     Additional Comments  --  seat 10  -GJ        Exercise 13    Exercise Name 13  --  mini squat  -GJ     Cueing 13  --  Verbal;Demo  -GJ     Reps 13  --  15  -GJ        Exercise 14    Exercise Name 14  --  retro walk at ballet bar  -GJ     Cueing 14  --  Verbal;Demo;Auditory  -GJ     Reps 14  --  6 laps  -GJ     Additional Comments  --  focus on upright, extend wth LLE  -GJ       User Key  (r) = Recorded By, (t) = Taken By, (c) = Cosigned By    Initials Name Provider Type    GJ Justo Gandhi, PT Physical Therapist                      Manual Rx (last 36 hours)      Manual Treatments     Row Name 05/03/21 0701 05/03/21 0700          Total Minutes    96044 - PT Manual Therapy Minutes  20  -GJ  --        Manual Rx 2    Manual Rx 2 Location  --  Left knee  -GJ     Manual Rx 2 Type  --  seated EOB knee flex mob with mob belt - distraction + IR + flex  -GJ     Manual Rx 2 Grade  --  patellar mobs, L knee, superior/inferior/medial/lateral  -GJ        Manual Rx 5    Manual Rx 5 Location  --  L knee  -GJ     Manual Rx 5 Type  --  extension with femoral IR at wall (runners stretch like position)  -GJ        Manual Rx 6    Manual Rx 6 Location  --  gua [place], L mid to distal HS tissus, pt prone  -GJ     Manual Rx 6 Type  --  foot hanging over edge  -GJ       User Key  (r) = Recorded By, (t) = Taken By, (c) = Cosigned By    Initials Name  Provider Type    Justo Phoenix, PT Physical Therapist          PT OP Goals     Row Name 05/03/21 0700          PT Short Term Goals    STG Date to Achieve  04/30/21  -GJ     STG 1  The pt will demonstrate IND and compliant with initial HEP focused on improved Left knee mobility and quad strength for increased functional independence  -GJ     STG 1 Progress  Met  -GJ     STG 2  The pt will demonstrate L knee ext AROM to only lacking 15 degrees or less for improved gait pattern.  -GJ     STG 2 Progress  Met  -GJ     STG 3  The pt will ambulate with SPC over even ground with near normal gait pattern for improved community navigation.  -GJ     STG 3 Progress  Met  -GJ        Long Term Goals    LTG Date to Achieve  06/29/21  -GJ     LTG 1  The pt will demonstrate IND with progressive HEP focused on IND condition management and return to PLOF.  -GJ     LTG 1 Progress  Ongoing  -GJ     LTG 2  The pt will demonstrate L knee flex PROM to at least 120 or greater for improved transitional position and stair navigation.  -GJ     LTG 2 Progress  Ongoing;Progressing  -GJ     LTG 3  The pt will resume reciprocal stair navigation for improved community and household navigation.  -GJ     LTG 3 Progress  Ongoing  -GJ     LTG 4  The pt will demonstrate L knee AROM to at least 0-120 for improved functional mobility.  -GJ     LTG 4 Progress  Ongoing  -GJ     LTG 5  The pt will demonstrate LLE strength to at least 4+/5 for improved stair navigation and transitional position performance.  -GJ     LTG 5 Progress  Ongoing  -GJ       User Key  (r) = Recorded By, (t) = Taken By, (c) = Cosigned By    Initials Name Provider Type    Justo Phoenix, PT Physical Therapist          Therapy Education  Education Details: encouraged to use pillow long ways to just proximal of knee to allow extension and taking stress off HS tissue, which should allow pt to avoid excessive ER of L femur at rest.  re-printed schedule for patient. Strengthening  to continue to be once every other day, ROM/stretches daily  Given: HEP, Symptoms/condition management, Pain management, Mobility training, Posture/body mechanics, Edema management  Program: Reinforced  How Provided: Verbal  Level of Understanding: Verbalized              Time Calculation:   Start Time: 0701  Stop Time: 0748  Time Calculation (min): 47 min  Time Calculation- PT  Start Time: 0701  Stop Time: 0748  Time Calculation (min): 47 min  Timed Charges  09582 - PT Therapeutic Exercise Minutes: 27  43912 - PT Manual Therapy Minutes: 20  Total Minutes  Timed Charges Total Minutes: 47   Total Minutes: 47  Therapy Charges for Today     Code Description Service Date Service Provider Modifiers Qty    92870401753 HC PT THER PROC EA 15 MIN 5/3/2021 Justo Gandhi, PT GP 2    64243602167 HC PT MANUAL THERAPY EA 15 MIN 5/3/2021 Justo Gandhi, PT GP 1                    Justo Gandhi, PT  5/3/2021

## 2021-05-04 ENCOUNTER — TELEPHONE (OUTPATIENT)
Dept: ORTHOPEDIC SURGERY | Facility: CLINIC | Age: 66
End: 2021-05-04

## 2021-05-04 NOTE — TELEPHONE ENCOUNTER
Caller: RIA PUGHWILLIAM    Relationship: SELF    Best call back number: [phone]    What form or medical record are you requesting: RETURN TO WORK NOTE    Who is requesting this form or medical record from you: [facility]    How would you like to receive the form or medical records (pick-up, mail, fax):   FAX: [phone]    Timeframe paperwork needed:  AS SOON AS POSSIBLE    Additional notes:  PATIENT WOULD LIKE A CALL BACK TO CONFIRM PAPERWORK BEING FAXED AND ALSO TO CONFIRM RETURN TO WORK DATE.

## 2021-05-04 NOTE — TELEPHONE ENCOUNTER
Caller: Jared Rose    Relationship: Self    Best call back number: [phone]    What is the best time to reach you: ANYTIME    Who are you requesting to speak with (clinical staff, provider,  specific staff member):DR FLEX ANDERS    What was the call regarding: HE WAS TOLD BY HIS EMPLOYER THAT HE RETURN BACK TO WORK 05/16/2021 BUT HE THINK HE WAS ADVISED BY DR ANDERS TO NOT RETURN UNTIL HE LOOK AT HIS LEG. HE WANT TO KNOW IS HE SUPPOSED TO GO BACK TO WORK OR WAIT    Do you require a callback: YES

## 2021-05-06 ENCOUNTER — TELEPHONE (OUTPATIENT)
Dept: ORTHOPEDIC SURGERY | Facility: CLINIC | Age: 66
End: 2021-05-06

## 2021-05-06 ENCOUNTER — HOSPITAL ENCOUNTER (OUTPATIENT)
Dept: PHYSICAL THERAPY | Facility: HOSPITAL | Age: 66
Setting detail: THERAPIES SERIES
Discharge: HOME | End: 2021-05-06

## 2021-05-06 DIAGNOSIS — Z98.890 S/P LEFT KNEE SURGERY: ICD-10-CM

## 2021-05-06 DIAGNOSIS — Z96.652 AFTERCARE FOLLOWING LEFT KNEE JOINT REPLACEMENT SURGERY: ICD-10-CM

## 2021-05-06 DIAGNOSIS — R26.9 GAIT DIFFICULTY: ICD-10-CM

## 2021-05-06 DIAGNOSIS — Z47.1 AFTERCARE FOLLOWING LEFT KNEE JOINT REPLACEMENT SURGERY: ICD-10-CM

## 2021-05-06 DIAGNOSIS — Z47.89 ORTHOPEDIC AFTERCARE: Primary | ICD-10-CM

## 2021-05-06 PROCEDURE — 97140 MANUAL THERAPY 1/> REGIONS: CPT | Performed by: PHYSICAL THERAPIST

## 2021-05-06 PROCEDURE — 97110 THERAPEUTIC EXERCISES: CPT | Performed by: PHYSICAL THERAPIST

## 2021-05-06 NOTE — TELEPHONE ENCOUNTER
Caller: OUMOU    Relationship: SAM    Best call back number: [phone]     What form or medical record are you requesting: LAST 2 PROGNOTES, AND OP NOTES     Who is requesting this form or medical record from you: SAM     How would you like to receive the form or medical records (pick-up, mail, fax): FAX   If fax, what is the fax number: [phone]

## 2021-05-06 NOTE — TELEPHONE ENCOUNTER
Caller: RIA SHAWCHRISTIANO    Relationship: SELF    Best call back number: [phone]    What form or medical record are you requesting: FMLA PAPERWORK    Who is requesting this form or medical record from you: EMETERIO    How would you like to receive the form or medical records (pick-up, mail, fax):   ATTN: SOLOMON BARFIELD  PHONE: [phone] ext [phone]  FAX: [phone]      Timeframe paperwork needed: TODAY    Additional notes: PATIENT RECEIVED DENIAL LETTER FOR FMLA.  HE STATED THAT [employer] NEVER RECEIVED ANY PAPERWORK FROM PRACTICE. EMETERIO NEEDS PAPERWORK BY END OF DAY TODAY 5-6-21.

## 2021-05-06 NOTE — THERAPY TREATMENT NOTE
Outpatient Physical Therapy Ortho Treatment Note  [facility]     Patient Name: Jared Rose  : 1955  MRN: [id number]  Today's Date: 2021      Visit Date: 2021    Visit Dx:    ICD-10-CM ICD-9-CM   1. Orthopedic aftercare  Z47.89 V54.9   2. S/P left knee surgery  Z98.890 V45.89   3. Gait difficulty  R26.9 781.2   4. Aftercare following left knee joint replacement surgery  Z47.1 V54.81    Z96.652 V43.65       Patient Active Problem List   Diagnosis   • Thrombophilia (CMS/HCC)   • Lupus anticoagulant disorder (CMS/HCC)   • Chronic anticoagulation   • Coronary atherosclerosis   • Hypertension   • Ventral hernia without obstruction or gangrene   • Hyperlipidemia   • Hematuria   • Dyspnea on exertion   • Symptomatic anemia   • History of pulmonary embolus (PE)   • Colonic mass   • Malignant neoplasm of sigmoid colon (CMS/HCC)   • Iron deficiency anemia due to chronic blood loss   • Arthritis of left knee   • History of DVT (deep vein thrombosis)   • Pulmonary nodule        Past Medical History:   Diagnosis Date   • Acute deep vein thrombosis (DVT) of upper extremity (CMS/HCC) 2019   • At risk for sleep apnea     6   • Bell's palsy 2011    SEEN AT [facility] ER   • Blister of foot 2017    RIGHT FOOT   • Chronic anticoagulation    • Chronic fatigue    • Chronic left-sided low back pain without sciatica    • Colon polyps     FOLLOWED BY DR. RADHA DONOVAN   • Coronary atherosclerosis     cath 2015: normal LM, diffuse LCx disease, LI LAD, 60-70% ostial diag (<1 mm vessel), LI RCA   • COVID-19 virus detected     2020  [facility].    • Dermoid cyst of leg, right 2017   • ED (erectile dysfunction)    • Exomphalos 2013   • GI hemorrhage 2019    ADMITTED TO [facility]   • H/O Anemia    • H/O Leukopenia    • History of chemotherapy    • History of transfusion     no reaction   • Hyperlipidemia    • Hypertension    • Knee pain    • Lupus anticoagulant disorder (CMS/HCC) 2016    • Muscle spasm of back 10/2018   • OA (osteoarthritis)    • Obesity    • Pulmonary embolism (CMS/HCC) 06/08/2010     Right lower lobe pulmonary embolus, ADMITTED TO [facility]   • Pulmonary embolus (CMS/HCC) 5/6/2016   • Rectal bleeding 09/2019   • Rectal cancer (CMS/HCC) 09/24/2019    MODERATELY DIFFERENTIATED ADENOCARCINOMA GRADE 2, FOLLOWED BY DR. RADHA WHITT   • Sprain of right shoulder 10/21/2020    SEEN AT [facility] ER   • Strain of left shoulder 10/2020   • Stress fracture of right foot 05/11/2013    4TH METATARSAL, SEEN AT [facility] ER   • Thrombophilia (CMS/HCC)     FOLLOWED BY DR. BALAJI MCGEE   • Tinea pedis 11/25/2007    SEEN AT [facility] ER        Past Surgical History:   Procedure Laterality Date   • CARDIAC CATHETERIZATION Left 05/11/2006    NORMAL LV SYSTOLIC FUNCTION(EF 50%), MOD DZ OF 2 SMALL CORONARY BRANCHES (D2 AND OM2), DR. BOOM GALLEGO AT [facility]   • CARDIAC CATHETERIZATION Left 07/20/2015    NORMAL LM, DIFFUSE LCx DZ, LI LAD, 60-70% OSTIAL DIAG(<1MM VESSEL), 10%STENOSIS IN LAD, DR. CHERI VARGAS AT [facility]   • CLOSED REDUCTION WRIST FRACTURE Right    • COLON RESECTION N/A 9/26/2019    Procedure: LOW ANTERIOR COLON RESECTION IMMOBILIZATION OF SPLENIC FLEXURE;  Surgeon: Radha Whitt MD;  Location: [facility];  Service: General   • COLONOSCOPY N/A 9/21/2019    INT/EXT HEMORRHOIDS, 18 MM POLYPOID LESION IN MID SIGMOID, PATH: INVASIVE MODERATELY DIFFERENTIATED GRADE 2 ADENOCARCINOMA, 2 TUBULOVILLOUS ADENOMA POLYPS IN CECUM, ADENOMATOUS POLYP IN TRANSVERSE, ADENOMATOUS POLYP IN ASCENDING, MULTIPLE SMALL AND LARGE DIVERTICULA, DR. TRUONG MONTEZ AT [facility]   • COLONOSCOPY N/A 3/10/2021    5 MM BENIGN POLYP WITH MELANOSIS COLI IN TRANSVERSE, RESCOPE IN 1 YR, DR. RADHA WHITT AT [facility]   • ENDOSCOPY N/A    • LUNG SURGERY Right 2009    RIGHT LOWER LOBE, BLOT CLOT REMOVED   • SIGMOIDOSCOPY N/A 9/24/2019    AN INFILTRATIVE NON OBSTRUCTING MASS IN SIGMOID, AREA TATTOOED, DR. RADHA WHITT AT [facility]   • TONSILLECTOMY AND ADENOIDECTOMY  Bilateral     DURING CHILDHOOD   • TOTAL KNEE ARTHROPLASTY Left 3/16/2021    Procedure: LEFT TOTAL KNEE ARTHROPLASTY WITH MARTHA NAVIGATION;  Surgeon: Abraham Waters MD;  Location: [facility];  Service: Orthopedics;  Laterality: Left;   • UMBILICAL HERNIA REPAIR N/A 05/14/2014    DR. ROMERO SCHUSTER AT [facility]       PT Ortho     Row Name 05/06/21 0700       Left Lower Ext    Lt Knee Extension/Flexion AROM  25-95  -GJ    Lt Knee Extension/Flexion PROM    -GJ      User Key  (r) = Recorded By, (t) = Taken By, (c) = Cosigned By    Initials Name Provider Type    Justo Phoenix, PT Physical Therapist                      PT Assessment/Plan     Row Name 05/06/21 0709          PT Assessment    Assessment Comments  Mr. Rose is 7 weeks s/p L TKA.  We again focused primarily on manual activities today. Mr. Rose continues to demonstrate limitationsin A/ROM L knee, however improved PROM L knee extension. End feel is spongy at this time with extension.  I have contacted rep for dynasplint to assess status of getting the device (for ext) to the patient.  Mr. Rose continues to be a good candidate for skilled physical therapy.  -GJ        PT Plan    PT Plan Comments  continue to work on extension more then flexion ROM, focus on manual and joint mobs, assess if he has dynasplint yet  -GJ       User Key  (r) = Recorded By, (t) = Taken By, (c) = Cosigned By    Initials Name Provider Type    Justo Phoenix, PT Physical Therapist            OP Exercises     Row Name 05/06/21 0705 05/06/21 0700          Subjective Comments    Subjective Comments  --  i think i'm off work until i go back to see the doctor, 5/24. My knee is still kind of sore  -GJ        Total Minutes    68604 - PT Therapeutic Exercise Minutes  19  -GJ  --     61565 - PT Manual Therapy Minutes  25  -GJ  --        Exercise 5    Exercise Name 5  --  Stair calf stretch  -GJ     Cueing 5  --  Verbal;Demo  -GJ     Reps 5  --  3  -GJ     Time 5  --  20 sec   -GJ        Exercise 8    Exercise Name 8  --  Nustep UE/LE L5  -GJ     Time 8  --  5 min  -GJ        Exercise 9    Exercise Name 9  --  calf raise on stair  -GJ     Cueing 9  --  Verbal;Demo  -GJ     Sets 9  --  2  -GJ     Reps 9  --  10  -GJ        Exercise 10    Exercise Name 10  --  gastroc stretch on step  -GJ     Cueing 10  --  Verbal;Demo  -GJ     Reps 10  --  3  -GJ     Time 10  --  20 s  -GJ        Exercise 12    Exercise Name 12  --  Recumbent bike,   -GJ     Cueing 12  --  Verbal  -GJ     Time 12  --  4 min  -GJ     Additional Comments  --  seat 10, forward revolutions only  -GJ        Exercise 13    Exercise Name 13  --  mini squat  -GJ     Cueing 13  --  Verbal;Demo  -GJ     Reps 13  --  15  -GJ        Exercise 14    Exercise Name 14  --  Forward/retro walk at ballet bar  -GJ     Cueing 14  --  Verbal;Demo;Auditory  -GJ     Reps 14  --  6 laps  -GJ     Additional Comments  --  focus on upright, extend wth LLE and heel first initial contact  -GJ        Exercise 15    Exercise Name 15  --  standing TKE  -GJ     Cueing 15  --  Verbal;Demo  -GJ     Reps 15  --  12  -GJ     Time 15  --  5s  -GJ     Additional Comments  --  BTB  -GJ       User Key  (r) = Recorded By, (t) = Taken By, (c) = Cosigned By    Initials Name Provider Type     Justo Gandhi, PT Physical Therapist                      Manual Rx (last 36 hours)      Manual Treatments     Row Name 05/06/21 0705 05/06/21 0700          Total Minutes    62858 - PT Manual Therapy Minutes  25  -GJ  --        Manual Rx 1    Manual Rx 1 Grade  --  +  -GJ        Manual Rx 2    Manual Rx 2 Location  --  Left knee  -GJ     Manual Rx 2 Type  --  seated EOB knee flex mob with mob belt - distraction + IR + flex  -GJ     Manual Rx 2 Grade  --  patellar mobs, L knee, superior/inferior/medial/lateral  -GJ        Manual Rx 3    Manual Rx 3 Location  --  Left knee  -GJ     Manual Rx 3 Type  --  supine knee ext mob with Left heel on towel  -GJ        Manual Rx 4     Manual Rx 4 Location  --  Left knee  -GJ     Manual Rx 4 Type  --  HL knee flex/gapping mobilization  -GJ        Manual Rx 5    Manual Rx 5 Location  --  L knee  -GJ     Manual Rx 5 Type  --  extension with femoral IR at wall (runners stretch like position)  -       User Key  (r) = Recorded By, (t) = Taken By, (c) = Cosigned By    Initials Name Provider Type     Justo Gandhi, PT Physical Therapist          PT OP Goals     Row Name 05/06/21 0700          PT Short Term Goals    STG Date to Achieve  04/30/21  -GJ     STG 1  The pt will demonstrate IND and compliant with initial HEP focused on improved Left knee mobility and quad strength for increased functional independence  -GJ     STG 1 Progress  Met  -GJ     STG 2  The pt will demonstrate L knee ext AROM to only lacking 15 degrees or less for improved gait pattern.  -GJ     STG 2 Progress  Met  -GJ     STG 3  The pt will ambulate with SPC over even ground with near normal gait pattern for improved community navigation.  -GJ     STG 3 Progress  Met  -GJ        Long Term Goals    LTG Date to Achieve  06/29/21  -GJ     LTG 1  The pt will demonstrate IND with progressive HEP focused on IND condition management and return to PLOF.  -GJ     LTG 1 Progress  Ongoing  -GJ     LTG 2  The pt will demonstrate L knee flex PROM to at least 120 or greater for improved transitional position and stair navigation.  -GJ     LTG 2 Progress  Ongoing;Progressing  -GJ     LTG 2 Progress Comments  see ortho  -GJ     LTG 3  The pt will resume reciprocal stair navigation for improved community and household navigation.  -GJ     LTG 3 Progress  Ongoing  -GJ     LTG 4  The pt will demonstrate L knee AROM to at least 0-120 for improved functional mobility.  -GJ     LTG 4 Progress  Ongoing  -GJ     LTG 4 Progress Comments  see ortho  -GJ     LTG 5  The pt will demonstrate LLE strength to at least 4+/5 for improved stair navigation and transitional position performance.  -GJ     LTG 5  Progress  Ongoing  -JENNIFER       User Key  (r) = Recorded By, (t) = Taken By, (c) = Cosigned By    Initials Name Provider Type    Justo Phoenix, PT Physical Therapist          Therapy Education  Given: HEP, Symptoms/condition management, Pain management, Mobility training, Posture/body mechanics, Edema management  Program: Reinforced  How Provided: Verbal, Demonstration  Provided to: Patient  Level of Understanding: Verbalized              Time Calculation:   Start Time: 0701  Stop Time: 0745  Time Calculation (min): 44 min  Time Calculation- PT  Start Time: 0701  Stop Time: 0745  Time Calculation (min): 44 min  Timed Charges  53218 - PT Therapeutic Exercise Minutes: 19  84443 - PT Manual Therapy Minutes: 25  Total Minutes  Timed Charges Total Minutes: 44   Total Minutes: 44  Therapy Charges for Today     Code Description Service Date Service Provider Modifiers Qty    07620785652  PT THER PROC EA 15 MIN 5/6/2021 Justo Gandhi, PT GP 1    [id number]  PT MANUAL THERAPY EA 15 MIN 5/6/2021 Justo Gandhi, PT GP 2                    Justo Gandhi, PT  5/6/2021

## 2021-05-11 ENCOUNTER — HOSPITAL ENCOUNTER (OUTPATIENT)
Dept: PHYSICAL THERAPY | Facility: HOSPITAL | Age: 66
Setting detail: THERAPIES SERIES
Discharge: HOME | End: 2021-05-11

## 2021-05-11 DIAGNOSIS — Z98.890 S/P LEFT KNEE SURGERY: ICD-10-CM

## 2021-05-11 DIAGNOSIS — Z47.89 ORTHOPEDIC AFTERCARE: Primary | ICD-10-CM

## 2021-05-11 DIAGNOSIS — Z96.652 AFTERCARE FOLLOWING LEFT KNEE JOINT REPLACEMENT SURGERY: ICD-10-CM

## 2021-05-11 DIAGNOSIS — Z47.1 AFTERCARE FOLLOWING LEFT KNEE JOINT REPLACEMENT SURGERY: ICD-10-CM

## 2021-05-11 DIAGNOSIS — R26.9 GAIT DIFFICULTY: ICD-10-CM

## 2021-05-11 PROCEDURE — 97140 MANUAL THERAPY 1/> REGIONS: CPT

## 2021-05-11 PROCEDURE — 97110 THERAPEUTIC EXERCISES: CPT

## 2021-05-11 NOTE — THERAPY TREATMENT NOTE
Outpatient Physical Therapy Ortho Treatment Note  [facility]     Patient Name: Jared Rose  : 1955  MRN: [id number]  Today's Date: 2021      Visit Date: 2021    Visit Dx:    ICD-10-CM ICD-9-CM   1. Orthopedic aftercare  Z47.89 V54.9   2. S/P left knee surgery  Z98.890 V45.89   3. Gait difficulty  R26.9 781.2   4. Aftercare following left knee joint replacement surgery  Z47.1 V54.81    Z96.652 V43.65       Patient Active Problem List   Diagnosis   • Thrombophilia (CMS/HCC)   • Lupus anticoagulant disorder (CMS/HCC)   • Chronic anticoagulation   • Coronary atherosclerosis   • Hypertension   • Ventral hernia without obstruction or gangrene   • Hyperlipidemia   • Hematuria   • Dyspnea on exertion   • Symptomatic anemia   • History of pulmonary embolus (PE)   • Colonic mass   • Malignant neoplasm of sigmoid colon (CMS/HCC)   • Iron deficiency anemia due to chronic blood loss   • Arthritis of left knee   • History of DVT (deep vein thrombosis)   • Pulmonary nodule        Past Medical History:   Diagnosis Date   • Acute deep vein thrombosis (DVT) of upper extremity (CMS/HCC) 2019   • At risk for sleep apnea     6   • Bell's palsy 2011    SEEN AT [facility]   • Blister of foot 2017    RIGHT FOOT   • Chronic anticoagulation    • Chronic fatigue    • Chronic left-sided low back pain without sciatica    • Colon polyps     FOLLOWED BY DR. RADHA DONOVAN   • Coronary atherosclerosis     cath 2015: normal LM, diffuse LCx disease, LI LAD, 60-70% ostial diag (<1 mm vessel), LI RCA   • COVID-19 virus detected     [facility]    • Dermoid cyst of leg, right 2017   • ED (erectile dysfunction)    • Exomphalos 2013   • GI hemorrhage 2019    ADMITTED TO [facility]   • H/O Anemia    • H/O Leukopenia    • History of chemotherapy    • History of transfusion     no reaction   • Hyperlipidemia    • Hypertension    • Knee pain    • Lupus anticoagulant disorder (CMS/HCC)  5/6/2016   • Muscle spasm of back 10/2018   • OA (osteoarthritis)    • Obesity    • Pulmonary embolism (CMS/HCC) 06/08/2010     Right lower lobe pulmonary embolus, ADMITTED TO [facility]   • Pulmonary embolus (CMS/HCC) 5/6/2016   • Rectal bleeding 09/2019   • Rectal cancer (CMS/HCC) 09/24/2019    MODERATELY DIFFERENTIATED ADENOCARCINOMA GRADE 2, FOLLOWED BY DR. RADHA WHITT   • Sprain of right shoulder 10/21/2020    SEEN AT [facility] ER   • Strain of left shoulder 10/2020   • Stress fracture of right foot 05/11/2013    4TH METATARSAL, SEEN AT [facility] ER   • Thrombophilia (CMS/HCC)     FOLLOWED BY DR. BALAJI MCGEE   • Tinea pedis 11/25/2007    SEEN AT [facility] ER        Past Surgical History:   Procedure Laterality Date   • CARDIAC CATHETERIZATION Left 05/11/2006    NORMAL LV SYSTOLIC FUNCTION(EF 50%), MOD DZ OF 2 SMALL CORONARY BRANCHES (D2 AND OM2), DR. BOOM GALLEGO AT [facility]   • CARDIAC CATHETERIZATION Left 07/20/2015    NORMAL LM, DIFFUSE LCx DZ, LI LAD, 60-70% OSTIAL DIAG(<1MM VESSEL), 10%STENOSIS IN LAD, DR. CHERI VARGAS AT [facility]   • CLOSED REDUCTION WRIST FRACTURE Right    • COLON RESECTION N/A 9/26/2019    Procedure: LOW ANTERIOR COLON RESECTION IMMOBILIZATION OF SPLENIC FLEXURE;  Surgeon: Radha Whitt MD;  Location: [facility];  Service: General   • COLONOSCOPY N/A 9/21/2019    INT/EXT HEMORRHOIDS, 18 MM POLYPOID LESION IN MID SIGMOID, PATH: INVASIVE MODERATELY DIFFERENTIATED GRADE 2 ADENOCARCINOMA, 2 TUBULOVILLOUS ADENOMA POLYPS IN CECUM, ADENOMATOUS POLYP IN TRANSVERSE, ADENOMATOUS POLYP IN ASCENDING, MULTIPLE SMALL AND LARGE DIVERTICULA, DR. TRUONG MONTEZ AT [facility]   • COLONOSCOPY N/A 3/10/2021    5 MM BENIGN POLYP WITH MELANOSIS COLI IN TRANSVERSE, RESCOPE IN 1 YR, DR. RADHA WHITT AT [facility]   • ENDOSCOPY N/A    • LUNG SURGERY Right 2009    RIGHT LOWER LOBE, BLOT CLOT REMOVED   • SIGMOIDOSCOPY N/A 9/24/2019    AN INFILTRATIVE NON OBSTRUCTING MASS IN SIGMOID, AREA TATTOOED, DR. RADHA WHITT AT [facility]   • TONSILLECTOMY AND  ADENOIDECTOMY Bilateral     DURING CHILDHOOD   • TOTAL KNEE ARTHROPLASTY Left 3/16/2021    Procedure: LEFT TOTAL KNEE ARTHROPLASTY WITH MARTHA NAVIGATION;  Surgeon: Abraham Waters MD;  Location: [facility];  Service: Orthopedics;  Laterality: Left;   • UMBILICAL HERNIA REPAIR N/A 05/14/2014    DR. ROMERO SCHUSTER AT [facility]       PT Ortho     Row Name 05/11/21 1100       Left Lower Ext    Lt Knee Extension/Flexion AROM  0-25-98  -    Lt Knee Extension/Flexion PROM  0-  -    LT Lower Extremity Comments  supine  -      User Key  (r) = Recorded By, (t) = Taken By, (c) = Cosigned By    Initials Name Provider Type     Ale Hernandez, PT Physical Therapist                      PT Assessment/Plan     Row Name 05/11/21 1129          PT Assessment    Assessment Comments  Mr. Rose is 8 week s/p L TKA. Pt. continues to demonstrate limitations to ROM in both flexion and extension. Continued to emphasize mobility throughout session, slight improvements in AROM at end of session noted (see ortho). Pt. reports he was contacted for dynasplint but due to expense is going to contninue to try improving ROM through therapy. Pt. remains appropriate for skilled PT.  -        PT Plan    PT Plan Comments  continue emphasis on ROM, consider prone TKE? knee ext prop for HEP?  -       User Key  (r) = Recorded By, (t) = Taken By, (c) = Cosigned By    Initials Name Provider Type     Ale Hernandez, PT Physical Therapist            OP Exercises     Row Name 05/11/21 1133 05/11/21 1000          Subjective Comments    Subjective Comments  --  It is just a little stiff   -        Subjective Pain    Able to rate subjective pain?  --  yes  -     Pre-Treatment Pain Level  --  0  -     Subjective Pain Comment  --  stiffness  -        Total Minutes    57874 - PT Therapeutic Exercise Minutes  22  -  --     22619 - PT Manual Therapy Minutes  20  -  --        Exercise 8    Exercise Name 8  --  Nustep UE/LE L5  -      Cueing 8  --  Verbal  -     Time 8  --  5min  -        Exercise 9    Exercise Name 9  --  calf raise on stair  -     Cueing 9  --  Verbal;[facility]     Sets 9  --  2  -     Reps 9  --  10  -        Exercise 10    Exercise Name 10  --  gastroc stretch on step  -     Cueing 10  --  Verbal;[facility]     Reps 10  --  3  -     Time 10  --  20sec  -        Exercise 11    Exercise Name 11  --  HS stretch at step  -     Cueing 11  --  Verbal;[facility]     Reps 11  --  3  -     Time 11  --  20sec  -        Exercise 12    Exercise Name 12  --  Recumbent bike,   -     Cueing 12  --  Verbal  -     Time 12  --  4 min  -     Additional Comments  --  seat 10, forward  -        Exercise 13    Exercise Name 13  --  mini squat  -     Cueing 13  --  Verbal;[facility]     Reps 13  --  15  -     Additional Comments  --  chair behind to cue posterior weight shift  -        Exercise 14    Exercise Name 14  --  Forward/retro walk at counter  -     Cueing 14  --  Verbal;[facility]     Reps 14  --  4 laps  -     Additional Comments  --  extend with L LE on retro, heel first contact forward  -        Exercise 15    Exercise Name 15  --  standing TKE  -     Cueing 15  --  Verbal;[facility]     Reps 15  --  15  -     Time 15  --  5sec  -     Additional Comments  --  BTB  -       User Key  (r) = Recorded By, (t) = Taken By, (c) = Cosigned By    Initials Name Provider Type     Ale Hernandez, PT Physical Therapist                      Manual Rx (last 36 hours)      Manual Treatments     Row Name 05/11/21 1133 05/11/21 1100          Total Minutes    10271 - PT Manual Therapy Minutes  20  -MH  --        Manual Rx 2    Manual Rx 2 Location  --  Left knee  -     Manual Rx 2 Type  --  seated EOB knee flex mob with mob belt - distraction + IR + flex  -     Manual Rx 2 Grade  --  patellar mobs, L knee, superior/inferior/medial/lateral  -        Manual Rx 3    Manual Rx 3 Location  --  Left knee   -     Manual Rx 3 Type  --  supine knee ext mob with Left heel on towel  -        Manual Rx 4    Manual Rx 4 Location  --  Left knee  -     Manual Rx 4 Type  --  HL knee flex/gapping mobilization  -        Manual Rx 5    Manual Rx 5 Location  --  L knee  -     Manual Rx 5 Type  --  extension with femoral IR at wall (runners stretch like position)  -       User Key  (r) = Recorded By, (t) = Taken By, (c) = Cosigned By    Initials Name Provider Type     Ale Hernandez, PT Physical Therapist          PT OP Goals     Row Name 05/11/21 1100          PT Short Term Goals    STG Date to Achieve  04/30/21  -     STG 1  The pt will demonstrate IND and compliant with initial HEP focused on improved Left knee mobility and quad strength for increased functional independence  -     STG 1 Progress  Met  -     STG 2  The pt will demonstrate L knee ext AROM to only lacking 15 degrees or less for improved gait pattern.  -     STG 2 Progress  Met  -     STG 3  The pt will ambulate with SPC over even ground with near normal gait pattern for improved community navigation.  -     STG 3 Progress  Met  -        Long Term Goals    LTG Date to Achieve  06/29/21  -     LTG 1  The pt will demonstrate IND with progressive HEP focused on IND condition management and return to PLOF.  -     LTG 1 Progress  Ongoing  -     LTG 2  The pt will demonstrate L knee flex PROM to at least 120 or greater for improved transitional position and stair navigation.  -     LTG 2 Progress  Ongoing;Progressing  -     LTG 2 Progress Comments  see ortho  -     LTG 3  The pt will resume reciprocal stair navigation for improved community and household navigation.  -     LTG 3 Progress  Ongoing  -     LTG 4  The pt will demonstrate L knee AROM to at least 0-120 for improved functional mobility.  -     LTG 4 Progress  Ongoing  -     LTG 5  The pt will demonstrate LLE strength to at least 4+/5 for improved stair navigation  and transitional position performance.  -     LTG 5 Progress  Ongoing  -       User Key  (r) = Recorded By, (t) = Taken By, (c) = Cosigned By    Initials Name Provider Type     Ale Hernandez PT Physical Therapist          Therapy Education  Given: HEP, Mobility training  Program: Reinforced  How Provided: Verbal, Demonstration  Provided to: Patient  Level of Understanding: Verbalized, Demonstrated              Time Calculation:   Start Time: 1045  Stop Time: 1127  Time Calculation (min): 42 min  Total Timed Code Minutes- PT: 42 minute(s)  Timed Charges  30638 - PT Therapeutic Exercise Minutes: 22  52735 - PT Manual Therapy Minutes: 20  Total Minutes  Timed Charges Total Minutes: 42   Total Minutes: 42  Therapy Charges for Today     Code Description Service Date Service Provider Modifiers Qty    50243375406  PT MANUAL THERAPY EA 15 MIN 5/11/2021 Ale Hernandez, PT GP 1    [id number]  PT THER PROC EA 15 MIN 5/11/2021 Ale Hernandez, PT GP 2                    Ale Hernandez PT  5/11/2021

## 2021-05-11 NOTE — TELEPHONE ENCOUNTER
Spoke with patient and also contacted [facility] to help assist patient with paperwork.  Had to leave a message for Millicent at [facility].

## 2021-05-13 ENCOUNTER — HOSPITAL ENCOUNTER (OUTPATIENT)
Dept: PHYSICAL THERAPY | Facility: HOSPITAL | Age: 66
Setting detail: THERAPIES SERIES
Discharge: HOME | End: 2021-05-13

## 2021-05-13 DIAGNOSIS — Z47.1 AFTERCARE FOLLOWING LEFT KNEE JOINT REPLACEMENT SURGERY: ICD-10-CM

## 2021-05-13 DIAGNOSIS — R26.9 GAIT DIFFICULTY: ICD-10-CM

## 2021-05-13 DIAGNOSIS — Z47.89 ORTHOPEDIC AFTERCARE: Primary | ICD-10-CM

## 2021-05-13 DIAGNOSIS — Z98.890 S/P LEFT KNEE SURGERY: ICD-10-CM

## 2021-05-13 DIAGNOSIS — Z96.652 AFTERCARE FOLLOWING LEFT KNEE JOINT REPLACEMENT SURGERY: ICD-10-CM

## 2021-05-13 PROCEDURE — 97140 MANUAL THERAPY 1/> REGIONS: CPT

## 2021-05-13 PROCEDURE — 97110 THERAPEUTIC EXERCISES: CPT

## 2021-05-13 NOTE — THERAPY TREATMENT NOTE
Outpatient Physical Therapy Ortho Treatment Note  [facility]     Patient Name: Jared Rose  : 1955  MRN: [id number]  Today's Date: 2021      Visit Date: 2021    Visit Dx:    ICD-10-CM ICD-9-CM   1. Orthopedic aftercare  Z47.89 V54.9   2. S/P left knee surgery  Z98.890 V45.89   3. Gait difficulty  R26.9 781.2   4. Aftercare following left knee joint replacement surgery  Z47.1 V54.81    Z96.652 V43.65       Patient Active Problem List   Diagnosis   • Thrombophilia (CMS/HCC)   • Lupus anticoagulant disorder (CMS/HCC)   • Chronic anticoagulation   • Coronary atherosclerosis   • Hypertension   • Ventral hernia without obstruction or gangrene   • Hyperlipidemia   • Hematuria   • Dyspnea on exertion   • Symptomatic anemia   • History of pulmonary embolus (PE)   • Colonic mass   • Malignant neoplasm of sigmoid colon (CMS/HCC)   • Iron deficiency anemia due to chronic blood loss   • Arthritis of left knee   • History of DVT (deep vein thrombosis)   • Pulmonary nodule        Past Medical History:   Diagnosis Date   • Acute deep vein thrombosis (DVT) of upper extremity (CMS/HCC) 2019   • At risk for sleep apnea     6   • Bell's palsy 2011    SEEN AT [facility] ER   • Blister of foot 2017    RIGHT FOOT   • Chronic anticoagulation    • Chronic fatigue    • Chronic left-sided low back pain without sciatica    • Colon polyps     FOLLOWED BY DR. RADHA DONOVAN   • Coronary atherosclerosis     cath 2015: normal LM, diffuse LCx disease, LI LAD, 60-70% ostial diag (<1 mm vessel), LI RCA   • COVID-19 virus detected     2020  [facility].    • Dermoid cyst of leg, right 2017   • ED (erectile dysfunction)    • Exomphalos 2013   • GI hemorrhage 2019    ADMITTED TO [facility]   • H/O Anemia    • H/O Leukopenia    • History of chemotherapy    • History of transfusion     no reaction   • Hyperlipidemia    • Hypertension    • Knee pain    • Lupus anticoagulant disorder (CMS/HCC)  5/6/2016   • Muscle spasm of back 10/2018   • OA (osteoarthritis)    • Obesity    • Pulmonary embolism (CMS/HCC) 06/08/2010     Right lower lobe pulmonary embolus, ADMITTED TO [facility]   • Pulmonary embolus (CMS/HCC) 5/6/2016   • Rectal bleeding 09/2019   • Rectal cancer (CMS/HCC) 09/24/2019    MODERATELY DIFFERENTIATED ADENOCARCINOMA GRADE 2, FOLLOWED BY DR. RADHA WHITT   • Sprain of right shoulder 10/21/2020    SEEN AT [facility] ER   • Strain of left shoulder 10/2020   • Stress fracture of right foot 05/11/2013    4TH METATARSAL, SEEN AT [facility] ER   • Thrombophilia (CMS/HCC)     FOLLOWED BY DR. BALAJI MCGEE   • Tinea pedis 11/25/2007    SEEN AT [facility] ER        Past Surgical History:   Procedure Laterality Date   • CARDIAC CATHETERIZATION Left 05/11/2006    NORMAL LV SYSTOLIC FUNCTION(EF 50%), MOD DZ OF 2 SMALL CORONARY BRANCHES (D2 AND OM2), DR. BOOM GALLEGO AT [facility]   • CARDIAC CATHETERIZATION Left 07/20/2015    NORMAL LM, DIFFUSE LCx DZ, LI LAD, 60-70% OSTIAL DIAG(<1MM VESSEL), 10%STENOSIS IN LAD, DR. CHERI VARGAS AT [facility]   • CLOSED REDUCTION WRIST FRACTURE Right    • COLON RESECTION N/A 9/26/2019    Procedure: LOW ANTERIOR COLON RESECTION IMMOBILIZATION OF SPLENIC FLEXURE;  Surgeon: Radha Whitt MD;  Location: [facility];  Service: General   • COLONOSCOPY N/A 9/21/2019    INT/EXT HEMORRHOIDS, 18 MM POLYPOID LESION IN MID SIGMOID, PATH: INVASIVE MODERATELY DIFFERENTIATED GRADE 2 ADENOCARCINOMA, 2 TUBULOVILLOUS ADENOMA POLYPS IN CECUM, ADENOMATOUS POLYP IN TRANSVERSE, ADENOMATOUS POLYP IN ASCENDING, MULTIPLE SMALL AND LARGE DIVERTICULA, DR. TRUONG MONTEZ AT [facility]   • COLONOSCOPY N/A 3/10/2021    5 MM BENIGN POLYP WITH MELANOSIS COLI IN TRANSVERSE, RESCOPE IN 1 YR, DR. RADHA WHITT AT [facility]   • ENDOSCOPY N/A    • LUNG SURGERY Right 2009    RIGHT LOWER LOBE, BLOT CLOT REMOVED   • SIGMOIDOSCOPY N/A 9/24/2019    AN INFILTRATIVE NON OBSTRUCTING MASS IN SIGMOID, AREA TATTOOED, DR. RADHA WHITT AT [facility]   • TONSILLECTOMY AND  ADENOIDECTOMY Bilateral     DURING CHILDHOOD   • TOTAL KNEE ARTHROPLASTY Left 3/16/2021    Procedure: LEFT TOTAL KNEE ARTHROPLASTY WITH MARTHA NAVIGATION;  Surgeon: Abraham Waters MD;  Location: [facility];  Service: Orthopedics;  Laterality: Left;   • UMBILICAL HERNIA REPAIR N/A 05/14/2014    DR. ROMERO SCHUSTER AT [facility]                       PT Assessment/Plan     Row Name 05/13/21 1100          PT Assessment    Assessment Comments  Mr. Rose continues to report stiffness but no pain. Focused on manual therapy with good tolerance. Pt withpoor awareness of body mechanics and lacks heel strike during initial contact LLE. He reports good understanding of HEP and POC and remains a good candidate for skilled Pt to addres remaining moility and strength limitations.  -RS        PT Plan    PT Plan Comments  Continue manual therapy, focus on gait  -RS       User Key  (r) = Recorded By, (t) = Taken By, (c) = Cosigned By    Initials Name Provider Type    RS Senia Stewart, PT Physical Therapist            OP Exercises     Row Name 05/13/21 1000 05/13/21 0900          Subjective Comments    Subjective Comments  pt reports no pain but feeling sstiff, feels better once does his HEP  -RS  --        Subjective Pain    Able to rate subjective pain?  yes  -RS  --     Subjective Pain Comment  stiff, no pain  -RS  --        Total Minutes    06662 - PT Therapeutic Exercise Minutes  23  -RS  --     56107 - PT Manual Therapy Minutes  --  20  -RS        Exercise 1    Exercise Name 1  prone knee ext  -RS  --     Cueing 1  Verbal  -RS  --     Time 1  4 min  -RS  --     Additional Comments  towel above knee  -RS  --        Exercise 8    Exercise Name 8  Nustep UE/LE L5  -RS  --     Cueing 8  Verbal  -RS  --     Time 8  3 min  -RS  --        Exercise 9    Exercise Name 9  calf raise on stair  -RS  --     Cueing 9  Verbal;Demo  -RS  --     Sets 9  2  -RS  --     Reps 9  10  -RS  --        Exercise 10    Exercise Name 10  gastroc stretch  on step  -RS  --     Cueing 10  Verbal;Demo  -RS  --     Reps 10  3  -RS  --     Time 10  20sec  -RS  --        Exercise 11    Exercise Name 11  long sitting HS stretch- heel prop position  -RS  --     Cueing 11  Verbal;Demo  -RS  --     Reps 11  --  -RS  --     Time 11  90 sec  -RS  --        Exercise 12    Exercise Name 12  Recumbent bike,   -RS  --     Cueing 12  Verbal  -RS  --     Time 12  5 min  -RS  --        Exercise 13    Exercise Name 13  retro weight shift with ipsilateral shoulder flex L  -RS  --     Cueing 13  Verbal;Tactile  -RS  --     Reps 13  10  -RS  --     Additional Comments  pt with dec awareness of  body mechanics- max VC  -RS  --        Exercise 14    Exercise Name 14  Forward/retro walk  -RS  --     Cueing 14  Verbal;Demo  -RS  --     Reps 14  3 long laps hallway  -RS  --     Additional Comments  cues for heel strike and TKE   -RS  --        Exercise 15    Exercise Name 15  standing TKE  -RS  --     Cueing 15  Verbal;Demo  -RS  --     Reps 15  15  -RS  --     Time 15  5sec  -RS  --     Additional Comments  BTB  -RS  --       User Key  (r) = Recorded By, (t) = Taken By, (c) = Cosigned By    Initials Name Provider Type    RS Senia Stewart, PT Physical Therapist                      Manual Rx (last 36 hours)      Manual Treatments     Row Name 05/13/21 0900             Total Minutes    37963 - PT Manual Therapy Minutes  20  -RS         Manual Rx 2    Manual Rx 2 Location  Left knee  -RS      Manual Rx 2 Type  seated EOB knee flex mob with mob belt - distraction + IR + flex  -RS      Manual Rx 2 Grade  patellar mobs, L knee, superior/inferior/medial/lateral  -RS         Manual Rx 3    Manual Rx 3 Location  Left knee  -RS      Manual Rx 3 Type  supine knee ext mob with Left heel on towel  -RS         Manual Rx 4    Manual Rx 4 Location  Left knee  -RS      Manual Rx 4 Type  HL knee flex/gapping mobilization  -RS         Manual Rx 5    Manual Rx 5 Location  L knee  -RS      Manual Rx 5 Type   extension with femoral IR at wall (runners stretch like position)  -RS        User Key  (r) = Recorded By, (t) = Taken By, (c) = Cosigned By    Initials Name Provider Type    RS Senia Stewart, PT Physical Therapist                             Time Calculation:   Start Time: 0945  Stop Time: 1030  Time Calculation (min): 45 min  Timed Charges  19432 - PT Therapeutic Exercise Minutes: 23  56749 - PT Manual Therapy Minutes: 20  Total Minutes  Timed Charges Total Minutes: 23   Total Minutes: 23  Therapy Charges for Today     Code Description Service Date Service Provider Modifiers Qty    53282555409 HC PT THER PROC EA 15 MIN 5/13/2021 Senia Stewart, PT GP 2    48307462023 HC PT MANUAL THERAPY EA 15 MIN 5/13/2021 Senia Stewart, PT GP 1                    Senia Stewart PT  5/13/2021

## 2021-05-17 ENCOUNTER — HOSPITAL ENCOUNTER (OUTPATIENT)
Dept: PHYSICAL THERAPY | Facility: HOSPITAL | Age: 66
Setting detail: THERAPIES SERIES
Discharge: HOME | End: 2021-05-17

## 2021-05-17 DIAGNOSIS — Z96.652 AFTERCARE FOLLOWING LEFT KNEE JOINT REPLACEMENT SURGERY: ICD-10-CM

## 2021-05-17 DIAGNOSIS — R26.9 GAIT DIFFICULTY: ICD-10-CM

## 2021-05-17 DIAGNOSIS — Z98.890 S/P LEFT KNEE SURGERY: ICD-10-CM

## 2021-05-17 DIAGNOSIS — Z47.89 ORTHOPEDIC AFTERCARE: Primary | ICD-10-CM

## 2021-05-17 DIAGNOSIS — Z47.1 AFTERCARE FOLLOWING LEFT KNEE JOINT REPLACEMENT SURGERY: ICD-10-CM

## 2021-05-17 PROCEDURE — 97140 MANUAL THERAPY 1/> REGIONS: CPT | Performed by: PHYSICAL THERAPIST

## 2021-05-17 PROCEDURE — 97110 THERAPEUTIC EXERCISES: CPT | Performed by: PHYSICAL THERAPIST

## 2021-05-17 NOTE — THERAPY TREATMENT NOTE
Outpatient Physical Therapy Ortho Treatment Note  [facility]     Patient Name: Jared Rose  : 1955  MRN: [id number]  Today's Date: 2021      Visit Date: 2021    Visit Dx:    ICD-10-CM ICD-9-CM   1. Orthopedic aftercare  Z47.89 V54.9   2. S/P left knee surgery  Z98.890 V45.89   3. Gait difficulty  R26.9 781.2   4. Aftercare following left knee joint replacement surgery  Z47.1 V54.81    Z96.652 V43.65       Patient Active Problem List   Diagnosis   • Thrombophilia (CMS/HCC)   • Lupus anticoagulant disorder (CMS/HCC)   • Chronic anticoagulation   • Coronary atherosclerosis   • Hypertension   • Ventral hernia without obstruction or gangrene   • Hyperlipidemia   • Hematuria   • Dyspnea on exertion   • Symptomatic anemia   • History of pulmonary embolus (PE)   • Colonic mass   • Malignant neoplasm of sigmoid colon (CMS/HCC)   • Iron deficiency anemia due to chronic blood loss   • Arthritis of left knee   • History of DVT (deep vein thrombosis)   • Pulmonary nodule        Past Medical History:   Diagnosis Date   • Acute deep vein thrombosis (DVT) of upper extremity (CMS/HCC) 2019   • At risk for sleep apnea     6   • Bell's palsy 2011    SEEN AT [facility]   • Blister of foot 2017    RIGHT FOOT   • Chronic anticoagulation    • Chronic fatigue    • Chronic left-sided low back pain without sciatica    • Colon polyps     FOLLOWED BY DR. RADHA DONOVAN   • Coronary atherosclerosis     cath 2015: normal LM, diffuse LCx disease, LI LAD, 60-70% ostial diag (<1 mm vessel), LI RCA   • COVID-19 virus detected     2020  [facility].    • Dermoid cyst of leg, right 2017   • ED (erectile dysfunction)    • Exomphalos 2013   • GI hemorrhage 2019    ADMITTED TO [facility]   • H/O Anemia    • H/O Leukopenia    • History of chemotherapy    • History of transfusion     no reaction   • Hyperlipidemia    • Hypertension    • Knee pain    • Lupus anticoagulant disorder (CMS/HCC)  5/6/2016   • Muscle spasm of back 10/2018   • OA (osteoarthritis)    • Obesity    • Pulmonary embolism (CMS/HCC) 06/08/2010     Right lower lobe pulmonary embolus, ADMITTED TO [facility]   • Pulmonary embolus (CMS/HCC) 5/6/2016   • Rectal bleeding 09/2019   • Rectal cancer (CMS/HCC) 09/24/2019    MODERATELY DIFFERENTIATED ADENOCARCINOMA GRADE 2, FOLLOWED BY DR. RADHA WHITT   • Sprain of right shoulder 10/21/2020    SEEN AT [facility] ER   • Strain of left shoulder 10/2020   • Stress fracture of right foot 05/11/2013    4TH METATARSAL, SEEN AT [facility] ER   • Thrombophilia (CMS/HCC)     FOLLOWED BY DR. BALAJI MCGEE   • Tinea pedis 11/25/2007    SEEN AT [facility] ER        Past Surgical History:   Procedure Laterality Date   • CARDIAC CATHETERIZATION Left 05/11/2006    NORMAL LV SYSTOLIC FUNCTION(EF 50%), MOD DZ OF 2 SMALL CORONARY BRANCHES (D2 AND OM2), DR. BOOM GALLEGO AT [facility]   • CARDIAC CATHETERIZATION Left 07/20/2015    NORMAL LM, DIFFUSE LCx DZ, LI LAD, 60-70% OSTIAL DIAG(<1MM VESSEL), 10%STENOSIS IN LAD, DR. CHERI VARGAS AT [facility]   • CLOSED REDUCTION WRIST FRACTURE Right    • COLON RESECTION N/A 9/26/2019    Procedure: LOW ANTERIOR COLON RESECTION IMMOBILIZATION OF SPLENIC FLEXURE;  Surgeon: Radha Whitt MD;  Location: [facility];  Service: General   • COLONOSCOPY N/A 9/21/2019    INT/EXT HEMORRHOIDS, 18 MM POLYPOID LESION IN MID SIGMOID, PATH: INVASIVE MODERATELY DIFFERENTIATED GRADE 2 ADENOCARCINOMA, 2 TUBULOVILLOUS ADENOMA POLYPS IN CECUM, ADENOMATOUS POLYP IN TRANSVERSE, ADENOMATOUS POLYP IN ASCENDING, MULTIPLE SMALL AND LARGE DIVERTICULA, DR. TRUONG MONTEZ AT [facility]   • COLONOSCOPY N/A 3/10/2021    5 MM BENIGN POLYP WITH MELANOSIS COLI IN TRANSVERSE, RESCOPE IN 1 YR, DR. RADHA WHITT AT [facility]   • ENDOSCOPY N/A    • LUNG SURGERY Right 2009    RIGHT LOWER LOBE, BLOT CLOT REMOVED   • SIGMOIDOSCOPY N/A 9/24/2019    AN INFILTRATIVE NON OBSTRUCTING MASS IN SIGMOID, AREA TATTOOED, DR. RADHA WHITT AT [facility]   • TONSILLECTOMY AND  ADENOIDECTOMY Bilateral     DURING CHILDHOOD   • TOTAL KNEE ARTHROPLASTY Left 3/16/2021    Procedure: LEFT TOTAL KNEE ARTHROPLASTY WITH MARTHA NAVIGATION;  Surgeon: Abraham Waters MD;  Location: [facility];  Service: Orthopedics;  Laterality: Left;   • UMBILICAL HERNIA REPAIR N/A 05/14/2014    DR. ROMERO SCHUSTER AT [facility]                       PT Assessment/Plan     Row Name 05/17/21 0925          PT Assessment    Assessment Comments  Mr. Rose is 9 weeks s/p L TKA. We continued to work on knee extension ROM more then flexion. Added exaggerataed marching down back hallway to work on balance, knee flexion, heel first initial contact. Mr. Rose is making gradual progress toward functional goals. His progress might be affected by his inability to take anti-inflamatories.  -GJ        PT Plan    PT Plan Comments  continue to focus on extension more then flexion ROM. gait normalization  -GJ       User Key  (r) = Recorded By, (t) = Taken By, (c) = Cosigned By    Initials Name Provider Type     Justo Gandhi, PT Physical Therapist            OP Exercises     Row Name 05/17/21 0833 05/17/21 0800          Subjective Comments    Subjective Comments  --  Some days i wonder if I'm getting better or not  -GJ        Subjective Pain    Pre-Treatment Pain Level  --  2  -GJ        Total Minutes    17024 - PT Therapeutic Exercise Minutes  25  -GJ  --     76704 - PT Manual Therapy Minutes  20  -GJ  --        Exercise 1    Exercise Name 1  --  prone knee ext  -GJ     Cueing 1  --  Verbal  -GJ     Time 1  --  4 min  -GJ     Additional Comments  --  towel above knee  -GJ        Exercise 8    Exercise Name 8  --  Nustep UE/LE L5  -GJ     Time 8  --  5 min  -GJ        Exercise 10    Exercise Name 10  --  gastroc stretch on step  -GJ     Cueing 10  --  Verbal;Demo  -GJ     Reps 10  --  3  -GJ     Time 10  --  20sec  -GJ        Exercise 11    Exercise Name 11  --  long sitting HS stretch- heel prop position  -GJ     Cueing 11  --   Verbal;Demo  -GJ     Time 11  --  90 sec  -GJ     Additional Comments  --  with over pressure from therapist  -GJ        Exercise 12    Exercise Name 12  --  Recumbent bike,   -GJ     Cueing 12  --  Verbal  -GJ     Time 12  --  5 min  -GJ     Additional Comments  --  seat 10, forward  -GJ        Exercise 13    Exercise Name 13  --  retro weight shift with ipsilateral shoulder flex L  -GJ     Cueing 13  --  Verbal;Tactile  -GJ     Reps 13  --  15  -GJ     Time 13  --  5 s  -GJ     Additional Comments  --  cues to keep foot neutral not ER  -GJ        Exercise 14    Exercise Name 14  --  Forward/retro walk  -GJ     Cueing 14  --  Verbal;Demo  -GJ     Reps 14  --  3 long laps hallway  -GJ        Exercise 15    Exercise Name 15  --  standing TKE  -GJ     Cueing 15  --  Verbal;Demo  -GJ     Reps 15  --  15  -GJ     Time 15  --  5sec  -GJ     Additional Comments  --  BTB  -GJ        Exercise 16    Exercise Name 16  --  exaggerated marching (with arm swing), focus on slow, controlled motions, with heel first initial contact bilaterally  -GJ     Cueing 16  --  Verbal;Tactile;Demo  -GJ     Reps 16  --  2 laps  -GJ     Additional Comments  --  down back hallway  -GJ       User Key  (r) = Recorded By, (t) = Taken By, (c) = Cosigned By    Initials Name Provider Type    GJ Justo Gandhi, PT Physical Therapist                      Manual Rx (last 36 hours)      Manual Treatments     Row Name 05/17/21 0900 05/17/21 0833          Total Minutes    90783 - PT Manual Therapy Minutes  --  20  -GJ        Manual Rx 2    Manual Rx 2 Location  Left knee  -GJ  --     Manual Rx 2 Type  seated EOB knee flex mob with mob belt - distraction + IR + flex  -GJ  --     Manual Rx 2 Grade  patellar mobs, L knee, superior/inferior/medial/lateral  -GJ  --        Manual Rx 3    Manual Rx 3 Location  Left knee  -GJ  --     Manual Rx 3 Type  supine knee ext mob with Left heel on towel  -GJ  --     Manual Rx 3 Grade  LS knee ext mob   -GJ  --         Manual Rx 4    Manual Rx 4 Location  Left knee  -GJ  --     Manual Rx 4 Type  HL knee flex/gapping mobilization  -GJ  --        Manual Rx 5    Manual Rx 5 Location  L knee  -GJ  --     Manual Rx 5 Type  extension with femoral IR at wall (runners stretch like position)  -GJ  --       User Key  (r) = Recorded By, (t) = Taken By, (c) = Cosigned By    Initials Name Provider Type    GJ Justo Gandhi, PT Physical Therapist          PT OP Goals     Row Name 05/17/21 0800          PT Short Term Goals    STG Date to Achieve  04/30/21  -GJ     STG 1  The pt will demonstrate IND and compliant with initial HEP focused on improved Left knee mobility and quad strength for increased functional independence  -GJ     STG 1 Progress  Met  -GJ     STG 2  The pt will demonstrate L knee ext AROM to only lacking 15 degrees or less for improved gait pattern.  -GJ     STG 2 Progress  Met  -GJ     STG 3  The pt will ambulate with SPC over even ground with near normal gait pattern for improved community navigation.  -GJ     STG 3 Progress  Met  -GJ        Long Term Goals    LTG Date to Achieve  06/29/21  -GJ     LTG 1  The pt will demonstrate IND with progressive HEP focused on IND condition management and return to PLOF.  -GJ     LTG 1 Progress  Ongoing  -GJ     LTG 2  The pt will demonstrate L knee flex PROM to at least 120 or greater for improved transitional position and stair navigation.  -GJ     LTG 2 Progress  Ongoing;Progressing  -GJ     LTG 3  The pt will resume reciprocal stair navigation for improved community and household navigation.  -GJ     LTG 3 Progress  Ongoing  -GJ     LTG 4  The pt will demonstrate L knee AROM to at least 0-120 for improved functional mobility.  -GJ     LTG 4 Progress  Ongoing  -GJ     LTG 5  The pt will demonstrate LLE strength to at least 4+/5 for improved stair navigation and transitional position performance.  -GJ     LTG 5 Progress  Ongoing  -GJ       User Key  (r) = Recorded By, (t) = Taken By,  (c) = Cosigned By    Initials Name Provider Type    Justo Phoenix, PT Physical Therapist          Therapy Education  Education Details: discussed phsyiology of healing, johan since he can not take anti inflamatories. DIscussed having wife help with knee extension ROM (in LS position) he voiced understanding  Given: HEP, Symptoms/condition management, Pain management, Posture/body mechanics, Edema management, Mobility training  Program: Reinforced, Progressed  How Provided: Verbal, Demonstration  Provided to: Patient  Level of Understanding: Verbalized              Time Calculation:   Start Time: 0830  Stop Time: 0918  Time Calculation (min): 48 min  Timed Charges  44621 - PT Therapeutic Exercise Minutes: 25  56968 - PT Manual Therapy Minutes: 20  Total Minutes  Timed Charges Total Minutes: 45   Total Minutes: 45  Therapy Charges for Today     Code Description Service Date Service Provider Modifiers Qty    10941439418 HC PT THER PROC EA 15 MIN 5/17/2021 Justo Gandhi, PT GP 2    38445440549 HC PT MANUAL THERAPY EA 15 MIN 5/17/2021 Justo Gandhi, PT GP 1                    Justo Gandhi, PT  5/17/2021

## 2021-05-19 ENCOUNTER — HOSPITAL ENCOUNTER (OUTPATIENT)
Dept: PHYSICAL THERAPY | Facility: HOSPITAL | Age: 66
Setting detail: THERAPIES SERIES
Discharge: HOME | End: 2021-05-19

## 2021-05-19 DIAGNOSIS — Z47.89 ORTHOPEDIC AFTERCARE: Primary | ICD-10-CM

## 2021-05-19 DIAGNOSIS — Z96.652 AFTERCARE FOLLOWING LEFT KNEE JOINT REPLACEMENT SURGERY: ICD-10-CM

## 2021-05-19 DIAGNOSIS — R26.9 GAIT DIFFICULTY: ICD-10-CM

## 2021-05-19 DIAGNOSIS — Z47.1 AFTERCARE FOLLOWING LEFT KNEE JOINT REPLACEMENT SURGERY: ICD-10-CM

## 2021-05-19 DIAGNOSIS — Z98.890 S/P LEFT KNEE SURGERY: ICD-10-CM

## 2021-05-19 PROCEDURE — 97140 MANUAL THERAPY 1/> REGIONS: CPT | Performed by: PHYSICAL THERAPIST

## 2021-05-19 PROCEDURE — 97110 THERAPEUTIC EXERCISES: CPT | Performed by: PHYSICAL THERAPIST

## 2021-05-19 NOTE — THERAPY TREATMENT NOTE
Outpatient Physical Therapy Ortho Treatment Note  [facility]     Patient Name: Jared Rose  : 1955  MRN: [id number]  Today's Date: 2021      Visit Date: 2021    Visit Dx:    ICD-10-CM ICD-9-CM   1. Orthopedic aftercare  Z47.89 V54.9   2. S/P left knee surgery  Z98.890 V45.89   3. Gait difficulty  R26.9 781.2   4. Aftercare following left knee joint replacement surgery  Z47.1 V54.81    Z96.652 V43.65       Patient Active Problem List   Diagnosis   • Thrombophilia (CMS/HCC)   • Lupus anticoagulant disorder (CMS/HCC)   • Chronic anticoagulation   • Coronary atherosclerosis   • Hypertension   • Ventral hernia without obstruction or gangrene   • Hyperlipidemia   • Hematuria   • Dyspnea on exertion   • Symptomatic anemia   • History of pulmonary embolus (PE)   • Colonic mass   • Malignant neoplasm of sigmoid colon (CMS/HCC)   • Iron deficiency anemia due to chronic blood loss   • Arthritis of left knee   • History of DVT (deep vein thrombosis)   • Pulmonary nodule        Past Medical History:   Diagnosis Date   • Acute deep vein thrombosis (DVT) of upper extremity (CMS/HCC) 2019   • At risk for sleep apnea     6   • Bell's palsy 2011    SEEN AT [facility] ER   • Blister of foot 2017    RIGHT FOOT   • Chronic anticoagulation    • Chronic fatigue    • Chronic left-sided low back pain without sciatica    • Colon polyps     FOLLOWED BY DR. RADHA DONOVAN   • Coronary atherosclerosis     cath 2015: normal LM, diffuse LCx disease, LI LAD, 60-70% ostial diag (<1 mm vessel), LI RCA   • COVID-19 virus detected     2020  [facility].    • Dermoid cyst of leg, right 2017   • ED (erectile dysfunction)    • Exomphalos 2013   • GI hemorrhage 2019    ADMITTED TO [facility]   • H/O Anemia    • H/O Leukopenia    • History of chemotherapy    • History of transfusion     no reaction   • Hyperlipidemia    • Hypertension    • Knee pain    • Lupus anticoagulant disorder (CMS/HCC)  5/6/2016   • Muscle spasm of back 10/2018   • OA (osteoarthritis)    • Obesity    • Pulmonary embolism (CMS/HCC) 06/08/2010     Right lower lobe pulmonary embolus, ADMITTED TO [facility]   • Pulmonary embolus (CMS/HCC) 5/6/2016   • Rectal bleeding 09/2019   • Rectal cancer (CMS/HCC) 09/24/2019    MODERATELY DIFFERENTIATED ADENOCARCINOMA GRADE 2, FOLLOWED BY DR. RADHA WHITT   • Sprain of right shoulder 10/21/2020    SEEN AT [facility] ER   • Strain of left shoulder 10/2020   • Stress fracture of right foot 05/11/2013    4TH METATARSAL, SEEN AT [facility] ER   • Thrombophilia (CMS/HCC)     FOLLOWED BY DR. BALAJI MCGEE   • Tinea pedis 11/25/2007    SEEN AT [facility] ER        Past Surgical History:   Procedure Laterality Date   • CARDIAC CATHETERIZATION Left 05/11/2006    NORMAL LV SYSTOLIC FUNCTION(EF 50%), MOD DZ OF 2 SMALL CORONARY BRANCHES (D2 AND OM2), DR. BOOM GALLEGO AT [facility]   • CARDIAC CATHETERIZATION Left 07/20/2015    NORMAL LM, DIFFUSE LCx DZ, LI LAD, 60-70% OSTIAL DIAG(<1MM VESSEL), 10%STENOSIS IN LAD, DR. CHERI VARGAS AT [facility]   • CLOSED REDUCTION WRIST FRACTURE Right    • COLON RESECTION N/A 9/26/2019    Procedure: LOW ANTERIOR COLON RESECTION IMMOBILIZATION OF SPLENIC FLEXURE;  Surgeon: Radha Whitt MD;  Location: [facility];  Service: General   • COLONOSCOPY N/A 9/21/2019    INT/EXT HEMORRHOIDS, 18 MM POLYPOID LESION IN MID SIGMOID, PATH: INVASIVE MODERATELY DIFFERENTIATED GRADE 2 ADENOCARCINOMA, 2 TUBULOVILLOUS ADENOMA POLYPS IN CECUM, ADENOMATOUS POLYP IN TRANSVERSE, ADENOMATOUS POLYP IN ASCENDING, MULTIPLE SMALL AND LARGE DIVERTICULA, DR. TRUONG MONTEZ AT [facility]   • COLONOSCOPY N/A 3/10/2021    5 MM BENIGN POLYP WITH MELANOSIS COLI IN TRANSVERSE, RESCOPE IN 1 YR, DR. RADHA WHITT AT [facility]   • ENDOSCOPY N/A    • LUNG SURGERY Right 2009    RIGHT LOWER LOBE, BLOT CLOT REMOVED   • SIGMOIDOSCOPY N/A 9/24/2019    AN INFILTRATIVE NON OBSTRUCTING MASS IN SIGMOID, AREA TATTOOED, DR. RADHA WHITT AT [facility]   • TONSILLECTOMY AND  ADENOIDECTOMY Bilateral     DURING CHILDHOOD   • TOTAL KNEE ARTHROPLASTY Left 3/16/2021    Procedure: LEFT TOTAL KNEE ARTHROPLASTY WITH MARTHA NAVIGATION;  Surgeon: Abraham Waters MD;  Location: [facility];  Service: Orthopedics;  Laterality: Left;   • UMBILICAL HERNIA REPAIR N/A 05/14/2014    DR. ROMERO SCHUSTER AT [facility]       PT Ortho     Row Name 05/19/21 0800       Left Lower Ext    Lt Knee Extension/Flexion AROM  , seated  -GJ    Lt Knee Extension/Flexion PROM  , supine for ext, seated for flexion  -GJ      User Key  (r) = Recorded By, (t) = Taken By, (c) = Cosigned By    Initials Name Provider Type    Justo Phoenix, PT Physical Therapist                      PT Assessment/Plan     Row Name 05/19/21 0822          PT Assessment    Assessment Comments  Mr. Rose is 9 weeks s/p L TKA. he continues to demonstrate limited L knee extension, both actively and passively.  He does demonstrate spongey end feel with extension.  We worked significantly on knee extension ROM today.  See ortho section for ROM. Mr. Rose is to see MD early next week. He has met all STG's and is progressing toward all remaining goals. Mr. Rose remains a good candidate for skilled physical therapy.  -GJ        PT Plan    PT Plan Comments  Continue to focus on extension ROM, functional mobility  -GJ       User Key  (r) = Recorded By, (t) = Taken By, (c) = Cosigned By    Initials Name Provider Type    Justo Phoenix, PT Physical Therapist          Modalities     Row Name 05/19/21 0700             Ice    Patient reports will apply ice at home to involved area  Yes  -GJ        User Key  (r) = Recorded By, (t) = Taken By, (c) = Cosigned By    Initials Name Provider Type    Justo Phoenix, PT Physical Therapist        OP Exercises     Row Name 05/19/21 0740 05/19/21 0700          Subjective Comments    Subjective Comments  --  My knee is just stiff, I'm ready to get back   -GJ        Total Minutes    19157 - PT  Therapeutic Exercise Minutes  20  -GJ  --     59011 - PT Manual Therapy Minutes  30  -GJ  --        Exercise 6    Exercise Name 6  --  stair knee flexion  -GJ     Cueing 6  --  Verbal;Demo  -GJ     Reps 6  --  3  -GJ     Time 6  --  20 sec  -GJ        Exercise 7    Exercise Name 7  --  stair HS strecth  -GJ     Cueing 7  --  Verbal;Demo  -GJ     Reps 7  --  3  -GJ     Time 7  --  20 seconds  -GJ        Exercise 8    Exercise Name 8  --  Nustep UE/LE L5  -GJ     Time 8  --  6 min  -GJ        Exercise 10    Exercise Name 10  --  gastroc stretch on step  -GJ     Cueing 10  --  Verbal;Demo  -GJ     Reps 10  --  3  -GJ     Time 10  --  20sec  -GJ        Exercise 12    Exercise Name 12  --  Recumbent bike,   -GJ     Cueing 12  --  Verbal  -GJ     Time 12  --  6 min  -GJ     Additional Comments  --  seat 10, forward  -GJ        Exercise 13    Exercise Name 13  --  retro weight shift with ipsilateral shoulder flex L  -GJ     Cueing 13  --  Verbal;Tactile  -GJ     Reps 13  --  15  -GJ     Time 13  --  5 s  -GJ     Additional Comments  --  cues to keep foot neutral not ER  -GJ        Exercise 14    Exercise Name 14  --  --  -GJ     Cueing 14  --  --  -GJ     Reps 14  --  --  -GJ        Exercise 16    Exercise Name 16  --  exaggerated marching (with arm swing), focus on slow, controlled motions, with heel first initial contact bilaterally  -GJ     Cueing 16  --  Verbal;Tactile;Demo  -GJ     Reps 16  --  2 laps  -GJ     Additional Comments  --  down back hallway  -GJ       User Key  (r) = Recorded By, (t) = Taken By, (c) = Cosigned By    Initials Name Provider Type    GJ Justo Gandhi, PT Physical Therapist                      Manual Rx (last 36 hours)      Manual Treatments     Row Name 05/19/21 0740 05/19/21 0700          Total Minutes    41568 - PT Manual Therapy Minutes  30  -GJ  --        Manual Rx 3    Manual Rx 3 Location  --  Left knee  -GJ     Manual Rx 3 Type  --  supine knee ext mob with Left heel on towel  -GJ      Manual Rx 3 Grade  --  LS knee ext mob   -GJ     Manual Rx 3 Duration  --  over pressure into ext  -GJ        Manual Rx 4    Manual Rx 4 Location  --  Left knee  -GJ     Manual Rx 4 Type  --  HL knee flex/gapping mobilization  -GJ        Manual Rx 5    Manual Rx 5 Location  --  L knee  -GJ     Manual Rx 5 Type  --  extension with femoral IR at wall (runners stretch like position)  -GJ       User Key  (r) = Recorded By, (t) = Taken By, (c) = Cosigned By    Initials Name Provider Type     Justo Gandhi, PT Physical Therapist          PT OP Goals     Row Name 05/19/21 0700          PT Short Term Goals    STG Date to Achieve  04/30/21  -GJ     STG 1  The pt will demonstrate IND and compliant with initial HEP focused on improved Left knee mobility and quad strength for increased functional independence  -GJ     STG 1 Progress  Met  -GJ     STG 2  The pt will demonstrate L knee ext AROM to only lacking 15 degrees or less for improved gait pattern.  -GJ     STG 2 Progress  Met  -GJ     STG 3  The pt will ambulate with SPC over even ground with near normal gait pattern for improved community navigation.  -GJ     STG 3 Progress  Met  -GJ        Long Term Goals    LTG Date to Achieve  06/29/21  -GJ     LTG 1  The pt will demonstrate IND with progressive HEP focused on IND condition management and return to PLOF.  -GJ     LTG 1 Progress  Ongoing  -GJ     LTG 2  The pt will demonstrate L knee flex PROM to at least 120 or greater for improved transitional position and stair navigation.  -GJ     LTG 2 Progress  Ongoing;Progressing  -GJ     LTG 2 Progress Comments  see ortho  -GJ     LTG 3  The pt will resume reciprocal stair navigation for improved community and household navigation.  -GJ     LTG 3 Progress  Ongoing  -GJ     LTG 4  The pt will demonstrate L knee AROM to at least 0-120 for improved functional mobility.  -GJ     LTG 4 Progress  Ongoing  -GJ     LTG 4 Progress Comments  see ortho  -GJ     LTG 5  The pt will  demonstrate LLE strength to at least 4+/5 for improved stair navigation and transitional position performance.  -JENNIFER     LTG 5 Progress  Ongoing  -GJ       User Key  (r) = Recorded By, (t) = Taken By, (c) = Cosigned By    Initials Name Provider Type    Justo Phoenix, PT Physical Therapist          Therapy Education  Given: HEP, Symptoms/condition management, Pain management, Posture/body mechanics, Bandaging/dressing change, Fall prevention and home safety, Mobility training, Edema management  Program: Reinforced  How Provided: Verbal  Provided to: Patient  Level of Understanding: Verbalized              Time Calculation:   Start Time: 0735  Stop Time: 0830  Time Calculation (min): 55 min  Timed Charges  63673 - PT Therapeutic Exercise Minutes: 20  05824 - PT Manual Therapy Minutes: 30  Total Minutes  Timed Charges Total Minutes: 50   Total Minutes: 50  Therapy Charges for Today     Code Description Service Date Service Provider Modifiers Qty    [id number]  PT THER PROC EA 15 MIN 5/19/2021 Justo Gandhi, PT GP 1    [id number]  PT MANUAL THERAPY EA 15 MIN 5/19/2021 Justo Gandhi, PT GP 2                    Justo Gandhi, PT  5/19/2021

## 2021-05-24 ENCOUNTER — OFFICE VISIT (OUTPATIENT)
Dept: ORTHOPEDIC SURGERY | Facility: CLINIC | Age: 66
End: 2021-05-24

## 2021-05-24 ENCOUNTER — HOSPITAL ENCOUNTER (OUTPATIENT)
Dept: PHYSICAL THERAPY | Facility: HOSPITAL | Age: 66
Setting detail: THERAPIES SERIES
Discharge: HOME | End: 2021-05-24

## 2021-05-24 VITALS — WEIGHT: 262 LBS | HEIGHT: 71 IN | BODY MASS INDEX: 36.68 KG/M2 | TEMPERATURE: 97.3 F

## 2021-05-24 DIAGNOSIS — Z96.652 AFTERCARE FOLLOWING LEFT KNEE JOINT REPLACEMENT SURGERY: ICD-10-CM

## 2021-05-24 DIAGNOSIS — Z98.890 S/P LEFT KNEE SURGERY: ICD-10-CM

## 2021-05-24 DIAGNOSIS — Z47.89 ORTHOPEDIC AFTERCARE: Primary | ICD-10-CM

## 2021-05-24 DIAGNOSIS — R52 PAIN: Primary | ICD-10-CM

## 2021-05-24 DIAGNOSIS — R26.9 GAIT DIFFICULTY: ICD-10-CM

## 2021-05-24 DIAGNOSIS — Z47.1 AFTERCARE FOLLOWING LEFT KNEE JOINT REPLACEMENT SURGERY: ICD-10-CM

## 2021-05-24 DIAGNOSIS — Z96.652 STATUS POST TOTAL LEFT KNEE REPLACEMENT: ICD-10-CM

## 2021-05-24 PROCEDURE — 97140 MANUAL THERAPY 1/> REGIONS: CPT | Performed by: PHYSICAL THERAPIST

## 2021-05-24 PROCEDURE — 73560 X-RAY EXAM OF KNEE 1 OR 2: CPT | Performed by: ORTHOPAEDIC SURGERY

## 2021-05-24 PROCEDURE — 97110 THERAPEUTIC EXERCISES: CPT | Performed by: PHYSICAL THERAPIST

## 2021-05-24 PROCEDURE — 99024 POSTOP FOLLOW-UP VISIT: CPT | Performed by: ORTHOPAEDIC SURGERY

## 2021-05-24 NOTE — PROGRESS NOTES
Patient Name: Jared Rose   YOB: 1955  Referring Primary Care Physician: Ras Ash MD  BMI: Body mass index is 36.54 kg/m².    Chief Complaint:    Chief Complaint   Patient presents with   • Left Knee - Follow-up        HPI:     Jared Rose is a 65 y.o. male who presents today for evaluation of   Chief Complaint   Patient presents with   • Left Knee - Follow-up   . The patient is approximately 3.5 months status post left total knee arthroplasty. He has no new concerns or questions today, and is doing well.       Subjective   Medications:   Home Medications:  Current Outpatient Medications on File Prior to Visit   Medication Sig   • atorvastatin (LIPITOR) 40 MG tablet Take 1 tablet by mouth Daily.   • losartan (COZAAR) 50 MG tablet Take 1 tablet by mouth Daily.   • rivaroxaban (XARELTO) 20 MG tablet Take 1 tablet by mouth Daily With Dinner. Must be seen (Patient taking differently: Take 20 mg by mouth Daily With Dinner. Pt to call MD to ask when to stop prior to surgery)   • sildenafil (Viagra) 100 MG tablet Take 1/2 to 1 tablet by mouth daily if needed for ED (Patient taking differently: Take  by mouth As Needed. Take 1/2 to 1 tablet by mouth daily if needed for ED)   • HYDROcodone-acetaminophen (NORCO) 7.5-325 MG per tablet Take 1-2 tablets by mouth Every 4 (Four) to 6 (Six) Hours As Needed for pain     Current Facility-Administered Medications on File Prior to Visit   Medication   • Chlorhexidine Gluconate 2 % pads 1 each     Current Medications:  Scheduled Meds:  Continuous Infusions:No current facility-administered medications for this visit.    PRN Meds:.    I have reviewed the patient's medical history in detail and updated the computerized patient record.  Review and summarization of old records includes:    Past Medical History:   Diagnosis Date   • Acute deep vein thrombosis (DVT) of upper extremity (CMS/[facility]) 9/29/2019   • At risk for sleep apnea     6   • Bell's palsy  02/23/2011    SEEN AT [facility] ER   • Blister of foot 06/2017    RIGHT FOOT   • Chronic anticoagulation    • Chronic fatigue    • Chronic left-sided low back pain without sciatica    • Colon polyps     FOLLOWED BY DR. RADHA DONOVAN   • Coronary atherosclerosis     cath 7/2015: normal LM, diffuse LCx disease, LI LAD, 60-70% ostial diag (<1 mm vessel), LI RCA   • COVID-19 virus detected     12/8/2020  [facility].    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   • GI hemorrhage 09/19/2019    ADMITTED TO [facility]   • H/O Anemia    • H/O Leukopenia    • History of chemotherapy    • History of transfusion     no reaction   • Hyperlipidemia    • Hypertension    • Knee pain    • Lupus anticoagulant disorder (CMS/HCC) 5/6/2016   • Muscle spasm of back 10/2018   • OA (osteoarthritis)    • Obesity    • Pulmonary embolism (CMS/[facility]) 06/08/2010     Right lower lobe pulmonary embolus, ADMITTED TO [facility]   • Pulmonary embolus (CMS/[facility]) 5/6/2016   • Rectal bleeding 09/2019   • Rectal cancer (CMS/[facility]) 09/24/2019    MODERATELY DIFFERENTIATED ADENOCARCINOMA GRADE 2, FOLLOWED BY DR. RADHA DONOVAN   • Sprain of right shoulder 10/21/2020    SEEN AT [facility] ER   • Strain of left shoulder 10/2020   • Stress fracture of right foot 05/11/2013    4TH METATARSAL, SEEN AT [facility] ER   • Thrombophilia (CMS/[facility])     FOLLOWED BY DR. BALAJI MCGEE   • Tinea pedis 11/25/2007    SEEN AT [facility] ER        Past Surgical History:   Procedure Laterality Date   • CARDIAC CATHETERIZATION Left 05/11/2006    NORMAL LV SYSTOLIC FUNCTION(EF 50%), MOD DZ OF 2 SMALL CORONARY BRANCHES (D2 AND OM2), DR. BOOM GALLEGO AT [facility]   • CARDIAC CATHETERIZATION Left 07/20/2015    NORMAL LM, DIFFUSE LCx DZ, LI LAD, 60-70% OSTIAL DIAG(<1MM VESSEL), 10%STENOSIS IN LAD, DR. CHERI VARGAS AT [facility]   • CLOSED REDUCTION WRIST FRACTURE Right    • COLON RESECTION N/A 9/26/2019    Procedure: LOW ANTERIOR COLON RESECTION IMMOBILIZATION OF SPLENIC FLEXURE;   Surgeon: Radha Whitt MD;  Location: [facility];  Service: General   • COLONOSCOPY N/A 9/21/2019    INT/EXT HEMORRHOIDS, 18 MM POLYPOID LESION IN MID SIGMOID, PATH: INVASIVE MODERATELY DIFFERENTIATED GRADE 2 ADENOCARCINOMA, 2 TUBULOVILLOUS ADENOMA POLYPS IN CECUM, ADENOMATOUS POLYP IN TRANSVERSE, ADENOMATOUS POLYP IN ASCENDING, MULTIPLE SMALL AND LARGE DIVERTICULA, DR. TRUONG MONTEZ AT [facility]   • COLONOSCOPY N/A 3/10/2021    5 MM BENIGN POLYP WITH MELANOSIS COLI IN TRANSVERSE, RESCOPE IN 1 YR, DR. RADHA WHITT AT [facility]   • ENDOSCOPY N/A    • LUNG SURGERY Right 2009    RIGHT LOWER LOBE, BLOT CLOT REMOVED   • SIGMOIDOSCOPY N/A 9/24/2019    AN INFILTRATIVE NON OBSTRUCTING MASS IN SIGMOID, AREA TATTOOED, DR. RADHA WHITT AT [facility]   • TONSILLECTOMY AND ADENOIDECTOMY Bilateral     DURING CHILDHOOD   • TOTAL KNEE ARTHROPLASTY Left 3/16/2021    Procedure: LEFT TOTAL KNEE ARTHROPLASTY WITH MARTHA NAVIGATION;  Surgeon: Abraham Waters MD;  Location: [facility];  Service: Orthopedics;  Laterality: Left;   • UMBILICAL HERNIA REPAIR N/A 05/14/2014    DR. ROMERO SCHUSTER AT [facility]        Social History     Occupational History   • Occupation:      Employer: [employer]   Tobacco Use   • Smoking status: Never Smoker   • Smokeless tobacco: Never Used   Vaping Use   • Vaping Use: Never used   Substance and Sexual Activity   • Alcohol use: Never     Comment: Caffeine use: 2 soft drink daily and sweet tea.    • Drug use: Never   • Sexual activity: Yes      Social History     Social History Narrative   • Not on file        Family History   Problem Relation Age of Onset   • Coronary artery disease Father    • Heart disease Mother 83   • Hypertension Mother    • Asthma Mother    • Diabetes Mother    • Kidney disease Mother    • Heart attack Mother    • Hypertension Sister    • Diabetes Sister    • Kidney disease Sister    • Heart attack Brother    • Alcohol abuse Brother    • Diabetes Sister    • Heart  "disease Sister    • Anni Hyperthermia Neg Hx        ROS: 14 point review of systems was performed and all other systems were reviewed and are negative except for documented findings in HPI and today's encounter.     Allergies:   Allergies   Allergen Reactions   • Adhesive Tape Rash     blisters     Constitutional:  Denies fever, shaking or chills   Eyes:  Denies change in visual acuity   HENT:  Denies nasal congestion or sore throat   Respiratory:  Denies cough or shortness of breath   Cardiovascular:  Denies chest pain or severe LE edema   GI:  Denies abdominal pain, nausea, vomiting, bloody stools or diarrhea   Musculoskeletal:  Numbness, tingling, pain, or loss of motor function only as noted above in history of present illness.  : Denies painful urination or hematuria  Integument:  Denies rash, lesion or ulceration   Neurologic:  Denies headache or focal weakness  Endocrine:  Denies lymphadenopathy  Psych:  Denies confusion or change in mental status   Hem:  Denies active bleeding    OBJECTIVE:  Physical Exam: 65 y.o. male  Wt Readings from Last 3 Encounters:   05/24/21 119 kg (262 lb)   04/28/21 119 kg (262 lb)   04/08/21 119 kg (262 lb 1.6 oz)     Ht Readings from Last 1 Encounters:   05/24/21 180.3 cm (71\")     Body mass index is 36.54 kg/m².  Vitals:    05/24/21 0835   Temp: 97.3 °F (36.3 °C)     Vital signs reviewed.     General Appearance:    Alert, cooperative, in no acute distress                  Eyes: conjunctiva clear  ENT: external ears and nose atraumatic  CV: no peripheral edema  Resp: normal respiratory effort  Skin: no rashes or wounds; normal turgor  Psych: mood and affect appropriate  Lymph: no nodes appreciated  Neuro: gross sensation intact  Vascular:  Palpable peripheral pulse in noted extremity  Musculoskeletal Extremities: Dank today shows well-healed scar he still has slight flexion contracture about 7 degrees he flexes back to about 115 degrees he has good stability and is walking " well    Radiology:   Two views of the left knee, including AP, and lateral views, were obtained and reviewed in the office today for postoperative follow-up. With/Without comparison views, these demonstrated a well aligned left total knee arthroplasty.       Assessment:     ICD-10-CM ICD-9-CM   1. Pain  R52 780.96   2. Status post total left knee replacement  Z96.652 V43.65        MDM/Plan:   The diagnosis(es), natural history, pathophysiology and treatment for diagnosis(es) were discussed. Opportunity given and questions answered.  Biomechanics of pertinent body areas discussed.  When appropriate, the use of ambulatory aids discussed.    The patient is advised that he will need to be on antibiotics prior to any dental work for at least 2 years. He is encouraged to continue with physical therapy as long as insurance will allow.     He will return in approximately 2 months, and we will obtain an x-ray at that time.     TOTAL JOINT recommendations and precautions, including antibiotic prophylaxis discussed. Advice given and questions answered.     Scribed for Abraham Waters MD by Evelyn Whitt.  05/24/21   09:59 EDT    I have personally performed the services described in this document as scribed by the above individual, and it is both accurate and complete.  Abraham Waters MD  5/25/2021  10:15 EDT

## 2021-05-24 NOTE — THERAPY TREATMENT NOTE
Outpatient Physical Therapy Ortho Treatment Note  [facility]     Patient Name: Jared Rose  : 1955  MRN: [id number]  Today's Date: 2021      Visit Date: 2021    Visit Dx:    ICD-10-CM ICD-9-CM   1. Orthopedic aftercare  Z47.89 V54.9   2. S/P left knee surgery  Z98.890 V45.89   3. Gait difficulty  R26.9 781.2   4. Aftercare following left knee joint replacement surgery  Z47.1 V54.81    Z96.652 V43.65       Patient Active Problem List   Diagnosis   • Thrombophilia (CMS/HCC)   • Lupus anticoagulant disorder (CMS/HCC)   • Chronic anticoagulation   • Coronary atherosclerosis   • Hypertension   • Ventral hernia without obstruction or gangrene   • Hyperlipidemia   • Hematuria   • Dyspnea on exertion   • Symptomatic anemia   • History of pulmonary embolus (PE)   • Colonic mass   • Malignant neoplasm of sigmoid colon (CMS/HCC)   • Iron deficiency anemia due to chronic blood loss   • Arthritis of left knee   • History of DVT (deep vein thrombosis)   • Pulmonary nodule        Past Medical History:   Diagnosis Date   • Acute deep vein thrombosis (DVT) of upper extremity (CMS/HCC) 2019   • At risk for sleep apnea     6   • Bell's palsy 2011    SEEN AT [facility]   • Blister of foot 2017    RIGHT FOOT   • Chronic anticoagulation    • Chronic fatigue    • Chronic left-sided low back pain without sciatica    • Colon polyps     FOLLOWED BY DR. RADHA DONOVAN   • Coronary atherosclerosis     cath 2015: normal LM, diffuse LCx disease, LI LAD, 60-70% ostial diag (<1 mm vessel), LI RCA   • COVID-19 virus detected     2020  [facility].    • Dermoid cyst of leg, right 2017   • ED (erectile dysfunction)    • Exomphalos 2013   • GI hemorrhage 2019    ADMITTED TO [facility]   • H/O Anemia    • H/O Leukopenia    • History of chemotherapy    • History of transfusion     no reaction   • Hyperlipidemia    • Hypertension    • Knee pain    • Lupus anticoagulant disorder (CMS/HCC)  5/6/2016   • Muscle spasm of back 10/2018   • OA (osteoarthritis)    • Obesity    • Pulmonary embolism (CMS/HCC) 06/08/2010     Right lower lobe pulmonary embolus, ADMITTED TO [facility]   • Pulmonary embolus (CMS/HCC) 5/6/2016   • Rectal bleeding 09/2019   • Rectal cancer (CMS/HCC) 09/24/2019    MODERATELY DIFFERENTIATED ADENOCARCINOMA GRADE 2, FOLLOWED BY DR. RADHA WHITT   • Sprain of right shoulder 10/21/2020    SEEN AT [facility] ER   • Strain of left shoulder 10/2020   • Stress fracture of right foot 05/11/2013    4TH METATARSAL, SEEN AT [facility] ER   • Thrombophilia (CMS/HCC)     FOLLOWED BY DR. BALAJI MCGEE   • Tinea pedis 11/25/2007    SEEN AT [facility] ER        Past Surgical History:   Procedure Laterality Date   • CARDIAC CATHETERIZATION Left 05/11/2006    NORMAL LV SYSTOLIC FUNCTION(EF 50%), MOD DZ OF 2 SMALL CORONARY BRANCHES (D2 AND OM2), DR. BOOM GALLEGO AT [facility]   • CARDIAC CATHETERIZATION Left 07/20/2015    NORMAL LM, DIFFUSE LCx DZ, LI LAD, 60-70% OSTIAL DIAG(<1MM VESSEL), 10%STENOSIS IN LAD, DR. CHERI VARGAS AT [facility]   • CLOSED REDUCTION WRIST FRACTURE Right    • COLON RESECTION N/A 9/26/2019    Procedure: LOW ANTERIOR COLON RESECTION IMMOBILIZATION OF SPLENIC FLEXURE;  Surgeon: Radha Whitt MD;  Location: [facility]  Service: General   • COLONOSCOPY N/A 9/21/2019    INT/EXT HEMORRHOIDS, 18 MM POLYPOID LESION IN MID SIGMOID, PATH: INVASIVE MODERATELY DIFFERENTIATED GRADE 2 ADENOCARCINOMA, 2 TUBULOVILLOUS ADENOMA POLYPS IN CECUM, ADENOMATOUS POLYP IN TRANSVERSE, ADENOMATOUS POLYP IN ASCENDING, MULTIPLE SMALL AND LARGE DIVERTICULA, DR. TRUONG MONTEZ AT [facility]   • COLONOSCOPY N/A 3/10/2021    5 MM BENIGN POLYP WITH MELANOSIS COLI IN TRANSVERSE, RESCOPE IN 1 YR, DR. RADHA WHITT AT [facility]   • ENDOSCOPY N/A    • LUNG SURGERY Right 2009    RIGHT LOWER LOBE, BLOT CLOT REMOVED   • SIGMOIDOSCOPY N/A 9/24/2019    AN INFILTRATIVE NON OBSTRUCTING MASS IN SIGMOID, AREA TATTOOED, DR. RADHA WHITT AT [facility]   • TONSILLECTOMY AND  ADENOIDECTOMY Bilateral     DURING CHILDHOOD   • TOTAL KNEE ARTHROPLASTY Left 3/16/2021    Procedure: LEFT TOTAL KNEE ARTHROPLASTY WITH MARTHA NAVIGATION;  Surgeon: Abraham Waters MD;  Location: [facility];  Service: Orthopedics;  Laterality: Left;   • UMBILICAL HERNIA REPAIR N/A 05/14/2014    DR. ROMERO SCHUSTER AT [facility]       PT Ortho     Row Name 05/24/21 0800       Left Lower Ext    Lt Knee Extension/Flexion PROM  10, supine  -GJ      User Key  (r) = Recorded By, (t) = Taken By, (c) = Cosigned By    Initials Name Provider Type    Justo Phoenix, PT Physical Therapist                      PT Assessment/Plan     Row Name 05/24/21 0824          PT Assessment    Assessment Comments  Mr. Rose is 10 weeks s/p L TKA. He demonstrates improved PROM L knee extension, with continued spongey end feel.  In terms of ROM, he seems to have turned a corner over the last week especially in feel of end ranages, however he continues to be limited.  He demosntrates difficulty with LLE proprioception activities.  He returs to MD today hoping to be released to return to work as  here at [employer]. Mr. Rose continues to progress toward functional goals and remains a good candidate for skilled physical therapy.  -GJ        PT Plan    PT Plan Comments  assess MD visit, contiue to work on extension > flexion ROM, manual techniques, work on proprioception of LLE  -GJ       User Key  (r) = Recorded By, (t) = Taken By, (c) = Cosigned By    Initials Name Provider Type    Justo Phoenix, PT Physical Therapist            OP Exercises     Row Name 05/24/21 0755 05/24/21 0700          Subjective Comments    Subjective Comments  --  my pain is better, I want to go back to work  -GJ        Total Minutes    49391 - PT Therapeutic Exercise Minutes  25  -GJ  --     99589 - PT Manual Therapy Minutes  18  -GJ  --        Exercise 6    Exercise Name 6  --  stair knee flexion  -     Cueing 6  --  Verbal;Demo  -GJ     Reps 6  --  3   -GJ     Time 6  --  20 sec  -GJ        Exercise 7    Exercise Name 7  --  stair HS strecth  -GJ     Cueing 7  --  Verbal;Demo  -GJ     Reps 7  --  3  -GJ     Time 7  --  20 seconds  -GJ        Exercise 8    Exercise Name 8  --  Nustep UE/LE L5  -GJ     Time 8  --  5 min  -GJ        Exercise 10    Exercise Name 10  --  gastroc stretch on step  -GJ     Cueing 10  --  Verbal;Demo  -GJ     Reps 10  --  3  -GJ     Time 10  --  20sec  -GJ        Exercise 12    Exercise Name 12  --  Recumbent bike,   -GJ     Cueing 12  --  Verbal  -GJ     Time 12  --  5 min  -GJ     Additional Comments  --  seat 10, forward  -GJ        Exercise 13    Exercise Name 13  --  retro weight shift with ipsilateral shoulder flex L  -GJ     Cueing 13  --  Verbal;Tactile  -GJ     Reps 13  --  15  -GJ     Time 13  --  5 s  -GJ     Additional Comments  --  cues to keep foot neutral not ER  -GJ        Exercise 16    Exercise Name 16  --  exaggerated marching (with arm swing), focus on slow, controlled motions, with heel first initial contact bilaterally  -GJ     Cueing 16  --  Verbal;Tactile;Demo  -GJ     Reps 16  --  2 laps  -GJ     Additional Comments  --  down back hallway  -GJ       User Key  (r) = Recorded By, (t) = Taken By, (c) = Cosigned By    Initials Name Provider Type    Justo Phoenix, PT Physical Therapist                      Manual Rx (last 36 hours)      Manual Treatments     Row Name 05/24/21 0755             Total Minutes    07631 - PT Manual Therapy Minutes  18  -GJ         Manual Rx 2    Manual Rx 2 Location  L knee patellar mobs  -GJ         Manual Rx 3    Manual Rx 3 Location  Left knee  -GJ      Manual Rx 3 Type  supine knee ext mob with Left heel on towel  -GJ      Manual Rx 3 Grade  LS knee ext mob   -GJ      Manual Rx 3 Duration  over pressure into ext  -GJ        User Key  (r) = Recorded By, (t) = Taken By, (c) = Cosigned By    Initials Name Provider Type    Justo Phoenix, PT Physical Therapist          PT OP Goals      Row Name 05/24/21 0700          PT Short Term Goals    STG Date to Achieve  04/30/21  -GJ     STG 1  The pt will demonstrate IND and compliant with initial HEP focused on improved Left knee mobility and quad strength for increased functional independence  -GJ     STG 1 Progress  Met  -GJ     STG 2  The pt will demonstrate L knee ext AROM to only lacking 15 degrees or less for improved gait pattern.  -GJ     STG 2 Progress  Met  -GJ     STG 3  The pt will ambulate with SPC over even ground with near normal gait pattern for improved community navigation.  -GJ     STG 3 Progress  Met  -GJ        Long Term Goals    LTG Date to Achieve  06/29/21  -GJ     LTG 1  The pt will demonstrate IND with progressive HEP focused on IND condition management and return to PLOF.  -GJ     LTG 1 Progress  Ongoing  -GJ     LTG 2  The pt will demonstrate L knee flex PROM to at least 120 or greater for improved transitional position and stair navigation.  -GJ     LTG 2 Progress  Ongoing;Progressing  -GJ     LTG 3  The pt will resume reciprocal stair navigation for improved community and household navigation.  -GJ     LTG 3 Progress  Ongoing  -GJ     LTG 4  The pt will demonstrate L knee AROM to at least 0-120 for improved functional mobility.  -GJ     LTG 4 Progress  Ongoing  -GJ     LTG 5  The pt will demonstrate LLE strength to at least 4+/5 for improved stair navigation and transitional position performance.  -GJ     LTG 5 Progress  Ongoing  -GJ       User Key  (r) = Recorded By, (t) = Taken By, (c) = Cosigned By    Initials Name Provider Type    Justo Phoenix, PT Physical Therapist          Therapy Education  Education Details: encouraged pt to work on exaggerated/slow marching at home to help with his proprioception/functional strengthening, discussed expectations with RTW if he is released  Given: HEP, Symptoms/condition management, Pain management, Posture/body mechanics, Edema management, Mobility training  Program:  Reinforced  How Provided: Verbal  Provided to: Patient  Level of Understanding: Verbalized              Time Calculation:   Start Time: 0739  Stop Time: 0822  Time Calculation (min): 43 min  Timed Charges  92566 - PT Therapeutic Exercise Minutes: 25  55289 - PT Manual Therapy Minutes: 18  Total Minutes  Timed Charges Total Minutes: 43   Total Minutes: 43  Therapy Charges for Today     Code Description Service Date Service Provider Modifiers Qty    75250960147 HC PT THER PROC EA 15 MIN 5/24/2021 Justo Gandhi, PT GP 2    [id number]  PT MANUAL THERAPY EA 15 MIN 5/24/2021 Justo Gandhi, PT GP 1                    Justo Gandhi, PT  5/24/2021

## 2021-05-26 ENCOUNTER — HOSPITAL ENCOUNTER (OUTPATIENT)
Dept: PHYSICAL THERAPY | Facility: HOSPITAL | Age: 66
Setting detail: THERAPIES SERIES
Discharge: HOME | End: 2021-05-26

## 2021-05-26 DIAGNOSIS — Z96.652 AFTERCARE FOLLOWING LEFT KNEE JOINT REPLACEMENT SURGERY: ICD-10-CM

## 2021-05-26 DIAGNOSIS — Z47.89 ORTHOPEDIC AFTERCARE: Primary | ICD-10-CM

## 2021-05-26 DIAGNOSIS — R26.9 GAIT DIFFICULTY: ICD-10-CM

## 2021-05-26 DIAGNOSIS — Z98.890 S/P LEFT KNEE SURGERY: ICD-10-CM

## 2021-05-26 DIAGNOSIS — Z47.1 AFTERCARE FOLLOWING LEFT KNEE JOINT REPLACEMENT SURGERY: ICD-10-CM

## 2021-05-26 PROCEDURE — 97110 THERAPEUTIC EXERCISES: CPT | Performed by: PHYSICAL THERAPIST

## 2021-05-26 PROCEDURE — 97140 MANUAL THERAPY 1/> REGIONS: CPT | Performed by: PHYSICAL THERAPIST

## 2021-05-26 NOTE — THERAPY TREATMENT NOTE
Outpatient Physical Therapy Ortho Treatment Note  [facility]     Patient Name: Jared Rose  : 1955  MRN: [id number]  Today's Date: 2021      Visit Date: 2021    Visit Dx:    ICD-10-CM ICD-9-CM   1. Orthopedic aftercare  Z47.89 V54.9   2. S/P left knee surgery  Z98.890 V45.89   3. Gait difficulty  R26.9 781.2   4. Aftercare following left knee joint replacement surgery  Z47.1 V54.81    Z96.652 V43.65       Patient Active Problem List   Diagnosis   • Thrombophilia (CMS/HCC)   • Lupus anticoagulant disorder (CMS/HCC)   • Chronic anticoagulation   • Coronary atherosclerosis   • Hypertension   • Ventral hernia without obstruction or gangrene   • Hyperlipidemia   • Hematuria   • Dyspnea on exertion   • Symptomatic anemia   • History of pulmonary embolus (PE)   • Colonic mass   • Malignant neoplasm of sigmoid colon (CMS/HCC)   • Iron deficiency anemia due to chronic blood loss   • Arthritis of left knee   • History of DVT (deep vein thrombosis)   • Pulmonary nodule        Past Medical History:   Diagnosis Date   • Acute deep vein thrombosis (DVT) of upper extremity (CMS/HCC) 2019   • At risk for sleep apnea     6   • Bell's palsy 2011    SEEN AT [facility] ER   • Blister of foot 2017    RIGHT FOOT   • Chronic anticoagulation    • Chronic fatigue    • Chronic left-sided low back pain without sciatica    • Colon polyps     FOLLOWED BY DR. RADHA DONOVAN   • Coronary atherosclerosis     cath 2015: normal LM, diffuse LCx disease, LI LAD, 60-70% ostial diag (<1 mm vessel), LI RCA   • COVID-19 virus detected     2020  [facility].    • Dermoid cyst of leg, right 2017   • ED (erectile dysfunction)    • Exomphalos 2013   • GI hemorrhage 2019    ADMITTED TO [facility]   • H/O Anemia    • H/O Leukopenia    • History of chemotherapy    • History of transfusion     no reaction   • Hyperlipidemia    • Hypertension    • Knee pain    • Lupus anticoagulant disorder (CMS/HCC)  5/6/2016   • Muscle spasm of back 10/2018   • OA (osteoarthritis)    • Obesity    • Pulmonary embolism (CMS/HCC) 06/08/2010     Right lower lobe pulmonary embolus, ADMITTED TO [facility]   • Pulmonary embolus (CMS/HCC) 5/6/2016   • Rectal bleeding 09/2019   • Rectal cancer (CMS/HCC) 09/24/2019    MODERATELY DIFFERENTIATED ADENOCARCINOMA GRADE 2, FOLLOWED BY DR. RADHA WHITT   • Sprain of right shoulder 10/21/2020    SEEN AT [facility]   • Strain of left shoulder 10/2020   • Stress fracture of right foot 05/11/2013    4TH METATARSAL, SEEN AT [facility]   • Thrombophilia (CMS/HCC)     FOLLOWED BY DR. BALAJI MCGEE   • Tinea pedis 11/25/2007    SEEN AT [facility] ER        Past Surgical History:   Procedure Laterality Date   • CARDIAC CATHETERIZATION Left 05/11/2006    NORMAL LV SYSTOLIC FUNCTION(EF 50%), MOD DZ OF 2 SMALL CORONARY BRANCHES (D2 AND OM2), DR. BOOM GALLEGO AT [facility]   • CARDIAC CATHETERIZATION Left 07/20/2015    NORMAL LM, DIFFUSE LCx DZ, LI LAD, 60-70% OSTIAL DIAG(<1MM VESSEL), 10%STENOSIS IN LAD, DR. CHERI VARGAS AT [facility]   • CLOSED REDUCTION WRIST FRACTURE Right    • COLON RESECTION N/A 9/26/2019    Procedure: LOW ANTERIOR COLON RESECTION IMMOBILIZATION OF SPLENIC FLEXURE;  Surgeon: Radha Whitt MD;  Location: [facility];  Service: General   • COLONOSCOPY N/A 9/21/2019    INT/EXT HEMORRHOIDS, 18 MM POLYPOID LESION IN MID SIGMOID, PATH: INVASIVE MODERATELY DIFFERENTIATED GRADE 2 ADENOCARCINOMA, 2 TUBULOVILLOUS ADENOMA POLYPS IN CECUM, ADENOMATOUS POLYP IN TRANSVERSE, ADENOMATOUS POLYP IN ASCENDING, MULTIPLE SMALL AND LARGE DIVERTICULA, DR. TRUONG MONTEZ AT [facility]   • COLONOSCOPY N/A 3/10/2021    5 MM BENIGN POLYP WITH MELANOSIS COLI IN TRANSVERSE, RESCOPE IN 1 YR, DR. RADHA WHITT AT [facility]   • ENDOSCOPY N/A    • LUNG SURGERY Right 2009    RIGHT LOWER LOBE, BLOT CLOT REMOVED   • SIGMOIDOSCOPY N/A 9/24/2019    AN INFILTRATIVE NON OBSTRUCTING MASS IN SIGMOID, AREA TATTOOED, DR. RADHA WHITT AT [facility]   • TONSILLECTOMY AND  ADENOIDECTOMY Bilateral     DURING CHILDHOOD   • TOTAL KNEE ARTHROPLASTY Left 3/16/2021    Procedure: LEFT TOTAL KNEE ARTHROPLASTY WITH MARTHA NAVIGATION;  Surgeon: Abraham Waters MD;  Location: [facility];  Service: Orthopedics;  Laterality: Left;   • UMBILICAL HERNIA REPAIR N/A 05/14/2014    DR. ROMERO SCHUSTER AT [facility]                       PT Assessment/Plan     Row Name 05/26/21 0756          PT Assessment    Assessment Comments  Mr. Townsend is 10 weeks s/p L TKA. He returns from MD, being released to return to work. He plans to retun on 5/30/2021 as a  here at [employer].  We discussed activity modifications and considerations with return to work. Continue to work on functional activity/mobility of L knee. he is able to ascend stairs reciprocally without rails, descends reciprocally with1 rail or step to without rails. Mr. Rose continues to be a good candidate for skilled physical therapy.  -GJ        PT Plan    PT Plan Comments  continue to work on functional mobility, ROM extension >/= flexion. 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[employer])  -GJ        Total Minutes    30909 - PT Therapeutic Exercise Minutes  30  -GJ  --     42421 - PT Manual Therapy Minutes  15  -GJ  --        Exercise 4    Exercise Name 4  --  quad set on towel  -GJ     Cueing 4  --  Verbal;Tactile  -GJ     Reps 4  --  15  -GJ     Time 4  --  5 s  -GJ        Exercise 6    Exercise Name 6  --  stair knee flexion  -GJ     Cueing 6  --  Verbal;Demo  -GJ     Reps 6  --  3  -GJ     Time 6  --  20 sec  -GJ        Exercise 7    Exercise Name 7  --  stair HS strecth  -GJ      Cueing 7  --  Verbal;Demo  -GJ     Reps 7  --  3  -GJ     Time 7  --  20 seconds  -GJ        Exercise 8    Exercise Name 8  --  Nustep UE/LE L5  -GJ     Time 8  --  5 min  -GJ        Exercise 10    Exercise Name 10  --  gastroc stretch on step  -GJ     Cueing 10  --  Verbal;Demo  -GJ     Reps 10  --  3  -GJ     Time 10  --  20sec  -GJ        Exercise 12    Exercise Name 12  --  Recumbent bike,   -GJ     Cueing 12  --  Verbal  -GJ     Time 12  --  5 min  -GJ     Additional Comments  --  seat 10, forward  -GJ        Exercise 13    Exercise Name 13  --  retro weight shift with ipsilateral shoulder flex L  -GJ     Cueing 13  --  Verbal;Tactile  -GJ     Reps 13  --  15  -GJ     Time 13  --  5 s  -GJ     Additional Comments  --  cues to keep foot neutral not ER, cues to straighten L knee  -GJ        Exercise 14    Exercise Name 14  --  retro walk down back [address]  -GJ     Reps 14  --  2 laps  -GJ        Exercise 16    Exercise Name 16  --  exaggerated marching (with arm swing), focus on slow, controlled motions, with heel first initial contact bilaterally  -GJ     Cueing 16  --  Verbal;Tactile;Demo  -GJ     Reps 16  --  2 laps  -GJ     Additional Comments  --  down back hallway  -GJ        Exercise 18    Exercise Name 18  --  step up, 6 inch, L control down phase, 1 hand  -GJ     Cueing 18  --  Verbal;Demo  -GJ     Reps 18  --  15  -GJ        Exercise 19    Exercise Name 19  --  lateral stepping, down back king way  -GJ     Cueing 19  --  Verbal;Demo  -GJ     Reps 19  --  2 laps  -GJ       User Key  (r) = Recorded By, (t) = Taken By, (c) = Cosigned By    Initials Name Provider Type    GJ Justo Gandhi W, PT Physical Therapist                      Manual Rx (last 36 hours)      Manual Treatments     Row Name 05/26/21 0748 05/26/21 0700          Total Minutes    67195 - PT Manual Therapy Minutes  15  -GJ  --        Manual Rx 2    Manual Rx 2 Location  --  L knee patellar mobs  -GJ        Manual Rx 3    Manual Rx 3  Location  --  Left knee  -GJ     Manual Rx 3 Type  --  supine knee ext mob with Left heel on towel  -GJ     Manual Rx 3 Grade  --  LS knee ext mob   -GJ     Manual Rx 3 Duration  --  over pressure into ext  -GJ       User Key  (r) = Recorded By, (t) = Taken By, (c) = Cosigned By    Initials Name Provider Type    Justo Phoenix, PT Physical Therapist          PT OP Goals     Row Name 05/26/21 0700          PT Short Term Goals    STG Date to Achieve  04/30/21  -GJ     STG 1  The pt will demonstrate IND and compliant with initial HEP focused on improved Left knee mobility and quad strength for increased functional independence  -GJ     STG 1 Progress  Met  -GJ     STG 2  The pt will demonstrate L knee ext AROM to only lacking 15 degrees or less for improved gait pattern.  -GJ     STG 2 Progress  Met  -GJ     STG 3  The pt will ambulate with SPC over even ground with near normal gait pattern for improved community navigation.  -GJ     STG 3 Progress  Met  -GJ        Long Term Goals    LTG Date to Achieve  06/29/21  -GJ     LTG 1  The pt will demonstrate IND with progressive HEP focused on IND condition management and return to PLOF.  -GJ     LTG 1 Progress  Ongoing  -GJ     LTG 2  The pt will demonstrate L knee flex PROM to at least 120 or greater for improved transitional position and stair navigation.  -GJ     LTG 2 Progress  Ongoing;Progressing  -GJ     LTG 3  The pt will resume reciprocal stair navigation for improved community and household navigation.  -GJ     LTG 3 Progress  Partially Met  -GJ     LTG 3 Progress Comments  ascends reciprocally no rails, descends step to without rails, reciprocal with 1 rail.   -GJ     LTG 4  The pt will demonstrate L knee AROM to at least 0-120 for improved functional mobility.  -GJ     LTG 4 Progress  Ongoing  -GJ     LTG 5  The pt will demonstrate LLE strength to at least 4+/5 for improved stair navigation and transitional position performance.  -GJ     LTG 5 Progress   Ongoing  -GJ       User Key  (r) = Recorded By, (t) = Taken By, (c) = Cosigned By    Initials Name Provider Type    Justo Phoenix, PT Physical Therapist          Therapy Education  Education Details: discussed return to work expectations, likely need to pull back on exercises for a week or two  Given: HEP, Symptoms/condition management, Pain management, Posture/body mechanics, Mobility training, Edema management  Program: Reinforced  How Provided: Verbal  Provided to: Patient  Level of Understanding: Verbalized              Time Calculation:   Start Time: 0745  Stop Time: 0830  Time Calculation (min): 45 min  Timed Charges  71081 - PT Therapeutic Exercise Minutes: 30  70111 - PT Manual Therapy Minutes: 15  Total Minutes  Timed Charges Total Minutes: 45   Total Minutes: 45  Therapy Charges for Today     Code Description Service Date Service Provider Modifiers Qty    78463152605  PT THER PROC EA 15 MIN 5/26/2021 Justo Gandhi, PT GP 2    [id number]  PT MANUAL THERAPY EA 15 MIN 5/26/2021 Justo Gandhi, PT GP 1                    Justo Gandhi, PT  5/26/2021

## 2021-05-28 ENCOUNTER — HOSPITAL ENCOUNTER (OUTPATIENT)
Dept: PHYSICAL THERAPY | Facility: HOSPITAL | Age: 66
Setting detail: THERAPIES SERIES
Discharge: HOME | End: 2021-05-28

## 2021-05-28 ENCOUNTER — TELEPHONE (OUTPATIENT)
Dept: ORTHOPEDIC SURGERY | Facility: CLINIC | Age: 66
End: 2021-05-28

## 2021-05-28 DIAGNOSIS — Z47.1 AFTERCARE FOLLOWING LEFT KNEE JOINT REPLACEMENT SURGERY: ICD-10-CM

## 2021-05-28 DIAGNOSIS — Z98.890 S/P LEFT KNEE SURGERY: ICD-10-CM

## 2021-05-28 DIAGNOSIS — Z96.652 AFTERCARE FOLLOWING LEFT KNEE JOINT REPLACEMENT SURGERY: ICD-10-CM

## 2021-05-28 DIAGNOSIS — R26.9 GAIT DIFFICULTY: ICD-10-CM

## 2021-05-28 DIAGNOSIS — Z47.89 ORTHOPEDIC AFTERCARE: Primary | ICD-10-CM

## 2021-05-28 PROCEDURE — 97140 MANUAL THERAPY 1/> REGIONS: CPT

## 2021-05-28 PROCEDURE — 97110 THERAPEUTIC EXERCISES: CPT

## 2021-05-28 NOTE — THERAPY PROGRESS REPORT/RE-CERT
Outpatient Physical Therapy Ortho Progress Note  [facility]     Patient Name: Jared Rose  : 1955  MRN: [id number]  Today's Date: 2021      Visit Date: 2021    Visit Dx:    ICD-10-CM ICD-9-CM   1. Orthopedic aftercare  Z47.89 V54.9   2. S/P left knee surgery  Z98.890 V45.89   3. Gait difficulty  R26.9 781.2   4. Aftercare following left knee joint replacement surgery  Z47.1 V54.81    Z96.652 V43.65       Patient Active Problem List   Diagnosis   • Thrombophilia (CMS/HCC)   • Lupus anticoagulant disorder (CMS/HCC)   • Chronic anticoagulation   • Coronary atherosclerosis   • Hypertension   • Ventral hernia without obstruction or gangrene   • Hyperlipidemia   • Hematuria   • Dyspnea on exertion   • Symptomatic anemia   • History of pulmonary embolus (PE)   • Colonic mass   • Malignant neoplasm of sigmoid colon (CMS/HCC)   • Iron deficiency anemia due to chronic blood loss   • Arthritis of left knee   • History of DVT (deep vein thrombosis)   • Pulmonary nodule        Past Medical History:   Diagnosis Date   • Acute deep vein thrombosis (DVT) of upper extremity (CMS/HCC) 2019   • At risk for sleep apnea     6   • Bell's palsy 2011    SEEN AT [facility] ER   • Blister of foot 2017    RIGHT FOOT   • Chronic anticoagulation    • Chronic fatigue    • Chronic left-sided low back pain without sciatica    • Colon polyps     FOLLOWED BY DR. RADHA DONOVAN   • Coronary atherosclerosis     cath 2015: normal LM, diffuse LCx disease, LI LAD, 60-70% ostial diag (<1 mm vessel), LI RCA   • COVID-19 virus detected     2020  [facility].    • Dermoid cyst of leg, right 2017   • ED (erectile dysfunction)    • Exomphalos 2013   • GI hemorrhage 2019    ADMITTED TO [facility]   • H/O Anemia    • H/O Leukopenia    • History of chemotherapy    • History of transfusion     no reaction   • Hyperlipidemia    • Hypertension    • Knee pain    • Lupus anticoagulant disorder (CMS/HCC) 2016    • Muscle spasm of back 10/2018   • OA (osteoarthritis)    • Obesity    • Pulmonary embolism (CMS/HCC) 06/08/2010     Right lower lobe pulmonary embolus, ADMITTED TO [facility]   • Pulmonary embolus (CMS/HCC) 5/6/2016   • Rectal bleeding 09/2019   • Rectal cancer (CMS/HCC) 09/24/2019    MODERATELY DIFFERENTIATED ADENOCARCINOMA GRADE 2, FOLLOWED BY DR. RADHA WHITT   • Sprain of right shoulder 10/21/2020    SEEN AT [facility] ER   • Strain of left shoulder 10/2020   • Stress fracture of right foot 05/11/2013    4TH METATARSAL, SEEN AT [facility] ER   • Thrombophilia (CMS/HCC)     FOLLOWED BY DR. BALAJI MCGEE   • Tinea pedis 11/25/2007    SEEN AT [facility] ER        Past Surgical History:   Procedure Laterality Date   • CARDIAC CATHETERIZATION Left 05/11/2006    NORMAL LV SYSTOLIC FUNCTION(EF 50%), MOD DZ OF 2 SMALL CORONARY BRANCHES (D2 AND OM2), DR. BOOM GALLEGO AT [facility]   • CARDIAC CATHETERIZATION Left 07/20/2015    NORMAL LM, DIFFUSE LCx DZ, LI LAD, 60-70% OSTIAL DIAG(<1MM VESSEL), 10%STENOSIS IN LAD, DR. CHERI VARGAS AT [facility]   • CLOSED REDUCTION WRIST FRACTURE Right    • COLON RESECTION N/A 9/26/2019    Procedure: LOW ANTERIOR COLON RESECTION IMMOBILIZATION OF SPLENIC FLEXURE;  Surgeon: Radha Whitt MD;  Location: [facility];  Service: General   • COLONOSCOPY N/A 9/21/2019    INT/EXT HEMORRHOIDS, 18 MM POLYPOID LESION IN MID SIGMOID, PATH: INVASIVE MODERATELY DIFFERENTIATED GRADE 2 ADENOCARCINOMA, 2 TUBULOVILLOUS ADENOMA POLYPS IN CECUM, ADENOMATOUS POLYP IN TRANSVERSE, ADENOMATOUS POLYP IN ASCENDING, MULTIPLE SMALL AND LARGE DIVERTICULA, DR. TRUONG MONTEZ AT [facility]   • COLONOSCOPY N/A 3/10/2021    5 MM BENIGN POLYP WITH MELANOSIS COLI IN TRANSVERSE, RESCOPE IN 1 YR, DR. RADHA WHITT AT [facility]   • ENDOSCOPY N/A    • LUNG SURGERY Right 2009    RIGHT LOWER LOBE, BLOT CLOT REMOVED   • SIGMOIDOSCOPY N/A 9/24/2019    AN INFILTRATIVE NON OBSTRUCTING MASS IN SIGMOID, AREA TATTOOED, DR. RADHA WHITT AT [facility]   • TONSILLECTOMY AND ADENOIDECTOMY  Bilateral     DURING CHILDHOOD   • TOTAL KNEE ARTHROPLASTY Left 3/16/2021    Procedure: LEFT TOTAL KNEE ARTHROPLASTY WITH MARTHA NAVIGATION;  Surgeon: Abraham Waters MD;  Location: [facility];  Service: Orthopedics;  Laterality: Left;   • UMBILICAL HERNIA REPAIR N/A 05/14/2014    DR. ROMERO SCHUSTER AT [facility]       PT Ortho     Row Name 05/28/21 0800       Left Lower Ext    Lt Knee Extension/Flexion AROM  lacking   -RS    Lt Knee Extension/Flexion PROM  lacking 6 spine-114  -RS       MMT Left Lower Ext    Lt Hip Flexion MMT, Gross Movement  (4+/5) good plus  -RS    Lt Knee Extension MMT, Gross Movement  (4+/5) good plus  -RS    Lt Knee Flexion MMT, Gross Movement  (4/5) good  -RS    Lt Ankle Plantarflexion MMT, Gross Movement  (4+/5) good plus  -RS    Lt Ankle Dorsiflexion MMT, Gross Movement  (4+/5) good plus  -RS       Gait/Stairs (Locomotion)    Comment (Gait/Stairs)  pt lacks terminal knee ext L LE at initial contact, dec stance time LLE, dec sandra  -RS      User Key  (r) = Recorded By, (t) = Taken By, (c) = Cosigned By    Initials Name Provider Type    RS Senia Stewart, PT Physical Therapist                      PT Assessment/Plan     Row Name 05/28/21 1000          PT Assessment    Functional Limitations  Impaired gait;Performance in leisure activities;Limitations in community activities;Limitation in home management;Performance in work activities;Performance in sport activities  -RS     Impairments  Gait;Endurance;Impaired flexibility;Impaired muscle length;Impaired muscle power;Impaired muscle endurance;Impaired postural alignment;Joint mobility;Muscle strength;Pain;Poor body mechanics;Posture;Range of motion  -RS     Assessment Comments  Mr. Townsend is approx 10 weeks s/p L TKA and is progressing toward goals however remains limited in terminal knee extnesion.  Passive knee extension L is improving however pt has difficulty activating and maintaiing specifically during gait cycle. He has met  3/3 STG and barb met 1/6 LTG. Discussed return to work and improtance of compliance with HEP when returning to work (heel prop stretch, prone knee hang at home, gastroc stretch) pt reports understanding. He remains appropriate for continued skilled PT focused on TKE and gait pattern.  -RS     Please refer to paper survey for additional self-reported information  Yes  -RS     Rehab Potential  Good  -RS     Patient/caregiver participated in establishment of treatment plan and goals  Yes  -RS     Patient would benefit from skilled therapy intervention  Yes  -RS        PT Plan    PT Plan Comments  COntinue focused on TKE, 2-3 more weeks as pt returns to work.  -RS       User Key  (r) = Recorded By, (t) = Taken By, (c) = Cosigned By    Initials Name Provider Type    RS Senia Stewart, PT Physical Therapist            OP Exercises     Row Name 05/28/21 0800 05/28/21 0700          Subjective Comments    Subjective Comments  Pt reports feeling fine, going ack to work next week  -RS  --        Subjective Pain    Able to rate subjective pain?  yes  -RS  --     Subjective Pain Comment  sore  -RS  --        Total Minutes    61643 - Gait Training Minutes   --  -RS  --     37097 - PT Therapeutic Exercise Minutes  28  -RS  --     14944 - PT Manual Therapy Minutes  --  15  -RS        Exercise 1    Exercise Name 1  prone knee hang/heel prop stretch demo/discussion  -RS  --     Time 1  5 min  -RS  --        Exercise 4    Exercise Name 4  --  -RS  --     Cueing 4  --  -RS  --     Reps 4  --  -RS  --     Time 4  --  -RS  --        Exercise 5    Exercise Name 5  review of objective measures/goals/review of HEP  -RS  --     Time 5  5 min  -RS  --        Exercise 6    Exercise Name 6  stair knee flexion  -RS  --     Cueing 6  Verbal;Demo  -RS  --     Reps 6  3  -RS  --     Time 6  20 sec  -RS  --        Exercise 7    Exercise Name 7  stair HS strecth  -RS  --     Cueing 7  Verbal;Demo  -RS  --     Reps 7  3  -RS  --     Time 7   20 seconds  -RS  --        Exercise 8    Exercise Name 8  Nustep UE/LE L5  -RS  --     Time 8  3 min  -RS  --        Exercise 10    Exercise Name 10  gastroc stretch on step  -RS  --     Cueing 10  Verbal;Demo  -RS  --     Reps 10  3  -RS  --     Time 10  20sec  -RS  --        Exercise 12    Exercise Name 12  Recumbent bike,   -RS  --     Cueing 12  Verbal  -RS  --     Time 12  5 min  -RS  --        Exercise 13    Exercise Name 13  retro weight shift with ipsilateral shoulder flex L  -RS  --     Cueing 13  Verbal;Tactile  -RS  --     Reps 13  15  -RS  --     Time 13  5 s  -RS  --        Exercise 14    Exercise Name 14  retro walk down back [address]  -RS  --     Reps 14  2 laps  -RS  --        Exercise 16    Exercise Name 16  exaggerated marching (with arm swing), focus on slow, controlled motions, with heel first initial contact bilaterally  -RS  --     Cueing 16  Verbal;Tactile;Demo  -RS  --     Reps 16  2 laps  -RS  --        Exercise 18    Exercise Name 18  --  -RS  --     Cueing 18  --  -RS  --     Reps 18  --  -RS  --        Exercise 19    Exercise Name 19  --  -RS  --     Cueing 19  --  -RS  --     Reps 19  --  -RS  --       User Key  (r) = Recorded By, (t) = Taken By, (c) = Cosigned By    Initials Name Provider Type    RS Senia Stewart, PT Physical Therapist                      Manual Rx (last 36 hours)      Manual Treatments     Row Name 05/28/21 0700             Total Minutes    62142 - PT Manual Therapy Minutes  15  -RS         Manual Rx 2    Manual Rx 2 Location  L knee patellar mobs  -RS         Manual Rx 3    Manual Rx 3 Location  Left knee  -RS      Manual Rx 3 Type  supine knee ext mob with Left heel on towel  -RS      Manual Rx 3 Grade  LS knee ext mob   -RS      Manual Rx 3 Duration  over pressure into ext  -RS        User Key  (r) = Recorded By, (t) = Taken By, (c) = Cosigned By    Initials Name Provider Type    RS Senia Stewart, PT Physical Therapist          PT OP Goals     Row Name  05/28/21 0800          PT Short Term Goals    STG Date to Achieve  04/30/21  -RS     STG 1  The pt will demonstrate IND and compliant with initial HEP focused on improved Left knee mobility and quad strength for increased functional independence  -RS     STG 1 Progress  Met  -RS     STG 2  The pt will demonstrate L knee ext AROM to only lacking 15 degrees or less for improved gait pattern.  -RS     STG 2 Progress  Met  -RS     STG 3  The pt will ambulate with SPC over even ground with near normal gait pattern for improved community navigation.  -RS     STG 3 Progress  Met  -RS        Long Term Goals    LTG Date to Achieve  06/29/21  -RS     LTG 1  The pt will demonstrate IND with progressive HEP focused on IND condition management and return to PLOF.  -RS     LTG 1 Progress  Ongoing  -RS     LTG 1 Progress Comments  discussed extension focused HEP, pt recptive  -RS     LTG 2  The pt will demonstrate L knee flex PROM to at least 120 or greater for improved transitional position and stair navigation.  -RS     LTG 2 Progress  Ongoing;Progressing  -RS     LTG 2 Progress Comments  114  -RS     LTG 3  The pt will resume reciprocal stair navigation for improved community and household navigation.  -RS     LTG 3 Progress  Partially Met  -RS     LTG 4  The pt will demonstrate L knee AROM to at least 0-120 for improved functional mobility.  -RS     LTG 4 Progress  Ongoing  -RS     LTG 4 Progress Comments  lacking 6 passive, 12 active  -RS     LTG 5  The pt will demonstrate LLE strength to at least 4+/5 for improved stair navigation and transitional position performance.  -RS     LTG 5 Progress  Ongoing;Progressing  -RS     LTG 5 Progress Comments  see objective  -RS       User Key  (r) = Recorded By, (t) = Taken By, (c) = Cosigned By    Initials Name Provider Type    RS Senia Stewart, PT Physical Therapist          Therapy Education  Education Details: review of goals/HEP, importance of knee ext stretch (gastroc off step,  prone knee hang, heel  prop at work)  Given: HEP, Symptoms/condition management, Pain management, Posture/body mechanics, Mobility training, Edema management  Program: Reinforced  How Provided: Verbal, Demonstration  Provided to: Patient  Level of Understanding: Verbalized    Outcome Measure Options: Knee Outcome Score- ADL  Knee Outcome Score  Knee Outcome Score Comments: 57%      Time Calculation:   Start Time: 0830  Stop Time: 0915  Time Calculation (min): 45 min  Timed Charges  59260 - PT Therapeutic Exercise Minutes: 28  11320 - PT Manual Therapy Minutes: 15  Total Minutes  Timed Charges Total Minutes: 28   Total Minutes: 28  Therapy Charges for Today     Code Description Service Date Service Provider Modifiers Qty    55432592835 HC PT THER PROC EA 15 MIN 5/28/2021 Senia Stewart, PT GP 2    34474625509 HC PT MANUAL THERAPY EA 15 MIN 5/28/2021 Senia Stewart, PT GP 1          PT G-Codes  Outcome Measure Options: Knee Outcome Score- ADL         Senia Stewart PT  5/28/2021

## 2021-05-28 NOTE — TELEPHONE ENCOUNTER
Caller: RICCARDO    Relationship:     Best call back number:  [phone]    What form or medical record are you requesting: OFFICE NOTES FROM 5/24 VISIT AND RETURN TO WORK DATE      How would you like to receive the form or medical records (pick-up, mail, fax): FAX  If fax, what is the fax number: [phone] ATTENTION RICCARDO BARFIELD

## 2021-06-01 ENCOUNTER — HOSPITAL ENCOUNTER (OUTPATIENT)
Dept: PHYSICAL THERAPY | Facility: HOSPITAL | Age: 66
Setting detail: THERAPIES SERIES
Discharge: HOME | End: 2021-06-01

## 2021-06-01 DIAGNOSIS — Z47.1 AFTERCARE FOLLOWING LEFT KNEE JOINT REPLACEMENT SURGERY: ICD-10-CM

## 2021-06-01 DIAGNOSIS — Z47.89 ORTHOPEDIC AFTERCARE: Primary | ICD-10-CM

## 2021-06-01 DIAGNOSIS — R26.9 GAIT DIFFICULTY: ICD-10-CM

## 2021-06-01 DIAGNOSIS — Z96.652 AFTERCARE FOLLOWING LEFT KNEE JOINT REPLACEMENT SURGERY: ICD-10-CM

## 2021-06-01 DIAGNOSIS — Z98.890 S/P LEFT KNEE SURGERY: ICD-10-CM

## 2021-06-01 PROCEDURE — 97110 THERAPEUTIC EXERCISES: CPT

## 2021-06-01 PROCEDURE — 97140 MANUAL THERAPY 1/> REGIONS: CPT

## 2021-06-01 NOTE — THERAPY TREATMENT NOTE
Outpatient Physical Therapy Ortho Treatment Note  [facility]     Patient Name: Jared Rose  : 1955  MRN: [id number]  Today's Date: 2021      Visit Date: 2021    Visit Dx:    ICD-10-CM ICD-9-CM   1. Orthopedic aftercare  Z47.89 V54.9   2. S/P left knee surgery  Z98.890 V45.89   3. Gait difficulty  R26.9 781.2   4. Aftercare following left knee joint replacement surgery  Z47.1 V54.81    Z96.652 V43.65       Patient Active Problem List   Diagnosis   • Thrombophilia (CMS/HCC)   • Lupus anticoagulant disorder (CMS/HCC)   • Chronic anticoagulation   • Coronary atherosclerosis   • Hypertension   • Ventral hernia without obstruction or gangrene   • Hyperlipidemia   • Hematuria   • Dyspnea on exertion   • Symptomatic anemia   • History of pulmonary embolus (PE)   • Colonic mass   • Malignant neoplasm of sigmoid colon (CMS/HCC)   • Iron deficiency anemia due to chronic blood loss   • Arthritis of left knee   • History of DVT (deep vein thrombosis)   • Pulmonary nodule        Past Medical History:   Diagnosis Date   • Acute deep vein thrombosis (DVT) of upper extremity (CMS/HCC) 2019   • At risk for sleep apnea     6   • Bell's palsy 2011    SEEN AT [facility] ER   • Blister of foot 2017    RIGHT FOOT   • Chronic anticoagulation    • Chronic fatigue    • Chronic left-sided low back pain without sciatica    • Colon polyps     FOLLOWED BY DR. RADHA DONOVAN   • Coronary atherosclerosis     cath 2015: normal LM, diffuse LCx disease, LI LAD, 60-70% ostial diag (<1 mm vessel), LI RCA   • COVID-19 virus detected     2020  [facility].    • Dermoid cyst of leg, right 2017   • ED (erectile dysfunction)    • Exomphalos 2013   • GI hemorrhage 2019    ADMITTED TO [facility]   • H/O Anemia    • H/O Leukopenia    • History of chemotherapy    • History of transfusion     no reaction   • Hyperlipidemia    • Hypertension    • Knee pain    • Lupus anticoagulant disorder (CMS/HCC) 2016    • Muscle spasm of back 10/2018   • OA (osteoarthritis)    • Obesity    • Pulmonary embolism (CMS/HCC) 06/08/2010     Right lower lobe pulmonary embolus, ADMITTED TO [facility]   • Pulmonary embolus (CMS/HCC) 5/6/2016   • Rectal bleeding 09/2019   • Rectal cancer (CMS/HCC) 09/24/2019    MODERATELY DIFFERENTIATED ADENOCARCINOMA GRADE 2, FOLLOWED BY DR. RADHA WHITT   • Sprain of right shoulder 10/21/2020    SEEN AT [facility] ER   • Strain of left shoulder 10/2020   • Stress fracture of right foot 05/11/2013    4TH METATARSAL, SEEN AT [facility] ER   • Thrombophilia (CMS/HCC)     FOLLOWED BY DR. BALAJI MCGEE   • Tinea pedis 11/25/2007    SEEN AT [facility] ER        Past Surgical History:   Procedure Laterality Date   • CARDIAC CATHETERIZATION Left 05/11/2006    NORMAL LV SYSTOLIC FUNCTION(EF 50%), MOD DZ OF 2 SMALL CORONARY BRANCHES (D2 AND OM2), DR. BOOM GALLEGO AT [facility]   • CARDIAC CATHETERIZATION Left 07/20/2015    NORMAL LM, DIFFUSE LCx DZ, LI LAD, 60-70% OSTIAL DIAG(<1MM VESSEL), 10%STENOSIS IN LAD, DR. CHERI VARGAS AT [facility]   • CLOSED REDUCTION WRIST FRACTURE Right    • COLON RESECTION N/A 9/26/2019    Procedure: LOW ANTERIOR COLON RESECTION IMMOBILIZATION OF SPLENIC FLEXURE;  Surgeon: Radha Whitt MD;  Location: [facility];  Service: General   • COLONOSCOPY N/A 9/21/2019    INT/EXT HEMORRHOIDS, 18 MM POLYPOID LESION IN MID SIGMOID, PATH: INVASIVE MODERATELY DIFFERENTIATED GRADE 2 ADENOCARCINOMA, 2 TUBULOVILLOUS ADENOMA POLYPS IN CECUM, ADENOMATOUS POLYP IN TRANSVERSE, ADENOMATOUS POLYP IN ASCENDING, MULTIPLE SMALL AND LARGE DIVERTICULA, DR. TRUONG MONTEZ AT [facility]   • COLONOSCOPY N/A 3/10/2021    5 MM BENIGN POLYP WITH MELANOSIS COLI IN TRANSVERSE, RESCOPE IN 1 YR, DR. RADHA WHITT AT [facility]   • ENDOSCOPY N/A    • LUNG SURGERY Right 2009    RIGHT LOWER LOBE, BLOT CLOT REMOVED   • SIGMOIDOSCOPY N/A 9/24/2019    AN INFILTRATIVE NON OBSTRUCTING MASS IN SIGMOID, AREA TATTOOED, DR. RADHA WHITT AT [facility]   • TONSILLECTOMY AND ADENOIDECTOMY  Bilateral     DURING CHILDHOOD   • TOTAL KNEE ARTHROPLASTY Left 3/16/2021    Procedure: LEFT TOTAL KNEE ARTHROPLASTY WITH MARTHA NAVIGATION;  Surgeon: Abraham Waters MD;  Location: [facility];  Service: Orthopedics;  Laterality: Left;   • UMBILICAL HERNIA REPAIR N/A 05/14/2014    DR. ROMERO SCHUSTER AT [facility]                       PT Assessment/Plan     Row Name 06/01/21 1000          PT Assessment    Assessment Comments  Mr. Townsend reports good tolerance for return to work however he did not perform his HEP while at work this weekend. Discussed alterations to HEP to accomodate for work performance, he is to perform heel prop stretch 3 times a day for 5 min each as well as standing TKE when at work, he verbalized understanding. Also added standing TKE and SLR to therex this date, pt with decreased L quad stability however pt active TKE is improving slowly. Continued to emphasize gait training and improtance of heel strike/TKE specifically as pt will be walking more at wor. He remains appropriate for PT.  -RS        PT Plan    PT Plan Comments  Continue to focus on  HEP compliance and gait pattern as pt returns to work, consider step ups  -RS       User Key  (r) = Recorded By, (t) = Taken By, (c) = Cosigned By    Initials Name Provider Type    RS Senia Stewart, PT Physical Therapist            OP Exercises     Row Name 06/01/21 0900             Subjective Comments    Subjective Comments  Pt went back to work, been going well overall, did not do his stretches as much as he should  -RS         Subjective Pain    Able to rate subjective pain?  yes  -RS      Subjective Pain Comment  sore  -RS         Total Minutes    13750 - PT Therapeutic Exercise Minutes  25  -RS      93824 - PT Manual Therapy Minutes  15  -RS         Exercise 1    Exercise Name 1  prone knee hang/heel prop stretch demo/discussion  -RS      Time 1  5 min  -RS         Exercise 6    Exercise Name 6  stair knee flexion  -RS      Cueing 6   Verbal;Demo  -RS      Reps 6  3  -RS      Time 6  20 sec  -RS         Exercise 7    Exercise Name 7  --  -RS      Cueing 7  --  -RS      Reps 7  --  -RS      Time 7  --  -RS         Exercise 8    Exercise Name 8  Nustep UE/LE L5  -RS      Time 8  3 min  -RS         Exercise 9    Exercise Name 9  gastroc stretch with OP into ext L knee  -RS      Cueing 9  Verbal;Tactile  -RS      Reps 9  5  -RS      Time 9  10s  -RS         Exercise 10    Exercise Name 10  gastroc stretch on step  -RS      Cueing 10  Verbal;Demo  -RS      Reps 10  3  -RS      Time 10  20sec  -RS      Additional Comments  cues for hip ext/upright posture  -RS         Exercise 12    Exercise Name 12  Recumbent bike,   -RS      Cueing 12  Verbal  -RS      Time 12  5 min  -RS         Exercise 13    Exercise Name 13  retro weight shift with ipsilateral shoulder flex L  -RS      Cueing 13  Verbal;Tactile  -RS      Reps 13  15  -RS      Time 13  5 s  -RS         Exercise 14    Exercise Name 14  retro walk down back king way  -RS      Reps 14  2 laps  -RS         Exercise 15    Exercise Name 15  TKE standing at wall  -RS      Cueing 15  Verbal;Demo  -RS      Reps 15  10  -RS      Time 15  10s  -RS         Exercise 16    Exercise Name 16  exaggerated marching (with arm swing), focus on slow, controlled motions, with heel first initial contact bilaterally  -RS      Cueing 16  Verbal;Tactile;Demo  -RS      Reps 16  2 laps  -RS         Exercise 17    Exercise Name 17  SLR post manual  -RS      Cueing 17  Verbal;Demo  -RS      Reps 17  10  -RS      Additional Comments  cue for QS  -RS        User Key  (r) = Recorded By, (t) = Taken By, (c) = Cosigned By    Initials Name Provider Type    RS Senia Stewart, PT Physical Therapist                      Manual Rx (last 36 hours)      Manual Treatments     Row Name 06/01/21 0900             Total Minutes    93915 - PT Manual Therapy Minutes  15  -RS         Manual Rx 2    Manual Rx 2 Location  L knee patellar mobs   -RS         Manual Rx 3    Manual Rx 3 Location  Left knee  -RS      Manual Rx 3 Type  supine knee ext mob with Left heel on towel  -RS      Manual Rx 3 Grade  LS knee ext mob   -RS      Manual Rx 3 Duration  over pressure into ext  -RS        User Key  (r) = Recorded By, (t) = Taken By, (c) = Cosigned By    Initials Name Provider Type    RS Senia Stewart, PT Physical Therapist          PT OP Goals     Row Name 06/01/21 0900          PT Short Term Goals    STG Date to Achieve  04/30/21  -RS     STG 1  The pt will demonstrate IND and compliant with initial HEP focused on improved Left knee mobility and quad strength for increased functional independence  -RS     STG 1 Progress  Met  -RS     STG 2  The pt will demonstrate L knee ext AROM to only lacking 15 degrees or less for improved gait pattern.  -RS     STG 2 Progress  Met  -RS     STG 3  The pt will ambulate with SPC over even ground with near normal gait pattern for improved community navigation.  -RS     STG 3 Progress  Met  -RS        Long Term Goals    LTG Date to Achieve  06/29/21  -RS     LTG 1  The pt will demonstrate IND with progressive HEP focused on IND condition management and return to PLOF.  -RS     LTG 1 Progress  Ongoing  -RS     LTG 2  The pt will demonstrate L knee flex PROM to at least 120 or greater for improved transitional position and stair navigation.  -RS     LTG 2 Progress  Ongoing;Progressing  -RS     LTG 3  The pt will resume reciprocal stair navigation for improved community and household navigation.  -RS     LTG 3 Progress  Partially Met  -RS     LTG 4  The pt will demonstrate L knee AROM to at least 0-120 for improved functional mobility.  -RS     LTG 4 Progress  Ongoing  -RS     LTG 5  The pt will demonstrate LLE strength to at least 4+/5 for improved stair navigation and transitional position performance.  -RS     LTG 5 Progress  Ongoing;Progressing  -RS       User Key  (r) = Recorded By, (t) = Taken By, (c) = Cosigned By     Initials Name Provider Type    RS Senia Stewart, PT Physical Therapist                         Time Calculation:   Start Time: 0900  Stop Time: 0945  Time Calculation (min): 45 min  Timed Charges  90686 - PT Therapeutic Exercise Minutes: 25  27471 - PT Manual Therapy Minutes: 15  Total Minutes  Timed Charges Total Minutes: 40   Total Minutes: 40  Therapy Charges for Today     Code Description Service Date Service Provider Modifiers Qty    61020660570 HC PT THER PROC EA 15 MIN 6/1/2021 Senia Stewart, PT GP 2    36206635834 HC PT MANUAL THERAPY EA 15 MIN 6/1/2021 Senia Stewart, PT GP 1                    Senia Stewart PT  6/1/2021

## 2021-06-11 RX ORDER — SILDENAFIL 100 MG/1
50-100 TABLET, FILM COATED ORAL DAILY PRN
Qty: 10 TABLET | Refills: 0 | Status: SHIPPED | OUTPATIENT
Start: 2021-06-11 | End: 2021-11-08 | Stop reason: SDUPTHER

## 2021-06-11 RX ORDER — LOSARTAN POTASSIUM 50 MG/1
50 TABLET ORAL DAILY
Qty: 90 TABLET | Refills: 0 | OUTPATIENT
Start: 2021-06-11

## 2021-06-11 NOTE — TELEPHONE ENCOUNTER
PT INFORMED THAT SOLOMON FINLEY. DENIED HIS REFILL FOR COZAAR BC WE DO NOT CONTROL BLOOD PRESSURE AT THIS OFFICE. INFORMED PT TO ASK HIS PCP TO REFILL THIS MEDICATION AND HE GAVE VERBAL UNDERSTANDING.

## 2021-06-15 RX ORDER — LOSARTAN POTASSIUM 50 MG/1
50 TABLET ORAL DAILY
Qty: 90 TABLET | Refills: 0 | Status: SHIPPED | OUTPATIENT
Start: 2021-06-15 | End: 2021-10-21 | Stop reason: SDUPTHER

## 2021-07-07 ENCOUNTER — TELEPHONE (OUTPATIENT)
Dept: ONCOLOGY | Facility: CLINIC | Age: 66
End: 2021-07-07

## 2021-07-07 DIAGNOSIS — C18.7 MALIGNANT NEOPLASM OF SIGMOID COLON (HCC): Primary | ICD-10-CM

## 2021-07-07 NOTE — TELEPHONE ENCOUNTER
Order placed and message sent to luis    ----- Message from Jemima Talamantes sent at 7/6/2021  8:03 AM EDT -----  Regarding: NIKHIL  He is due for his repeat Guardant test

## 2021-07-08 ENCOUNTER — TELEPHONE (OUTPATIENT)
Dept: ONCOLOGY | Facility: CLINIC | Age: 66
End: 2021-07-08

## 2021-07-08 NOTE — TELEPHONE ENCOUNTER
Caller: RIA    Relationship to patient: PATIENT    Best call back number: [phone]     Type of visit: LAB    Requested date: 07/12 MORNING    If rescheduling, when is the original appointment: 07/08

## 2021-07-15 ENCOUNTER — HOSPITAL ENCOUNTER (OUTPATIENT)
Dept: PET IMAGING | Facility: HOSPITAL | Age: 66
Discharge: HOME | End: 2021-07-15
Admitting: INTERNAL MEDICINE

## 2021-07-15 ENCOUNTER — LAB (OUTPATIENT)
Dept: LAB | Facility: HOSPITAL | Age: 66
End: 2021-07-15

## 2021-07-15 DIAGNOSIS — C18.7 MALIGNANT NEOPLASM OF SIGMOID COLON (HCC): ICD-10-CM

## 2021-07-15 LAB
ALBUMIN SERPL-MCNC: 4.2 G/DL (ref 3.5–5.2)
ALBUMIN/GLOB SERPL: 1.1 G/DL (ref 1.1–2.4)
ALP SERPL-CCNC: 112 U/L (ref 38–116)
ALT SERPL W P-5'-P-CCNC: 19 U/L (ref 0–41)
ANION GAP SERPL CALCULATED.3IONS-SCNC: 7.8 MMOL/L (ref 5–15)
AST SERPL-CCNC: 24 U/L (ref 0–40)
BASOPHILS # BLD AUTO: 0.03 10*3/MM3 (ref 0–0.2)
BASOPHILS NFR BLD AUTO: 0.7 % (ref 0–1.5)
BILIRUB SERPL-MCNC: 0.9 MG/DL (ref 0.2–1.2)
BUN SERPL-MCNC: 15 MG/DL (ref 6–20)
BUN/CREAT SERPL: 12.7 (ref 7.3–30)
CALCIUM SPEC-SCNC: 9.6 MG/DL (ref 8.5–10.2)
CEA SERPL-MCNC: 1.02 NG/ML
CHLORIDE SERPL-SCNC: 101 MMOL/L (ref 98–107)
CO2 SERPL-SCNC: 27.2 MMOL/L (ref 22–29)
CREAT BLDA-MCNC: 1.3 MG/DL (ref 0.6–1.3)
CREAT SERPL-MCNC: 1.18 MG/DL (ref 0.7–1.3)
DEPRECATED RDW RBC AUTO: 48.5 FL (ref 37–54)
EOSINOPHIL # BLD AUTO: 0.12 10*3/MM3 (ref 0–0.4)
EOSINOPHIL NFR BLD AUTO: 2.9 % (ref 0.3–6.2)
ERYTHROCYTE [DISTWIDTH] IN BLOOD BY AUTOMATED COUNT: 14.7 % (ref 12.3–15.4)
GFR SERPL CREATININE-BSD FRML MDRD: 75 ML/MIN/1.73
GLOBULIN UR ELPH-MCNC: 3.8 GM/DL (ref 1.8–3.5)
GLUCOSE SERPL-MCNC: 89 MG/DL (ref 74–124)
HCT VFR BLD AUTO: 37.6 % (ref 37.5–51)
HGB BLD-MCNC: 12.6 G/DL (ref 13–17.7)
IMM GRANULOCYTES # BLD AUTO: 0.01 10*3/MM3 (ref 0–0.05)
IMM GRANULOCYTES NFR BLD AUTO: 0.2 % (ref 0–0.5)
LYMPHOCYTES # BLD AUTO: 1.74 10*3/MM3 (ref 0.7–3.1)
LYMPHOCYTES NFR BLD AUTO: 41.4 % (ref 19.6–45.3)
MCH RBC QN AUTO: 30.4 PG (ref 26.6–33)
MCHC RBC AUTO-ENTMCNC: 33.5 G/DL (ref 31.5–35.7)
MCV RBC AUTO: 90.6 FL (ref 79–97)
MONOCYTES # BLD AUTO: 0.55 10*3/MM3 (ref 0.1–0.9)
MONOCYTES NFR BLD AUTO: 13.1 % (ref 5–12)
NEUTROPHILS NFR BLD AUTO: 1.75 10*3/MM3 (ref 1.7–7)
NEUTROPHILS NFR BLD AUTO: 41.7 % (ref 42.7–76)
NRBC BLD AUTO-RTO: 0 /100 WBC (ref 0–0.2)
PLATELET # BLD AUTO: 285 10*3/MM3 (ref 140–450)
PMV BLD AUTO: 9.8 FL (ref 6–12)
POTASSIUM SERPL-SCNC: 4.3 MMOL/L (ref 3.5–4.7)
PROT SERPL-MCNC: 8 G/DL (ref 6.3–8)
RBC # BLD AUTO: 4.15 10*6/MM3 (ref 4.14–5.8)
SODIUM SERPL-SCNC: 136 MMOL/L (ref 134–145)
WBC # BLD AUTO: 4.2 10*3/MM3 (ref 3.4–10.8)

## 2021-07-15 PROCEDURE — 36415 COLL VENOUS BLD VENIPUNCTURE: CPT

## 2021-07-15 PROCEDURE — 82565 ASSAY OF CREATININE: CPT

## 2021-07-15 PROCEDURE — 0 DIATRIZOATE MEGLUMINE & SODIUM PER 1 ML: Performed by: INTERNAL MEDICINE

## 2021-07-15 PROCEDURE — 80053 COMPREHEN METABOLIC PANEL: CPT

## 2021-07-15 PROCEDURE — 82378 CARCINOEMBRYONIC ANTIGEN: CPT | Performed by: INTERNAL MEDICINE

## 2021-07-15 PROCEDURE — 85025 COMPLETE CBC W/AUTO DIFF WBC: CPT

## 2021-07-15 PROCEDURE — 71260 CT THORAX DX C+: CPT

## 2021-07-15 PROCEDURE — 25010000002 IOPAMIDOL 61 % SOLUTION: Performed by: INTERNAL MEDICINE

## 2021-07-15 PROCEDURE — 74177 CT ABD & PELVIS W/CONTRAST: CPT

## 2021-07-15 RX ADMIN — DIATRIZOATE MEGLUMINE AND DIATRIZOATE SODIUM 30 ML: 660; 100 LIQUID ORAL; RECTAL at 10:48

## 2021-07-15 RX ADMIN — IOPAMIDOL 85 ML: 612 INJECTION, SOLUTION INTRAVENOUS at 11:30

## 2021-07-20 ENCOUNTER — OFFICE VISIT (OUTPATIENT)
Dept: ONCOLOGY | Facility: CLINIC | Age: 66
End: 2021-07-20

## 2021-07-20 ENCOUNTER — APPOINTMENT (OUTPATIENT)
Dept: LAB | Facility: HOSPITAL | Age: 66
End: 2021-07-20

## 2021-07-20 ENCOUNTER — LAB (OUTPATIENT)
Dept: LAB | Facility: HOSPITAL | Age: 66
End: 2021-07-20

## 2021-07-20 VITALS
HEART RATE: 79 BPM | DIASTOLIC BLOOD PRESSURE: 63 MMHG | BODY MASS INDEX: 38.39 KG/M2 | SYSTOLIC BLOOD PRESSURE: 160 MMHG | RESPIRATION RATE: 16 BRPM | OXYGEN SATURATION: 94 % | HEIGHT: 69 IN | WEIGHT: 259.2 LBS | TEMPERATURE: 97.7 F

## 2021-07-20 DIAGNOSIS — C18.7 MALIGNANT NEOPLASM OF SIGMOID COLON (HCC): Primary | ICD-10-CM

## 2021-07-20 DIAGNOSIS — Z86.711 HISTORY OF PULMONARY EMBOLUS (PE): ICD-10-CM

## 2021-07-20 LAB
BASOPHILS # BLD AUTO: 0.04 10*3/MM3 (ref 0–0.2)
BASOPHILS NFR BLD AUTO: 0.8 % (ref 0–1.5)
DEPRECATED RDW RBC AUTO: 48.8 FL (ref 37–54)
EOSINOPHIL # BLD AUTO: 0.14 10*3/MM3 (ref 0–0.4)
EOSINOPHIL NFR BLD AUTO: 2.8 % (ref 0.3–6.2)
ERYTHROCYTE [DISTWIDTH] IN BLOOD BY AUTOMATED COUNT: 14.6 % (ref 12.3–15.4)
HCT VFR BLD AUTO: 38.2 % (ref 37.5–51)
HGB BLD-MCNC: 12.5 G/DL (ref 13–17.7)
IMM GRANULOCYTES # BLD AUTO: 0.01 10*3/MM3 (ref 0–0.05)
IMM GRANULOCYTES NFR BLD AUTO: 0.2 % (ref 0–0.5)
LYMPHOCYTES # BLD AUTO: 2.13 10*3/MM3 (ref 0.7–3.1)
LYMPHOCYTES NFR BLD AUTO: 41.9 % (ref 19.6–45.3)
MCH RBC QN AUTO: 29.9 PG (ref 26.6–33)
MCHC RBC AUTO-ENTMCNC: 32.7 G/DL (ref 31.5–35.7)
MCV RBC AUTO: 91.4 FL (ref 79–97)
MONOCYTES # BLD AUTO: 0.63 10*3/MM3 (ref 0.1–0.9)
MONOCYTES NFR BLD AUTO: 12.4 % (ref 5–12)
NEUTROPHILS NFR BLD AUTO: 2.13 10*3/MM3 (ref 1.7–7)
NEUTROPHILS NFR BLD AUTO: 41.9 % (ref 42.7–76)
NRBC BLD AUTO-RTO: 0 /100 WBC (ref 0–0.2)
PLATELET # BLD AUTO: 279 10*3/MM3 (ref 140–450)
PMV BLD AUTO: 9.2 FL (ref 6–12)
RBC # BLD AUTO: 4.18 10*6/MM3 (ref 4.14–5.8)
WBC # BLD AUTO: 5.08 10*3/MM3 (ref 3.4–10.8)

## 2021-07-20 PROCEDURE — 85025 COMPLETE CBC W/AUTO DIFF WBC: CPT

## 2021-07-20 PROCEDURE — 99214 OFFICE O/P EST MOD 30 MIN: CPT | Performed by: INTERNAL MEDICINE

## 2021-07-20 PROCEDURE — 36415 COLL VENOUS BLD VENIPUNCTURE: CPT

## 2021-07-23 ENCOUNTER — OFFICE VISIT (OUTPATIENT)
Dept: CARDIOLOGY | Facility: CLINIC | Age: 66
End: 2021-07-23

## 2021-07-23 VITALS
HEIGHT: 71 IN | SYSTOLIC BLOOD PRESSURE: 130 MMHG | HEART RATE: 73 BPM | BODY MASS INDEX: 36.37 KG/M2 | WEIGHT: 259.8 LBS | DIASTOLIC BLOOD PRESSURE: 82 MMHG

## 2021-07-23 DIAGNOSIS — I10 ESSENTIAL HYPERTENSION: ICD-10-CM

## 2021-07-23 DIAGNOSIS — R06.09 DYSPNEA ON EXERTION: ICD-10-CM

## 2021-07-23 DIAGNOSIS — I25.10 ATHEROSCLEROSIS OF NATIVE CORONARY ARTERY OF NATIVE HEART WITHOUT ANGINA PECTORIS: Primary | ICD-10-CM

## 2021-07-23 DIAGNOSIS — E66.09 CLASS 2 OBESITY DUE TO EXCESS CALORIES WITHOUT SERIOUS COMORBIDITY WITH BODY MASS INDEX (BMI) OF 36.0 TO 36.9 IN ADULT: ICD-10-CM

## 2021-07-23 DIAGNOSIS — E78.2 MIXED HYPERLIPIDEMIA: ICD-10-CM

## 2021-07-23 PROCEDURE — 99213 OFFICE O/P EST LOW 20 MIN: CPT | Performed by: NURSE PRACTITIONER

## 2021-07-23 PROCEDURE — 93000 ELECTROCARDIOGRAM COMPLETE: CPT | Performed by: NURSE PRACTITIONER

## 2021-07-23 RX ORDER — ATORVASTATIN CALCIUM 40 MG/1
40 TABLET, FILM COATED ORAL DAILY
Qty: 90 TABLET | Refills: 3 | Status: SHIPPED | OUTPATIENT
Start: 2021-07-23

## 2021-07-23 NOTE — PROGRESS NOTES
Date of Office Visit: 2021  Encounter Provider: MALIA Magdaleno  Place of Service: [facility] CARDIOLOGY  Patient Name: Jared Rose  :1955  Primary Cardiologist: Dr. Eligio Panchal    Chief Complaint   Patient presents with   • Hypertension   • Hyperlipidemia   • Annual Exam   :     HPI: Jared Rose is a pleasant 66 y.o. male who presents today for annual follow-up visit for coronary artery disease. I have reviewed his medical records.    In 2015, he was experiencing dyspnea.  He underwent coronary angiography which revealed small vessel disease of the diagonal and circumflex.  Medical treatment was recommended.  He has also been diagnosed with previous DVT due to lupus anticoagulant/antiphospholipid antibody syndrome.  He was initially treated with warfarin and was transitioned to rivaroxaban. He had recurrent DVT in the perioperative period while his rivaroxaban was being held for colon cancer surgery.  In 2018, renal duplex was normal.     In 2021, he saw Dr. Eligio Panchal in the office. He reported chronic fatigue and dyspnea with exertion.  He was going to be undergoing left knee replacement and colonoscopy in the near future.  In 2021, myocardial perfusion study showed no evidence of ischemia and normal LVEF of 51%.  He was felt to be at acceptable risk to proceed with left knee replacement surgery. In 2021, he had the knee surgery and he did well.     Today is his annual follow-up visit.  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  He continues to experience some mild dyspnea when he overexerts and lower extremity edema.  He denies chest pain, PND, orthopnea, palpitations, dizziness, syncope, or bleeding.  He has not had the COVID-19 virus and has received both vaccinations.    I reviewed his blood work from 2021: CMP normal.  CBC showed normal hematocrit of 37.6 and hemoglobin 12.6.   According to the [facility] labs he has not had a lipid panel since September 2018.    Past Medical History:   Diagnosis Date   • Acute deep vein thrombosis (DVT) of upper extremity (CMS/[facility]) 9/29/2019   • At risk for sleep apnea     6   • Bell's palsy 02/23/2011    SEEN AT [facility]   • Blister of foot 06/2017    RIGHT FOOT   • Chronic anticoagulation    • Chronic fatigue    • Chronic left-sided low back pain without sciatica    • Colon polyps     FOLLOWED BY DR. RADHA DONOVAN   • Coronary atherosclerosis     cath 7/2015: normal LM, diffuse LCx disease, LI LAD, 60-70% ostial diag (<1 mm vessel), LI RCA   • COVID-19 virus detected     12/8/2020  [facility].    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   • GI hemorrhage 09/19/2019    ADMITTED TO [facility]   • H/O Anemia    • H/O Leukopenia    • History of chemotherapy    • History of transfusion     no reaction   • Hyperlipidemia    • Hypertension    • Knee pain    • Lupus anticoagulant disorder (CMS/[facility]) 5/6/2016   • Muscle spasm of back 10/2018   • OA (osteoarthritis)    • Obesity    • Pulmonary embolism (CMS/[facility]) 06/08/2010     Right lower lobe pulmonary embolus, ADMITTED TO [facility]   • Pulmonary embolus (CMS/[facility]) 5/6/2016   • Rectal bleeding 09/2019   • Rectal cancer (CMS/[facility]) 09/24/2019    MODERATELY DIFFERENTIATED ADENOCARCINOMA GRADE 2, FOLLOWED BY DR. RADHA DONOVAN   • Sprain of right shoulder 10/21/2020    SEEN AT [facility]   • Strain of left shoulder 10/2020   • Stress fracture of right foot 05/11/2013    4TH METATARSAL, SEEN AT [facility]   • Thrombophilia (CMS/[facility])     FOLLOWED BY DR. BALAJI MCGEE   • Tinea pedis 11/25/2007    SEEN AT [facility]       Past Surgical History:   Procedure Laterality Date   • CARDIAC CATHETERIZATION Left 05/11/2006    NORMAL LV SYSTOLIC FUNCTION(EF 50%), MOD DZ OF 2 SMALL CORONARY BRANCHES (D2 AND OM2), DR. BOOM GALLEGO AT [facility]   • CARDIAC CATHETERIZATION Left 07/20/2015    NORMAL LM, DIFFUSE LCx  ROGERIO, LI LAD, 60-70% OSTIAL DIAG(<1MM VESSEL), 10%STENOSIS IN LAD, DR. CHERI VARGAS AT [facility]   • CLOSED REDUCTION WRIST FRACTURE Right    • COLON RESECTION N/A 9/26/2019    Procedure: LOW ANTERIOR COLON RESECTION IMMOBILIZATION OF SPLENIC FLEXURE;  Surgeon: Radha Whitt MD;  Location: [facility];  Service: General   • COLONOSCOPY N/A 9/21/2019    INT/EXT HEMORRHOIDS, 18 MM POLYPOID LESION IN MID SIGMOID, PATH: INVASIVE MODERATELY DIFFERENTIATED GRADE 2 ADENOCARCINOMA, 2 TUBULOVILLOUS ADENOMA POLYPS IN CECUM, ADENOMATOUS POLYP IN TRANSVERSE, ADENOMATOUS POLYP IN ASCENDING, MULTIPLE SMALL AND LARGE DIVERTICULA, DR. TRUONG MONTEZ AT [facility]   • COLONOSCOPY N/A 3/10/2021    5 MM BENIGN POLYP WITH MELANOSIS COLI IN TRANSVERSE, RESCOPE IN 1 YR, DR. RADHA WHITT AT [facility]   • ENDOSCOPY N/A    • LUNG SURGERY Right 2009    RIGHT LOWER LOBE, BLOT CLOT REMOVED   • SIGMOIDOSCOPY N/A 9/24/2019    AN INFILTRATIVE NON OBSTRUCTING MASS IN SIGMOID, AREA TATTOOED, DR. RADHA WHITT AT [facility]   • TONSILLECTOMY AND ADENOIDECTOMY Bilateral     DURING CHILDHOOD   • TOTAL KNEE ARTHROPLASTY Left 3/16/2021    Procedure: LEFT TOTAL KNEE ARTHROPLASTY WITH MARTHA NAVIGATION;  Surgeon: Abraham Waters MD;  Location: [facility];  Service: Orthopedics;  Laterality: Left;   • UMBILICAL HERNIA REPAIR N/A 05/14/2014    DR. ROMERO SCHUSTER AT [facility]       Social History     Socioeconomic History   • Marital status:      Spouse name: Not on file   • Number of children: Not on file   • Years of education: Not on file   • Highest education level: Not on file   Tobacco Use   • Smoking status: Never Smoker   • Smokeless tobacco: Never Used   Vaping Use   • Vaping Use: Never used   Substance and Sexual Activity   • Alcohol use: Never     Comment: Caffeine use: 2 soft drink daily and sweet tea.    • Drug use: Never   • Sexual activity: Yes       Family History   Problem Relation Age of Onset   • Coronary artery disease Father    • Heart disease Mother  "83   • Hypertension Mother    • Asthma Mother    • Diabetes Mother    • Kidney disease Mother    • Heart attack Mother    • Hypertension Sister    • Diabetes Sister    • Kidney disease Sister    • Heart attack Brother    • Alcohol abuse Brother    • Diabetes Sister    • Heart disease Sister    • Malig Hyperthermia Neg Hx        The following portion of the patient's history were reviewed and updated as appropriate: past medical history, past surgical history, past social history, past family history, allergies, current medications, and problem list.    Review of Systems   Constitutional: Negative.   Cardiovascular: Positive for dyspnea on exertion and leg swelling.   Respiratory: Negative.    Hematologic/Lymphatic: Negative.    Neurological: Negative.        Allergies   Allergen Reactions   • Adhesive Tape Rash     blisters         Current Outpatient Medications:   •  atorvastatin (LIPITOR) 40 MG tablet, Take 1 tablet by mouth Daily., Disp: 90 tablet, Rfl: 3  •  HYDROcodone-acetaminophen (NORCO) 7.5-325 MG per tablet, Take 1-2 tablets by mouth Every 4 (Four) to 6 (Six) Hours As Needed for pain, Disp: 60 tablet, Rfl: 0  •  losartan (COZAAR) 50 MG tablet, Take 1 tablet by mouth Daily., Disp: 90 tablet, Rfl: 0  •  rivaroxaban (Xarelto) 20 MG tablet, Take 1 tablet by mouth Daily With Dinner, Disp: 15 tablet, Rfl: 0  •  sildenafil (Viagra) 100 MG tablet, Take 0.5-1 tablets by mouth Daily As Needed for ED, Disp: 10 tablet, Rfl: 0  No current facility-administered medications for this visit.        Objective:     Vitals:    07/23/21 0900 07/23/21 0904 07/23/21 0911   BP: 132/90 132/90 130/82   BP Location: Left arm Right arm Left arm   Patient Position:   Sitting   Pulse: 73 73    Weight: 118 kg (259 lb 12.8 oz)     Height: 180.3 cm (71\")       Body mass index is 36.23 kg/m².    PHYSICAL EXAM:    Vitals Reviewed.   General Appearance: No acute distress, well developed and well nourished. Obese.   Eyes: Conjunctiva and " lids: No erythema, swelling, or discharge. Sclera non-icteric.   HENT: Atraumatic, normocephalic. External eyes, ears, and nose normal. No hearing loss noted. Mucous membranes normal. Lips not cyanotic. Neck supple with no tenderness.  Respiratory: No signs of respiratory distress. Respiration rhythm and depth normal.   Clear to auscultation. No rales, crackles, rhonchi, or wheezing auscultated.   Cardiovascular:  Jugular Venous Pressure: Normal  Heart Rate and Rhythm: Normal, Heart Sounds: Normal S1 and S2. No S3 or S4 noted.  Murmurs: No murmurs noted. No rubs, thrills, or gallops.   Lower Extremities: No edema noted.  Gastrointestinal:  Abdomen soft, non-distended, non-tender. Normal bowel sounds.    Musculoskeletal: Normal movement of extremities  Skin and Nails: General appearance normal. No pallor, cyanosis, diaphoresis. Skin temperature normal. No clubbing of fingernails.   Psychiatric: Patient alert and oriented to person, place, and time. Speech and behavior appropriate. Normal mood and affect.       ECG 12 Lead    Date/Time: 7/23/2021 8:59 AM  Performed by: Rama Simmons APRN  Authorized by: Rama Simmons APRN   Comparison: compared with previous ECG from 1/6/2021  Similar to previous ECG  Rhythm: sinus rhythm  Rate: normal  BPM: 73  Conduction: conduction normal  ST Segments: ST segments normal  T Waves: T waves normal  QRS axis: normal  Other findings: non-specific ST-T wave changes    Clinical impression: non-specific ECG              Assessment:       Diagnosis Plan   1. Atherosclerosis of native coronary artery of native heart without angina pectoris     2. Essential hypertension     3. Mixed hyperlipidemia  Lipid Panel   4. Dyspnea on exertion     5. Class 2 obesity due to excess calories without serious comorbidity with body mass index (BMI) of 36.0 to 36.9 in adult            Plan:       1.  Coronary Artery Disease: In July 2015, he had a cardiac catheterization which revealed small vessel  disease of the diagonal and circumflex.  He has remained on atorvastatin.  Dr. Panchal did not feel that he needed aspirin since he is on rivaroxaban.    2.  Hypertension: Blood pressure goal of 120s/80s recommended.  I recommended a low-sodium diet less than 2000 mg daily.    3.  Hyperlipidemia: I refilled his atorvastatin.  He is due for repeat lipid panel and have asked him to have this completed at the main lab at the hospital.    4.  Dyspnea on Exertion: Most likely due to deconditioning and obesity.  LVEF was normal in January 2021.    5.  Obesity: BMI is 36.3.    6.  I recommend a follow-up visit with Dr. Eligio Panchal in 1 year, unless otherwise needed sooner.    As always, it has been a pleasure to participate in your patient's care. Thank you.       Sincerely,       MALIA Henderson  [facility]      · COVID-19 Precautions - Patient was compliant in wearing a mask. When I saw the patient, I used appropriate personal protective equipment (PPE) including mask and eye shield (standard procedure).  Additionally, I used gown and gloves if indicated.  Hand hygiene was completed before and after seeing the patient.  · Dictated utilizing Dragon Dictation  · I spent 22 minutes reviewing her medical records/testing/previous office notes/labs, face-to-face interaction with patient, physical examination, formulating the plan of care, and discussion of plan of care with patient.

## 2021-07-26 ENCOUNTER — OFFICE VISIT (OUTPATIENT)
Dept: ORTHOPEDIC SURGERY | Facility: CLINIC | Age: 66
End: 2021-07-26

## 2021-07-26 VITALS — BODY MASS INDEX: 36.4 KG/M2 | TEMPERATURE: 98 F | HEIGHT: 71 IN | WEIGHT: 260 LBS

## 2021-07-26 DIAGNOSIS — Z96.652 STATUS POST TOTAL LEFT KNEE REPLACEMENT: Primary | ICD-10-CM

## 2021-07-26 PROCEDURE — 99213 OFFICE O/P EST LOW 20 MIN: CPT | Performed by: ORTHOPAEDIC SURGERY

## 2021-07-26 NOTE — PROGRESS NOTES
"Patient Name: Jared Rose   YOB: 1955  Referring Primary Care Physician: Ras Ash MD  BMI: Body mass index is 36.26 kg/m².    Chief Complaint:    Chief Complaint   Patient presents with   • Left Knee - Follow-up, Pain        HPI:     Jared Rose is a 66 y.o. male who presents today for evaluation of   Chief Complaint   Patient presents with   • Left Knee - Follow-up, Pain   . He follows up today about 4.5 months status post left total knee arthroplasty. He says he is doing \" wonderful. \" The patient says a little stiff in the morning, but he works it out. He has finished with therapy and is on a home program which is encouraged.      Subjective   Medications:   Home Medications:  Current Outpatient Medications on File Prior to Visit   Medication Sig   • atorvastatin (LIPITOR) 40 MG tablet Take 1 tablet by mouth Daily.   • HYDROcodone-acetaminophen (NORCO) 7.5-325 MG per tablet Take 1-2 tablets by mouth Every 4 (Four) to 6 (Six) Hours As Needed for pain   • losartan (COZAAR) 50 MG tablet Take 1 tablet by mouth Daily.   • rivaroxaban (Xarelto) 20 MG tablet Take 1 tablet by mouth Daily With Dinner   • sildenafil (Viagra) 100 MG tablet Take 0.5-1 tablets by mouth Daily As Needed for ED     No current facility-administered medications on file prior to visit.     Current Medications:  Scheduled Meds:  Continuous Infusions:No current facility-administered medications for this visit.    PRN Meds:.    I have reviewed the patient's medical history in detail and updated the computerized patient record.  Review and summarization of old records includes:    Past Medical History:   Diagnosis Date   • Acute deep vein thrombosis (DVT) of upper extremity (CMS/[facility]) 9/29/2019   • At risk for sleep apnea     6   • Bell's palsy 02/23/2011    SEEN AT [facility] ER   • Blister of foot 06/2017    RIGHT FOOT   • Chronic anticoagulation    • Chronic fatigue    • Chronic left-sided low back pain without sciatica    • " Colon polyps     FOLLOWED BY DR. RADHA WHITT   • Coronary atherosclerosis     cath 7/2015: normal LM, diffuse LCx disease, LI LAD, 60-70% ostial diag (<1 mm vessel), LI RCA   • COVID-19 virus detected     12/8/2020  [facility].    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   • GI hemorrhage 09/19/2019    ADMITTED TO [facility]   • H/O Anemia    • H/O Leukopenia    • History of chemotherapy    • History of transfusion     no reaction   • Hyperlipidemia    • Hypertension    • Knee pain    • Lupus anticoagulant disorder (CMS/HCC) 5/6/2016   • Muscle spasm of back 10/2018   • OA (osteoarthritis)    • Obesity    • Pulmonary embolism (CMS/HCC) 06/08/2010     Right lower lobe pulmonary embolus, ADMITTED TO [facility]   • Pulmonary embolus (CMS/HCC) 5/6/2016   • Rectal bleeding 09/2019   • Rectal cancer (CMS/[facility]) 09/24/2019    MODERATELY DIFFERENTIATED ADENOCARCINOMA GRADE 2, FOLLOWED BY DR. RADHA WHITT   • Sprain of right shoulder 10/21/2020    SEEN AT [facility] ER   • Strain of left shoulder 10/2020   • Stress fracture of right foot 05/11/2013    4TH METATARSAL, SEEN AT [facility] ER   • Thrombophilia (CMS/[facility])     FOLLOWED BY DR. BALAJI MCGEE   • Tinea pedis 11/25/2007    SEEN AT [facility] ER        Past Surgical History:   Procedure Laterality Date   • CARDIAC CATHETERIZATION Left 05/11/2006    NORMAL LV SYSTOLIC FUNCTION(EF 50%), MOD DZ OF 2 SMALL CORONARY BRANCHES (D2 AND OM2), DR. BOOM GALLEGO AT [facility]   • CARDIAC CATHETERIZATION Left 07/20/2015    NORMAL LM, DIFFUSE LCx DZ, LI LAD, 60-70% OSTIAL DIAG(<1MM VESSEL), 10%STENOSIS IN LAD, DR. CHERI VARGAS AT [facility]   • CLOSED REDUCTION WRIST FRACTURE Right    • COLON RESECTION N/A 9/26/2019    Procedure: LOW ANTERIOR COLON RESECTION IMMOBILIZATION OF SPLENIC FLEXURE;  Surgeon: Radha Whitt MD;  Location: [facility];  Service: General   • COLONOSCOPY N/A 9/21/2019    INT/EXT HEMORRHOIDS, 18 MM POLYPOID LESION IN MID SIGMOID, PATH: INVASIVE  MODERATELY DIFFERENTIATED GRADE 2 ADENOCARCINOMA, 2 TUBULOVILLOUS ADENOMA POLYPS IN CECUM, ADENOMATOUS POLYP IN TRANSVERSE, ADENOMATOUS POLYP IN ASCENDING, MULTIPLE SMALL AND LARGE DIVERTICULA, DR. TRUONG MONTEZ AT [facility]   • COLONOSCOPY N/A 3/10/2021    5 MM BENIGN POLYP WITH MELANOSIS COLI IN TRANSVERSE, RESCOPE IN 1 YR, DR. RADHA DONOVAN AT [facility]   • ENDOSCOPY N/A    • LUNG SURGERY Right 2009    RIGHT LOWER LOBE, BLOT CLOT REMOVED   • SIGMOIDOSCOPY N/A 9/24/2019    AN INFILTRATIVE NON OBSTRUCTING MASS IN SIGMOID, AREA TATTOOED, DR. RADHA DONOVAN AT [facility]   • TONSILLECTOMY AND ADENOIDECTOMY Bilateral     DURING CHILDHOOD   • TOTAL KNEE ARTHROPLASTY Left 3/16/2021    Procedure: LEFT TOTAL KNEE ARTHROPLASTY WITH MARTHA NAVIGATION;  Surgeon: Abraham Waters MD;  Location: [facility];  Service: Orthopedics;  Laterality: Left;   • UMBILICAL HERNIA REPAIR N/A 05/14/2014    DR. ROMERO SCHUSTER AT [facility]        Social History     Occupational History   • Occupation:      Employer: [employer]   Tobacco Use   • Smoking status: Never Smoker   • Smokeless tobacco: Never Used   Vaping Use   • Vaping Use: Never used   Substance and Sexual Activity   • Alcohol use: Never     Comment: Caffeine use: 2 soft drink daily and sweet tea.    • Drug use: Never   • Sexual activity: Yes      Social History     Social History Narrative   • Not on file        Family History   Problem Relation Age of Onset   • Coronary artery disease Father    • Heart disease Mother 83   • Hypertension Mother    • Asthma Mother    • Diabetes Mother    • Kidney disease Mother    • Heart attack Mother    • Hypertension Sister    • Diabetes Sister    • Kidney disease Sister    • Heart attack Brother    • Alcohol abuse Brother    • Diabetes Sister    • Heart disease Sister    • Malig Hyperthermia Neg Hx        ROS: 14 point review of systems was performed and all other systems were reviewed and are negative except for documented findings  "in HPI and today's encounter.     Allergies:   Allergies   Allergen Reactions   • Adhesive Tape Rash     blisters     Constitutional:  Denies fever, shaking or chills   Eyes:  Denies change in visual acuity   HENT:  Denies nasal congestion or sore throat   Respiratory:  Denies cough or shortness of breath   Cardiovascular:  Denies chest pain or severe LE edema   GI:  Denies abdominal pain, nausea, vomiting, bloody stools or diarrhea   Musculoskeletal:  Numbness, tingling, pain, or loss of motor function only as noted above in history of present illness.  : Denies painful urination or hematuria  Integument:  Denies rash, lesion or ulceration   Neurologic:  Denies headache or focal weakness  Endocrine:  Denies lymphadenopathy  Psych:  Denies confusion or change in mental status   Hem:  Denies active bleeding    OBJECTIVE:  Physical Exam: 66 y.o. male  Wt Readings from Last 3 Encounters:   07/26/21 118 kg (260 lb)   07/23/21 118 kg (259 lb 12.8 oz)   07/20/21 118 kg (259 lb 3.2 oz)     Ht Readings from Last 1 Encounters:   07/26/21 180.3 cm (71\")     Body mass index is 36.26 kg/m².  Vitals:    07/26/21 0829   Temp: 98 °F (36.7 °C)     Vital signs reviewed.     General Appearance:    Alert, cooperative, in no acute distress                  Eyes: conjunctiva clear  ENT: external ears and nose atraumatic  CV: no peripheral edema  Resp: normal respiratory effort  Skin: no rashes or wounds; normal turgor  Psych: mood and affect appropriate  Lymph: no nodes appreciated  Neuro: gross sensation intact  Vascular:  Palpable peripheral pulse in noted extremity  Musculoskeletal Extremities: His left knee range of motion is -5  degrees to 105 degrees. He has good stability and no unusual swelling. His scar is healing well. He is a little bit hypertrophic at the top.    Radiology:   Two views of the left knee, including AP and lateral views, were obtained and reviewed in the office today for postoperative follow-up. With " comparison views, these demonstrated a well-aligned total knee arthroplasty.      Assessment:     ICD-10-CM ICD-9-CM   1. Status post total left knee replacement  Z96.652 V43.65        MDM/Plan:   The diagnosis(es), natural history, pathophysiology and treatment for diagnosis(es) were discussed. Opportunity given and questions answered.  Biomechanics of pertinent body areas discussed.  When appropriate, the use of ambulatory aids discussed.    I did give him recommendations. He does not have artificial teeth. He wears artificial teeth, so I cautioned him about other infections, etcetera. At this point, I gave him advice and answered his questions. I will see him back as necessary.    TOTAL JOINT recommendations and precautions, including antibiotic prophylaxis discussed. Advice given and questions answered.     Scribed for Abraham Waters MD by Evelyn Whitt.  07/26/21   09:43 EDT    I have personally performed the services described in this document as scribed by the above individual, and it is both accurate and complete.  Abraham Waters MD  7/27/2021  10:30 EDT

## 2021-08-10 LAB — REF LAB TEST RESULTS: NORMAL

## 2021-10-21 RX ORDER — LOSARTAN POTASSIUM 50 MG/1
50 TABLET ORAL DAILY
Qty: 90 TABLET | Refills: 0 | Status: SHIPPED | OUTPATIENT
Start: 2021-10-21 | End: 2021-12-17

## 2021-10-21 NOTE — TELEPHONE ENCOUNTER
Caller: Jared Rose    Relationship: Self    Medication requested (name and dosage):     Requested Prescriptions:   Requested Prescriptions     Pending Prescriptions Disp Refills   • rivaroxaban (Xarelto) 20 MG tablet 15 tablet 0     Sig: Take 1 tablet by mouth Daily With Dinner   • losartan (COZAAR) 50 MG tablet 90 tablet 0     Sig: Take 1 tablet by mouth Daily.        Pharmacy where request should be sent: [address] AT [facility] - [phone]  - [phone] FX  [phone]    Additional details provided by patient: PATIENT STATES HAS 2 TABLETS REMAINING. CHANGING PHARMACY    Best call back number: [phone]    Does the patient have less than a 3 day supply:  [x] Yes  [] No    Liliya Ann Rep   10/21/21 10:18 EDT

## 2021-11-08 ENCOUNTER — HOSPITAL ENCOUNTER (OUTPATIENT)
Dept: CT IMAGING | Facility: HOSPITAL | Age: 66
Discharge: HOME | End: 2021-11-08
Admitting: INTERNAL MEDICINE

## 2021-11-08 DIAGNOSIS — C18.7 MALIGNANT NEOPLASM OF SIGMOID COLON (HCC): ICD-10-CM

## 2021-11-08 LAB
ALBUMIN SERPL-MCNC: 4.4 G/DL (ref 3.5–5.2)
ALBUMIN/GLOB SERPL: 1.2 G/DL
ALP SERPL-CCNC: 81 U/L (ref 39–117)
ALT SERPL W P-5'-P-CCNC: 21 U/L (ref 1–41)
ANION GAP SERPL CALCULATED.3IONS-SCNC: 8.4 MMOL/L (ref 5–15)
AST SERPL-CCNC: 17 U/L (ref 1–40)
BASOPHILS # BLD AUTO: 0.04 10*3/MM3 (ref 0–0.2)
BASOPHILS NFR BLD AUTO: 1.1 % (ref 0–1.5)
BILIRUB SERPL-MCNC: 0.8 MG/DL (ref 0–1.2)
BUN SERPL-MCNC: 11 MG/DL (ref 8–23)
BUN/CREAT SERPL: 8.7 (ref 7–25)
CALCIUM SPEC-SCNC: 9.6 MG/DL (ref 8.6–10.5)
CEA SERPL-MCNC: 0.83 NG/ML
CHLORIDE SERPL-SCNC: 102 MMOL/L (ref 98–107)
CO2 SERPL-SCNC: 26.6 MMOL/L (ref 22–29)
CREAT BLDA-MCNC: 1.3 MG/DL (ref 0.6–1.3)
CREAT SERPL-MCNC: 1.26 MG/DL (ref 0.76–1.27)
DEPRECATED RDW RBC AUTO: 46 FL (ref 37–54)
EOSINOPHIL # BLD AUTO: 0.14 10*3/MM3 (ref 0–0.4)
EOSINOPHIL NFR BLD AUTO: 3.7 % (ref 0.3–6.2)
ERYTHROCYTE [DISTWIDTH] IN BLOOD BY AUTOMATED COUNT: 14.3 % (ref 12.3–15.4)
GFR SERPL CREATININE-BSD FRML MDRD: 69 ML/MIN/1.73
GLOBULIN UR ELPH-MCNC: 3.6 GM/DL
GLUCOSE SERPL-MCNC: 107 MG/DL (ref 65–99)
HCT VFR BLD AUTO: 39 % (ref 37.5–51)
HGB BLD-MCNC: 13.1 G/DL (ref 13–17.7)
IMM GRANULOCYTES # BLD AUTO: 0.01 10*3/MM3 (ref 0–0.05)
IMM GRANULOCYTES NFR BLD AUTO: 0.3 % (ref 0–0.5)
LYMPHOCYTES # BLD AUTO: 1.7 10*3/MM3 (ref 0.7–3.1)
LYMPHOCYTES NFR BLD AUTO: 45.1 % (ref 19.6–45.3)
MCH RBC QN AUTO: 29.9 PG (ref 26.6–33)
MCHC RBC AUTO-ENTMCNC: 33.6 G/DL (ref 31.5–35.7)
MCV RBC AUTO: 89 FL (ref 79–97)
MONOCYTES # BLD AUTO: 0.46 10*3/MM3 (ref 0.1–0.9)
MONOCYTES NFR BLD AUTO: 12.2 % (ref 5–12)
NEUTROPHILS NFR BLD AUTO: 1.42 10*3/MM3 (ref 1.7–7)
NEUTROPHILS NFR BLD AUTO: 37.6 % (ref 42.7–76)
NRBC BLD AUTO-RTO: 0 /100 WBC (ref 0–0.2)
PLATELET # BLD AUTO: 284 10*3/MM3 (ref 140–450)
PMV BLD AUTO: 10.1 FL (ref 6–12)
POTASSIUM SERPL-SCNC: 4.4 MMOL/L (ref 3.5–5.2)
PROT SERPL-MCNC: 8 G/DL (ref 6–8.5)
RBC # BLD AUTO: 4.38 10*6/MM3 (ref 4.14–5.8)
SODIUM SERPL-SCNC: 137 MMOL/L (ref 136–145)
WBC # BLD AUTO: 3.77 10*3/MM3 (ref 3.4–10.8)

## 2021-11-08 PROCEDURE — 25010000002 IOPAMIDOL 61 % SOLUTION: Performed by: INTERNAL MEDICINE

## 2021-11-08 PROCEDURE — 80053 COMPREHEN METABOLIC PANEL: CPT | Performed by: INTERNAL MEDICINE

## 2021-11-08 PROCEDURE — 74177 CT ABD & PELVIS W/CONTRAST: CPT

## 2021-11-08 PROCEDURE — 85025 COMPLETE CBC W/AUTO DIFF WBC: CPT | Performed by: INTERNAL MEDICINE

## 2021-11-08 PROCEDURE — 0 DIATRIZOATE MEGLUMINE & SODIUM PER 1 ML: Performed by: INTERNAL MEDICINE

## 2021-11-08 PROCEDURE — 82565 ASSAY OF CREATININE: CPT

## 2021-11-08 PROCEDURE — 71260 CT THORAX DX C+: CPT

## 2021-11-08 PROCEDURE — 82378 CARCINOEMBRYONIC ANTIGEN: CPT | Performed by: INTERNAL MEDICINE

## 2021-11-08 RX ORDER — SILDENAFIL 100 MG/1
50-100 TABLET, FILM COATED ORAL DAILY PRN
Qty: 10 TABLET | Refills: 0 | Status: SHIPPED | OUTPATIENT
Start: 2021-11-08 | End: 2022-02-10 | Stop reason: SDUPTHER

## 2021-11-08 RX ADMIN — DIATRIZOATE MEGLUMINE AND DIATRIZOATE SODIUM 30 ML: 600; 100 SOLUTION ORAL; RECTAL at 10:19

## 2021-11-08 RX ADMIN — IOPAMIDOL 85 ML: 612 INJECTION, SOLUTION INTRAVENOUS at 10:19

## 2021-11-08 NOTE — TELEPHONE ENCOUNTER
Caller: Jared Rose    Relationship: Self    Requested Prescriptions:   Requested Prescriptions     Pending Prescriptions Disp Refills   • sildenafil (Viagra) 100 MG tablet 10 tablet 0     Sig: Take 0.5-1 tablets by mouth Daily As Needed for ED        Pharmacy where request should be sent: [phone]    Additional details provided by patient: PATIENT WOULD LIKE THE BRAND NAME OF THIS MEDICATION, NOT THE GENERIC, IT IS NOT WORKING. PLEASE CALL PATIENT IF ANY QUESTIONS.     Best call back number: [phone]    Does the patient have less than a 3 day supply:  [x] Yes  [] No    Liliya [place] Rep   11/08/21 11:24 EST

## 2021-11-14 NOTE — PROGRESS NOTES
"[facility] GROUP OUTPATIENT FOLLOW UP CLINIC VISIT    REASON FOR FOLLOW-UP:    1.  History of unprovoked pulmonary embolism with a positive lupus anticoagulant test in June 2010.  Chronically anticoagulated with Xarelto 20 mg daily.  Last annual follow-up with Dr. Monroy on 9/26/2018.  2.  Iron deficiency anemia diagnosed at the time of hospitalization on 9/19/2019.  He received a total of 900 mg of intravenous Venofer.  3.  Stage IIIa (pT1, pN1b) adenocarcinoma of the sigmoid colon status post laparoscopic low anterior resection on 9/26/2019.  1.6 x 1.4 cm tumor, grade 2, moderately differentiated with widely negative margins with 2 out of 15 lymph nodes positive for metastatic disease without evidence for extracapsular extension, greatest measuring 3.5 mm.  Microsatellite stable.  4.  Acute right upper extremity superficial thrombophlebitis in the cephalic vein diagnosed on 9/27/2019.  5.  Acute left upper extremity DVT in the brachial vein and acute left upper extremity superficial thrombophlebitis in the basilic vein on 9/27/2019.  Associated with an intravenous catheter.  On Xarelto.  6.  Four cycles of adjuvant CAPEOX complete 1/23/2020    HISTORY OF PRESENT ILLNESS:  Jared Rose is a 66 y.o. male who returns today for follow up.    He generally feels well.  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  He notes occasional dyspnea on exertion.  He notes occasional left lower quadrant abdominal discomfort.  This is sometimes associated with eating.  No diarrhea.  No bleeding.      REVIEW OF SYSTEMS:  As per the HPI    Vitals:    11/15/21 0847   BP: 164/90   Pulse: 86   Resp: 16   Temp: 97.1 °F (36.2 °C)   TempSrc: Temporal   SpO2: 96%   Weight: 117 kg (258 lb 1.6 oz)   Height: 175.8 cm (69.21\")   PainSc: 0-No pain  Comment: colon cancer       PHYSICAL EXAMINATION:  GENERAL:  Well-developed well-nourished male; awake, alert and oriented, in no acute distress.  Wearing a face mask.    SKIN:  Warm and dry, " without rashes, purpura, or petechiae.  HEAD:  Normocephalic, atraumatic.  EARS:  Hearing intact.  LYMPHATICS:  No palpable cervical, supraclavicular, or axillary lymphadenopathy.  CHEST:  Lungs are clear to auscultation bilaterally.  No wheezes, rales, or rhonchi.  HEART:  Regular rate; normal rhythm.  No murmurs, gallops or rubs.  Abdomen: Soft, nontender, nondistended, normal active bowel sounds.  Ventral hernia present.  EXTREMITIES: Warm and well perfused.  No edema.  Incision over the left knee is well-healed.  NEUROLOGICAL:  No focal neurologic deficits.      DIAGNOSTIC DATA:  CEA (11/08/2021 09:53)  Comprehensive Metabolic Panel (11/08/2021 09:53)  CBC & Differential (11/08/2021 09:53)      IMAGING:   CT Abdomen Pelvis With Contrast (11/08/2021 10:20)  CT Chest With Contrast Diagnostic (11/08/2021 10:20)    CT imaging personally reviewed. No change and no evidence for metastatic disease.    ASSESSMENT:  This is a 66 y.o. male with:    *History of unprovoked pulmonary embolism with a positive lupus anticoagulant test in June 2010.    · Chronically anticoagulated with Xarelto 20 mg daily.      *Iron deficiency anemia diagnosed at the time of hospitalization on 9/19/2019.    · He received a total of 900 mg of intravenous Venofer  · Due to colon ca  · Normalized    *Stage IIIa (pT1, pN1b) adenocarcinoma of the sigmoid colon   · Preop CEA 9/25/19 1.28  · Status post laparoscopic low anterior resection on 9/26/2019.    · 1.6 x 1.4 cm tumor, grade 2, moderately differentiated with widely negative margins with 2 out of 15 lymph nodes positive for metastatic disease without evidence for extracapsular extension, greatest measuring 3.5 mm.  Microsatellite stable.  · Discussed pursuing adjuvant therapy with CAPEOX for at least 3 months. This is in accordance with NCCN guidelines.  · 4 cycles of therapy complete as of 1/23/2020.  · CT imaging on 7/17/2020 with some concerning findings.  I alerted his surgeon Dr. Whitt.   He elected to proceed with CT imaging of the abdomen and pelvis in 3 months.  Follow up CT imaging of the abdomen and pelvis performed on 10/16/2020 stable.  · CT imaging 4/2/2021 with a new 1 cm right lower lobe nodule.  Stable findings in the abdomen otherwise.  · PET scan 4/13/2021 with enlargement of multiple abdominal pelvic lymph nodes demonstrating moderate to intense FDG uptake concerning for metastatic disease.  Lobulated cystic mass along the colorectal anastomosis stable with very low FDG uptake.  · Peripheral blood guardant reveal testing on 4/8/2021 is negative for circulating tumor DNA  · ctDNA negative 7/20/21  · Labs and  CT imaging negative on 11/8/21    *Acute right upper extremity superficial thrombophlebitis in the cephalic vein diagnosed on 9/27/2019.  Acute left upper extremity DVT in the brachial vein and acute left upper extremity superficial thrombophlebitis in the basilic vein on 9/27/2019.    · Associated with an intravenous catheter.    · Repeat left upper extremity venous duplex on 1/16/2020 with chronic left upper extremity superficial thrombophlebitis in the basilic vein.  No DVT.    · He continues Xarelto 20 mg daily.    *Dyspnea on exertion: Mild.  Not worsening.    *s/p L total knee replacement on 3/16/2021    *1 cm RLL pulm nodule on CT abd/pelvis 4/2/2021.  Resolved.    PLAN:  1. Continue Xarelto.  He is chronically anticoagulated.    2. Repeat guardant reveal testing when recommended  3. CT imaging and labs in 6 months and I will see him back after that.  4. He does note today that he is now working in [place] and may need to transition his medical care over to [place] [facility].  He will notify us if this is necessary.

## 2021-11-15 ENCOUNTER — APPOINTMENT (OUTPATIENT)
Dept: LAB | Facility: HOSPITAL | Age: 66
End: 2021-11-15

## 2021-11-15 ENCOUNTER — OFFICE VISIT (OUTPATIENT)
Dept: ONCOLOGY | Facility: CLINIC | Age: 66
End: 2021-11-15

## 2021-11-15 VITALS
OXYGEN SATURATION: 96 % | BODY MASS INDEX: 38.23 KG/M2 | DIASTOLIC BLOOD PRESSURE: 90 MMHG | TEMPERATURE: 97.1 F | SYSTOLIC BLOOD PRESSURE: 164 MMHG | RESPIRATION RATE: 16 BRPM | WEIGHT: 258.1 LBS | HEIGHT: 69 IN | HEART RATE: 86 BPM

## 2021-11-15 DIAGNOSIS — Z86.711 HISTORY OF PULMONARY EMBOLUS (PE): Primary | ICD-10-CM

## 2021-11-15 DIAGNOSIS — C18.7 MALIGNANT NEOPLASM OF SIGMOID COLON (HCC): ICD-10-CM

## 2021-11-15 PROCEDURE — 99214 OFFICE O/P EST MOD 30 MIN: CPT | Performed by: INTERNAL MEDICINE

## 2021-12-10 ENCOUNTER — DOCUMENTATION (OUTPATIENT)
Dept: SURGERY | Facility: CLINIC | Age: 66
End: 2021-12-10

## 2021-12-10 NOTE — PROGRESS NOTES
1 year CY, last done 03/10/2021.     12/10/2021, mailed colonoscopy recall letter to patient.     Thank you.

## 2021-12-17 RX ORDER — RIVAROXABAN 20 MG/1
TABLET, FILM COATED ORAL
Qty: 15 TABLET | Refills: 0 | Status: SHIPPED | OUTPATIENT
Start: 2021-12-17 | End: 2021-12-28

## 2021-12-17 RX ORDER — LOSARTAN POTASSIUM 50 MG/1
TABLET ORAL
Qty: 90 TABLET | Refills: 0 | Status: SHIPPED | OUTPATIENT
Start: 2021-12-17 | End: 2022-08-09

## 2021-12-28 RX ORDER — RIVAROXABAN 20 MG/1
TABLET, FILM COATED ORAL
Qty: 15 TABLET | Refills: 0 | Status: SHIPPED | OUTPATIENT
Start: 2021-12-28 | End: 2022-02-07

## 2022-02-07 RX ORDER — RIVAROXABAN 20 MG/1
TABLET, FILM COATED ORAL
Qty: 15 TABLET | Refills: 0 | Status: SHIPPED | OUTPATIENT
Start: 2022-02-07 | End: 2022-06-02 | Stop reason: SDUPTHER

## 2022-02-10 RX ORDER — SILDENAFIL 100 MG/1
50-100 TABLET, FILM COATED ORAL DAILY PRN
Qty: 10 TABLET | Refills: 0 | Status: SHIPPED | OUTPATIENT
Start: 2022-02-10

## 2022-03-03 NOTE — PROGRESS NOTES
"[facility] GROUP OUTPATIENT FOLLOW UP CLINIC VISIT    REASON FOR FOLLOW-UP:    1.  History of unprovoked pulmonary embolism with a positive lupus anticoagulant test in June 2010.  Chronically anticoagulated with Xarelto 20 mg daily.  Last annual follow-up with Dr. Monroy on 9/26/2018.  2.  Iron deficiency anemia diagnosed at the time of hospitalization on 9/19/2019.  He received a total of 900 mg of intravenous Venofer.  3.  Stage IIIa (pT1, pN1b) adenocarcinoma of the sigmoid colon status post laparoscopic low anterior resection on 9/26/2019.  1.6 x 1.4 cm tumor, grade 2, moderately differentiated with widely negative margins with 2 out of 15 lymph nodes positive for metastatic disease without evidence for extracapsular extension, greatest measuring 3.5 mm.  Microsatellite stable.  4.  Acute right upper extremity superficial thrombophlebitis in the cephalic vein diagnosed on 9/27/2019.  5.  Acute left upper extremity DVT in the brachial vein and acute left upper extremity superficial thrombophlebitis in the basilic vein on 9/27/2019.  Associated with an intravenous catheter.  On Xarelto.  6.  Four cycles of adjuvant CAPEOX complete 1/23/2020    HISTORY OF PRESENT ILLNESS:  Jared Rose is a 66 y.o. male who returns today for follow up.    He continues to have some dyspnea on exertion but otherwise remains active and feels well.  He continues to work.  No gastrointestinal problems.  His left knee continues to heal after his left total knee arthroplasty.      REVIEW OF SYSTEMS:  Left knee pain and stiffness has improved.  Dyspnea on exertion.    Vitals:    07/20/21 0900   BP: 160/63   Pulse: 79   Resp: 16   Temp: 97.7 °F (36.5 °C)   TempSrc: Temporal   SpO2: 94%   Weight: 118 kg (259 lb 3.2 oz)   Height: 175.8 cm (69.21\")   PainSc: 0-No pain  Comment: colon cancer       PHYSICAL EXAMINATION:  GENERAL:  Well-developed well-nourished male; awake, alert and oriented, in no acute distress.  Wearing a face mask. " See e-advice encounter.    Ambulatory without assistance today.  SKIN:  Warm and dry, without rashes, purpura, or petechiae.  HEAD:  Normocephalic, atraumatic.  EARS:  Hearing intact.  LYMPHATICS:  No palpable cervical, supraclavicular, or axillary lymphadenopathy.  CHEST:  Lungs are clear to auscultation bilaterally.  No wheezes, rales, or rhonchi.  HEART:  Regular rate; normal rhythm.  No murmurs, gallops or rubs.  EXTREMITIES: Warm and well perfused.  No edema.  Incision over the left knee is well-healed.  NEUROLOGICAL:  No focal neurologic deficits.      DIAGNOSTIC DATA:  CBC & Differential (07/20/2021 08:48)      IMAGING:   CT Abdomen Pelvis With Contrast (07/15/2021 11:37)  CT Chest With Contrast Diagnostic (07/15/2021 11:37)    CT images personally reviewed.  Unchanged tiny right middle lobe nodules.  Prior right lower lobe pulmonary nodule resolved.  Stable pelvic adenopathy with no significant increase in size.  Collection at the operative bed unchanged    ASSESSMENT:  This is a 66 y.o. male with:  *History of unprovoked pulmonary embolism with a positive lupus anticoagulant test in June 2010.    · Chronically anticoagulated with Xarelto 20 mg daily.    · Last annual meeting with Dr. Monroy on 9/26/2019    *Iron deficiency anemia diagnosed at the time of hospitalization on 9/19/2019.    · He received a total of 900 mg of intravenous Venofer  · Due to colon ca  · Normalized    *Stage IIIa (pT1, pN1b) adenocarcinoma of the sigmoid colon   · Preop CEA 9/25/19 1.28  · Status post laparoscopic low anterior resection on 9/26/2019.    · 1.6 x 1.4 cm tumor, grade 2, moderately differentiated with widely negative margins with 2 out of 15 lymph nodes positive for metastatic disease without evidence for extracapsular extension, greatest measuring 3.5 mm.  Microsatellite stable.  · Discussed pursuing adjuvant therapy with CAPEOX for at least 3 months. This is in accordance with NCCN guidelines.  · 4 cycles of therapy complete as of  1/23/2020.  · CT imaging on 7/17/2020 with some concerning findings.  I alerted his surgeon Dr. Whitt.  He elected to proceed with CT imaging of the abdomen and pelvis in 3 months.  Follow up CT imaging of the abdomen and pelvis performed on 10/16/2020 stable.  · CT imaging 4/2/2021 with a new 1 cm right lower lobe nodule.  Stable findings in the abdomen otherwise.  · PET scan 4/13/2021 with enlargement of multiple abdominal pelvic lymph nodes demonstrating moderate to intense FDG uptake concerning for metastatic disease.  Lobulated cystic mass along the colorectal anastomosis stable with very low FDG uptake.  · Peripheral blood guardant reveal testing on 4/8/2021 is negative for circulating tumor DNA    *Acute right upper extremity superficial thrombophlebitis in the cephalic vein diagnosed on 9/27/2019.  Acute left upper extremity DVT in the brachial vein and acute left upper extremity superficial thrombophlebitis in the basilic vein on 9/27/2019.    · Associated with an intravenous catheter.    · Repeat left upper extremity venous duplex on 1/16/2020 with chronic left upper extremity superficial thrombophlebitis in the basilic vein.  No DVT.    · He continues Xarelto 20 mg daily.    *Dyspnea on exertion: His lungs are clear again today without wheezing.  He does note occasional wheezing he states.  It sounds like this is mild and not bothering him much.  We again discussed some weight loss and exercise.    *s/p L total knee replacement on 3/16/2021    *1 cm RLL pulm nodule on CT abd/pelvis 4/2/2021.  Resolved.    PLAN:  1. Continue Xarelto.  He is chronically anticoagulated.    2. Repeat guardant reveal testing today  3. Follow-up in 4 months with CT imaging, CBC, CMP, CEA done at least a few days prior

## 2022-04-25 RX ORDER — LOSARTAN POTASSIUM 50 MG/1
TABLET ORAL
Qty: 90 TABLET | Refills: 0 | OUTPATIENT
Start: 2022-04-25

## 2022-04-25 RX ORDER — RIVAROXABAN 20 MG/1
TABLET, FILM COATED ORAL
Qty: 15 TABLET | Refills: 0 | OUTPATIENT
Start: 2022-04-25

## 2022-05-23 ENCOUNTER — APPOINTMENT (OUTPATIENT)
Dept: PET IMAGING | Facility: HOSPITAL | Age: 67
End: 2022-05-23

## 2022-08-09 RX ORDER — RIVAROXABAN 20 MG/1
TABLET, FILM COATED ORAL
Qty: 15 TABLET | Refills: 0 | Status: SHIPPED | OUTPATIENT
Start: 2022-08-09

## 2022-08-09 RX ORDER — LOSARTAN POTASSIUM 50 MG/1
TABLET ORAL
Qty: 90 TABLET | Refills: 0 | Status: SHIPPED | OUTPATIENT
Start: 2022-08-09

## 2022-08-29 RX ORDER — RIVAROXABAN 20 MG/1
TABLET, FILM COATED ORAL
Qty: 15 TABLET | Refills: 0 | OUTPATIENT
Start: 2022-08-29

## 2024-06-06 NOTE — PROGRESS NOTES
[facility] GROUP OUTPATIENT FOLLOW UP CLINIC VISIT    REASON FOR FOLLOW-UP:    1.  History of unprovoked pulmonary embolism with a positive lupus anticoagulant test in June 2010.  Chronically anticoagulated with Xarelto 20 mg daily.  Last annual follow-up with Dr. Monroy on 9/26/2018.  2.  Iron deficiency anemia diagnosed at the time of hospitalization on 9/19/2019.  He received a total of 900 mg of intravenous Venofer.  3.  Stage IIIa (pT1, pN1b) adenocarcinoma of the sigmoid colon status post laparoscopic low anterior resection on 9/26/2019.  1.6 x 1.4 cm tumor, grade 2, moderately differentiated with widely negative margins with 2 out of 15 lymph nodes positive for metastatic disease without evidence for extracapsular extension, greatest measuring 3.5 mm.  Microsatellite stable.  4.  Acute right upper extremity superficial thrombophlebitis in the cephalic vein diagnosed on 9/27/2019.  5.  Acute left upper extremity DVT in the brachial vein and acute left upper extremity superficial thrombophlebitis in the basilic vein on 9/27/2019.  Associated with an intravenous catheter.  On Xarelto.  6.  Four cycles of adjuvant CAPEOX complete 1/23/2020    HISTORY OF PRESENT ILLNESS:  Jared Rose is a 64 y.o. male who returns today for follow up and lab review.  He completed his fourth cycle being on 1/23/2020.    He is doing well.  Occasional numbness in his fingertips.  He is working.  Energy level is excellent.  His appetite is excellent and he eats well.  He denies any urinary symptoms.  He denies any new antihypertensives or diuretics.  He states that his blood pressure has been under good control.      ALLERGIES:  Allergies   Allergen Reactions   • Adhesive Tape Other (See Comments)     blisters       MEDICATIONS:  The medication list has been reviewed with the patient by the medical assistant, and the list has been updated in the electronic medical record, which I reviewed.  Medication dosages and frequencies  were confirmed to be accurate.    REVIEW OF SYSTEMS:  PAIN:  See Vital Signs below.  GENERAL:  No fevers, chills, night sweats, or unintended weight loss.  SKIN: No rash  HEME/LYMPH:  No abnormal bleeding.  No palpable lymphadenopathy.  EYES:  No vision changes or diplopia.  ENT:  No sore throat or difficulty swallowing.  RESPIRATORY:  No cough, shortness of breath, hemoptysis, or wheezing.  CARDIOVASCULAR:  No chest pain, palpitations, orthopnea, or dyspnea on exertion.  GASTROINTESTINAL: No nausea vomiting diarrhea or constipation.  GENITOURINARY:  No dysuria or hematuria.  MUSCULOSKELETAL: Limited range of motion of the left shoulder.  NEUROLOGIC: Occasional tingling in his fingers.  PSYCHIATRIC:  No depression, anxiety, or mood changes.    There were no vitals filed for this visit.    PHYSICAL EXAMINATION:  GENERAL:  Well-developed well-nourished male; awake, alert and oriented, in no acute distress.  Seen with a video visit today.    DIAGNOSTIC DATA:  Results for orders placed or performed in visit on 04/20/20   Comprehensive Metabolic Panel   Result Value Ref Range    Glucose 106 74 - 124 mg/dL    BUN 20 6 - 20 mg/dL    Creatinine 1.36 (H) 0.70 - 1.30 mg/dL    Sodium 141 134 - 145 mmol/L    Potassium 4.7 3.5 - 4.7 mmol/L    Chloride 103 98 - 107 mmol/L    CO2 26.2 22.0 - 29.0 mmol/L    Calcium 9.6 8.5 - 10.2 mg/dL    Total Protein 7.7 6.3 - 8.0 g/dL    Albumin 4.10 3.50 - 5.20 g/dL    ALT (SGPT) 24 0 - 41 U/L    AST (SGOT) 26 0 - 40 U/L    Alkaline Phosphatase 76 38 - 116 U/L    Total Bilirubin 0.9 0.2 - 1.2 mg/dL    eGFR  African Amer 64 >60 mL/min/1.73    Globulin 3.6 (H) 1.8 - 3.5 gm/dL    A/G Ratio 1.1 1.1 - 2.4 g/dL    BUN/Creatinine Ratio 14.7 7.3 - 30.0    Anion Gap 11.8 5.0 - 15.0 mmol/L   CBC Auto Differential   Result Value Ref Range    WBC 4.98 3.40 - 10.80 10*3/mm3    RBC 3.81 (L) 4.14 - 5.80 10*6/mm3    Hemoglobin 12.5 (L) 13.0 - 17.7 g/dL    Hematocrit 38.3 37.5 - 51.0 %    .5 (H) 79.0 -  97.0 fL    MCH 32.8 26.6 - 33.0 pg    MCHC 32.6 31.5 - 35.7 g/dL    RDW 13.9 12.3 - 15.4 %    RDW-SD 51.9 37.0 - 54.0 fl    MPV 9.0 6.0 - 12.0 fL    Platelets 254 140 - 450 10*3/mm3    Neutrophil % 40.1 (L) 42.7 - 76.0 %    Lymphocyte % 41.6 19.6 - 45.3 %    Monocyte % 13.9 (H) 5.0 - 12.0 %    Eosinophil % 3.8 0.3 - 6.2 %    Basophil % 0.4 0.0 - 1.5 %    Immature Grans % 0.2 0.0 - 0.5 %    Neutrophils, Absolute 2.00 1.70 - 7.00 10*3/mm3    Lymphocytes, Absolute 2.07 0.70 - 3.10 10*3/mm3    Monocytes, Absolute 0.69 0.10 - 0.90 10*3/mm3    Eosinophils, Absolute 0.19 0.00 - 0.40 10*3/mm3    Basophils, Absolute 0.02 0.00 - 0.20 10*3/mm3    Immature Grans, Absolute 0.01 0.00 - 0.05 10*3/mm3    nRBC 0.0 0.0 - 0.2 /100 WBC       IMAGING:  CT images of the chest on 9/25/2019 without definite evidence for metastatic disease.  There is one nodule stable since 2010.  A second nodule is felt to be benign with repeat imaging in 6 months suggested.    IMPRESSION:  The primary sigmoid neoplasm is not well delineated on CT. A  few foci of extraluminal air are seen adjacent to the mid sigmoid colon  likely a result of recent biopsy of the neoplasm. No convincing evidence  of metastatic disease is seen within the chest, abdomen and pelvis.  There are 2 noncalcified lung nodules one of which is definitely  unchanged from 2010 suggestive of a benign nodule. The second nodule is  not definitively identified, however is favored to be benign as well.  This can be reevaluated by chest CT in 6 months.    ASSESSMENT:  This is a 64 y.o. male with:  1.  History of unprovoked pulmonary embolism with a positive lupus anticoagulant test in June 2010.  Chronically anticoagulated with Xarelto 20 mg daily.  Last annual meeting with Dr. Monroy on 9/26/2019    2.  Iron deficiency anemia diagnosed at the time of hospitalization on 9/19/2019.  He received a total of 900 mg of intravenous Venofer.  Iron studies and ferritin were normal.  Anemia improved  today with a hemoglobin of 12.5.    3.  Stage IIIa (pT1, pN1b) adenocarcinoma of the sigmoid colon status post laparoscopic low anterior resection on 9/26/2019.  1.6 x 1.4 cm tumor, grade 2, moderately differentiated with widely negative margins with 2 out of 15 lymph nodes positive for metastatic disease without evidence for extracapsular extension, greatest measuring 3.5 mm.  Microsatellite stable.    Discussed pursuing adjuvant therapy with CAPEOX for at least 3 months. This is in accordance with NCCN guidelines.    4 cycles of therapy complete as of 1/23/2020.    4.  Acute right upper extremity superficial thrombophlebitis in the cephalic vein diagnosed on 9/27/2019.  Acute left upper extremity DVT in the brachial vein and acute left upper extremity superficial thrombophlebitis in the basilic vein on 9/27/2019.  Associated with an intravenous catheter.  He continues Xarelto 20 mg daily.    Repeat left upper extremity venous duplex on 1/16/2020 with chronic left upper extremity superficial thrombophlebitis in the basilic vein.  No DVT.    5.  Skin changes related to Xeloda with very dry skin on his palms: Advised Aquaphor.      6.  Mild neutropenia related to chemotherapy: This has resolved.    7.  Cold sensitivity related to oxaliplatin: Anticipated.  He has no persistent neuropathic symptoms.    8.  Mild acute kidney injury: His creatinine today is 1.36, up from 1.01.  States his blood pressure has been under good control.  Denies any new antihypertensives or diuretics.  I advised making sure that his blood pressure is under good control and staying hydrated.  This was a new issue discussed today.    PLAN:  1.  Continue Xarelto.  He is chronically anticoagulated.    2.  CT imaging of the chest abdomen and pelvis as well as a CBC, CMP, CEA in 3 months and I will see him back after that for follow-up.   General Sunscreen Counseling: I recommended a broad spectrum sunscreen with a SPF of 30 or higher.  I explained that SPF 30 sunscreens block approximately 97 percent of the sun's harmful rays.  Sunscreens should be applied at least 15 minutes prior to expected sun exposure and then every 2 hours after that as long as sun exposure continues. If swimming or exercising sunscreen should be reapplied every 45 minutes to an hour after getting wet or sweating.  One ounce, or the equivalent of a shot glass full of sunscreen, is adequate to protect the skin not covered by a bathing suit. I also recommended a lip balm with a sunscreen as well. Sun protective clothing can be used in lieu of sunscreen but must be worn the entire time you are exposed to the sun's rays. Detail Level: Detailed

## (undated) DEVICE — GOWN SURG AERO CHROME XL

## (undated) DEVICE — BNDG ELAS ELITE V/CLOSE 6IN 5YD LF STRL

## (undated) DEVICE — DUAL CUT SAGITTAL BLADE

## (undated) DEVICE — PREP SOL POVIDONE/IODINE BT 4OZ

## (undated) DEVICE — AIRWAY RETRIEVAL BASKET: Brand: ZERO TIP

## (undated) DEVICE — APPL CHLORAPREP HI/LITE 26ML ORNG

## (undated) DEVICE — SENSR O2 OXIMAX FNGR A/ 18IN NONSTR

## (undated) DEVICE — P.F.C. DRILL BIT AND STEINMAN PIN PACKET (1 UNIT) .125IN DIA 5IN LGTH: Brand: P.F.C.

## (undated) DEVICE — OPTIFOAM GENTLE SA, POSTOP, 4X12: Brand: MEDLINE

## (undated) DEVICE — TRAP FLD MINIVAC MEGADYNE 100ML

## (undated) DEVICE — TUBING, SUCTION, 1/4" X 10', STRAIGHT: Brand: MEDLINE

## (undated) DEVICE — VIOLET BRAIDED (POLYGLACTIN 910), SYNTHETIC ABSORBABLE SUTURE: Brand: COATED VICRYL

## (undated) DEVICE — LOU LAP SIGMOID COLON: Brand: MEDLINE INDUSTRIES, INC.

## (undated) DEVICE — ENSEAL TRIO TEMPERATURE CONTOLLED TISSUE SEALING TECHNOLOGY DISPOSABLE TISSUE SEALING DEVICE TAPTRONIC TRIGGER ACTIVATED POWER 3MM CURVED JAW: Brand: ENSEAL

## (undated) DEVICE — THE CARR-LOCKE INJECTION NEEDLE IS A SINGLE USE, DISPOSABLE, FLEXIBLE SHEATH INJECTION NEEDLE USED FOR THE INJECTION OF VARIOUS TYPES OF MEDIA THROUGH FLEXIBLE ENDOSCOPES.

## (undated) DEVICE — THE SINGLE USE ETRAP – POLYP TRAP IS USED FOR SUCTION RETRIEVAL OF ENDOSCOPICALLY REMOVED POLYPS.: Brand: ETRAP

## (undated) DEVICE — TROCAR: Brand: KII SLEEVE

## (undated) DEVICE — SYS CLS SKIN PREMIERPRO EXOFINFUSION 22CM

## (undated) DEVICE — GLV SURG PREMIERPRO ORTHO LTX PF SZ8.5 BRN

## (undated) DEVICE — PENCL E/S ULTRAVAC TELESCP NOSE HOLSTR 10FT

## (undated) DEVICE — TROCAR: Brand: KII OPTICAL ACCESS SYSTEM

## (undated) DEVICE — ADAPT CLN BIOGUARD AIR/H2O DISP

## (undated) DEVICE — TTL1LYR 16FR10ML 100%SIL TMPST TR: Brand: MEDLINE

## (undated) DEVICE — PENCL E/S HNDSWCH ROCKR CB

## (undated) DEVICE — STPLR SKIN VISISTAT WD 35CT

## (undated) DEVICE — SUT VIC 2/0 UR6 27IN J602H

## (undated) DEVICE — WOUND RETRACTOR AND PROTECTOR: Brand: ALEXIS WOUND PROTECTOR-RETRACTOR

## (undated) DEVICE — DEV COND GAS LAP INSUFLOW W/LUER CONN

## (undated) DEVICE — ENDOCUT SCISSOR TIP, DISPOSABLE: Brand: RENEW

## (undated) DEVICE — GLV SURG BIOGEL LTX PF 7

## (undated) DEVICE — NEEDLE, QUINCKE, 18GX3.5": Brand: MEDLINE

## (undated) DEVICE — CANN NASL CO2 TRULINK W/O2 A/

## (undated) DEVICE — KT ORCA ORCAPOD DISP STRL

## (undated) DEVICE — DECANT BG O JET

## (undated) DEVICE — STERILE LATEX POWDER-FREE SURGICAL GLOVESWITH NITRILE COATING: Brand: PROTEXIS

## (undated) DEVICE — THE TORRENT IRRIGATION SCOPE CONNECTOR IS USED WITH THE TORRENT IRRIGATION TUBING TO PROVIDE IRRIGATION FLUIDS SUCH AS STERILE WATER DURING GASTROINTESTINAL ENDOSCOPIC PROCEDURES WHEN USED IN CONJUNCTION WITH AN IRRIGATION PUMP (OR ELECTROSURGICAL UNIT).: Brand: TORRENT

## (undated) DEVICE — CANNULA,ADULT,SOFT-TOUCH,7'TUBE,UC: Brand: PENDING

## (undated) DEVICE — Device: Brand: SPOT EX ENDOSCOPIC TATTOO

## (undated) DEVICE — ADHS SKIN DERMABOND TOP ADVANCED

## (undated) DEVICE — UNDERCAST PADDING: Brand: DEROYAL

## (undated) DEVICE — GLV SURG SENSICARE POLYISPRN W/ALOE PF LF 9 GRN STRL

## (undated) DEVICE — 3M™ IOBAN™ 2 ANTIMICROBIAL INCISE DRAPE 6648EZ: Brand: IOBAN™ 2

## (undated) DEVICE — SNAR POLYP SENSATION STDOVL 27 240 BX40

## (undated) DEVICE — GAUZE,SPONGE,FLUFF,6"X6.75",STRL,10/TRAY: Brand: MEDLINE

## (undated) DEVICE — GLV SURG SENSICARE PI LF PF 7.5 GRN STRL

## (undated) DEVICE — SUT MNCRYL PLS ANTIB UD 4/0 PS2 18IN

## (undated) DEVICE — MAT FLR ABSORBENT LG 4FT 10 2.5FT

## (undated) DEVICE — ENDOPATH XCEL BLADELESS TROCARS WITH STABILITY SLEEVES: Brand: ENDOPATH XCEL

## (undated) DEVICE — LN SMPL CO2 SHTRM SD STREAM W/M LUER

## (undated) DEVICE — CANN O2 ETCO2 FITS ALL CONN CO2 SMPL A/ 7IN DISP LF

## (undated) DEVICE — COVER,MAYO STAND,STERILE: Brand: MEDLINE

## (undated) DEVICE — INSTRUMENT BATTERY

## (undated) DEVICE — SUT ETHLN 2/0 30IN 628H

## (undated) DEVICE — BARRIER, ABSORBABLE, ADHESION: Brand: SEPRAFILM®

## (undated) DEVICE — STERILE PATIENT PROTECTIVE PAD FOR IMP® KNEE POSITIONERS & COHESIVE WRAP (10 / CASE): Brand: DE MAYO KNEE POSITIONER®

## (undated) DEVICE — SYR LUERLOK 30CC

## (undated) DEVICE — SUT VICRYL 1 CT1 27IN  JJ40G

## (undated) DEVICE — GLV SURG SENSICARE W/ALOE PF LF 9 STRL

## (undated) DEVICE — PK KN TOTL 40

## (undated) DEVICE — SUT PDS 3/0 SH 27IN DYED Z316H

## (undated) DEVICE — ELECTRD BLD EXT EDGE 1P COAT 6.5IN STRL

## (undated) DEVICE — ENDOPATH PNEUMONEEDLE INSUFFLATION NEEDLES WITH LUER LOCK CONNECTORS 120MM: Brand: ENDOPATH

## (undated) DEVICE — PREMIUM WET SKIN PREP TRAY: Brand: MEDLINE INDUSTRIES, INC.

## (undated) DEVICE — SUT VIC 2/0 TIES 18IN J111T

## (undated) DEVICE — SINGLE-USE BIOPSY FORCEPS: Brand: RADIAL JAW 4

## (undated) DEVICE — DISPOSABLE GRASPER CARTRIDGE: Brand: DIRECT DRIVE REPOSABLE GRASPERS

## (undated) DEVICE — Device: Brand: DEFENDO AIR/WATER/SUCTION AND BIOPSY VALVE

## (undated) DEVICE — SOL NACL 0.9PCT 1000ML

## (undated) DEVICE — GLV SURG SENSICARE PI PF LF 7 GRN STRL

## (undated) DEVICE — DRAPE,UTILITY,TAPE,15X26,STERILE: Brand: MEDLINE

## (undated) DEVICE — APPL CHLORAPREP W/TINT 26ML ORNG

## (undated) DEVICE — VISUALIZATION SYSTEM: Brand: CLEARIFY

## (undated) DEVICE — SUT VIC 0 TIES 18IN J912G